# Patient Record
Sex: FEMALE | Race: WHITE | NOT HISPANIC OR LATINO | Employment: OTHER | URBAN - METROPOLITAN AREA
[De-identification: names, ages, dates, MRNs, and addresses within clinical notes are randomized per-mention and may not be internally consistent; named-entity substitution may affect disease eponyms.]

---

## 2020-04-04 ENCOUNTER — APPOINTMENT (OUTPATIENT)
Dept: RADIOLOGY | Facility: MEDICAL CENTER | Age: 75
DRG: 562 | End: 2020-04-04
Attending: EMERGENCY MEDICINE
Payer: MEDICARE

## 2020-04-04 ENCOUNTER — APPOINTMENT (OUTPATIENT)
Dept: CARDIOLOGY | Facility: MEDICAL CENTER | Age: 75
DRG: 562 | End: 2020-04-04
Attending: HOSPITALIST
Payer: MEDICARE

## 2020-04-04 ENCOUNTER — HOSPITAL ENCOUNTER (INPATIENT)
Facility: MEDICAL CENTER | Age: 75
LOS: 3 days | DRG: 562 | End: 2020-04-07
Attending: EMERGENCY MEDICINE | Admitting: HOSPITALIST
Payer: MEDICARE

## 2020-04-04 DIAGNOSIS — J96.01 ACUTE RESPIRATORY FAILURE WITH HYPOXIA (HCC): ICD-10-CM

## 2020-04-04 DIAGNOSIS — E78.5 DYSLIPIDEMIA: ICD-10-CM

## 2020-04-04 DIAGNOSIS — I10 ESSENTIAL HYPERTENSION: ICD-10-CM

## 2020-04-04 DIAGNOSIS — S42.209A PROXIMAL HUMERAL FRACTURE: ICD-10-CM

## 2020-04-04 DIAGNOSIS — R09.02 HYPOXIA: ICD-10-CM

## 2020-04-04 DIAGNOSIS — S42.326A: ICD-10-CM

## 2020-04-04 PROBLEM — Z71.89 ACP (ADVANCE CARE PLANNING): Status: ACTIVE | Noted: 2020-04-04

## 2020-04-04 PROBLEM — S42.309A HUMERUS FRACTURE: Status: ACTIVE | Noted: 2020-04-04

## 2020-04-04 PROBLEM — D53.9 MACROCYTIC ANEMIA: Status: ACTIVE | Noted: 2020-04-04

## 2020-04-04 LAB
ANION GAP SERPL CALC-SCNC: 15 MMOL/L (ref 7–16)
BASOPHILS # BLD AUTO: 0.3 % (ref 0–1.8)
BASOPHILS # BLD: 0.05 K/UL (ref 0–0.12)
BUN SERPL-MCNC: 21 MG/DL (ref 8–22)
CALCIUM SERPL-MCNC: 9.6 MG/DL (ref 8.4–10.2)
CHLORIDE SERPL-SCNC: 98 MMOL/L (ref 96–112)
CO2 SERPL-SCNC: 23 MMOL/L (ref 20–33)
CREAT SERPL-MCNC: 0.52 MG/DL (ref 0.5–1.4)
EOSINOPHIL # BLD AUTO: 0.1 K/UL (ref 0–0.51)
EOSINOPHIL NFR BLD: 0.6 % (ref 0–6.9)
ERYTHROCYTE [DISTWIDTH] IN BLOOD BY AUTOMATED COUNT: 48.4 FL (ref 35.9–50)
GLUCOSE SERPL-MCNC: 331 MG/DL (ref 65–99)
HCT VFR BLD AUTO: 41 % (ref 37–47)
HGB BLD-MCNC: 13 G/DL (ref 12–16)
IMM GRANULOCYTES # BLD AUTO: 0.11 K/UL (ref 0–0.11)
IMM GRANULOCYTES NFR BLD AUTO: 0.7 % (ref 0–0.9)
LYMPHOCYTES # BLD AUTO: 1.78 K/UL (ref 1–4.8)
LYMPHOCYTES NFR BLD: 10.6 % (ref 22–41)
MCH RBC QN AUTO: 32 PG (ref 27–33)
MCHC RBC AUTO-ENTMCNC: 31.7 G/DL (ref 33.6–35)
MCV RBC AUTO: 101 FL (ref 81.4–97.8)
MONOCYTES # BLD AUTO: 0.75 K/UL (ref 0–0.85)
MONOCYTES NFR BLD AUTO: 4.5 % (ref 0–13.4)
NEUTROPHILS # BLD AUTO: 13.97 K/UL (ref 2–7.15)
NEUTROPHILS NFR BLD: 83.3 % (ref 44–72)
NRBC # BLD AUTO: 0 K/UL
NRBC BLD-RTO: 0 /100 WBC
PLATELET # BLD AUTO: 288 K/UL (ref 164–446)
PMV BLD AUTO: 10 FL (ref 9–12.9)
POTASSIUM SERPL-SCNC: 4.1 MMOL/L (ref 3.6–5.5)
RBC # BLD AUTO: 4.06 M/UL (ref 4.2–5.4)
SODIUM SERPL-SCNC: 136 MMOL/L (ref 135–145)
WBC # BLD AUTO: 16.8 K/UL (ref 4.8–10.8)

## 2020-04-04 PROCEDURE — 700102 HCHG RX REV CODE 250 W/ 637 OVERRIDE(OP): Performed by: HOSPITALIST

## 2020-04-04 PROCEDURE — 82607 VITAMIN B-12: CPT

## 2020-04-04 PROCEDURE — 71045 X-RAY EXAM CHEST 1 VIEW: CPT

## 2020-04-04 PROCEDURE — 80048 BASIC METABOLIC PNL TOTAL CA: CPT

## 2020-04-04 PROCEDURE — 99223 1ST HOSP IP/OBS HIGH 75: CPT | Mod: 25 | Performed by: HOSPITALIST

## 2020-04-04 PROCEDURE — 99285 EMERGENCY DEPT VISIT HI MDM: CPT

## 2020-04-04 PROCEDURE — 770006 HCHG ROOM/CARE - MED/SURG/GYN SEMI*

## 2020-04-04 PROCEDURE — A9270 NON-COVERED ITEM OR SERVICE: HCPCS | Performed by: EMERGENCY MEDICINE

## 2020-04-04 PROCEDURE — 93306 TTE W/DOPPLER COMPLETE: CPT

## 2020-04-04 PROCEDURE — 96374 THER/PROPH/DIAG INJ IV PUSH: CPT

## 2020-04-04 PROCEDURE — 700102 HCHG RX REV CODE 250 W/ 637 OVERRIDE(OP): Performed by: EMERGENCY MEDICINE

## 2020-04-04 PROCEDURE — A9270 NON-COVERED ITEM OR SERVICE: HCPCS | Performed by: HOSPITALIST

## 2020-04-04 PROCEDURE — 94760 N-INVAS EAR/PLS OXIMETRY 1: CPT

## 2020-04-04 PROCEDURE — 85025 COMPLETE CBC W/AUTO DIFF WBC: CPT

## 2020-04-04 PROCEDURE — 700111 HCHG RX REV CODE 636 W/ 250 OVERRIDE (IP): Performed by: EMERGENCY MEDICINE

## 2020-04-04 PROCEDURE — 36415 COLL VENOUS BLD VENIPUNCTURE: CPT

## 2020-04-04 PROCEDURE — 96375 TX/PRO/DX INJ NEW DRUG ADDON: CPT

## 2020-04-04 PROCEDURE — 73030 X-RAY EXAM OF SHOULDER: CPT | Mod: RT

## 2020-04-04 PROCEDURE — 700111 HCHG RX REV CODE 636 W/ 250 OVERRIDE (IP): Performed by: HOSPITALIST

## 2020-04-04 PROCEDURE — 99497 ADVNCD CARE PLAN 30 MIN: CPT | Performed by: HOSPITALIST

## 2020-04-04 RX ORDER — AMOXICILLIN 250 MG
2 CAPSULE ORAL 2 TIMES DAILY
Status: DISCONTINUED | OUTPATIENT
Start: 2020-04-04 | End: 2020-04-07 | Stop reason: HOSPADM

## 2020-04-04 RX ORDER — MORPHINE SULFATE 4 MG/ML
2 INJECTION, SOLUTION INTRAMUSCULAR; INTRAVENOUS ONCE
Status: COMPLETED | OUTPATIENT
Start: 2020-04-04 | End: 2020-04-04

## 2020-04-04 RX ORDER — ONDANSETRON 2 MG/ML
4 INJECTION INTRAMUSCULAR; INTRAVENOUS EVERY 4 HOURS PRN
Status: DISCONTINUED | OUTPATIENT
Start: 2020-04-04 | End: 2020-04-07 | Stop reason: HOSPADM

## 2020-04-04 RX ORDER — GLIMEPIRIDE 4 MG/1
4 TABLET ORAL DAILY
COMMUNITY
Start: 2020-02-24

## 2020-04-04 RX ORDER — OXYBUTYNIN CHLORIDE 5 MG/1
5 TABLET ORAL 2 TIMES DAILY
COMMUNITY
Start: 2020-03-13 | End: 2021-06-23

## 2020-04-04 RX ORDER — ACETAMINOPHEN 325 MG/1
650 TABLET ORAL EVERY 6 HOURS PRN
Status: DISCONTINUED | OUTPATIENT
Start: 2020-04-04 | End: 2020-04-07 | Stop reason: HOSPADM

## 2020-04-04 RX ORDER — ACETAMINOPHEN 325 MG/1
650 TABLET ORAL ONCE
Status: COMPLETED | OUTPATIENT
Start: 2020-04-04 | End: 2020-04-04

## 2020-04-04 RX ORDER — OXYCODONE HYDROCHLORIDE 5 MG/1
5 TABLET ORAL
Status: DISCONTINUED | OUTPATIENT
Start: 2020-04-04 | End: 2020-04-07 | Stop reason: HOSPADM

## 2020-04-04 RX ORDER — BISACODYL 10 MG
10 SUPPOSITORY, RECTAL RECTAL
Status: DISCONTINUED | OUTPATIENT
Start: 2020-04-04 | End: 2020-04-07 | Stop reason: HOSPADM

## 2020-04-04 RX ORDER — OXYCODONE HYDROCHLORIDE 5 MG/1
2.5 TABLET ORAL
Status: DISCONTINUED | OUTPATIENT
Start: 2020-04-04 | End: 2020-04-07 | Stop reason: HOSPADM

## 2020-04-04 RX ORDER — ENALAPRILAT 1.25 MG/ML
1.25 INJECTION INTRAVENOUS EVERY 6 HOURS PRN
Status: DISCONTINUED | OUTPATIENT
Start: 2020-04-04 | End: 2020-04-07 | Stop reason: HOSPADM

## 2020-04-04 RX ORDER — LEVOTHYROXINE SODIUM 0.03 MG/1
25 TABLET ORAL DAILY
COMMUNITY
Start: 2020-03-13

## 2020-04-04 RX ORDER — ONDANSETRON 4 MG/1
4 TABLET, ORALLY DISINTEGRATING ORAL EVERY 4 HOURS PRN
Status: DISCONTINUED | OUTPATIENT
Start: 2020-04-04 | End: 2020-04-07 | Stop reason: HOSPADM

## 2020-04-04 RX ORDER — METFORMIN HYDROCHLORIDE 500 MG/1
500 TABLET, EXTENDED RELEASE ORAL 2 TIMES DAILY
COMMUNITY
Start: 2020-01-18

## 2020-04-04 RX ORDER — HYDROMORPHONE HYDROCHLORIDE 1 MG/ML
0.25 INJECTION, SOLUTION INTRAMUSCULAR; INTRAVENOUS; SUBCUTANEOUS
Status: DISCONTINUED | OUTPATIENT
Start: 2020-04-04 | End: 2020-04-07 | Stop reason: HOSPADM

## 2020-04-04 RX ORDER — POLYETHYLENE GLYCOL 3350 17 G/17G
1 POWDER, FOR SOLUTION ORAL
Status: DISCONTINUED | OUTPATIENT
Start: 2020-04-04 | End: 2020-04-07 | Stop reason: HOSPADM

## 2020-04-04 RX ADMIN — ONDANSETRON 4 MG: 2 INJECTION INTRAMUSCULAR; INTRAVENOUS at 23:34

## 2020-04-04 RX ADMIN — MORPHINE SULFATE 2 MG: 4 INJECTION INTRAVENOUS at 11:38

## 2020-04-04 RX ADMIN — OXYCODONE HYDROCHLORIDE 5 MG: 5 TABLET ORAL at 14:34

## 2020-04-04 RX ADMIN — FENTANYL CITRATE 50 MCG: 50 INJECTION, SOLUTION INTRAMUSCULAR; INTRAVENOUS at 12:34

## 2020-04-04 RX ADMIN — ACETAMINOPHEN 650 MG: 325 TABLET, FILM COATED ORAL at 11:47

## 2020-04-04 RX ADMIN — ONDANSETRON 4 MG: 2 INJECTION INTRAMUSCULAR; INTRAVENOUS at 19:39

## 2020-04-04 RX ADMIN — ENOXAPARIN SODIUM 40 MG: 40 INJECTION SUBCUTANEOUS at 14:34

## 2020-04-04 RX ADMIN — OXYCODONE HYDROCHLORIDE 5 MG: 5 TABLET ORAL at 23:31

## 2020-04-04 ASSESSMENT — COGNITIVE AND FUNCTIONAL STATUS - GENERAL
TOILETING: TOTAL
WALKING IN HOSPITAL ROOM: A LOT
MOBILITY SCORE: 12
MOVING FROM LYING ON BACK TO SITTING ON SIDE OF FLAT BED: A LOT
DRESSING REGULAR LOWER BODY CLOTHING: TOTAL
SUGGESTED CMS G CODE MODIFIER MOBILITY: CL
EATING MEALS: A LITTLE
HELP NEEDED FOR BATHING: TOTAL
STANDING UP FROM CHAIR USING ARMS: A LOT
MOVING TO AND FROM BED TO CHAIR: A LOT
PERSONAL GROOMING: A LITTLE
TURNING FROM BACK TO SIDE WHILE IN FLAT BAD: A LOT
DAILY ACTIVITIY SCORE: 10
SUGGESTED CMS G CODE MODIFIER DAILY ACTIVITY: CL
CLIMB 3 TO 5 STEPS WITH RAILING: A LOT
DRESSING REGULAR UPPER BODY CLOTHING: TOTAL

## 2020-04-04 ASSESSMENT — LIFESTYLE VARIABLES
HAVE PEOPLE ANNOYED YOU BY CRITICIZING YOUR DRINKING: NO
TOTAL SCORE: 0
AVERAGE NUMBER OF DAYS PER WEEK YOU HAVE A DRINK CONTAINING ALCOHOL: 0
EVER_SMOKED: YES
EVER FELT BAD OR GUILTY ABOUT YOUR DRINKING: NO
EVER HAD A DRINK FIRST THING IN THE MORNING TO STEADY YOUR NERVES TO GET RID OF A HANGOVER: NO
CONSUMPTION TOTAL: NEGATIVE
HAVE YOU EVER FELT YOU SHOULD CUT DOWN ON YOUR DRINKING: NO
ON A TYPICAL DAY WHEN YOU DRINK ALCOHOL HOW MANY DRINKS DO YOU HAVE: 0
ALCOHOL_USE: NO
HOW MANY TIMES IN THE PAST YEAR HAVE YOU HAD 5 OR MORE DRINKS IN A DAY: 0
TOTAL SCORE: 0
TOTAL SCORE: 0

## 2020-04-04 ASSESSMENT — PATIENT HEALTH QUESTIONNAIRE - PHQ9
SUM OF ALL RESPONSES TO PHQ QUESTIONS 1-9: 15
7. TROUBLE CONCENTRATING ON THINGS, SUCH AS READING THE NEWSPAPER OR WATCHING TELEVISION: NOT AT ALL
8. MOVING OR SPEAKING SO SLOWLY THAT OTHER PEOPLE COULD HAVE NOTICED. OR THE OPPOSITE, BEING SO FIGETY OR RESTLESS THAT YOU HAVE BEEN MOVING AROUND A LOT MORE THAN USUAL: NOT AT ALL
2. FEELING DOWN, DEPRESSED, IRRITABLE, OR HOPELESS: NEARLY EVERY DAY
SUM OF ALL RESPONSES TO PHQ9 QUESTIONS 1 AND 2: 5
5. POOR APPETITE OR OVEREATING: NEARLY EVERY DAY
4. FEELING TIRED OR HAVING LITTLE ENERGY: NEARLY EVERY DAY
3. TROUBLE FALLING OR STAYING ASLEEP OR SLEEPING TOO MUCH: NEARLY EVERY DAY
9. THOUGHTS THAT YOU WOULD BE BETTER OFF DEAD, OR OF HURTING YOURSELF: NOT AT ALL
6. FEELING BAD ABOUT YOURSELF - OR THAT YOU ARE A FAILURE OR HAVE LET YOURSELF OR YOUR FAMILY DOWN: SEVERAL DAYS
1. LITTLE INTEREST OR PLEASURE IN DOING THINGS: MORE THAN HALF THE DAYS

## 2020-04-04 ASSESSMENT — COPD QUESTIONNAIRES
IN THE PAST 12 MONTHS DO YOU DO LESS THAN YOU USED TO BECAUSE OF YOUR BREATHING PROBLEMS: STRONGLY AGREE
HAVE YOU SMOKED AT LEAST 100 CIGARETTES IN YOUR ENTIRE LIFE: YES
COPD SCREENING SCORE: 8
DURING THE PAST 4 WEEKS HOW MUCH DID YOU FEEL SHORT OF BREATH: MOST  OR ALL OF THE TIME
DO YOU EVER COUGH UP ANY MUCUS OR PHLEGM?: NO/ONLY WITH OCCASIONAL COLDS OR INFECTIONS

## 2020-04-04 ASSESSMENT — ENCOUNTER SYMPTOMS
DOUBLE VISION: 0
NAUSEA: 0
DIZZINESS: 0
PALPITATIONS: 0
TREMORS: 0
ORTHOPNEA: 0
SENSORY CHANGE: 0
TINGLING: 0
HEADACHES: 0
FEVER: 0
SHORTNESS OF BREATH: 0
VOMITING: 0
COUGH: 0
HEARTBURN: 0
CLAUDICATION: 0
CHILLS: 0
BLURRED VISION: 0

## 2020-04-04 NOTE — ED NOTES
Care assumed at this time. Patient resting in bed. Patient occasionally self adjusts in bed. Bed is at lowest position and locked. Call light and preferred belongings in reach. Will continue to monitor.

## 2020-04-04 NOTE — ASSESSMENT & PLAN NOTE
Of unclear etiology  Low suspicion for PE  We do not have a previous echo on file  Chest x-ray personally reviewed shows possible vascular congestion  Repeat echo shows grade 2 diastolic dysfunction  Start Lasix 40 mg p.o. daily  RT protocol  Continuous pulse ox  Wean O2 as able

## 2020-04-04 NOTE — ED TRIAGE NOTES
Pt BIB [facility] from home.  Pt had a GLF this morning in her kitchen.  Pt lives alone.  Per EMS, pt was c/o pain to her R side.  Pt given Fentanyl 100mcg and Zofran 4mg by EMS.  Pt was found to be hypoxic for EMS @ 87%.  Pt placed on O2 by EMS.  FSBS .  On arrival to the ER, pt awake and c/o pain 3/10 to her R shoulder, R elbow and R arm.  Pt found to be 78% on R/A.  Pt states she has COPD and does not use home oxygen.  Pt placed on 4L O2 and sats up to 99%.  Warm blankets given.  Call light within reach.  Chart up for ERP.

## 2020-04-04 NOTE — ED NOTES
Unable to complete med rec at this time  Tried to call pt and she did not answer  No pharmacy on file to update med rec with

## 2020-04-04 NOTE — ASSESSMENT & PLAN NOTE
I discussed advance care planning with the patient for at least 18 minutes, including diagnosis, prognosis, plan of care, risks and benefits of any therapies that could be offered, as well as alternatives including palliation and hospice, as appropriate.  We discussed CODE STATUS, patient is wanting full code.

## 2020-04-04 NOTE — ED PROVIDER NOTES
ED Provider Note    CHIEF COMPLAINT  Chief Complaint   Patient presents with   • T-5000 GLF   • Shoulder Pain     right   • Arm Pain     right       HPI  Gabrielle Olmstead is a 74 y.o. female who presents complaining of right shoulder pain.  The patient was in her usual state of health, was leaning over to kill some ants on the floor, lost her balance landing onto her right shoulder.  She denies any loss of consciousness.  There is no headache or head injury.  No chest pain or shortness of breath.  She has a cough but points out that this is the same cough that she always has no changes here.  She denies any sick contacts or recent travel.  No fever.  It is not sound like she is aware she has a diagnosis of COPD.  There is no leg pain or swelling.  She denies any difficulty sleeping such as orthopnea or PND.  She denies injure anything else.  There is no other complaint.    PAST MEDICAL HISTORY  Past Medical History:   Diagnosis Date   • Arthritis    • ASTHMA    • Breath shortness    • Bronchitis    • Diabetes     ORAL   • EMPHYSEMA    • Hypertension    • Other specified disorder of intestines        FAMILY HISTORY  History reviewed. No pertinent family history.    SOCIAL HISTORY  Social History     Tobacco Use   • Smoking status: Former Smoker     Packs/day: 2.00     Years: 40.00     Pack years: 80.00     Types: Cigarettes   • Smokeless tobacco: Never Used   • Tobacco comment: 45 YEARS  11/2 PPD   Substance Use Topics   • Alcohol use: No     Comment: OCCASSIONAL   • Drug use: No         SURGICAL HISTORY  Past Surgical History:   Procedure Laterality Date   • HYSTERECTOMY ROBOTIC  11/28/2011    Performed by REKHA BYERS at SURGERY [facility] ORS   • LESION EXCISION GENERAL  11/28/2011    Performed by REKHA BYERS at SURGERY [facility]   • CYST EXCISION  8/6/2010    Performed by ANA PAVON at SURGERY SAME DAY [facility]       CURRENT MEDICATIONS  Home Medications     Reviewed by Lowell Álvarez (Pharmacy  "Tech) on 04/04/20 at 1223  Med List Status: Complete   Medication Last Dose Status   amlodipine-benazepril (LOTREL) 5-20 MG per capsule UNK Active   atorvastatin (LIPITOR) 40 MG TABS UNK Active   dicyclomine (BENTYL) 20 MG TABS UNK Active   fluticasone-salmeterol (ADVAIR) 250-50 MCG/DOSE AEPB UNK Active   glimepiride (AMARYL) 4 MG Tab UNK Active   hydrochlorothiazide (HYDRODIURIL) 25 MG TABS UNK Active   levothyroxine (SYNTHROID) 25 MCG Tab UNK Active   metFORMIN ER (GLUCOPHAGE XR) 500 MG TABLET SR 24 HR UNK Active   oxybutynin (DITROPAN) 5 MG Tab UNK Active   sertraline (ZOLOFT) 50 MG Tab UNK Active                I have reviewed the nurses notes and/or the list brought with the patient.    ALLERGIES  Allergies   Allergen Reactions   • Nkda [No Known Drug Allergy]        REVIEW OF SYSTEMS  See HPI for further details. Review of systems as above, otherwise all other systems are negative.     PHYSICAL EXAM  VITAL SIGNS: BP (!) 175/71   Pulse 75   Temp 36.7 °C (98 °F) (Temporal)   Resp 18   Ht 1.626 m (5' 4\")   Wt 108 kg (238 lb 1.6 oz) Comment: per [facility] on scale upon entry  SpO2 93%   BMI 40.87 kg/m²     Constitutional: Well appearing patient in no acute distress.  Not toxic, nor ill in appearance.  HENT: Mucus membranes moist.  Oropharynx is clear.  Eyes: Pupils equally round.  No scleral icterus.   Neck: Full nontender range of motion.  Lymphatic: No cervical lymphadenopathy noted.   Cardiovascular: Regular heart rate and rhythm.  No murmurs, rubs, nor gallop appreciated.   Thorax & Lungs: Chest is nontender.  Lungs are clear to auscultation with good air movement bilaterally.  No wheeze, rhonchi, nor rales.   Abdomen: Soft, with no tenderness, rebound nor guarding.  No mass, pulsatile mass, nor hepatosplenomegaly appreciated.  Skin: No purpura nor petechia noted.  Extremities/Musculoskeletal: Quite tender over the proximal humerus.  Some edema here.  No evidence of dislocation.  She is uninjured more " distally.  Neurologic: Alert & oriented.  Strength and sensation is intact all around.  Gait is not assessed.  Psychiatric: Normal affect appropriate for the clinical situation.    LABS  Labs Reviewed   CBC WITH DIFFERENTIAL - Abnormal; Notable for the following components:       Result Value    WBC 16.8 (*)     RBC 4.06 (*)     .0 (*)     MCHC 31.7 (*)     Neutrophils-Polys 83.30 (*)     Lymphocytes 10.60 (*)     Neutrophils (Absolute) 13.97 (*)     All other components within normal limits   BASIC METABOLIC PANEL - Abnormal; Notable for the following components:    Glucose 331 (*)     All other components within normal limits   ESTIMATED GFR         RADIOLOGY/PROCEDURES  I have reviewed the patient's film interpretations myself, and they are read out by the radiologist as:   DX-SHOULDER 2+ RIGHT   Final Result      Impacted and angulated RIGHT proximal humerus fracture with associated hemarthrosis.      DX-CHEST-PORTABLE (1 VIEW)   Final Result      1.  Hypoinflation and pulmonary vascular congestion.   2.  Moderate cardiac enlargement, increased from prior exam.   3.  Impacted RIGHT proximal humerus fracture.        .    MEDICAL RECORD  I have reviewed patient's medical record and pertinent results are listed above.    COURSE & MEDICAL DECISION MAKING  I have reviewed any medical record information, laboratory studies and radiographic results as noted above.  This patient presents after mechanical ground-level fall with pain in her right shoulder.  She is in quite a bit of discomfort despite IV opiate analgesia as well as oral acetaminophen.  At this point, I agree with the nursing staff that she is unable to care for herself safely at this time.  I do note that she has hypoxia which corrects with supplemental nasal cannula.  She does have leukocytosis as well.  As noted above the patient is quite clear that she is not short of breath, does not have a new cough.  She was a heavy smoker but quit about 8  years ago.  For this reason, and the fact that her x-ray shows no evidence of infiltrate, I have held off on antibiotics.  Emphysema is listed on her medication problems but she does not know about a diagnosis of this or COPD.  At this point put her in the hospital for ongoing pain control, physical and potentially Occupational Therapy.  Discussed the patient's case with Dr. Lloyd who will be seeing her for admission.  Also discussed the patient's case with Dr. Loredo, orthopedics, who will be consulting.  She be placed into a sling.    FINAL IMPRESSION  1. Proximal humeral fracture    2. Hypoxia           This dictation was created using voice recognition software.    Electronically signed by: Vj Adams M.D., 4/4/2020 1:05 PM

## 2020-04-04 NOTE — H&P
Hospital Medicine History & Physical Note    Date of Service  4/4/2020    Primary Care Physician  Anthony Martinez M.D.    Consultants  Orthopedics    Code Status  Full    Chief Complaint  Fall with arm pain    History of Presenting Illness  74 y.o. female who presented 4/4/2020 with fall at home, patient was bending over at the waist to place in and says she is on the ground and fell forward onto her right shoulder and arm.  Imaging confirms impacted and angulated RIGHT proximal humerus fracture with associated hemarthrosis.  This is not happened to her before.  She denies any loss of consciousness no chest pain headaches or vision changes prior to the event.  She simply lost her balance and fell forward, she is unable to bend well at the knees due to arthritis and so leaning too far forward over her hips.    Denies recent fevers, no recent sick contacts.  No cough.  In the emergency department she was found to be hypoxic requiring 2 L via nasal cannula.  She denies oxygen use at home.  I confirmed patient is hypoxic on room air with O2 sats dipping into the mid 80s.  No tachycardia.  Patient denies any lower extremity pain or swelling, no recent long trips.    I discussed this case with Dr Adams of the emergency department.  He kindly consulted orthopedics and discussed her care, patient will be placed into a sling.    Review of Systems  Review of Systems   Constitutional: Negative for chills and fever.   Eyes: Negative for blurred vision and double vision.   Respiratory: Negative for cough and shortness of breath.    Cardiovascular: Negative for chest pain, palpitations, orthopnea, claudication and leg swelling.   Gastrointestinal: Negative for heartburn, nausea and vomiting.   Musculoskeletal: Positive for joint pain.   Skin: Negative for itching and rash.   Neurological: Negative for dizziness, tingling, tremors, sensory change and headaches.   All other systems reviewed and are negative.      Past Medical  History   has a past medical history of Arthritis, ASTHMA, Breath shortness, Bronchitis, Diabetes, EMPHYSEMA, Hypertension, and Other specified disorder of intestines.    Surgical History   has a past surgical history that includes cyst excision (8/6/2010); hysterectomy robotic (11/28/2011); and lesion excision general (11/28/2011).     Family History  family history is not on file.     Social History   reports that she has quit smoking. Her smoking use included cigarettes. She has a 80.00 pack-year smoking history. She has never used smokeless tobacco. She reports that she does not drink alcohol or use drugs.    Allergies  Allergies   Allergen Reactions   • Nkda [No Known Drug Allergy]        Medications  Prior to Admission Medications   Prescriptions Last Dose Informant Patient Reported? Taking?   amlodipine-benazepril (LOTREL) 5-20 MG per capsule UNK at [facility] Patient's Home Pharmacy Yes No   Sig: Take 1 Cap by mouth every day.   atorvastatin (LIPITOR) 40 MG TABS UNK at [facility] Patient's Home Pharmacy Yes No   Sig: Take 40 mg by mouth every evening.   dicyclomine (BENTYL) 20 MG TABS UNK at [facility] Patient's Home Pharmacy Yes No   Sig: Take 20 mg by mouth every 6 hours.   fluticasone-salmeterol (ADVAIR) 250-50 MCG/DOSE AEPB UNK at [facility] Patient's Home Pharmacy Yes No   Sig: Inhale 1 Puff by mouth every 12 hours.   glimepiride (AMARYL) 4 MG Tab UNK at [facility] Patient's Home Pharmacy Yes No   Sig: Take 4 mg by mouth every day.   hydrochlorothiazide (HYDRODIURIL) 25 MG TABS UNK at [facility] Patient's Home Pharmacy Yes No   Sig: Take 25 mg by mouth every day.   levothyroxine (SYNTHROID) 25 MCG Tab UNK at [facility] Patient's Home Pharmacy Yes No   Sig: Take 25 mcg by mouth every day.   metFORMIN ER (GLUCOPHAGE XR) 500 MG TABLET SR 24 HR UNK at [facility] Patient's Home Pharmacy Yes No   Sig: Take 500 mg by mouth 2 Times a Day.   oxybutynin (DITROPAN) 5 MG Tab UNK at [facility] Patient's Home Pharmacy Yes No   Sig: Take 5 mg by mouth 2 Times a Day.   sertraline  (ZOLOFT) 50 MG Tab UNK at [facility] Patient's Home Pharmacy Yes No   Sig: Take 50 mg by mouth every day.      Facility-Administered Medications: None       Physical Exam  Temp:  [36.6 °C (97.8 °F)-36.7 °C (98 °F)] 36.6 °C (97.8 °F)  Pulse:  [75-86] 77  Resp:  [18] 18  BP: (156-201)/(48-71) 156/57  SpO2:  [78 %-97 %] 96 %    Physical Exam  Vitals signs and nursing note reviewed.   Constitutional:       General: She is not in acute distress.     Appearance: She is obese. She is ill-appearing.   HENT:      Head: Normocephalic and atraumatic.      Nose: No congestion.      Mouth/Throat:      Mouth: Mucous membranes are moist.      Pharynx: Oropharynx is clear.   Eyes:      General: No scleral icterus.        Right eye: No discharge.         Left eye: No discharge.      Extraocular Movements: Extraocular movements intact.      Conjunctiva/sclera: Conjunctivae normal.   Neck:      Musculoskeletal: Normal range of motion and neck supple. No neck rigidity.   Cardiovascular:      Rate and Rhythm: Normal rate and regular rhythm.      Pulses: Normal pulses.      Heart sounds: Normal heart sounds. No murmur.   Pulmonary:      Effort: Pulmonary effort is normal. No respiratory distress.      Breath sounds: Normal breath sounds. No stridor. No wheezing.   Abdominal:      General: Bowel sounds are normal. There is no distension.      Palpations: Abdomen is soft.      Tenderness: There is no abdominal tenderness. There is no guarding.   Musculoskeletal:         General: Swelling, tenderness and signs of injury present.   Skin:     General: Skin is warm and dry.      Coloration: Skin is not jaundiced.      Findings: No bruising.   Neurological:      General: No focal deficit present.      Mental Status: She is alert and oriented to person, place, and time.      Cranial Nerves: No cranial nerve deficit.   Psychiatric:         Mood and Affect: Mood normal.         Thought Content: Thought content normal.         Laboratory:  Recent Labs      04/04/20  1145   WBC 16.8*   RBC 4.06*   HEMOGLOBIN 13.0   HEMATOCRIT 41.0   .0*   MCH 32.0   MCHC 31.7*   RDW 48.4   PLATELETCT 288   MPV 10.0     Recent Labs     04/04/20  1145   SODIUM 136   POTASSIUM 4.1   CHLORIDE 98   CO2 23   GLUCOSE 331*   BUN 21   CREATININE 0.52   CALCIUM 9.6     Recent Labs     04/04/20  1145   GLUCOSE 331*         No results for input(s): NTPROBNP in the last 72 hours.      No results for input(s): TROPONINT in the last 72 hours.    Urinalysis:    No results found     Imaging:  DX-SHOULDER 2+ RIGHT   Final Result      Impacted and angulated RIGHT proximal humerus fracture with associated hemarthrosis.      DX-CHEST-PORTABLE (1 VIEW)   Final Result      1.  Hypoinflation and pulmonary vascular congestion.   2.  Moderate cardiac enlargement, increased from prior exam.   3.  Impacted RIGHT proximal humerus fracture.      EC-ECHOCARDIOGRAM COMPLETE W/O CONT    (Results Pending)         Assessment/Plan:  I anticipate this patient will require at least two midnights for appropriate medical management, necessitating inpatient admission.    * Acute respiratory failure with hypoxia (HCC)  Assessment & Plan  Of unclear etiology  Low suspicion for PE  We do not have a previous echo on file  Chest x-ray personally reviewed shows possible vascular congestion  Check echo  RT protocol  Continuous pulse ox  Wean O2 as able    ACP (advance care planning)  Assessment & Plan  I discussed advance care planning with the patient for at least 18 minutes, including diagnosis, prognosis, plan of care, risks and benefits of any therapies that could be offered, as well as alternatives including palliation and hospice, as appropriate.  We discussed CODE STATUS, patient is wanting full code.      Macrocytic anemia  Assessment & Plan  Check vitamin B12 levels  Follow a.m. CBC    Humerus fracture  Assessment & Plan  After falling forward onto right side  Orthopedics consulted  Patient placed in sling  Continue  judicious pain control  PT OT ordered  Anticipate she will need skilled nursing      VTE prophylaxis: Lovenox

## 2020-04-04 NOTE — PROGRESS NOTES
Assumed care. Pt reports 4/10 humerus pain. IS given to pt and she got up to 1500 the first attempt.

## 2020-04-04 NOTE — CONSULTS
Orthopaedic Consult / H&P Note  Date: 4/4/2020    CHIEF COMPLAINT: Right shoulder pain    HISTORY OF PRESENT ILLNESS: Gabrielle Olmstead is a 74 y.o. who presents with a right proximal humerus fracture after a ground level fall today. She has knee arthritis and usually bends from her hips. She was bending to pick something up today and fell directly onto the right shoulder. She was hypoxic in the ED causing her to be admitted. She has no history of pain or surgery on this shoulder and is right hand dominant. Denies numbness, tingling, weakness, or pain elsewhere.    Past Medical History:   Diagnosis Date   • Arthritis    • ASTHMA    • Breath shortness    • Bronchitis    • Diabetes     ORAL   • EMPHYSEMA    • Hypertension    • Other specified disorder of intestines        Past Surgical History:   Procedure Laterality Date   • HYSTERECTOMY ROBOTIC  11/28/2011    Performed by REKHA BYERS at SURGERY [facility] ORS   • LESION EXCISION GENERAL  11/28/2011    Performed by REKHA BYERS at SURGERY [facility] ORS   • CYST EXCISION  8/6/2010    Performed by ANA PAVON at SURGERY SAME DAY [facility] ORS       No current facility-administered medications on file prior to encounter.      Current Outpatient Medications on File Prior to Encounter   Medication Sig Dispense Refill   • glimepiride (AMARYL) 4 MG Tab Take 4 mg by mouth every day.     • levothyroxine (SYNTHROID) 25 MCG Tab Take 25 mcg by mouth every day.     • metFORMIN ER (GLUCOPHAGE XR) 500 MG TABLET SR 24 HR Take 500 mg by mouth 2 Times a Day.     • oxybutynin (DITROPAN) 5 MG Tab Take 5 mg by mouth 2 Times a Day.     • sertraline (ZOLOFT) 50 MG Tab Take 50 mg by mouth every day.     • dicyclomine (BENTYL) 20 MG TABS Take 20 mg by mouth every 6 hours.     • fluticasone-salmeterol (ADVAIR) 250-50 MCG/DOSE AEPB Inhale 1 Puff by mouth every 12 hours.     • hydrochlorothiazide (HYDRODIURIL) 25 MG TABS Take 25 mg by mouth every day.     • amlodipine-benazepril (LOTREL) 5-20  MG per capsule Take 1 Cap by mouth every day.     • atorvastatin (LIPITOR) 40 MG TABS Take 40 mg by mouth every evening.         Allergies: Nkda [no known drug allergy]    Social History     Socioeconomic History   • Marital status:      Spouse name: Not on file   • Number of children: Not on file   • Years of education: Not on file   • Highest education level: Not on file   Occupational History   • Not on file   Social Needs   • Financial resource strain: Not on file   • Food insecurity     Worry: Not on file     Inability: Not on file   • Transportation needs     Medical: Not on file     Non-medical: Not on file   Tobacco Use   • Smoking status: Former Smoker     Packs/day: 2.00     Years: 40.00     Pack years: 80.00     Types: Cigarettes   • Smokeless tobacco: Never Used   • Tobacco comment: 45 YEARS  11/2 PPD   Substance and Sexual Activity   • Alcohol use: No     Comment: OCCASSIONAL   • Drug use: No   • Sexual activity: Not on file   Lifestyle   • Physical activity     Days per week: Not on file     Minutes per session: Not on file   • Stress: Not on file   Relationships   • Social connections     Talks on phone: Not on file     Gets together: Not on file     Attends Rastafari service: Not on file     Active member of club or organization: Not on file     Attends meetings of clubs or organizations: Not on file     Relationship status: Not on file   • Intimate partner violence     Fear of current or ex partner: Not on file     Emotionally abused: Not on file     Physically abused: Not on file     Forced sexual activity: Not on file   Other Topics Concern   • Not on file   Social History Narrative   • Not on file       History reviewed. No pertinent family history.    REVIEW OF SYSTEMS: Full 13-point review of systems obtained with positives   noted in the HPI above, all others negative.    PHYSICAL EXAMINATION:  Vitals:    04/04/20 1245 04/04/20 1335 04/04/20 1434 04/04/20 1525   BP: (!) 175/71 156/57    "  Pulse: 75 77  85   Resp:  18 18 18   Temp:  36.6 °C (97.8 °F)     TempSrc:  Oral     SpO2: 93% 96%  93%   Weight:  107.5 kg (236 lb 15.9 oz)     Height:  1.6 m (5' 3\")         GENERAL: No acute distress, alert and oriented x3.  HEENT: Normocephalic, atraumatic  CARDIOVASCULAR: Regular rate  RESPIRATORY: Unlabored  ABDOMEN: Soft, nontender, nondistended.  SKIN: Some swelling/bruising, no open wound noted.   EXTREMITIES: No clubbing, cyanosis or edema, Pain in right shoulder, other extremities have no pain with palpation.   MUSCULOSKELETAL: Right shoulder with swelling, faint bruising. Able to make OK sign, cross fingers, and extend thumb IP joint. SILT in radial, median, ulnar, axillary nerve distrubutions. Palpable radial and ulnar pulses    LABORATORY DATA:     Lab Results   Component Value Date/Time    WBC 16.8 (H) 04/04/2020 11:45 AM    RBC 4.06 (L) 04/04/2020 11:45 AM    HEMOGLOBIN 13.0 04/04/2020 11:45 AM    HEMATOCRIT 41.0 04/04/2020 11:45 AM        Lab Results   Component Value Date/Time    SODIUM 136 04/04/2020 11:45 AM    POTASSIUM 4.1 04/04/2020 11:45 AM    CHLORIDE 98 04/04/2020 11:45 AM    CO2 23 04/04/2020 11:45 AM    GLUCOSE 331 (H) 04/04/2020 11:45 AM    BUN 21 04/04/2020 11:45 AM    CREATININE 0.52 04/04/2020 11:45 AM        Lab Results   Component Value Date/Time    PROTHROMBTM 11.0 11/21/2011 12:10 PM    INR 0.93 11/21/2011 12:10 PM          IMAGING: Right proximal humerus fracture with mild varus displaced neck component, comminution into tuberosities but little displacement    ASSESSMENT AND PLAN: Right proximal humerus fracture    Diet:  Weight Bearing Status-NWB RUE, AROM/PROM elbow and wrist encouraged  Please use a cuff and collar brace  Follow up with Dr. Melchor in 1 week for repeat films  No plan for surgery at this time    Christiano Loredo MD  Arthroplasty Fellow-Orthopedic Surgery  [facility]      Please page or call with any questions or concerns regarding this patient's care  "

## 2020-04-04 NOTE — ASSESSMENT & PLAN NOTE
After falling forward onto right side  Orthopedics consulted  Patient placed in sling  Continue judicious pain control  PT OT ordered  Anticipate she will need skilled nursing

## 2020-04-04 NOTE — FLOWSHEET NOTE
04/04/20 1525   Events/Summary/Plan   Events/Summary/Plan RT Consult done   Skin Inspection Respiratory Device Intact   Interdisciplinary Plan of Care-Goals (Indications)   Bronchodilator Indications History / Diagnosis   Breath Sounds   RUL Breath Sounds Clear;Diminished   RML Breath Sounds Diminished   RLL Breath Sounds Diminished   MOSHE Breath Sounds Clear;Diminished   LLL Breath Sounds Diminished   Oximetry   $ Pulse Oximetry (Spot Check) Yes   Oxygen   O2 (LPM) 2   O2 Delivery Device Nasal Cannula

## 2020-04-05 LAB
ALBUMIN SERPL BCP-MCNC: 4 G/DL (ref 3.2–4.9)
ALBUMIN/GLOB SERPL: 1.4 G/DL
ALP SERPL-CCNC: 102 U/L (ref 30–99)
ALT SERPL-CCNC: 18 U/L (ref 2–50)
ANION GAP SERPL CALC-SCNC: 14 MMOL/L (ref 7–16)
AST SERPL-CCNC: 15 U/L (ref 12–45)
BASOPHILS # BLD AUTO: 0.2 % (ref 0–1.8)
BASOPHILS # BLD: 0.02 K/UL (ref 0–0.12)
BILIRUB SERPL-MCNC: 0.3 MG/DL (ref 0.1–1.5)
BUN SERPL-MCNC: 25 MG/DL (ref 8–22)
CALCIUM SERPL-MCNC: 10.1 MG/DL (ref 8.4–10.2)
CHLORIDE SERPL-SCNC: 95 MMOL/L (ref 96–112)
CO2 SERPL-SCNC: 29 MMOL/L (ref 20–33)
CREAT SERPL-MCNC: 0.6 MG/DL (ref 0.5–1.4)
EOSINOPHIL # BLD AUTO: 0.08 K/UL (ref 0–0.51)
EOSINOPHIL NFR BLD: 0.9 % (ref 0–6.9)
ERYTHROCYTE [DISTWIDTH] IN BLOOD BY AUTOMATED COUNT: 47.1 FL (ref 35.9–50)
GLOBULIN SER CALC-MCNC: 2.8 G/DL (ref 1.9–3.5)
GLUCOSE BLD-MCNC: 241 MG/DL (ref 65–99)
GLUCOSE BLD-MCNC: 274 MG/DL (ref 65–99)
GLUCOSE SERPL-MCNC: 231 MG/DL (ref 65–99)
HCT VFR BLD AUTO: 36.7 % (ref 37–47)
HGB BLD-MCNC: 11.8 G/DL (ref 12–16)
IMM GRANULOCYTES # BLD AUTO: 0.03 K/UL (ref 0–0.11)
IMM GRANULOCYTES NFR BLD AUTO: 0.3 % (ref 0–0.9)
LV EJECT FRACT  99904: 75
LV EJECT FRACT MOD 2C 99903: 85.3
LV EJECT FRACT MOD 4C 99902: 83.42
LV EJECT FRACT MOD BP 99901: 83.54
LYMPHOCYTES # BLD AUTO: 2.48 K/UL (ref 1–4.8)
LYMPHOCYTES NFR BLD: 28.1 % (ref 22–41)
MCH RBC QN AUTO: 32 PG (ref 27–33)
MCHC RBC AUTO-ENTMCNC: 32.2 G/DL (ref 33.6–35)
MCV RBC AUTO: 99.5 FL (ref 81.4–97.8)
MONOCYTES # BLD AUTO: 0.65 K/UL (ref 0–0.85)
MONOCYTES NFR BLD AUTO: 7.4 % (ref 0–13.4)
NEUTROPHILS # BLD AUTO: 5.56 K/UL (ref 2–7.15)
NEUTROPHILS NFR BLD: 63.1 % (ref 44–72)
NRBC # BLD AUTO: 0 K/UL
NRBC BLD-RTO: 0 /100 WBC
PLATELET # BLD AUTO: 294 K/UL (ref 164–446)
PMV BLD AUTO: 9.7 FL (ref 9–12.9)
POTASSIUM SERPL-SCNC: 4 MMOL/L (ref 3.6–5.5)
PROT SERPL-MCNC: 6.8 G/DL (ref 6–8.2)
RBC # BLD AUTO: 3.69 M/UL (ref 4.2–5.4)
SODIUM SERPL-SCNC: 138 MMOL/L (ref 135–145)
VIT B12 SERPL-MCNC: 213 PG/ML (ref 211–911)
WBC # BLD AUTO: 8.8 K/UL (ref 4.8–10.8)

## 2020-04-05 PROCEDURE — 93306 TTE W/DOPPLER COMPLETE: CPT | Mod: 26 | Performed by: INTERNAL MEDICINE

## 2020-04-05 PROCEDURE — 700102 HCHG RX REV CODE 250 W/ 637 OVERRIDE(OP): Performed by: HOSPITALIST

## 2020-04-05 PROCEDURE — 36415 COLL VENOUS BLD VENIPUNCTURE: CPT

## 2020-04-05 PROCEDURE — 85025 COMPLETE CBC W/AUTO DIFF WBC: CPT

## 2020-04-05 PROCEDURE — 700111 HCHG RX REV CODE 636 W/ 250 OVERRIDE (IP): Performed by: INTERNAL MEDICINE

## 2020-04-05 PROCEDURE — 700111 HCHG RX REV CODE 636 W/ 250 OVERRIDE (IP): Performed by: HOSPITALIST

## 2020-04-05 PROCEDURE — 83036 HEMOGLOBIN GLYCOSYLATED A1C: CPT

## 2020-04-05 PROCEDURE — 99233 SBSQ HOSP IP/OBS HIGH 50: CPT | Performed by: HOSPITALIST

## 2020-04-05 PROCEDURE — 770006 HCHG ROOM/CARE - MED/SURG/GYN SEMI*

## 2020-04-05 PROCEDURE — 82962 GLUCOSE BLOOD TEST: CPT

## 2020-04-05 PROCEDURE — A9270 NON-COVERED ITEM OR SERVICE: HCPCS | Performed by: HOSPITALIST

## 2020-04-05 PROCEDURE — 80053 COMPREHEN METABOLIC PANEL: CPT

## 2020-04-05 RX ORDER — FAMOTIDINE 20 MG/1
20 TABLET, FILM COATED ORAL ONCE
Status: COMPLETED | OUTPATIENT
Start: 2020-04-05 | End: 2020-04-05

## 2020-04-05 RX ORDER — BENAZEPRIL HYDROCHLORIDE 10 MG/1
20 TABLET ORAL
Status: DISCONTINUED | OUTPATIENT
Start: 2020-04-05 | End: 2020-04-07 | Stop reason: HOSPADM

## 2020-04-05 RX ORDER — METFORMIN HYDROCHLORIDE 500 MG/1
500 TABLET, EXTENDED RELEASE ORAL 2 TIMES DAILY
Status: DISCONTINUED | OUTPATIENT
Start: 2020-04-05 | End: 2020-04-07 | Stop reason: HOSPADM

## 2020-04-05 RX ORDER — DEXTROSE MONOHYDRATE 25 G/50ML
50 INJECTION, SOLUTION INTRAVENOUS
Status: DISCONTINUED | OUTPATIENT
Start: 2020-04-05 | End: 2020-04-06

## 2020-04-05 RX ORDER — BUDESONIDE AND FORMOTEROL FUMARATE DIHYDRATE 160; 4.5 UG/1; UG/1
2 AEROSOL RESPIRATORY (INHALATION)
Status: DISCONTINUED | OUTPATIENT
Start: 2020-04-05 | End: 2020-04-07 | Stop reason: HOSPADM

## 2020-04-05 RX ORDER — AMLODIPINE BESYLATE AND BENAZEPRIL HYDROCHLORIDE 5; 20 MG/1; MG/1
1 CAPSULE ORAL DAILY
Status: DISCONTINUED | OUTPATIENT
Start: 2020-04-05 | End: 2020-04-05

## 2020-04-05 RX ORDER — ATORVASTATIN CALCIUM 40 MG/1
40 TABLET, FILM COATED ORAL NIGHTLY
Status: DISCONTINUED | OUTPATIENT
Start: 2020-04-05 | End: 2020-04-07 | Stop reason: HOSPADM

## 2020-04-05 RX ORDER — PROCHLORPERAZINE EDISYLATE 5 MG/ML
10 INJECTION INTRAMUSCULAR; INTRAVENOUS EVERY 6 HOURS PRN
Status: DISCONTINUED | OUTPATIENT
Start: 2020-04-05 | End: 2020-04-07 | Stop reason: HOSPADM

## 2020-04-05 RX ORDER — AMLODIPINE BESYLATE 5 MG/1
5 TABLET ORAL
Status: DISCONTINUED | OUTPATIENT
Start: 2020-04-05 | End: 2020-04-07 | Stop reason: HOSPADM

## 2020-04-05 RX ORDER — FUROSEMIDE 40 MG/1
40 TABLET ORAL
Status: DISCONTINUED | OUTPATIENT
Start: 2020-04-05 | End: 2020-04-07 | Stop reason: HOSPADM

## 2020-04-05 RX ORDER — FAMOTIDINE 20 MG/1
20 TABLET, FILM COATED ORAL 2 TIMES DAILY
Status: DISCONTINUED | OUTPATIENT
Start: 2020-04-05 | End: 2020-04-07 | Stop reason: HOSPADM

## 2020-04-05 RX ADMIN — INSULIN HUMAN 5 UNITS: 100 INJECTION, SOLUTION PARENTERAL at 16:33

## 2020-04-05 RX ADMIN — PROCHLORPERAZINE EDISYLATE 10 MG: 5 INJECTION INTRAMUSCULAR; INTRAVENOUS at 01:28

## 2020-04-05 RX ADMIN — FAMOTIDINE 20 MG: 20 TABLET ORAL at 12:26

## 2020-04-05 RX ADMIN — BENAZEPRIL HYDROCHLORIDE 20 MG: 10 TABLET ORAL at 09:37

## 2020-04-05 RX ADMIN — AMLODIPINE BESYLATE 5 MG: 5 TABLET ORAL at 09:37

## 2020-04-05 RX ADMIN — ATORVASTATIN CALCIUM 40 MG: 40 TABLET, FILM COATED ORAL at 20:12

## 2020-04-05 RX ADMIN — FAMOTIDINE 20 MG: 20 TABLET ORAL at 18:17

## 2020-04-05 RX ADMIN — INSULIN HUMAN 5 UNITS: 100 INJECTION, SOLUTION PARENTERAL at 20:09

## 2020-04-05 RX ADMIN — METFORMIN HYDROCHLORIDE 500 MG: 500 TABLET, EXTENDED RELEASE ORAL at 10:53

## 2020-04-05 RX ADMIN — FUROSEMIDE 40 MG: 40 TABLET ORAL at 14:18

## 2020-04-05 RX ADMIN — PROCHLORPERAZINE EDISYLATE 10 MG: 5 INJECTION INTRAMUSCULAR; INTRAVENOUS at 07:28

## 2020-04-05 RX ADMIN — ENOXAPARIN SODIUM 40 MG: 40 INJECTION SUBCUTANEOUS at 05:45

## 2020-04-05 RX ADMIN — BUDESONIDE AND FORMOTEROL FUMARATE DIHYDRATE 2 PUFF: 160; 4.5 AEROSOL RESPIRATORY (INHALATION) at 20:06

## 2020-04-05 RX ADMIN — METFORMIN HYDROCHLORIDE 500 MG: 500 TABLET, EXTENDED RELEASE ORAL at 18:17

## 2020-04-05 RX ADMIN — HYDROMORPHONE HYDROCHLORIDE 0.25 MG: 1 INJECTION, SOLUTION INTRAMUSCULAR; INTRAVENOUS; SUBCUTANEOUS at 07:28

## 2020-04-05 RX ADMIN — INSULIN HUMAN 3 UNITS: 100 INJECTION, SOLUTION PARENTERAL at 10:53

## 2020-04-05 RX ADMIN — SERTRALINE HYDROCHLORIDE 50 MG: 50 TABLET ORAL at 09:37

## 2020-04-05 ASSESSMENT — ENCOUNTER SYMPTOMS
CHILLS: 0
FEVER: 0
SENSORY CHANGE: 0
DIARRHEA: 0
HEARTBURN: 0
NAUSEA: 0
BLURRED VISION: 0
TREMORS: 0
DOUBLE VISION: 0
MYALGIAS: 1
ABDOMINAL PAIN: 0
TINGLING: 0
VOMITING: 0

## 2020-04-05 NOTE — PROGRESS NOTES
Pt sitting up in bed. Sling in place. C/o 2/10 pain in R shoulder. Medicated per MAR. Pt is requesting to rest at this time. Fall precautions in place.

## 2020-04-05 NOTE — PROGRESS NOTES
0112: Pt has continued c/o n/v despite PRN zofran. pt vomited x1. Paged MD for new orders.     0121: received orders for compazine from MD.

## 2020-04-05 NOTE — PROGRESS NOTES
[facility] Medicine Daily Progress Note    Date of Service  4/5/2020    Chief Complaint  74 y.o. female admitted 4/4/2020 with ground-level fall and proximal right humerus fracture    Hospital Course    74-year-old female admitted with fall at home, found to have right proximal humerus fracture associated with hemarthrosis.  Patient placed in a sling in the emergency department.      Interval Problem Update  Seen and examined  Orthopedics consulted on 4/4/2020, no surgical plan at this time  Patient appears oversedated today  No reported fevers overnight, right arm       Consultants/Specialty  Orthopedics    Code Status  Full    Disposition  Inpatient, may need skilled nursing in 1 to 2 days    Review of Systems  Review of Systems   Constitutional: Negative for chills and fever.   Eyes: Negative for blurred vision and double vision.   Cardiovascular: Negative for chest pain.   Gastrointestinal: Negative for abdominal pain, diarrhea, heartburn, nausea and vomiting.   Musculoskeletal: Positive for joint pain and myalgias.   Skin: Negative for itching and rash.   Neurological: Negative for tingling, tremors and sensory change.   All other systems reviewed and are negative.       Physical Exam  Temp:  [37 °C (98.6 °F)-37.1 °C (98.7 °F)] 37.1 °C (98.7 °F)  Pulse:  [85-91] 86  Resp:  [16-18] 17  BP: (125-160)/(43-78) 125/43  SpO2:  [91 %-98 %] 93 %    Physical Exam  Vitals signs and nursing note reviewed.   Constitutional:       General: She is not in acute distress.     Appearance: She is not ill-appearing.   HENT:      Head: Normocephalic and atraumatic.      Nose: No congestion.      Mouth/Throat:      Mouth: Mucous membranes are moist.      Pharynx: Oropharynx is clear.   Eyes:      General:         Right eye: No discharge.         Left eye: No discharge.      Extraocular Movements: Extraocular movements intact.      Conjunctiva/sclera: Conjunctivae normal.   Neck:      Musculoskeletal: Normal range of  motion and neck supple.   Cardiovascular:      Rate and Rhythm: Normal rate.      Pulses: Normal pulses.      Heart sounds: Normal heart sounds.   Pulmonary:      Effort: Pulmonary effort is normal. No respiratory distress.      Breath sounds: Normal breath sounds. No wheezing.   Abdominal:      General: Bowel sounds are normal. There is no distension.      Palpations: Abdomen is soft.      Tenderness: There is no abdominal tenderness. There is no guarding.   Musculoskeletal:         General: Tenderness present.      Comments: Right arm in sling   Skin:     General: Skin is warm and dry.   Neurological:      General: No focal deficit present.      Mental Status: She is alert and oriented to person, place, and time.      Cranial Nerves: No cranial nerve deficit.      Comments: Appears overly sedated   Psychiatric:         Mood and Affect: Mood normal.         Thought Content: Thought content normal.         Fluids    Intake/Output Summary (Last 24 hours) at 4/5/2020 1404  Last data filed at 4/5/2020 0800  Gross per 24 hour   Intake 720 ml   Output 775 ml   Net -55 ml       Laboratory  Recent Labs     04/04/20  1145 04/05/20  0020   WBC 16.8* 8.8   RBC 4.06* 3.69*   HEMOGLOBIN 13.0 11.8*   HEMATOCRIT 41.0 36.7*   .0* 99.5*   MCH 32.0 32.0   MCHC 31.7* 32.2*   RDW 48.4 47.1   PLATELETCT 288 294   MPV 10.0 9.7     Recent Labs     04/04/20  1145 04/05/20  0020   SODIUM 136 138   POTASSIUM 4.1 4.0   CHLORIDE 98 95*   CO2 23 29   GLUCOSE 331* 231*   BUN 21 25*   CREATININE 0.52 0.60   CALCIUM 9.6 10.1                   Imaging  EC-ECHOCARDIOGRAM COMPLETE W/O CONT   Final Result      DX-SHOULDER 2+ RIGHT   Final Result      Impacted and angulated RIGHT proximal humerus fracture with associated hemarthrosis.      DX-CHEST-PORTABLE (1 VIEW)   Final Result      1.  Hypoinflation and pulmonary vascular congestion.   2.  Moderate cardiac enlargement, increased from prior exam.   3.  Impacted RIGHT proximal humerus  fracture.           Assessment/Plan  * Acute respiratory failure with hypoxia (HCC)  Assessment & Plan  Of unclear etiology  Low suspicion for PE  We do not have a previous echo on file  Chest x-ray personally reviewed shows possible vascular congestion  Repeat echo shows grade 2 diastolic dysfunction  Start Lasix 40 mg p.o. daily  RT protocol  Continuous pulse ox  Wean O2 as able    ACP (advance care planning)  Assessment & Plan  I discussed advance care planning with the patient for at least 18 minutes, including diagnosis, prognosis, plan of care, risks and benefits of any therapies that could be offered, as well as alternatives including palliation and hospice, as appropriate.  We discussed CODE STATUS, patient is wanting full code.      Macrocytic anemia  Assessment & Plan  Check vitamin B12 levels  Follow a.m. CBC    Humerus fracture  Assessment & Plan  After falling forward onto right side  Orthopedics consulted  Patient placed in sling  Continue judicious pain control  PT OT ordered  Anticipate she will need skilled nursing       VTE prophylaxis: Lovenox    Total time: 40 minutes. I spent greater than 50% of the time for patient care, counseling, and coordination on this date, including unit/floor time, and face-to-face time with the patient as per interval events and assessment and plan above. Where indicated, the assessment and plan reflect discussion of patient with consultants, other healthcare providers, family members, and additional research needed to obtain further information in formulating the plan of care for Gabrielle Olmstead.

## 2020-04-05 NOTE — THERAPY
PT eval attempted, however, pt is increasingly lethargic today and presenting with delayed responses. RN attempted EOB, however, pt not following well. Will re-attempt once patient is able to participate with therapy session.

## 2020-04-05 NOTE — CARE PLAN
Problem: Safety  Goal: Will remain free from injury  Outcome: PROGRESSING AS EXPECTED  Goal: Will remain free from falls  Outcome: PROGRESSING AS EXPECTED  Pt has not fallen this admission. Call light and personal belongings within reach.     Problem: Venous Thromboembolism (VTW)/Deep Vein Thrombosis (DVT) Prevention:  Goal: Patient will participate in Venous Thrombosis (VTE)/Deep Vein Thrombosis (DVT)Prevention Measures  Outcome: PROGRESSING AS EXPECTED  Pt is free from s/s of VTE/DVT     Problem: Pain Management  Goal: Pain level will decrease to patient's comfort goal  Outcome: PROGRESSING AS EXPECTED  Pts pain level has decreased since arrival to unit.

## 2020-04-06 PROBLEM — I10 ESSENTIAL HYPERTENSION: Status: ACTIVE | Noted: 2020-04-06

## 2020-04-06 PROBLEM — E78.5 DYSLIPIDEMIA: Status: ACTIVE | Noted: 2020-04-06

## 2020-04-06 LAB
BASOPHILS # BLD AUTO: 0.3 % (ref 0–1.8)
BASOPHILS # BLD: 0.03 K/UL (ref 0–0.12)
EOSINOPHIL # BLD AUTO: 0.09 K/UL (ref 0–0.51)
EOSINOPHIL NFR BLD: 0.8 % (ref 0–6.9)
ERYTHROCYTE [DISTWIDTH] IN BLOOD BY AUTOMATED COUNT: 49.9 FL (ref 35.9–50)
EST. AVERAGE GLUCOSE BLD GHB EST-MCNC: 200 MG/DL
GLUCOSE BLD-MCNC: 194 MG/DL (ref 65–99)
GLUCOSE BLD-MCNC: 212 MG/DL (ref 65–99)
GLUCOSE BLD-MCNC: 222 MG/DL (ref 65–99)
GLUCOSE BLD-MCNC: 258 MG/DL (ref 65–99)
GLUCOSE BLD-MCNC: 286 MG/DL (ref 65–99)
HBA1C MFR BLD: 8.6 % (ref 0–5.6)
HCT VFR BLD AUTO: 34 % (ref 37–47)
HGB BLD-MCNC: 10.6 G/DL (ref 12–16)
IMM GRANULOCYTES # BLD AUTO: 0.03 K/UL (ref 0–0.11)
IMM GRANULOCYTES NFR BLD AUTO: 0.3 % (ref 0–0.9)
LYMPHOCYTES # BLD AUTO: 2.79 K/UL (ref 1–4.8)
LYMPHOCYTES NFR BLD: 24.5 % (ref 22–41)
MCH RBC QN AUTO: 31.9 PG (ref 27–33)
MCHC RBC AUTO-ENTMCNC: 31.2 G/DL (ref 33.6–35)
MCV RBC AUTO: 102.4 FL (ref 81.4–97.8)
MONOCYTES # BLD AUTO: 0.81 K/UL (ref 0–0.85)
MONOCYTES NFR BLD AUTO: 7.1 % (ref 0–13.4)
NEUTROPHILS # BLD AUTO: 7.64 K/UL (ref 2–7.15)
NEUTROPHILS NFR BLD: 67 % (ref 44–72)
NRBC # BLD AUTO: 0 K/UL
NRBC BLD-RTO: 0 /100 WBC
PLATELET # BLD AUTO: 286 K/UL (ref 164–446)
PMV BLD AUTO: 10.1 FL (ref 9–12.9)
RBC # BLD AUTO: 3.32 M/UL (ref 4.2–5.4)
WBC # BLD AUTO: 11.4 K/UL (ref 4.8–10.8)

## 2020-04-06 PROCEDURE — 36415 COLL VENOUS BLD VENIPUNCTURE: CPT

## 2020-04-06 PROCEDURE — 82962 GLUCOSE BLOOD TEST: CPT | Mod: 91

## 2020-04-06 PROCEDURE — A9270 NON-COVERED ITEM OR SERVICE: HCPCS | Performed by: HOSPITALIST

## 2020-04-06 PROCEDURE — 700102 HCHG RX REV CODE 250 W/ 637 OVERRIDE(OP): Performed by: HOSPITALIST

## 2020-04-06 PROCEDURE — 85025 COMPLETE CBC W/AUTO DIFF WBC: CPT

## 2020-04-06 PROCEDURE — 770006 HCHG ROOM/CARE - MED/SURG/GYN SEMI*

## 2020-04-06 PROCEDURE — 700111 HCHG RX REV CODE 636 W/ 250 OVERRIDE (IP): Performed by: HOSPITALIST

## 2020-04-06 PROCEDURE — 97163 PT EVAL HIGH COMPLEX 45 MIN: CPT

## 2020-04-06 PROCEDURE — 700111 HCHG RX REV CODE 636 W/ 250 OVERRIDE (IP): Performed by: INTERNAL MEDICINE

## 2020-04-06 PROCEDURE — 97166 OT EVAL MOD COMPLEX 45 MIN: CPT

## 2020-04-06 PROCEDURE — 99233 SBSQ HOSP IP/OBS HIGH 50: CPT | Performed by: HOSPITALIST

## 2020-04-06 RX ORDER — INSULIN GLARGINE 100 [IU]/ML
0.2 INJECTION, SOLUTION SUBCUTANEOUS EVERY EVENING
Status: DISCONTINUED | OUTPATIENT
Start: 2020-04-06 | End: 2020-04-07 | Stop reason: HOSPADM

## 2020-04-06 RX ORDER — DEXTROSE MONOHYDRATE 25 G/50ML
50 INJECTION, SOLUTION INTRAVENOUS
Status: DISCONTINUED | OUTPATIENT
Start: 2020-04-06 | End: 2020-04-07 | Stop reason: HOSPADM

## 2020-04-06 RX ADMIN — AMLODIPINE BESYLATE 5 MG: 5 TABLET ORAL at 05:44

## 2020-04-06 RX ADMIN — FAMOTIDINE 20 MG: 20 TABLET ORAL at 05:45

## 2020-04-06 RX ADMIN — INSULIN HUMAN 3 UNITS: 100 INJECTION, SOLUTION PARENTERAL at 16:33

## 2020-04-06 RX ADMIN — ENOXAPARIN SODIUM 40 MG: 40 INJECTION SUBCUTANEOUS at 05:43

## 2020-04-06 RX ADMIN — INSULIN HUMAN 2 UNITS: 100 INJECTION, SOLUTION PARENTERAL at 05:41

## 2020-04-06 RX ADMIN — BUDESONIDE AND FORMOTEROL FUMARATE DIHYDRATE 2 PUFF: 160; 4.5 AEROSOL RESPIRATORY (INHALATION) at 10:09

## 2020-04-06 RX ADMIN — METFORMIN HYDROCHLORIDE 500 MG: 500 TABLET, EXTENDED RELEASE ORAL at 07:40

## 2020-04-06 RX ADMIN — BENAZEPRIL HYDROCHLORIDE 20 MG: 10 TABLET ORAL at 05:45

## 2020-04-06 RX ADMIN — PROCHLORPERAZINE EDISYLATE 10 MG: 5 INJECTION INTRAMUSCULAR; INTRAVENOUS at 07:40

## 2020-04-06 RX ADMIN — FAMOTIDINE 20 MG: 20 TABLET ORAL at 16:33

## 2020-04-06 RX ADMIN — METFORMIN HYDROCHLORIDE 500 MG: 500 TABLET, EXTENDED RELEASE ORAL at 16:33

## 2020-04-06 RX ADMIN — SENNOSIDES AND DOCUSATE SODIUM 2 TABLET: 8.6; 5 TABLET ORAL at 05:44

## 2020-04-06 RX ADMIN — INSULIN GLARGINE 22 UNITS: 100 INJECTION, SOLUTION SUBCUTANEOUS at 18:35

## 2020-04-06 RX ADMIN — INSULIN LISPRO 2 UNITS: 100 INJECTION, SOLUTION INTRAVENOUS; SUBCUTANEOUS at 21:31

## 2020-04-06 RX ADMIN — SERTRALINE HYDROCHLORIDE 50 MG: 50 TABLET ORAL at 05:44

## 2020-04-06 RX ADMIN — OXYCODONE HYDROCHLORIDE 5 MG: 5 TABLET ORAL at 06:44

## 2020-04-06 RX ADMIN — BUDESONIDE AND FORMOTEROL FUMARATE DIHYDRATE 2 PUFF: 160; 4.5 AEROSOL RESPIRATORY (INHALATION) at 21:27

## 2020-04-06 RX ADMIN — FUROSEMIDE 40 MG: 40 TABLET ORAL at 05:46

## 2020-04-06 RX ADMIN — ATORVASTATIN CALCIUM 40 MG: 40 TABLET, FILM COATED ORAL at 21:27

## 2020-04-06 RX ADMIN — INSULIN HUMAN 5 UNITS: 100 INJECTION, SOLUTION PARENTERAL at 10:22

## 2020-04-06 ASSESSMENT — COGNITIVE AND FUNCTIONAL STATUS - GENERAL
MOVING TO AND FROM BED TO CHAIR: UNABLE
TOILETING: TOTAL
MOVING FROM LYING ON BACK TO SITTING ON SIDE OF FLAT BED: A LOT
DAILY ACTIVITIY SCORE: 10
CLIMB 3 TO 5 STEPS WITH RAILING: TOTAL
EATING MEALS: A LITTLE
MOBILITY SCORE: 10
STANDING UP FROM CHAIR USING ARMS: A LOT
HELP NEEDED FOR BATHING: TOTAL
TURNING FROM BACK TO SIDE WHILE IN FLAT BAD: A LOT
SUGGESTED CMS G CODE MODIFIER DAILY ACTIVITY: CL
DRESSING REGULAR LOWER BODY CLOTHING: TOTAL
DRESSING REGULAR UPPER BODY CLOTHING: TOTAL
PERSONAL GROOMING: A LITTLE
WALKING IN HOSPITAL ROOM: A LOT
SUGGESTED CMS G CODE MODIFIER MOBILITY: CL

## 2020-04-06 ASSESSMENT — ENCOUNTER SYMPTOMS
CHILLS: 0
NAUSEA: 0
DOUBLE VISION: 0
SENSORY CHANGE: 0
BLURRED VISION: 0
CONSTIPATION: 0
FEVER: 0
DIARRHEA: 0
HEARTBURN: 0
TREMORS: 0
ABDOMINAL PAIN: 0
VOMITING: 0
TINGLING: 0
MYALGIAS: 1

## 2020-04-06 ASSESSMENT — GAIT ASSESSMENTS
DISTANCE (FEET): 3
DEVIATION: ATAXIC;STEP TO;DECREASED BASE OF SUPPORT;BRADYKINETIC;SHUFFLED GAIT
GAIT LEVEL OF ASSIST: MODERATE ASSIST
ASSISTIVE DEVICE: SINGLE POINT CANE

## 2020-04-06 ASSESSMENT — ACTIVITIES OF DAILY LIVING (ADL): TOILETING: INDEPENDENT

## 2020-04-06 NOTE — DISCHARGE PLANNING
Attempted to call pt to discuss dc planning. RN STEVEN was unable to reach pt by phone. Called BS RN and asked for assistance contacting pt. BS RN to discuss SNF options with pt.

## 2020-04-06 NOTE — THERAPY
"Occupational Therapy Evaluation completed.   Functional Status:  73 yo admit after GLF at home, R proximal humerus fx.  Pt currently NWB RUE in immobilizer.  Pt states she lives alone in a condo with no steps to enter, and that she has friends to assist with groceries as she no longer drives.  Pt states her mobility is limited \"because both of my knees are bad\" and that \"I broke pretty much everything falling down some stairs.\"  Pt states she normally walks with a cane in her R hand.  Pt required maxA for supine to sit EOB (with HOB elevated.  ModA to scoot to EOB.  ModA x2 sit to stand, Nicholas x2 for safety with cane and hand held assist to pivot to bedside chair.  Pt required max encouragement to participate- groggy throughout session.  Pt is maxA with all self care tasks at this time, and noted to have limited ROM in her L shoulder as well.  Pt is NOT safe to care for self at home and will need further inpt post acute therapy prior to home.  OT will follow while in house.    Plan of Care: Will benefit from Occupational Therapy 5 times per week  Discharge Recommendations:  Equipment: Will Continue to Assess for Equipment Needs.  Recommend post-acute placement for additional occupational therapy services prior to discharge home.    See \"Rehab Therapy-Acute\" Patient Summary Report for complete documentation.    "

## 2020-04-06 NOTE — CARE PLAN
Problem: Venous Thromboembolism (VTW)/Deep Vein Thrombosis (DVT) Prevention:  Goal: Patient will participate in Venous Thrombosis (VTE)/Deep Vein Thrombosis (DVT)Prevention Measures  Outcome: PROGRESSING AS EXPECTED  Flowsheets (Taken 4/6/2020 0913)  Pharmacologic Prophylaxis Used: LMWH: Enoxaparin(Lovenox)  Note: Pt on DVT prophylaxis. Will encourage patient to get up to chair.      Problem: Pain Management  Goal: Pain level will decrease to patient's comfort goal  Outcome: PROGRESSING AS EXPECTED  Note: Difficult to control pain, able to control with combination of pain medication and nausea medication.

## 2020-04-06 NOTE — PROGRESS NOTES
Received report from night shift RNHaim. Assumed care. This pt is AOx4,   5/10 pain, Patient is still reports nausea, unable to eat breakfast, administered meds per MAR. Chart reviewed. Call light in place, fall precautions in place, patient educated on importance of calling for assistance. No additional needs at this time.

## 2020-04-06 NOTE — PROGRESS NOTES
Pt projectile vomited around 200mL. Pt more alert and able to sit and stand at the edge of bed, but still complaining of nausea and vomiting.

## 2020-04-06 NOTE — DIETARY
NUTRITION SERVICES: BMI - Pt with BMI >40 (=Body mass index is 41.98 kg/m².), morbid obesity. Weight loss counseling not appropriate in acute care setting. RECOMMEND - Referral to outpatient nutrition services for weight management after D/C.

## 2020-04-06 NOTE — DISCHARGE PLANNING
Received phone call from from PALMA RN. Patient is hesitant about SNF placement. Pt requesting we get ahold of her , Keily.     RN STEVEN reached out to pts emergency contact, Javier. Javier states that the patient lives alone and needs SNF placement for safe DC. ADDIE HARRIS asked Javier if he was aware on any social workers the patient has. Javier states that pt is in contact with Brittany Sohrt ([phone])  Who works for the [employer]. Javier stated she may also have another  named Letty but does not have a phone number.  Javier stated he is able to help pt with getting things from her house if needed.     ADDIE HARRIS reached out to Allie Short. No answer - left VM.

## 2020-04-06 NOTE — DISCHARGE PLANNING
Received Choice form at 1105  Agency/Facility Name: [facility]  Referral sent per Choice form @ [id number]

## 2020-04-06 NOTE — THERAPY
"Physical Therapy Evaluation completed.   Bed Mobility:  Supine to Sit: Maximal Assist  Transfers: Sit to Stand: Moderate Assist(x2 people)  Gait: Level Of Assist: Moderate Assist with Single Point Cane       Plan of Care: Will benefit from Physical Therapy 5 times per week  Discharge Recommendations: Equipment: Will Continue to Assess for Equipment Needs.Recommend post-acute placement for continued physical therapy services prior to discharge home.       See \"Rehab Therapy-Acute\" Patient Summary Report for complete documentation.     Pt was recently admitted for GLF and presented with R humer fracture. Pt has orders to be NWB in RUE with immobilizer in place. Pt presented to PT with impaired balance, impaired coordination, weakness, dec activity tolerance, and dec ROM and use of RUE due to pain and functional limitations. Pt was able to demo Mod to Max A x2 for all functional mobility at this time with SPC. Pt was primarily limtied due to fear and lethargic behavior during functional assessment. Pt requires frequent encouragment and redirection thoughout session. Pt was able to take a few steps forward during transfer to chair. Pt will continue to benefit from skilled PT while in house, with recommendation for post acute therapy prior to d/c home given current objective findings, age, IPLOF, and limited social support.   "

## 2020-04-06 NOTE — DISCHARGE PLANNING
BS RN spoke to pt at [facility] about DC planning. Pt requesting friend, Javier, to help with DC planning.     RN STEVEN called Javier to discuss DC planing. Javier stated that he is not very famillar with the local SNFs. RN CM explained location and ratings of SNFs in [facility]. Javier would like referrals sent out to [facility].      SNF choice form completed and faxed to [facility].

## 2020-04-07 ENCOUNTER — PATIENT OUTREACH (OUTPATIENT)
Dept: HEALTH INFORMATION MANAGEMENT | Facility: OTHER | Age: 75
End: 2020-04-07

## 2020-04-07 VITALS
BODY MASS INDEX: 41.99 KG/M2 | DIASTOLIC BLOOD PRESSURE: 40 MMHG | RESPIRATION RATE: 18 BRPM | WEIGHT: 236.99 LBS | OXYGEN SATURATION: 91 % | SYSTOLIC BLOOD PRESSURE: 139 MMHG | HEART RATE: 75 BPM | TEMPERATURE: 98.8 F | HEIGHT: 63 IN

## 2020-04-07 LAB
BASOPHILS # BLD AUTO: 0.2 % (ref 0–1.8)
BASOPHILS # BLD: 0.02 K/UL (ref 0–0.12)
EOSINOPHIL # BLD AUTO: 0.11 K/UL (ref 0–0.51)
EOSINOPHIL NFR BLD: 0.9 % (ref 0–6.9)
ERYTHROCYTE [DISTWIDTH] IN BLOOD BY AUTOMATED COUNT: 49.2 FL (ref 35.9–50)
GLUCOSE BLD-MCNC: 171 MG/DL (ref 65–99)
GLUCOSE BLD-MCNC: 205 MG/DL (ref 65–99)
HCT VFR BLD AUTO: 33.8 % (ref 37–47)
HGB BLD-MCNC: 10.5 G/DL (ref 12–16)
IMM GRANULOCYTES # BLD AUTO: 0.05 K/UL (ref 0–0.11)
IMM GRANULOCYTES NFR BLD AUTO: 0.4 % (ref 0–0.9)
LYMPHOCYTES # BLD AUTO: 1.87 K/UL (ref 1–4.8)
LYMPHOCYTES NFR BLD: 16.1 % (ref 22–41)
MCH RBC QN AUTO: 31.4 PG (ref 27–33)
MCHC RBC AUTO-ENTMCNC: 31.1 G/DL (ref 33.6–35)
MCV RBC AUTO: 101.2 FL (ref 81.4–97.8)
MONOCYTES # BLD AUTO: 0.86 K/UL (ref 0–0.85)
MONOCYTES NFR BLD AUTO: 7.4 % (ref 0–13.4)
NEUTROPHILS # BLD AUTO: 8.68 K/UL (ref 2–7.15)
NEUTROPHILS NFR BLD: 75 % (ref 44–72)
NRBC # BLD AUTO: 0 K/UL
NRBC BLD-RTO: 0 /100 WBC
PLATELET # BLD AUTO: 269 K/UL (ref 164–446)
PMV BLD AUTO: 10.4 FL (ref 9–12.9)
RBC # BLD AUTO: 3.34 M/UL (ref 4.2–5.4)
WBC # BLD AUTO: 11.6 K/UL (ref 4.8–10.8)

## 2020-04-07 PROCEDURE — 36415 COLL VENOUS BLD VENIPUNCTURE: CPT

## 2020-04-07 PROCEDURE — 99239 HOSP IP/OBS DSCHRG MGMT >30: CPT | Performed by: INTERNAL MEDICINE

## 2020-04-07 PROCEDURE — 82962 GLUCOSE BLOOD TEST: CPT | Mod: 91

## 2020-04-07 PROCEDURE — 85025 COMPLETE CBC W/AUTO DIFF WBC: CPT

## 2020-04-07 PROCEDURE — A9270 NON-COVERED ITEM OR SERVICE: HCPCS | Performed by: HOSPITALIST

## 2020-04-07 PROCEDURE — 700102 HCHG RX REV CODE 250 W/ 637 OVERRIDE(OP): Performed by: HOSPITALIST

## 2020-04-07 PROCEDURE — 700111 HCHG RX REV CODE 636 W/ 250 OVERRIDE (IP): Performed by: HOSPITALIST

## 2020-04-07 RX ORDER — OXYCODONE HYDROCHLORIDE 5 MG/1
2.5-5 TABLET ORAL
Qty: 10 TAB | Refills: 0 | Status: SHIPPED | OUTPATIENT
Start: 2020-04-07 | End: 2020-04-12

## 2020-04-07 RX ADMIN — AMLODIPINE BESYLATE 5 MG: 5 TABLET ORAL at 06:21

## 2020-04-07 RX ADMIN — METFORMIN HYDROCHLORIDE 500 MG: 500 TABLET, EXTENDED RELEASE ORAL at 07:37

## 2020-04-07 RX ADMIN — FAMOTIDINE 20 MG: 20 TABLET ORAL at 06:21

## 2020-04-07 RX ADMIN — INSULIN LISPRO 2 UNITS: 100 INJECTION, SOLUTION INTRAVENOUS; SUBCUTANEOUS at 11:00

## 2020-04-07 RX ADMIN — BENAZEPRIL HYDROCHLORIDE 20 MG: 10 TABLET ORAL at 06:21

## 2020-04-07 RX ADMIN — ONDANSETRON 4 MG: 2 INJECTION INTRAMUSCULAR; INTRAVENOUS at 01:37

## 2020-04-07 RX ADMIN — ENOXAPARIN SODIUM 40 MG: 40 INJECTION SUBCUTANEOUS at 06:21

## 2020-04-07 RX ADMIN — BUDESONIDE AND FORMOTEROL FUMARATE DIHYDRATE 2 PUFF: 160; 4.5 AEROSOL RESPIRATORY (INHALATION) at 09:45

## 2020-04-07 RX ADMIN — OXYCODONE HYDROCHLORIDE 2.5 MG: 5 TABLET ORAL at 01:39

## 2020-04-07 RX ADMIN — FUROSEMIDE 40 MG: 40 TABLET ORAL at 06:21

## 2020-04-07 RX ADMIN — SERTRALINE HYDROCHLORIDE 50 MG: 50 TABLET ORAL at 06:22

## 2020-04-07 RX ADMIN — INSULIN LISPRO 1 UNITS: 100 INJECTION, SOLUTION INTRAVENOUS; SUBCUTANEOUS at 07:39

## 2020-04-07 NOTE — RESPIRATORY CARE
COPD EDUCATION by COPD CLINICAL EDUCATOR  4/7/2020 at 11:45 AM by Carolina Salvador, RRT     Patient interviewed by COPD education team. Patient refused COPD program at this time. Patient has quit smoking. Patient going to SNF.

## 2020-04-07 NOTE — DISCHARGE SUMMARY
Discharge Summary    CHIEF COMPLAINT ON ADMISSION  Chief Complaint   Patient presents with   • T-5000 GLF   • Shoulder Pain     right   • Arm Pain     right       Reason for Admission  EMS     Admission Date  4/4/2020    CODE STATUS  Full Code    HPI & HOSPITAL COURSE  This is a 74 y.o. female here with fall at home, found to have right proximal humerus fracture associated with hemarthrosis.  Orthopedic surgery consulted and recommended no surgical intervention and to use a cuff and collar brace.  She is supposed to have 1 week repeat films with Dr Melchor at the office for follow up on healing.         Therefore, she is discharged in good and stable condition to skilled nursing facility.    The patient met 2-midnight criteria for an inpatient stay at the time of discharge.    Discharge Date  4/7/2020    FOLLOW UP ITEMS POST DISCHARGE  PCP after discharge from SNF  Dr Melchor in 1 week for follow up films.    DISCHARGE DIAGNOSES  Principal Problem:    Acute respiratory failure with hypoxia (HCC) POA: Unknown  Active Problems:    Humerus fracture POA: Unknown    Macrocytic anemia POA: Unknown    ACP (advance care planning) POA: Unknown    Essential hypertension POA: Unknown    Dyslipidemia POA: Unknown  Resolved Problems:    * No resolved hospital problems. *      FOLLOW UP  No future appointments.  Anthony Martinez M.D.  [address] [facility] [address]  [phone]    In 2 weeks        MEDICATIONS ON DISCHARGE     Medication List      START taking these medications      Instructions   oxyCODONE immediate-release 5 MG Tabs  Commonly known as:  ROXICODONE   Take 0.5-1 Tabs by mouth every 3 hours as needed for up to 5 days.  Dose:  2.5-5 mg        CONTINUE taking these medications      Instructions   amlodipine-benazepril 5-20 MG per capsule  Commonly known as:  LOTREL   Take 1 Cap by mouth every day.  Dose:  1 Cap     dicyclomine 20 MG Tabs  Commonly known as:  BENTYL   Take 20 mg by mouth every 6 hours.  Dose:   20 mg     fluticasone-salmeterol 250-50 MCG/DOSE Aepb  Commonly known as:  ADVAIR   Inhale 1 Puff by mouth every 12 hours.  Dose:  1 Puff     glimepiride 4 MG Tabs  Commonly known as:  AMARYL   Take 4 mg by mouth every day.  Dose:  4 mg     hydroCHLOROthiazide 25 MG Tabs  Commonly known as:  HYDRODIURIL   Take 25 mg by mouth every day.  Dose:  25 mg     levothyroxine 25 MCG Tabs  Commonly known as:  SYNTHROID   Take 25 mcg by mouth every day.  Dose:  25 mcg     Lipitor 40 MG Tabs  Generic drug:  atorvastatin   Take 40 mg by mouth every evening.  Dose:  40 mg     metFORMIN  MG Tb24  Commonly known as:  GLUCOPHAGE XR   Take 500 mg by mouth 2 Times a Day.  Dose:  500 mg     oxybutynin 5 MG Tabs  Commonly known as:  DITROPAN   Take 5 mg by mouth 2 Times a Day.  Dose:  5 mg     sertraline 50 MG Tabs  Commonly known as:  ZOLOFT   Take 50 mg by mouth every day.  Dose:  50 mg            Allergies  Allergies   Allergen Reactions   • Nkda [No Known Drug Allergy]        DIET  Orders Placed This Encounter   Procedures   • Diet Order Regular     Standing Status:   Standing     Number of Occurrences:   1     Order Specific Question:   Diet:     Answer:   Regular [1]       ACTIVITY  As tolerated.  Weight bearing as tolerated  Non-weight bearing RUE - continue to wear brace    CONSULTATIONS  Christiano Loredo - orthopedic surgery    PROCEDURES  none    LABORATORY  Lab Results   Component Value Date    SODIUM 138 04/05/2020    POTASSIUM 4.0 04/05/2020    CHLORIDE 95 (L) 04/05/2020    CO2 29 04/05/2020    GLUCOSE 231 (H) 04/05/2020    BUN 25 (H) 04/05/2020    CREATININE 0.60 04/05/2020        Lab Results   Component Value Date    WBC 11.6 (H) 04/07/2020    HEMOGLOBIN 10.5 (L) 04/07/2020    HEMATOCRIT 33.8 (L) 04/07/2020    PLATELETCT 269 04/07/2020        Total time of the discharge process exceeds 35 minutes.

## 2020-04-07 NOTE — PROGRESS NOTES
Patient alert and oriented, vss, complains of pain to R shoulder relieved with ice pack and repositioning with sling. Patient refuses ambulation or some turns at this time. Noted to have redness in abd folds and interdry soiled. Removed interdry to wash and will replace when dry. Patient incontinent on urine as purewick did not work. Will monitor.

## 2020-04-07 NOTE — DISCHARGE PLANNING
CCA spoke to Chaparrita @ [facility]. Pt accepted. Transport is scheduled for 4/7 @ 1200. [facility] will be providing transport via their van.     Damaris REAL CM notified of transport time.

## 2020-04-07 NOTE — DISCHARGE INSTRUCTIONS
Discharge Instructions    Discharged to [facility] by medical transportation with escort. Discharged via wheelchair, hospital escort: Yes.  Special equipment needed: Not Applicable    Be sure to schedule a follow-up appointment with your primary care doctor or any specialists as instructed.     Discharge Plan:   Diet Plan: Discussed  Activity Level: Discussed  Confirmed Follow up Appointment: Patient to Call and Schedule Appointment  Confirmed Symptoms Management: Discussed  Medication Reconciliation Updated: Yes  Influenza Vaccine Indication: Not indicated: Previously immunized this influenza season and > 8 years of age    I understand that a diet low in cholesterol, fat, and sodium is recommended for good health. Unless I have been given specific instructions below for another diet, I accept this instruction as my diet prescription.   Other diet: Diebetic Diet    Special Instructions: None    · Is patient discharged on Warfarin / Coumadin?   No     Depression / Suicide Risk    As you are discharged from this [facility] facility, it is important to learn how to keep safe from harming yourself.    Recognize the warning signs:  · Abrupt changes in personality, positive or negative- including increase in energy   · Giving away possessions  · Change in eating patterns- significant weight changes-  positive or negative  · Change in sleeping patterns- unable to sleep or sleeping all the time   · Unwillingness or inability to communicate  · Depression  · Unusual sadness, discouragement and loneliness  · Talk of wanting to die  · Neglect of personal appearance   · Rebelliousness- reckless behavior  · Withdrawal from people/activities they love  · Confusion- inability to concentrate     If you or a loved one observes any of these behaviors or has concerns about self-harm, here's what you can do:  · Talk about it- your feelings and reasons for harming yourself  · Remove any means that you might use to hurt yourself  (examples: pills, rope, extension cords, firearm)  · Get professional help from the community (Mental Health, Substance Abuse, psychological counseling)  · Do not be alone:Call your Safe Contact- someone whom you trust who will be there for you.  · Call your local CRISIS HOTLINE 612-9816 or 384-376-1386  · Call your local Children's Mobile Crisis Response Team [place] (213) 407-5117 or www.[employer]  · Call the toll free National Suicide Prevention Hotlines   · National Suicide Prevention Lifeline 193-105-CZSI (5123)  · National Hope Line Network 800-SUICIDE (869-5944)

## 2020-04-07 NOTE — PROGRESS NOTES
Received report from night shift RNBinta. Assumed care. This pt is AOx4, mild pain, Patient sitting up eating breafast: questions answered. Refilled and placed ice pack to right shoulder. Chart reviewed. Call light in place, fall precautions in place, patient educated on importance of calling for assistance. No additional needs at this time.

## 2020-04-07 NOTE — PROGRESS NOTES
Transport, Seun, from [facility] arrived, pt DC'd with transport. All pt belongings gathered, paperwork/cobra given to transport. VSS, pt A/Ox4, ice pack to shoulder for comfort.

## 2020-04-07 NOTE — CARE PLAN
Problem: Communication  Goal: The ability to communicate needs accurately and effectively will improve  Outcome: PROGRESSING AS EXPECTED  Note: With prompting patient able to effectively communicate with staff.      Problem: Safety  Goal: Will remain free from falls  Outcome: PROGRESSING AS EXPECTED  Note: Patient needs asist of two for edge of the bed.      Problem: Pain Management  Goal: Pain level will decrease to patient's comfort goal  Outcome: PROGRESSING AS EXPECTED  Note: Pain is controlled with ice and medication per MAR.      Problem: Respiratory:  Goal: Respiratory status will improve  Outcome: PROGRESSING AS EXPECTED  Note: Able to ween pt down to 1.5 liters of oxygen, nasal canula. Encouraged patient to use IS. TCDB

## 2020-09-15 NOTE — PROGRESS NOTES
2 RN skin check complete with Colleen  Devices in place: nasal cannula  Skin assessed under devices : yes.  Areas for concern are bilateral abd folds, under bilateral breasts for moisture. Bottom red but blanchable.  The following interventions in place: education provided on risk for skin breakdown. interdry will be applied.     Mastoid Interpolation Flap Text: A decision was made to reconstruct the defect utilizing an interpolation axial flap and a staged reconstruction.  A telfa template was made of the defect.  This telfa template was then used to outline the mastoid interpolation flap.  The donor area for the pedicle flap was then injected with anesthesia.  The flap was excised through the skin and subcutaneous tissue down to the layer of the underlying musculature.  The pedicle flap was carefully excised within this deep plane to maintain its blood supply.  The edges of the donor site were undermined.   The donor site was closed in a primary fashion.  The pedicle was then rotated into position and sutured.  Once the tube was sutured into place, adequate blood supply was confirmed with blanching and refill.  The pedicle was then wrapped with xeroform gauze and dressed appropriately with a telfa and gauze bandage to ensure continued blood supply and protect the attached pedicle.

## 2020-11-23 ENCOUNTER — HOSPITAL ENCOUNTER (OUTPATIENT)
Facility: MEDICAL CENTER | Age: 75
End: 2020-11-23
Attending: PHYSICIAN ASSISTANT
Payer: MEDICARE

## 2020-11-23 PROCEDURE — U0003 INFECTIOUS AGENT DETECTION BY NUCLEIC ACID (DNA OR RNA); SEVERE ACUTE RESPIRATORY SYNDROME CORONAVIRUS 2 (SARS-COV-2) (CORONAVIRUS DISEASE [COVID-19]), AMPLIFIED PROBE TECHNIQUE, MAKING USE OF HIGH THROUGHPUT TECHNOLOGIES AS DESCRIBED BY CMS-2020-01-R: HCPCS

## 2020-11-24 ENCOUNTER — HOSPITAL ENCOUNTER (INPATIENT)
Facility: MEDICAL CENTER | Age: 75
LOS: 2 days | DRG: 291 | End: 2020-11-27
Attending: EMERGENCY MEDICINE | Admitting: HOSPITALIST
Payer: MEDICARE

## 2020-11-24 ENCOUNTER — APPOINTMENT (OUTPATIENT)
Dept: RADIOLOGY | Facility: MEDICAL CENTER | Age: 75
DRG: 291 | End: 2020-11-24
Attending: EMERGENCY MEDICINE
Payer: MEDICARE

## 2020-11-24 DIAGNOSIS — J96.01 ACUTE RESPIRATORY FAILURE WITH HYPOXIA (HCC): ICD-10-CM

## 2020-11-24 DIAGNOSIS — J81.0 ACUTE PULMONARY EDEMA (HCC): ICD-10-CM

## 2020-11-24 DIAGNOSIS — J44.1 ACUTE EXACERBATION OF CHRONIC OBSTRUCTIVE PULMONARY DISEASE (COPD) (HCC): ICD-10-CM

## 2020-11-24 DIAGNOSIS — I27.20 PULMONARY HYPERTENSION (HCC): ICD-10-CM

## 2020-11-24 PROBLEM — E11.9 DM (DIABETES MELLITUS) (HCC): Status: ACTIVE | Noted: 2020-11-24

## 2020-11-24 LAB
ALBUMIN SERPL BCP-MCNC: 3.7 G/DL (ref 3.2–4.9)
ALBUMIN/GLOB SERPL: 1.1 G/DL
ALP SERPL-CCNC: 111 U/L (ref 30–99)
ALT SERPL-CCNC: 35 U/L (ref 2–50)
ANION GAP SERPL CALC-SCNC: 16 MMOL/L (ref 7–16)
AST SERPL-CCNC: 42 U/L (ref 12–45)
BASOPHILS # BLD AUTO: 0.2 % (ref 0–1.8)
BASOPHILS # BLD: 0.03 K/UL (ref 0–0.12)
BILIRUB SERPL-MCNC: 0.4 MG/DL (ref 0.1–1.5)
BUN SERPL-MCNC: 18 MG/DL (ref 8–22)
CALCIUM SERPL-MCNC: 9.8 MG/DL (ref 8.4–10.2)
CHLORIDE SERPL-SCNC: 103 MMOL/L (ref 96–112)
CO2 SERPL-SCNC: 22 MMOL/L (ref 20–33)
COVID ORDER STATUS COVID19: NORMAL
COVID ORDER STATUS COVID19: NORMAL
CREAT SERPL-MCNC: 0.65 MG/DL (ref 0.5–1.4)
EKG IMPRESSION: NORMAL
EOSINOPHIL # BLD AUTO: 0.02 K/UL (ref 0–0.51)
EOSINOPHIL NFR BLD: 0.1 % (ref 0–6.9)
ERYTHROCYTE [DISTWIDTH] IN BLOOD BY AUTOMATED COUNT: 51 FL (ref 35.9–50)
FLUAV RNA SPEC QL NAA+PROBE: NEGATIVE
FLUBV RNA SPEC QL NAA+PROBE: NEGATIVE
GLOBULIN SER CALC-MCNC: 3.3 G/DL (ref 1.9–3.5)
GLUCOSE SERPL-MCNC: 364 MG/DL (ref 65–99)
HCT VFR BLD AUTO: 37.2 % (ref 37–47)
HGB BLD-MCNC: 11.8 G/DL (ref 12–16)
IMM GRANULOCYTES # BLD AUTO: 0.11 K/UL (ref 0–0.11)
IMM GRANULOCYTES NFR BLD AUTO: 0.7 % (ref 0–0.9)
LYMPHOCYTES # BLD AUTO: 1 K/UL (ref 1–4.8)
LYMPHOCYTES NFR BLD: 6.3 % (ref 22–41)
MCH RBC QN AUTO: 31.7 PG (ref 27–33)
MCHC RBC AUTO-ENTMCNC: 31.7 G/DL (ref 33.6–35)
MCV RBC AUTO: 100 FL (ref 81.4–97.8)
MONOCYTES # BLD AUTO: 0.29 K/UL (ref 0–0.85)
MONOCYTES NFR BLD AUTO: 1.8 % (ref 0–13.4)
NEUTROPHILS # BLD AUTO: 14.41 K/UL (ref 2–7.15)
NEUTROPHILS NFR BLD: 90.9 % (ref 44–72)
NRBC # BLD AUTO: 0 K/UL
NRBC BLD-RTO: 0 /100 WBC
NT-PROBNP SERPL IA-MCNC: 320 PG/ML (ref 0–125)
PLATELET # BLD AUTO: 294 K/UL (ref 164–446)
PMV BLD AUTO: 9.5 FL (ref 9–12.9)
POTASSIUM SERPL-SCNC: 4.1 MMOL/L (ref 3.6–5.5)
PROT SERPL-MCNC: 7 G/DL (ref 6–8.2)
RBC # BLD AUTO: 3.72 M/UL (ref 4.2–5.4)
RSV RNA SPEC QL NAA+PROBE: NEGATIVE
SARS-COV-2 RNA RESP QL NAA+PROBE: NOTDETECTED
SARS-COV-2 RNA RESP QL NAA+PROBE: NOTDETECTED
SODIUM SERPL-SCNC: 141 MMOL/L (ref 135–145)
SPECIMEN SOURCE: NORMAL
SPECIMEN SOURCE: NORMAL
TROPONIN T SERPL-MCNC: 53 NG/L (ref 6–19)
WBC # BLD AUTO: 15.9 K/UL (ref 4.8–10.8)

## 2020-11-24 PROCEDURE — 84484 ASSAY OF TROPONIN QUANT: CPT

## 2020-11-24 PROCEDURE — 700111 HCHG RX REV CODE 636 W/ 250 OVERRIDE (IP): Performed by: HOSPITALIST

## 2020-11-24 PROCEDURE — 93005 ELECTROCARDIOGRAM TRACING: CPT

## 2020-11-24 PROCEDURE — 96367 TX/PROPH/DG ADDL SEQ IV INF: CPT

## 2020-11-24 PROCEDURE — 83880 ASSAY OF NATRIURETIC PEPTIDE: CPT

## 2020-11-24 PROCEDURE — 96372 THER/PROPH/DIAG INJ SC/IM: CPT

## 2020-11-24 PROCEDURE — 93005 ELECTROCARDIOGRAM TRACING: CPT | Performed by: EMERGENCY MEDICINE

## 2020-11-24 PROCEDURE — 99220 PR INITIAL OBSERVATION CARE,LEVL III: CPT | Performed by: HOSPITALIST

## 2020-11-24 PROCEDURE — 700105 HCHG RX REV CODE 258: Performed by: EMERGENCY MEDICINE

## 2020-11-24 PROCEDURE — G0378 HOSPITAL OBSERVATION PER HR: HCPCS

## 2020-11-24 PROCEDURE — A9270 NON-COVERED ITEM OR SERVICE: HCPCS | Performed by: HOSPITALIST

## 2020-11-24 PROCEDURE — 85025 COMPLETE CBC W/AUTO DIFF WBC: CPT

## 2020-11-24 PROCEDURE — 700111 HCHG RX REV CODE 636 W/ 250 OVERRIDE (IP): Performed by: EMERGENCY MEDICINE

## 2020-11-24 PROCEDURE — 96365 THER/PROPH/DIAG IV INF INIT: CPT

## 2020-11-24 PROCEDURE — 99285 EMERGENCY DEPT VISIT HI MDM: CPT

## 2020-11-24 PROCEDURE — 700105 HCHG RX REV CODE 258: Performed by: HOSPITALIST

## 2020-11-24 PROCEDURE — 700102 HCHG RX REV CODE 250 W/ 637 OVERRIDE(OP): Performed by: HOSPITALIST

## 2020-11-24 PROCEDURE — 80053 COMPREHEN METABOLIC PANEL: CPT

## 2020-11-24 PROCEDURE — 96375 TX/PRO/DX INJ NEW DRUG ADDON: CPT

## 2020-11-24 PROCEDURE — 71045 X-RAY EXAM CHEST 1 VIEW: CPT

## 2020-11-24 PROCEDURE — 82962 GLUCOSE BLOOD TEST: CPT

## 2020-11-24 PROCEDURE — 0241U HCHG SARS-COV-2 COVID-19 NFCT DS RESP RNA 4 TRGT MIC: CPT

## 2020-11-24 RX ORDER — METHYLPREDNISOLONE SODIUM SUCCINATE 125 MG/2ML
125 INJECTION, POWDER, LYOPHILIZED, FOR SOLUTION INTRAMUSCULAR; INTRAVENOUS ONCE
Status: COMPLETED | OUTPATIENT
Start: 2020-11-24 | End: 2020-11-24

## 2020-11-24 RX ORDER — ONDANSETRON 4 MG/1
4 TABLET, ORALLY DISINTEGRATING ORAL EVERY 4 HOURS PRN
Status: DISCONTINUED | OUTPATIENT
Start: 2020-11-24 | End: 2020-11-27 | Stop reason: HOSPADM

## 2020-11-24 RX ORDER — LEVOTHYROXINE SODIUM 0.05 MG/1
25 TABLET ORAL DAILY
Status: DISCONTINUED | OUTPATIENT
Start: 2020-11-25 | End: 2020-11-27 | Stop reason: HOSPADM

## 2020-11-24 RX ORDER — OXYBUTYNIN CHLORIDE 5 MG/1
5 TABLET ORAL 2 TIMES DAILY
Status: DISCONTINUED | OUTPATIENT
Start: 2020-11-24 | End: 2020-11-27 | Stop reason: HOSPADM

## 2020-11-24 RX ORDER — GUAIFENESIN/DEXTROMETHORPHAN 100-10MG/5
10 SYRUP ORAL EVERY 6 HOURS PRN
Status: DISCONTINUED | OUTPATIENT
Start: 2020-11-24 | End: 2020-11-27 | Stop reason: HOSPADM

## 2020-11-24 RX ORDER — AMOXICILLIN AND CLAVULANATE POTASSIUM 875; 125 MG/1; MG/1
1 TABLET, FILM COATED ORAL 2 TIMES DAILY
Status: ON HOLD | COMMUNITY
Start: 2020-11-24 | End: 2020-11-27

## 2020-11-24 RX ORDER — BUDESONIDE AND FORMOTEROL FUMARATE DIHYDRATE 160; 4.5 UG/1; UG/1
2 AEROSOL RESPIRATORY (INHALATION) 2 TIMES DAILY
Status: DISCONTINUED | OUTPATIENT
Start: 2020-11-24 | End: 2020-11-27 | Stop reason: HOSPADM

## 2020-11-24 RX ORDER — AMOXICILLIN 250 MG
2 CAPSULE ORAL 2 TIMES DAILY
Status: DISCONTINUED | OUTPATIENT
Start: 2020-11-24 | End: 2020-11-27 | Stop reason: HOSPADM

## 2020-11-24 RX ORDER — PREDNISONE 20 MG/1
40 TABLET ORAL DAILY
COMMUNITY
Start: 2020-11-24 | End: 2021-06-23

## 2020-11-24 RX ORDER — ACETAMINOPHEN 325 MG/1
650 TABLET ORAL EVERY 6 HOURS PRN
Status: DISCONTINUED | OUTPATIENT
Start: 2020-11-24 | End: 2020-11-27 | Stop reason: HOSPADM

## 2020-11-24 RX ORDER — IBUPROFEN 200 MG
600 TABLET ORAL EVERY 6 HOURS PRN
Status: ON HOLD | COMMUNITY
End: 2020-11-27

## 2020-11-24 RX ORDER — BENAZEPRIL HYDROCHLORIDE 10 MG/1
20 TABLET ORAL
Status: DISCONTINUED | OUTPATIENT
Start: 2020-11-25 | End: 2020-11-27 | Stop reason: HOSPADM

## 2020-11-24 RX ORDER — ALBUTEROL SULFATE 90 UG/1
2 AEROSOL, METERED RESPIRATORY (INHALATION) EVERY 6 HOURS PRN
COMMUNITY
End: 2021-06-23

## 2020-11-24 RX ORDER — ONDANSETRON 2 MG/ML
4 INJECTION INTRAMUSCULAR; INTRAVENOUS EVERY 4 HOURS PRN
Status: DISCONTINUED | OUTPATIENT
Start: 2020-11-24 | End: 2020-11-27 | Stop reason: HOSPADM

## 2020-11-24 RX ORDER — AMLODIPINE BESYLATE AND BENAZEPRIL HYDROCHLORIDE 5; 20 MG/1; MG/1
1 CAPSULE ORAL DAILY
Status: DISCONTINUED | OUTPATIENT
Start: 2020-11-25 | End: 2020-11-24

## 2020-11-24 RX ORDER — BISACODYL 10 MG
10 SUPPOSITORY, RECTAL RECTAL
Status: DISCONTINUED | OUTPATIENT
Start: 2020-11-24 | End: 2020-11-27 | Stop reason: HOSPADM

## 2020-11-24 RX ORDER — HYDROCHLOROTHIAZIDE 25 MG/1
25 TABLET ORAL
Status: DISCONTINUED | OUTPATIENT
Start: 2020-11-24 | End: 2020-11-24

## 2020-11-24 RX ORDER — FUROSEMIDE 10 MG/ML
20 INJECTION INTRAMUSCULAR; INTRAVENOUS
Status: DISCONTINUED | OUTPATIENT
Start: 2020-11-25 | End: 2020-11-27

## 2020-11-24 RX ORDER — DEXTROSE MONOHYDRATE 25 G/50ML
50 INJECTION, SOLUTION INTRAVENOUS
Status: DISCONTINUED | OUTPATIENT
Start: 2020-11-24 | End: 2020-11-27 | Stop reason: HOSPADM

## 2020-11-24 RX ORDER — FUROSEMIDE 10 MG/ML
40 INJECTION INTRAMUSCULAR; INTRAVENOUS ONCE
Status: DISPENSED | OUTPATIENT
Start: 2020-11-24 | End: 2020-11-25

## 2020-11-24 RX ORDER — ATORVASTATIN CALCIUM 40 MG/1
40 TABLET, FILM COATED ORAL DAILY
Status: DISCONTINUED | OUTPATIENT
Start: 2020-11-25 | End: 2020-11-27 | Stop reason: HOSPADM

## 2020-11-24 RX ORDER — AZITHROMYCIN 500 MG/1
500 INJECTION, POWDER, LYOPHILIZED, FOR SOLUTION INTRAVENOUS ONCE
Status: COMPLETED | OUTPATIENT
Start: 2020-11-24 | End: 2020-11-24

## 2020-11-24 RX ORDER — AMLODIPINE BESYLATE 5 MG/1
5 TABLET ORAL
Status: DISCONTINUED | OUTPATIENT
Start: 2020-11-25 | End: 2020-11-27 | Stop reason: HOSPADM

## 2020-11-24 RX ORDER — ALBUTEROL SULFATE 90 UG/1
2 AEROSOL, METERED RESPIRATORY (INHALATION) EVERY 6 HOURS PRN
Status: DISCONTINUED | OUTPATIENT
Start: 2020-11-24 | End: 2020-11-27 | Stop reason: HOSPADM

## 2020-11-24 RX ORDER — POLYETHYLENE GLYCOL 3350 17 G/17G
1 POWDER, FOR SOLUTION ORAL
Status: DISCONTINUED | OUTPATIENT
Start: 2020-11-24 | End: 2020-11-27 | Stop reason: HOSPADM

## 2020-11-24 RX ADMIN — INSULIN HUMAN 6 UNITS: 100 INJECTION, SOLUTION PARENTERAL at 21:54

## 2020-11-24 RX ADMIN — AZITHROMYCIN DIHYDRATE 500 MG: 500 INJECTION, POWDER, LYOPHILIZED, FOR SOLUTION INTRAVENOUS at 19:00

## 2020-11-24 RX ADMIN — SODIUM CHLORIDE 3 G: 900 INJECTION INTRAVENOUS at 17:30

## 2020-11-24 RX ADMIN — METHYLPREDNISOLONE SODIUM SUCCINATE 125 MG: 125 INJECTION, POWDER, FOR SOLUTION INTRAMUSCULAR; INTRAVENOUS at 17:28

## 2020-11-24 RX ADMIN — SODIUM CHLORIDE 3 G: 900 INJECTION INTRAVENOUS at 23:55

## 2020-11-24 RX ADMIN — ACETAMINOPHEN 650 MG: 325 TABLET, FILM COATED ORAL at 23:55

## 2020-11-24 ASSESSMENT — COGNITIVE AND FUNCTIONAL STATUS - GENERAL
HELP NEEDED FOR BATHING: A LITTLE
TURNING FROM BACK TO SIDE WHILE IN FLAT BAD: A LITTLE
MOVING TO AND FROM BED TO CHAIR: A LITTLE
SUGGESTED CMS G CODE MODIFIER DAILY ACTIVITY: CK
DRESSING REGULAR LOWER BODY CLOTHING: A LITTLE
MOBILITY SCORE: 14
CLIMB 3 TO 5 STEPS WITH RAILING: A LOT
SUGGESTED CMS G CODE MODIFIER MOBILITY: CL
DRESSING REGULAR UPPER BODY CLOTHING: A LITTLE
EATING MEALS: A LITTLE
PERSONAL GROOMING: A LITTLE
DAILY ACTIVITIY SCORE: 18
STANDING UP FROM CHAIR USING ARMS: A LOT
TOILETING: A LITTLE
MOVING FROM LYING ON BACK TO SITTING ON SIDE OF FLAT BED: A LOT
WALKING IN HOSPITAL ROOM: A LOT

## 2020-11-24 ASSESSMENT — ENCOUNTER SYMPTOMS
VOMITING: 0
PALPITATIONS: 0
MYALGIAS: 0
SPEECH CHANGE: 0
COUGH: 0
ORTHOPNEA: 1
FLANK PAIN: 0
EYE DISCHARGE: 0
CHILLS: 0
SORE THROAT: 0
EYE REDNESS: 0
SHORTNESS OF BREATH: 1
PND: 1
HALLUCINATIONS: 0
BLOOD IN STOOL: 0
LOSS OF CONSCIOUSNESS: 0
HEMOPTYSIS: 0
EYE PAIN: 0
STRIDOR: 0
FEVER: 0
NERVOUS/ANXIOUS: 0
ABDOMINAL PAIN: 0
BRUISES/BLEEDS EASILY: 0
SPUTUM PRODUCTION: 0
SEIZURES: 0
FOCAL WEAKNESS: 0
DIARRHEA: 0

## 2020-11-24 ASSESSMENT — FIBROSIS 4 INDEX: FIB4 SCORE: 1.81

## 2020-11-24 NOTE — ED NOTES
"assisting with care, per arleth in lab, pt covid test will be resulted \"later this afternoon\", junior eta. Update to dr villeda  "

## 2020-11-24 NOTE — ED TRIAGE NOTES
BIB [facility] for SOB that began today. Patient advised that she has had flu like symptoms for 2 weeks. Patient was 72% RA, [facility] placed patient on 02 and gave patient 3 albuterol tx, 5 duo neb tx, 125 of solumedrol, and 2mg of mag. Pt tested for COVID yesterday and results not back at this time.

## 2020-11-25 ENCOUNTER — APPOINTMENT (OUTPATIENT)
Dept: CARDIOLOGY | Facility: MEDICAL CENTER | Age: 75
DRG: 291 | End: 2020-11-25
Attending: INTERNAL MEDICINE
Payer: MEDICARE

## 2020-11-25 PROBLEM — I50.9 ACUTE EXACERBATION OF CHF (CONGESTIVE HEART FAILURE) (HCC): Status: ACTIVE | Noted: 2020-11-25

## 2020-11-25 PROBLEM — I50.33 ACUTE ON CHRONIC DIASTOLIC CHF (CONGESTIVE HEART FAILURE), NYHA CLASS 2 (HCC): Status: ACTIVE | Noted: 2020-11-25

## 2020-11-25 PROBLEM — J04.0 LARYNGITIS, ACUTE: Status: ACTIVE | Noted: 2020-11-25

## 2020-11-25 PROBLEM — Z91.199 CURRENT NON-ADHERENCE TO MEDICAL TREATMENT: Status: ACTIVE | Noted: 2020-11-25

## 2020-11-25 LAB
ALBUMIN SERPL BCP-MCNC: 3.7 G/DL (ref 3.2–4.9)
ALBUMIN/GLOB SERPL: 1.2 G/DL
ALP SERPL-CCNC: 99 U/L (ref 30–99)
ALT SERPL-CCNC: 27 U/L (ref 2–50)
ANION GAP SERPL CALC-SCNC: 15 MMOL/L (ref 7–16)
AST SERPL-CCNC: 19 U/L (ref 12–45)
BASOPHILS # BLD AUTO: 0.1 % (ref 0–1.8)
BASOPHILS # BLD: 0.01 K/UL (ref 0–0.12)
BILIRUB SERPL-MCNC: 0.3 MG/DL (ref 0.1–1.5)
BUN SERPL-MCNC: 20 MG/DL (ref 8–22)
CALCIUM SERPL-MCNC: 9.8 MG/DL (ref 8.4–10.2)
CHLORIDE SERPL-SCNC: 103 MMOL/L (ref 96–112)
CO2 SERPL-SCNC: 22 MMOL/L (ref 20–33)
CREAT SERPL-MCNC: 0.6 MG/DL (ref 0.5–1.4)
EOSINOPHIL # BLD AUTO: 0 K/UL (ref 0–0.51)
EOSINOPHIL NFR BLD: 0 % (ref 0–6.9)
ERYTHROCYTE [DISTWIDTH] IN BLOOD BY AUTOMATED COUNT: 51.2 FL (ref 35.9–50)
EST. AVERAGE GLUCOSE BLD GHB EST-MCNC: 200 MG/DL
GLOBULIN SER CALC-MCNC: 3.1 G/DL (ref 1.9–3.5)
GLUCOSE BLD-MCNC: 204 MG/DL (ref 65–99)
GLUCOSE BLD-MCNC: 260 MG/DL (ref 65–99)
GLUCOSE BLD-MCNC: 263 MG/DL (ref 65–99)
GLUCOSE BLD-MCNC: 310 MG/DL (ref 65–99)
GLUCOSE BLD-MCNC: 409 MG/DL (ref 65–99)
GLUCOSE SERPL-MCNC: 301 MG/DL (ref 65–99)
HBA1C MFR BLD: 8.6 % (ref 0–5.6)
HCT VFR BLD AUTO: 35.8 % (ref 37–47)
HGB BLD-MCNC: 11.2 G/DL (ref 12–16)
IMM GRANULOCYTES # BLD AUTO: 0.1 K/UL (ref 0–0.11)
IMM GRANULOCYTES NFR BLD AUTO: 1.2 % (ref 0–0.9)
LV EJECT FRACT  99904: 70
LV EJECT FRACT MOD 2C 99903: 70.06
LV EJECT FRACT MOD 4C 99902: 67.26
LV EJECT FRACT MOD BP 99901: 67.6
LYMPHOCYTES # BLD AUTO: 1.16 K/UL (ref 1–4.8)
LYMPHOCYTES NFR BLD: 13.7 % (ref 22–41)
MAGNESIUM SERPL-MCNC: 1.9 MG/DL (ref 1.5–2.5)
MCH RBC QN AUTO: 31 PG (ref 27–33)
MCHC RBC AUTO-ENTMCNC: 31.3 G/DL (ref 33.6–35)
MCV RBC AUTO: 99.2 FL (ref 81.4–97.8)
MONOCYTES # BLD AUTO: 0.15 K/UL (ref 0–0.85)
MONOCYTES NFR BLD AUTO: 1.8 % (ref 0–13.4)
NEUTROPHILS # BLD AUTO: 7.07 K/UL (ref 2–7.15)
NEUTROPHILS NFR BLD: 83.2 % (ref 44–72)
NRBC # BLD AUTO: 0 K/UL
NRBC BLD-RTO: 0 /100 WBC
PLATELET # BLD AUTO: 322 K/UL (ref 164–446)
PMV BLD AUTO: 10.2 FL (ref 9–12.9)
POTASSIUM SERPL-SCNC: 4.6 MMOL/L (ref 3.6–5.5)
PROCALCITONIN SERPL-MCNC: 0.17 NG/ML
PROT SERPL-MCNC: 6.8 G/DL (ref 6–8.2)
RBC # BLD AUTO: 3.61 M/UL (ref 4.2–5.4)
S PYO AG THROAT QL: NORMAL
SIGNIFICANT IND 70042: NORMAL
SITE SITE: NORMAL
SODIUM SERPL-SCNC: 140 MMOL/L (ref 135–145)
SOURCE SOURCE: NORMAL
TROPONIN T SERPL-MCNC: 78 NG/L (ref 6–19)
WBC # BLD AUTO: 8.5 K/UL (ref 4.8–10.8)

## 2020-11-25 PROCEDURE — 82962 GLUCOSE BLOOD TEST: CPT | Mod: 91

## 2020-11-25 PROCEDURE — 93306 TTE W/DOPPLER COMPLETE: CPT | Mod: 26 | Performed by: INTERNAL MEDICINE

## 2020-11-25 PROCEDURE — 83036 HEMOGLOBIN GLYCOSYLATED A1C: CPT

## 2020-11-25 PROCEDURE — 84484 ASSAY OF TROPONIN QUANT: CPT

## 2020-11-25 PROCEDURE — 93306 TTE W/DOPPLER COMPLETE: CPT

## 2020-11-25 PROCEDURE — 700105 HCHG RX REV CODE 258: Performed by: HOSPITALIST

## 2020-11-25 PROCEDURE — 700102 HCHG RX REV CODE 250 W/ 637 OVERRIDE(OP): Performed by: HOSPITALIST

## 2020-11-25 PROCEDURE — 770020 HCHG ROOM/CARE - TELE (206)

## 2020-11-25 PROCEDURE — 85025 COMPLETE CBC W/AUTO DIFF WBC: CPT

## 2020-11-25 PROCEDURE — 700111 HCHG RX REV CODE 636 W/ 250 OVERRIDE (IP): Performed by: HOSPITALIST

## 2020-11-25 PROCEDURE — 80053 COMPREHEN METABOLIC PANEL: CPT

## 2020-11-25 PROCEDURE — 700111 HCHG RX REV CODE 636 W/ 250 OVERRIDE (IP): Performed by: INTERNAL MEDICINE

## 2020-11-25 PROCEDURE — 87880 STREP A ASSAY W/OPTIC: CPT

## 2020-11-25 PROCEDURE — 94760 N-INVAS EAR/PLS OXIMETRY 1: CPT

## 2020-11-25 PROCEDURE — 84145 PROCALCITONIN (PCT): CPT

## 2020-11-25 PROCEDURE — 96375 TX/PRO/DX INJ NEW DRUG ADDON: CPT

## 2020-11-25 PROCEDURE — 700101 HCHG RX REV CODE 250: Performed by: HOSPITALIST

## 2020-11-25 PROCEDURE — 700105 HCHG RX REV CODE 258: Performed by: INTERNAL MEDICINE

## 2020-11-25 PROCEDURE — 83735 ASSAY OF MAGNESIUM: CPT

## 2020-11-25 PROCEDURE — 99232 SBSQ HOSP IP/OBS MODERATE 35: CPT | Performed by: INTERNAL MEDICINE

## 2020-11-25 PROCEDURE — A9270 NON-COVERED ITEM OR SERVICE: HCPCS | Performed by: HOSPITALIST

## 2020-11-25 PROCEDURE — 94640 AIRWAY INHALATION TREATMENT: CPT

## 2020-11-25 PROCEDURE — 96372 THER/PROPH/DIAG INJ SC/IM: CPT

## 2020-11-25 RX ADMIN — BUDESONIDE AND FORMOTEROL FUMARATE DIHYDRATE 2 PUFF: 160; 4.5 AEROSOL RESPIRATORY (INHALATION) at 07:09

## 2020-11-25 RX ADMIN — BUDESONIDE AND FORMOTEROL FUMARATE DIHYDRATE 2 PUFF: 160; 4.5 AEROSOL RESPIRATORY (INHALATION) at 17:27

## 2020-11-25 RX ADMIN — OXYBUTYNIN CHLORIDE 5 MG: 5 TABLET ORAL at 17:26

## 2020-11-25 RX ADMIN — INSULIN HUMAN 4 UNITS: 100 INJECTION, SOLUTION PARENTERAL at 21:13

## 2020-11-25 RX ADMIN — ALBUTEROL SULFATE 2.5 MG: 2.5 SOLUTION RESPIRATORY (INHALATION) at 18:53

## 2020-11-25 RX ADMIN — ATORVASTATIN CALCIUM 40 MG: 40 TABLET, FILM COATED ORAL at 06:10

## 2020-11-25 RX ADMIN — SODIUM CHLORIDE 3 G: 900 INJECTION INTRAVENOUS at 11:34

## 2020-11-25 RX ADMIN — CEFTRIAXONE SODIUM 1 G: 1 INJECTION, POWDER, FOR SOLUTION INTRAMUSCULAR; INTRAVENOUS at 15:22

## 2020-11-25 RX ADMIN — FUROSEMIDE 20 MG: 10 INJECTION, SOLUTION INTRAMUSCULAR; INTRAVENOUS at 15:22

## 2020-11-25 RX ADMIN — FUROSEMIDE 20 MG: 10 INJECTION, SOLUTION INTRAMUSCULAR; INTRAVENOUS at 05:59

## 2020-11-25 RX ADMIN — ALBUTEROL SULFATE 2.5 MG: 2.5 SOLUTION RESPIRATORY (INHALATION) at 10:32

## 2020-11-25 RX ADMIN — ENOXAPARIN SODIUM 40 MG: 40 INJECTION SUBCUTANEOUS at 06:09

## 2020-11-25 RX ADMIN — INSULIN HUMAN 3 UNITS: 100 INJECTION, SOLUTION PARENTERAL at 11:37

## 2020-11-25 RX ADMIN — SODIUM CHLORIDE 3 G: 900 INJECTION INTRAVENOUS at 06:10

## 2020-11-25 RX ADMIN — INSULIN HUMAN 3 UNITS: 100 INJECTION, SOLUTION PARENTERAL at 06:20

## 2020-11-25 RX ADMIN — ALBUTEROL SULFATE 2 PUFF: 90 AEROSOL, METERED RESPIRATORY (INHALATION) at 07:09

## 2020-11-25 RX ADMIN — INSULIN HUMAN 2 UNITS: 100 INJECTION, SOLUTION PARENTERAL at 17:30

## 2020-11-25 RX ADMIN — ALBUTEROL SULFATE 2.5 MG: 2.5 SOLUTION RESPIRATORY (INHALATION) at 14:32

## 2020-11-25 RX ADMIN — ACETAMINOPHEN 650 MG: 325 TABLET, FILM COATED ORAL at 21:15

## 2020-11-25 RX ADMIN — OXYBUTYNIN CHLORIDE 5 MG: 5 TABLET ORAL at 06:10

## 2020-11-25 ASSESSMENT — ENCOUNTER SYMPTOMS
VOMITING: 0
SHORTNESS OF BREATH: 1
DIARRHEA: 0
BACK PAIN: 0
DIZZINESS: 0
SORE THROAT: 1
INSOMNIA: 0
NAUSEA: 0
ABDOMINAL PAIN: 0
PALPITATIONS: 0
DIAPHORESIS: 0
CONSTIPATION: 0
HEADACHES: 0
FEVER: 0
COUGH: 1
NERVOUS/ANXIOUS: 0

## 2020-11-25 ASSESSMENT — PAIN DESCRIPTION - PAIN TYPE
TYPE: CHRONIC PAIN
TYPE: ACUTE PAIN
TYPE: ACUTE PAIN

## 2020-11-25 ASSESSMENT — FIBROSIS 4 INDEX: FIB4 SCORE: 0.85

## 2020-11-25 ASSESSMENT — LIFESTYLE VARIABLES: EVER_SMOKED: YES

## 2020-11-25 NOTE — ASSESSMENT & PLAN NOTE
Unclear etiology at this time, rapid strep negative. Likely viral etiology, COVID test negative.  - continue supportive care

## 2020-11-25 NOTE — ASSESSMENT & PLAN NOTE
- continue home amlodipine/Benzapril with hold parameters.  - patient is refusing medications, monitor blood pressures  - continue IV lasix, transition to PO lasix

## 2020-11-25 NOTE — PROGRESS NOTES
Telemetry Shift Summary    Rhythm: SR w/ BBB  HR: 60-80s  Ectopy: none    Measurements for strip printed 11/25/2020 at 0201  HR 85  0.18 / 0.14 / 0.34    Normal Values  Rhythm: SR  HR:   Measurements: 0.12-0.20 / 0.06-0.10 / 0.30-0.52

## 2020-11-25 NOTE — PROGRESS NOTES
Hospital Medicine Daily Progress Note    Date of Service  11/25/2020    Chief Complaint  75 y.o. female admitted 11/24/2020 with SOB    Hospital Course  75F PMH COPD no home oxygen, Hypothyroid, DM A1c 8.6 (04/20), HTN, DLD admitted 11/24/2020 for SOB, orthopnea, BLE edema, weight gain for 2 weeks likely decompensated CHF. Patient had hypoxia at home 70's on EMS pickup.  Patient has not been adherent to her prescribed lasix medication from her PCP.  She stated she does not want to live a life where she has to use the restroom too frequently.  CXR on admission showed possible consolidation in RLL region, procalcitonin 0.17 high normal range.    Interval Problem Update  I have seen and examined this patient this morning. Patient stated she does not want to use lasix.  Informed her of her CHF and fluid intake at home, restrictions to keep from having volume overload and adherence to lasix therapy.  Patient understood but stated she does not want to continue with lasix at home.    As per nursing, patient has been stable, requiring supplemental oxygen.  Patient did refuse some medications overnight.    Care plan discussed with patient in detail.  Care team notified of plan as well.    Consultants/Specialty  Palliative care for goals of care and POLST    Code Status  Full Code    Disposition  TBD, likely will need management of her CHF exacerbation for next 2-3 days, requiring oxygen supplementation.    Review of Systems  Review of Systems   Constitutional: Negative for diaphoresis and fever.   HENT: Positive for sore throat. Negative for ear pain and nosebleeds.    Respiratory: Positive for cough and shortness of breath.    Cardiovascular: Negative for chest pain and palpitations.   Gastrointestinal: Negative for abdominal pain, constipation, diarrhea, nausea and vomiting.   Musculoskeletal: Negative for back pain and joint pain.   Skin: Negative for itching and rash.   Neurological: Negative for dizziness and headaches.    Psychiatric/Behavioral: The patient is not nervous/anxious and does not have insomnia.       Physical Exam  Temp:  [36.4 °C (97.6 °F)-37.1 °C (98.7 °F)] 36.7 °C (98 °F)  Pulse:  [77-97] 77  Resp:  [18-38] 20  BP: (117-181)/(50-93) 138/79  SpO2:  [92 %-97 %] 96 %    Physical Exam  Vitals signs and nursing note reviewed.   Constitutional:       General: She is not in acute distress.     Appearance: Normal appearance. She is not ill-appearing.   Cardiovascular:      Rate and Rhythm: Tachycardia present.      Pulses: Normal pulses.      Heart sounds: Normal heart sounds. No murmur.   Pulmonary:      Effort: Respiratory distress present.      Comments: Tachypnea  Using oxygen supplementation  B/L coarse breath sounds  Abdominal:      General: Bowel sounds are normal.      Palpations: Abdomen is soft.   Musculoskeletal: Normal range of motion.      Right lower leg: Edema present.      Left lower leg: Edema present.   Skin:     General: Skin is warm.      Coloration: Skin is not jaundiced.      Findings: Lesion (right leg covered in guaze) present.   Neurological:      General: No focal deficit present.      Mental Status: She is alert and oriented to person, place, and time. Mental status is at baseline.      Motor: No weakness.   Psychiatric:         Mood and Affect: Mood normal.         Behavior: Behavior normal.         Thought Content: Thought content normal.       Fluids    Intake/Output Summary (Last 24 hours) at 11/25/2020 1105  Last data filed at 11/25/2020 0600  Gross per 24 hour   Intake --   Output 500 ml   Net -500 ml     Laboratory  Recent Labs     11/24/20  1529 11/25/20  0259   WBC 15.9* 8.5   RBC 3.72* 3.61*   HEMOGLOBIN 11.8* 11.2*   HEMATOCRIT 37.2 35.8*   .0* 99.2*   MCH 31.7 31.0   MCHC 31.7* 31.3*   RDW 51.0* 51.2*   PLATELETCT 294 322   MPV 9.5 10.2     Recent Labs     11/24/20  1529 11/25/20  0259   SODIUM 141 140   POTASSIUM 4.1 4.6   CHLORIDE 103 103   CO2 22 22   GLUCOSE 364* 301*   BUN  "18 20   CREATININE 0.65 0.60   CALCIUM 9.8 9.8                   Imaging  DX-CHEST-PORTABLE (1 VIEW)   Final Result      Stable marked cardiac silhouette enlargement with some new reticular and indistinct opacity that is worrisome for interstitial edema favored over infectious process      EC-ECHOCARDIOGRAM COMPLETE W/O CONT    (Results Pending)        Assessment/Plan  * Acute on chronic diastolic CHF (congestive heart failure), NYHA class 2 (HCC)- (present on admission)  Assessment & Plan  Patient arrived with orthopnea, SOB, BLE edema, CXR showing pulmonary edema  Last Echo showed probably diastolic CHF stage 2  Patient is not taking her prescribed lasix at home, states \"it's no life\" going to the bathroom all day  - continue IV lasix  - update echo ordered  - monitor respiratory function, strict Ins & Outs, fluid restriction 1200ml per day    Acute respiratory failure with hypoxia (HCC)- (present on admission)  Assessment & Plan  COVID test negative, Influenza screen negative  CXR shows evidence for new reticular and indistinct opacity that is worrisome for interstitial edema favored over infectious process  - monitor respiratory function, continue plan for CHF exacerbation  - continue Unasyn, checking procalcitonin    Current non-adherence to medical treatment  Assessment & Plan  Patient has refused to take her home lasix ordered by her PCP, due to inconvience of mulitple urination throughout the day.  - explained to patient the necessity of medical adherence, especially with her lasix.  Explained to patient that she will have continued exacerbations if she does not take her diuretics at home.  Patient has understanding of the need for lasix, but she adamantly states she will not take the medication.  - obtain palliative care for CHF and COPD goals of care, code status, POLST  - patient is high risk for readmission to the hospital    Laryngitis, acute- (present on admission)  Assessment & Plan  Unclear etiology " at this time  - check rapid strep, patient had past strep laryngitis    Essential hypertension- (present on admission)  Assessment & Plan  - continue home amlodipine/Benzapril with hold parameters.  - patient is refusing medications, monitor blood pressures  - continue IV lasix    DM (diabetes mellitus) (HCC)- (present on admission)  Assessment & Plan  With hyperglycemia  Glycated hemoglobin 8.6%, 8 months ago  - checking updated A1c  - Hold home oral antidiabetic medications  - ISS  - Accu-Checks  - hypoglycemia protocol      Dyslipidemia- (present on admission)  Assessment & Plan  - continue home atorvastatin       VTE prophylaxis: lovenox

## 2020-11-25 NOTE — ASSESSMENT & PLAN NOTE
Patient has refused to take her home lasix ordered by her PCP, due to inconvience of mulitple urination throughout the day.  - explained to patient the necessity of medical adherence, especially with her lasix.  Explained to patient that she will have continued exacerbations if she does not take her diuretics at home.  Patient has understanding of the need for lasix, but she adamantly states she will not take the medication.  - Palliative care has spoken with patient, will revisit, patient became agitated on multiple questioning.  - patient is high risk for readmission to the hospital

## 2020-11-25 NOTE — CARE PLAN
Problem: Bronchoconstriction:  Goal: Improve in air movement and diminished wheezing  Outcome: PROGRESSING AS EXPECTED   Albuterol QID

## 2020-11-25 NOTE — ED PROVIDER NOTES
ED provider note    CHIEF COMPLAINT  Chief Complaint   Patient presents with   • Shortness of Breath     BIB EMS for Shortness of Breath       HPI  Gabrielle Olmstead is a 75 y.o. female who presents for evaluation of shortness of breath.  She is a history of COPD, she reports over the past few days she is had increasing shortness of breath with a mild nonproductive cough.  Yesterday she contacted dispatch [facility], a traveling health provider who evaluated the patient, treated her with steroids, breathing treatment and antibiotics, no x-ray of her chest was obtained.  She states that she felt better until sometime in the early morning hours when she began to be short of breath again.  She called EMS, upon their arrival they noted her oxygen saturation to be around 70% on room air, she received breathing treatments on the ambulance and again is feeling improved.  Patient does not have chest pain.  She was tested for Covid yesterday, results pending was states she has had no fever, no contact the Covid patients that she is aware of.  Patient offers no other specific complaints at this time, no abdominal pain or vomiting, headache.    REVIEW OF SYSTEMS  Negative for fever, rash, chest pain, abdominal pain, nausea, vomiting, diarrhea, headache, focal weakness, focal numbness, focal tingling, back pain. All other systems are negative.     PAST MEDICAL HISTORY  Past Medical History:   Diagnosis Date   • Arthritis    • ASTHMA    • Breath shortness    • Bronchitis    • Diabetes     ORAL   • EMPHYSEMA    • Hypertension    • Other specified disorder of intestines        FAMILY HISTORY  Family History   Problem Relation Age of Onset   • Hypertension Mother        SOCIAL HISTORY  Social History     Tobacco Use   • Smoking status: Former Smoker     Packs/day: 2.00     Years: 40.00     Pack years: 80.00     Types: Cigarettes   • Smokeless tobacco: Never Used   • Tobacco comment: 45 YEARS  11/2 PPD   Substance Use Topics   • Alcohol  "use: No     Comment: OCCASSIONAL   • Drug use: No       SURGICAL HISTORY  Past Surgical History:   Procedure Laterality Date   • HYSTERECTOMY ROBOTIC  11/28/2011    Performed by REKHA BYERS at SURGERY [facility] ORS   • LESION EXCISION GENERAL  11/28/2011    Performed by REKHA BYERS at SURGERY [facility] ORS   • CYST EXCISION  8/6/2010    Performed by ANA PAVON at SURGERY SAME DAY [facility] ORS       CURRENT MEDICATIONS  I personally reviewed the medication list in the charting documentation.     ALLERGIES  Allergies   Allergen Reactions   • Nkda [No Known Drug Allergy]        MEDICAL RECORD  I have reviewed patient's medical record and pertinent results are listed above.      PHYSICAL EXAM  VITAL SIGNS: /59   Pulse 86   Temp 36.7 °C (98.1 °F) (Temporal)   Resp (!) 24   Ht 1.6 m (5' 3\")   Wt 104.3 kg (230 lb)   SpO2 85% on room air  BMI 40.74 kg/m²    Constitutional: Ill-appearing, mild respiratory distress   HENT: Normocephalic, no evidence of acute trauma.  Eyes: No scleral icterus. Normal conjunctiva   Neck: Supple, comfortable, nonpainful range of motion.   Cardiovascular: Regular heart rate and rhythm.   Thorax & Lungs: Poor air movement throughout.  No focal rales, no wheezing appreciated.  Abdomen: Soft, with no tenderness, rebound nor guarding.  No mass or pulsatile mass appreciated.  Skin: Warm, dry. No rash appreciated  Extremities/Musculoskeletal: Symmetric 2+ lower extremity pitting edema.  Dressing applied to the right lower extremity.  Neurovascularly intact bilaterally  Neurologic: Alert & oriented. No focal deficits observed.   Psychiatric: Normal affect appropriate for the clinical situation.    DIAGNOSTIC STUDIES / PROCEDURES    LABS/EKGs  Results for orders placed or performed during the hospital encounter of 11/24/20   CBC WITH DIFFERENTIAL   Result Value Ref Range    WBC 15.9 (H) 4.8 - 10.8 K/uL    RBC 3.72 (L) 4.20 - 5.40 M/uL    Hemoglobin 11.8 (L) 12.0 - 16.0 g/dL    " Hematocrit 37.2 37.0 - 47.0 %    .0 (H) 81.4 - 97.8 fL    MCH 31.7 27.0 - 33.0 pg    MCHC 31.7 (L) 33.6 - 35.0 g/dL    RDW 51.0 (H) 35.9 - 50.0 fL    Platelet Count 294 164 - 446 K/uL    MPV 9.5 9.0 - 12.9 fL    Neutrophils-Polys 90.90 (H) 44.00 - 72.00 %    Lymphocytes 6.30 (L) 22.00 - 41.00 %    Monocytes 1.80 0.00 - 13.40 %    Eosinophils 0.10 0.00 - 6.90 %    Basophils 0.20 0.00 - 1.80 %    Immature Granulocytes 0.70 0.00 - 0.90 %    Nucleated RBC 0.00 /100 WBC    Neutrophils (Absolute) 14.41 (H) 2.00 - 7.15 K/uL    Lymphs (Absolute) 1.00 1.00 - 4.80 K/uL    Monos (Absolute) 0.29 0.00 - 0.85 K/uL    Eos (Absolute) 0.02 0.00 - 0.51 K/uL    Baso (Absolute) 0.03 0.00 - 0.12 K/uL    Immature Granulocytes (abs) 0.11 0.00 - 0.11 K/uL    NRBC (Absolute) 0.00 K/uL   COMP METABOLIC PANEL   Result Value Ref Range    Sodium 141 135 - 145 mmol/L    Potassium 4.1 3.6 - 5.5 mmol/L    Chloride 103 96 - 112 mmol/L    Co2 22 20 - 33 mmol/L    Anion Gap 16.0 7.0 - 16.0    Glucose 364 (H) 65 - 99 mg/dL    Bun 18 8 - 22 mg/dL    Creatinine 0.65 0.50 - 1.40 mg/dL    Calcium 9.8 8.4 - 10.2 mg/dL    AST(SGOT) 42 12 - 45 U/L    ALT(SGPT) 35 2 - 50 U/L    Alkaline Phosphatase 111 (H) 30 - 99 U/L    Total Bilirubin 0.4 0.1 - 1.5 mg/dL    Albumin 3.7 3.2 - 4.9 g/dL    Total Protein 7.0 6.0 - 8.2 g/dL    Globulin 3.3 1.9 - 3.5 g/dL    A-G Ratio 1.1 g/dL   TROPONIN   Result Value Ref Range    Troponin T 53 (H) 6 - 19 ng/L   proBrain Natriuretic Peptide, NT   Result Value Ref Range    NT-proBNP 320 (H) 0 - 125 pg/mL   ESTIMATED GFR   Result Value Ref Range    GFR If African American >60 >60 mL/min/1.73 m 2    GFR If Non African American >60 >60 mL/min/1.73 m 2   EKG   Result Value Ref Range    Report       [facility] Emergency Dept.    Test Date:  2020-11-24  Pt Name:    MICHEAL VALENCIA                  Department[facility]  MRN:        [id number]                      [facility]  Gender:     Female                        Technician: RUSTY  :        1945                   Requested By:ER TRIAGE PROTOCOL  Order #:    [id number]                    Reading MD: VLADISLAV MCMULLEN MD    Measurements  Intervals                                Axis  Rate:       92                           P:          -4  WV:         175                          QRS:        -81  QRSD:       152                          T:          16  QT:         411  QTc:        509    Interpretive Statements  12 Lead EKG interpreted by me to show: -- Rate 92-- Rhythm: Normal sinus  rhythm  -- Axis: Left-- WV and QRS Intervals: Right bundle branch block-- T waves: No    acute changes -- ST segments: No acute changes -- Ectopy: None. My impression  of  this EKG: Does not indicate acute ischemia at thi s time.  Electronically Signed On 2020 16:40:38 PST by VLADISLAV MCMULLEN MD          RADIOLOGY  DX-CHEST-PORTABLE (1 VIEW)   Final Result      Stable marked cardiac silhouette enlargement with some new reticular and indistinct opacity that is worrisome for interstitial edema favored over infectious process            COURSE & MEDICAL DECISION MAKING  I have reviewed any medical record information, laboratory studies and radiographic results as noted above.  Differential diagnoses includes: COPD with exacerbation, pneumonia, COVID-19, pulmonary edema, CHF, anemia, dehydration, electrolyte abnormalities, pneumothorax    Encounter Summary: This is a 75 y.o. female with progressive shortness of breath for a few days, history of COPD, treated yesterday by dispatch health with antibiotics and steroids and breathing treatments, improved only temporarily and worsened again.  Was found today by EMS to have oxygen saturations in the 70s.  Treated with breathing treatments on the ambulance and arrives hypoxic with a room air saturation of 85%.  In respiratory distress and ill-appearing.  The patient was tested for Covid yesterday, results pending and the patient is  refusing another swab at this time.  Chest x-ray reveals indistinct opacities differential including interstitial edema versus infectious process.  WBC is elevated.  At this point the patient has been treated for pneumonia with broad-spectrum antibiotics, she is also been treated for possible pulmonary edema with Lasix as well as for exacerbation of COPD with Solu-Medrol.  At this point, regardless of the etiology, the patient has acute hypoxic respiratory failure and is being admitted to the hospital in guarded condition    CRITICAL CARE  The very real possibilty of a deterioration of this patient's condition required the highest level of my preparedness for sudden, emergent intervention.  I provided critical care services, which included medication orders, frequent reevaluations of the patient's condition and response to treatment, ordering and reviewing test results, and discussing the case with various consultants.  The critical care time associated with the care of the patient was 39 minutes. Review chart for interventions. This time is exclusive of any other billable procedures.         DISPOSITION: Admit in guarded condition      FINAL IMPRESSION  1. Acute respiratory failure with hypoxia (HCC)    2. Acute exacerbation of chronic obstructive pulmonary disease (COPD) (HCC)    3. Acute pulmonary edema (HCC)           This dictation was created using voice recognition software. The accuracy of the dictation is limited to the abilities of the software. I expect there may be some errors of grammar and possibly content. The nursing notes were reviewed and certain aspects of this information were incorporated into this note.    Electronically signed by: Chace Hyde M.D., 11/24/2020 8:49 PM

## 2020-11-25 NOTE — H&P
Hospital Medicine History & Physical Note    Date of Service  11/24/2020    Primary Care Physician  Anthony Martinez M.D.    Consultants  None    Code Status  Full Code    Chief Complaint  Chief Complaint   Patient presents with   • Shortness of Breath     BIB EMS for Shortness of Breath     History of Presenting Illness  75 y.o. female with a past medical history of chronic obstructive pulmonary disease not on oxygen at baseline, hypertension, diabetes, dyslipidemia and hypothyroidism who presented 11/24/2020 with shortness of breath.  Patient reports that she has been having generalized weakness easy fatigability over the past 2 weeks.  She also reports having flulike symptoms nearly 10 days ago.  Apparently she was found to be saturating in the low 70s on room air by rounds.  She denies having fevers and chills.  She does report having bilateral lower extremity swelling over the past 7 days.  She does have orthopnea and paroxysmal nocturnal dyspnea.  She thinks that she might have gained some weight recently, however she is now following her weight regularly.    Review of Systems  Review of Systems   Constitutional: Positive for malaise/fatigue. Negative for chills and fever.   HENT: Negative for congestion and sore throat.    Eyes: Negative for pain, discharge and redness.   Respiratory: Positive for shortness of breath. Negative for cough, hemoptysis, sputum production and stridor.    Cardiovascular: Positive for orthopnea, leg swelling and PND. Negative for chest pain and palpitations.   Gastrointestinal: Negative for abdominal pain, blood in stool, diarrhea and vomiting.   Genitourinary: Negative for flank pain, hematuria and urgency.   Musculoskeletal: Negative for myalgias.   Skin: Negative for rash.   Neurological: Negative for speech change, focal weakness, seizures and loss of consciousness.   Endo/Heme/Allergies: Does not bruise/bleed easily.   Psychiatric/Behavioral: Negative for hallucinations and  suicidal ideas. The patient is not nervous/anxious.      Past Medical History   has a past medical history of Arthritis, ASTHMA, Breath shortness, Bronchitis, Diabetes, EMPHYSEMA, Hypertension, and Other specified disorder of intestines.    Surgical History   has a past surgical history that includes cyst excision (8/6/2010); hysterectomy robotic (11/28/2011); and lesion excision general (11/28/2011).     Family History  family history includes Hypertension in her mother.     Social History   reports that she has quit smoking. Her smoking use included cigarettes. She has a 80.00 pack-year smoking history. She has never used smokeless tobacco. She reports that she does not drink alcohol or use drugs.    Allergies  Allergies   Allergen Reactions   • Nkda [No Known Drug Allergy]      Medications  Prior to Admission Medications   Prescriptions Last Dose Informant Patient Reported? Taking?   albuterol 108 (90 Base) MCG/ACT Aero Soln inhalation aerosol 11/24/2020 at 0800 Patient Yes Yes   Sig: Inhale 2 Puffs every 6 hours as needed for Shortness of Breath.   amlodipine-benazepril (LOTREL) 5-20 MG per capsule 11/24/2020 at 0700 Patient Yes No   Sig: Take 1 Cap by mouth every day.   amoxicillin-clavulanate (AUGMENTIN) 875-125 MG Tab 11/24/2020 at 1000 Patient's Home Pharmacy Yes Yes   Sig: Take 1 Tab by mouth 2 times a day. Pt started on 11/24/2020 for 7 day course   atorvastatin (LIPITOR) 40 MG TABS 11/24/2020 at 0700 Patient Yes No   Sig: Take 40 mg by mouth every day.   fluticasone-salmeterol (ADVAIR) 250-50 MCG/DOSE AEPB 11/24/2020 at 0700 Patient Yes No   Sig: Inhale 1 Puff by mouth every 12 hours.   glimepiride (AMARYL) 4 MG Tab 11/24/2020 at 0700 Patient Yes No   Sig: Take 4 mg by mouth every day.   guaiFENesin (MUCINEX PO) 11/23/2020 at 2300 Patient Yes Yes   Sig: Take 1 Tab by mouth as needed (For cough).   hydrochlorothiazide (HYDRODIURIL) 25 MG TABS > 2 days at Unknown Patient Yes No   Sig: Take 25 mg by mouth  every 72 hours.   ibuprofen (MOTRIN) 200 MG Tab 11/23/2020 at 2300 Patient Yes Yes   Sig: Take 600 mg by mouth every 6 hours as needed for Mild Pain.   levothyroxine (SYNTHROID) 25 MCG Tab 11/24/2020 at 0630 Patient Yes No   Sig: Take 25 mcg by mouth every day.   metFORMIN ER (GLUCOPHAGE XR) 500 MG TABLET SR 24 HR 11/24/2020 at 0700 Patient Yes No   Sig: Take 500 mg by mouth 2 Times a Day.   oxybutynin (DITROPAN) 5 MG Tab 11/24/2020 at 0700 Patient Yes No   Sig: Take 5 mg by mouth 2 Times a Day.   predniSONE (DELTASONE) 20 MG Tab 11/24/2020 at 1000 Patient's Home Pharmacy Yes Yes   Sig: Take 40 mg by mouth every day. Pt started on 11/24/2020 for 4 day course   sertraline (ZOLOFT) 50 MG Tab 11/24/2020 at 0700 Patient Yes No   Sig: Take 50 mg by mouth every day.      Facility-Administered Medications: None     Physical Exam  Temp:  [36.7 °C (98.1 °F)] 36.7 °C (98.1 °F)  Pulse:  [86-97] 97  Resp:  [18-38] 31  BP: (117-181)/(50-93) 146/65  SpO2:  [92 %-97 %] 96 %    Physical Exam  Vitals signs and nursing note reviewed.   Constitutional:       General: She is not in acute distress.     Appearance: Normal appearance.   HENT:      Head: Normocephalic and atraumatic.      Right Ear: External ear normal.      Left Ear: External ear normal.      Nose: Nose normal.      Mouth/Throat:      Mouth: Mucous membranes are moist.   Eyes:      General:         Right eye: No discharge.         Left eye: No discharge.      Extraocular Movements: Extraocular movements intact.      Pupils: Pupils are equal, round, and reactive to light.   Neck:      Musculoskeletal: Normal range of motion and neck supple.   Cardiovascular:      Rate and Rhythm: Tachycardia present.      Heart sounds: No friction rub. No gallop.    Pulmonary:      Effort: Pulmonary effort is normal.      Breath sounds: No stridor. Decreased breath sounds present. No wheezing.      Comments: Bilateral basal crepitations   Abdominal:      General: Abdomen is flat. There is  no distension.      Tenderness: There is no abdominal tenderness.   Musculoskeletal: Normal range of motion.         General: Swelling present. No deformity.      Right lower leg: Edema present.      Left lower leg: Edema present.   Skin:     General: Skin is warm and dry.      Capillary Refill: Capillary refill takes 2 to 3 seconds.   Neurological:      General: No focal deficit present.      Mental Status: She is alert and oriented to person, place, and time.   Psychiatric:         Mood and Affect: Mood normal.         Behavior: Behavior normal.       Laboratory:  Recent Labs     11/24/20  1529   WBC 15.9*   RBC 3.72*   HEMOGLOBIN 11.8*   HEMATOCRIT 37.2   .0*   MCH 31.7   MCHC 31.7*   RDW 51.0*   PLATELETCT 294   MPV 9.5     Recent Labs     11/24/20  1529   SODIUM 141   POTASSIUM 4.1   CHLORIDE 103   CO2 22   GLUCOSE 364*   BUN 18   CREATININE 0.65   CALCIUM 9.8     Recent Labs     11/24/20  1529   ALTSGPT 35   ASTSGOT 42   ALKPHOSPHAT 111*   TBILIRUBIN 0.4   GLUCOSE 364*         Recent Labs     11/24/20  1529   NTPROBNP 320*         Recent Labs     11/24/20  1529   TROPONINT 53*     Imaging:  DX-CHEST-PORTABLE (1 VIEW)   Final Result      Stable marked cardiac silhouette enlargement with some new reticular and indistinct opacity that is worrisome for interstitial edema favored over infectious process        Assessment/Plan:  I anticipate this patient is appropriate for observation status at this time.    * Acute respiratory failure with hypoxia (HCC)- (present on admission)  Assessment & Plan  Differentials include COVID-19 infection, heart failure, worsening pulmonary hypertension, COPD exacerbation  CXR shows evidence for new reticular and indistinct opacity that is worrisome for interstitial edema favored over infectious process  Recent echo reviewed, Echo April 2020, EF, ~75%.. Mild concentric left ventricular hypertrophy.  Probably grade II diastolic dysfunction.~ right ventricular systolic pressure   is 40 mmHg.  COVID-19 is negative, patient has bilateral lower extremity edema, bilateral basal crepitations and appears volume overloaded.   Started on Lasix in the emergency department with some benefit, will continue intravenous diuretics for now.  Daily strict input and output. Daily standing weights.  Daily BMP to watch renal function, electrolytes.  Replace electrolytes as needed.  Started on Unasyn in the emergency department, will continue for now, follow on cultures and sensitivities.    DM (diabetes mellitus) (HCC)- (present on admission)  Assessment & Plan  With hyperglycemia  Glycated hemoglobin 8.6%, 8 months ago  I will hold home oral antidiabetic medications  I will start short acting insulin for now  Accu-Checks, hypoglycemia protocol      Essential hypertension- (present on admission)  Assessment & Plan  Resume home amlodipine/Benzapril with hold parameters.  Switching to intravenous Lasix with hold parameters    Dyslipidemia- (present on admission)  Assessment & Plan  Resume home atorvastatin.

## 2020-11-25 NOTE — ED NOTES
Med rec updated and complete  Allergies reviewed  Pt reports no vitamins   Pt reports she picked up an antibiotic and a steroid today 11/24/2020, not sure the name or strength.  Called Cecille @ [phone] to verify antibiotic and steroid  name

## 2020-11-25 NOTE — WOUND TEAM
[facility]  Inpatient Services  Initial Wound and Skin Care Evaluation    Admission Date: 11/24/2020     Last order of IP CONSULT TO WOUND CARE was found on 11/24/2020 from Hospital Encounter on 11/24/2020       HPI, PMH, SH: Reviewed    Unit where seen by Wound Team: 3315/01     WOUND CONSULT/FOLLOW UP RELATED TO:  RLE wounds    Self Report / Pain Level:   No c/o pain    OBJECTIVE:  On pressure redistribution mattress, silicone O2 NC    WOUND TYPE, LOCATION, CHARACTERISTICS (Pressure Injuries: location, stage, POA or date identified)  Wound 11/24/20 Venous Ulcer Pretibial Right (Active)      11/24/20 2158   Site Assessment JACKY    Periwound Assessment JACKY    Margins Defined edges    Closure Secondary intention    Drainage Amount Scant    Drainage Description Serous    Treatments Cleansed    Wound Cleansing Normal Saline Irrigation    Dressing Cleansing/Solutions Not Applicable    Dressing Options Nonadhesive Foam;Spandage    Dressing Changed New    Dressing Status Clean;Dry;Intact    Dressing Change/Treatment Frequency Every 72 hrs, and As Needed    NEXT Dressing Change/Treatment Date 11/28/20    NEXT Weekly Photo (Inpatient Only) 12/01/20    Non-staged Wound Description Partial thickness    Wound Length (cm) 2 cm medial 1 11/25/20 0730   Wound Width (cm) 1 cm medial 0.8 11/25/20 0730   Wound Depth (cm) 0.1 cm both 11/25/20 0730   Wound Surface Area (cm^2) 2 cm^2 11/25/20 0730   Wound Volume (cm^3) 0.2 cm^3 11/25/20 0730   Shape irregular    Wound Odor None    Pulses DP;1+;Right    Exposed Structures None    Number of days: 1          Vascular:    NEL:   No results found.      Lab Values:    Lab Results   Component Value Date/Time    WBC 8.5 11/25/2020 02:59 AM    RBC 3.61 (L) 11/25/2020 02:59 AM    HEMOGLOBIN 11.2 (L) 11/25/2020 02:59 AM    HEMATOCRIT 35.8 (L) 11/25/2020 02:59 AM    HBA1C 8.6 (H) 04/05/2020 12:20 AM            Culture Results show:  No results found for this or any previous visit  "(from the past 720 hour(s)).      INTERVENTIONS BY WOUND TEAM:  Removed drsg, cleaned wounds with NS, dried. Applied small piece of silver hydrofiber to wounds, covered with ad foam. Applied moisturizer to BLE and reapplied tubigrip F to RLE.     Interdisciplinary consultation: Patient, Bedside RN  EVALUATION: pt has hemosiderin staining with 2 small wounds to R shin. Pt reports \"It's getting better.\"    Goals: Slow steady decrease in wound area and depth weekly.    NURSING PLAN OF CARE ORDERS (X):    Dressing changes: See Dressing Care orders: x  Skin care: See Skin Care orders: x  Rectal tube care: See Rectal Tube Care orders:   Other orders:    RSKIN:   CURRENTLY IN PLACE (X), APPLIED THIS VISIT (A), ORDERED (O):   Q shift Isrrael:  x  Q shift pressure point assessments:    Pressure redistribution mattress  x          Low Airloss          Bariatric PAYTON         Bariatric foam           Heel float boots     Heel Silicone dressing        Float Heels off Bed with Pillows               Barrier wipes         Barrier Cream         Barrier paste          Sacral silicone dressing         Silicone O2 tubing   x      Anchorfast         Cannula fixation Device (Tender )          Gray Foam Ear protectors           Trach with Optifoam split foam                 Waffle cushion        Waffle Overlay         Rectal tube or BMS    Purwick/Condom Cath          Antifungal tx      Interdry          Reposition q 2 hours   x     Up to chair        Ambulate      PT/OT        Dietician        Diabetes Education      PO  x   TF     TPN     NPO   # days   Other        WOUND TEAM PLAN OF CARE   Dressing changes by wound team:          Follow up 1-2 times weekly:               Follow up 3 times weekly:                NPWT change 3 times weekly:     Follow up as needed:  PRN     Other (explain):     Anticipated discharge plans:  LTACH:        SNF/Rehab:                  Home Care:    Has been seeing       Outpatient Wound Center:          "   Self Care:            Other:

## 2020-11-25 NOTE — ASSESSMENT & PLAN NOTE
COVID test negative, Influenza screen negative  CXR shows evidence for new reticular and indistinct opacity that is worrisome for interstitial edema favored over infectious process  - monitor respiratory function, continue plan for CHF exacerbation  - discontinued ABX  - Positive home oxygen requirements on evaluation, Face to face made and DME order placed for home oxygen  - PT/OT eval

## 2020-11-25 NOTE — ASSESSMENT & PLAN NOTE
With hyperglycemia  Glycated hemoglobin 8.6% (11/20)  - Hold home oral antidiabetic medications  - ISS  - Accu-Checks  - hypoglycemia protocol

## 2020-11-25 NOTE — PROGRESS NOTES
1900 Report received from Merline REAL    1945 Pt up to unit    2050 Admit profile and assessment complete. Pt AOx4 on 3L NC, denies pain. POC discussed regarding diuresis.    2200 COVID swab collected and sent to lab

## 2020-11-25 NOTE — ASSESSMENT & PLAN NOTE
"Patient arrived with orthopnea, SOB, BLE edema, CXR showing pulmonary edema  Last Echo showed probably diastolic CHF stage 2  Patient is not taking her prescribed lasix at home, states \"it's no life\" going to the bathroom all day  - updated echo shows LVEF 70%, normal diastolic function, elevated RVSP  - continue IV lasix, transition to PO lasix  - monitor respiratory function, strict Ins & Outs, fluid restriction 1200ml per day  "

## 2020-11-26 ENCOUNTER — APPOINTMENT (OUTPATIENT)
Dept: RADIOLOGY | Facility: MEDICAL CENTER | Age: 75
DRG: 291 | End: 2020-11-26
Attending: INTERNAL MEDICINE
Payer: MEDICARE

## 2020-11-26 PROBLEM — I27.20 PULMONARY HYPERTENSION (HCC): Status: ACTIVE | Noted: 2020-11-26

## 2020-11-26 LAB
ANION GAP SERPL CALC-SCNC: 15 MMOL/L (ref 7–16)
BUN SERPL-MCNC: 33 MG/DL (ref 8–22)
CALCIUM SERPL-MCNC: 9.9 MG/DL (ref 8.4–10.2)
CHLORIDE SERPL-SCNC: 101 MMOL/L (ref 96–112)
CO2 SERPL-SCNC: 24 MMOL/L (ref 20–33)
CREAT SERPL-MCNC: 0.69 MG/DL (ref 0.5–1.4)
ERYTHROCYTE [DISTWIDTH] IN BLOOD BY AUTOMATED COUNT: 53.7 FL (ref 35.9–50)
GLUCOSE BLD-MCNC: 128 MG/DL (ref 65–99)
GLUCOSE SERPL-MCNC: 171 MG/DL (ref 65–99)
HCT VFR BLD AUTO: 37 % (ref 37–47)
HGB BLD-MCNC: 11.3 G/DL (ref 12–16)
MAGNESIUM SERPL-MCNC: 1.8 MG/DL (ref 1.5–2.5)
MCH RBC QN AUTO: 31.3 PG (ref 27–33)
MCHC RBC AUTO-ENTMCNC: 30.5 G/DL (ref 33.6–35)
MCV RBC AUTO: 102.5 FL (ref 81.4–97.8)
PHOSPHATE SERPL-MCNC: 2.8 MG/DL (ref 2.5–4.5)
PLATELET # BLD AUTO: 318 K/UL (ref 164–446)
PMV BLD AUTO: 10.2 FL (ref 9–12.9)
POTASSIUM SERPL-SCNC: 4.1 MMOL/L (ref 3.6–5.5)
PROCALCITONIN SERPL-MCNC: 0.17 NG/ML
RBC # BLD AUTO: 3.61 M/UL (ref 4.2–5.4)
SODIUM SERPL-SCNC: 140 MMOL/L (ref 135–145)
WBC # BLD AUTO: 12.9 K/UL (ref 4.8–10.8)

## 2020-11-26 PROCEDURE — 700102 HCHG RX REV CODE 250 W/ 637 OVERRIDE(OP): Performed by: HOSPITALIST

## 2020-11-26 PROCEDURE — 82962 GLUCOSE BLOOD TEST: CPT | Mod: 91

## 2020-11-26 PROCEDURE — 99232 SBSQ HOSP IP/OBS MODERATE 35: CPT | Performed by: INTERNAL MEDICINE

## 2020-11-26 PROCEDURE — 770006 HCHG ROOM/CARE - MED/SURG/GYN SEMI*

## 2020-11-26 PROCEDURE — 700105 HCHG RX REV CODE 258: Performed by: INTERNAL MEDICINE

## 2020-11-26 PROCEDURE — 84145 PROCALCITONIN (PCT): CPT

## 2020-11-26 PROCEDURE — 700111 HCHG RX REV CODE 636 W/ 250 OVERRIDE (IP): Performed by: HOSPITALIST

## 2020-11-26 PROCEDURE — 700101 HCHG RX REV CODE 250: Performed by: HOSPITALIST

## 2020-11-26 PROCEDURE — 94760 N-INVAS EAR/PLS OXIMETRY 1: CPT

## 2020-11-26 PROCEDURE — 80048 BASIC METABOLIC PNL TOTAL CA: CPT

## 2020-11-26 PROCEDURE — 94640 AIRWAY INHALATION TREATMENT: CPT

## 2020-11-26 PROCEDURE — 83735 ASSAY OF MAGNESIUM: CPT

## 2020-11-26 PROCEDURE — 700111 HCHG RX REV CODE 636 W/ 250 OVERRIDE (IP): Performed by: INTERNAL MEDICINE

## 2020-11-26 PROCEDURE — 84100 ASSAY OF PHOSPHORUS: CPT

## 2020-11-26 PROCEDURE — A9270 NON-COVERED ITEM OR SERVICE: HCPCS | Performed by: HOSPITALIST

## 2020-11-26 PROCEDURE — 71045 X-RAY EXAM CHEST 1 VIEW: CPT

## 2020-11-26 PROCEDURE — 85027 COMPLETE CBC AUTOMATED: CPT

## 2020-11-26 RX ADMIN — ENOXAPARIN SODIUM 40 MG: 40 INJECTION SUBCUTANEOUS at 08:27

## 2020-11-26 RX ADMIN — AMLODIPINE BESYLATE 5 MG: 5 TABLET ORAL at 08:28

## 2020-11-26 RX ADMIN — OXYBUTYNIN CHLORIDE 5 MG: 5 TABLET ORAL at 08:28

## 2020-11-26 RX ADMIN — ALBUTEROL SULFATE 2.5 MG: 2.5 SOLUTION RESPIRATORY (INHALATION) at 18:42

## 2020-11-26 RX ADMIN — ALBUTEROL SULFATE 2.5 MG: 2.5 SOLUTION RESPIRATORY (INHALATION) at 07:23

## 2020-11-26 RX ADMIN — FUROSEMIDE 20 MG: 10 INJECTION, SOLUTION INTRAMUSCULAR; INTRAVENOUS at 08:29

## 2020-11-26 RX ADMIN — INSULIN HUMAN 2 UNITS: 100 INJECTION, SOLUTION PARENTERAL at 20:48

## 2020-11-26 RX ADMIN — ALBUTEROL SULFATE 2.5 MG: 2.5 SOLUTION RESPIRATORY (INHALATION) at 10:24

## 2020-11-26 RX ADMIN — BENAZEPRIL HYDROCHLORIDE 20 MG: 10 TABLET, COATED ORAL at 08:27

## 2020-11-26 RX ADMIN — ACETAMINOPHEN 650 MG: 325 TABLET, FILM COATED ORAL at 11:19

## 2020-11-26 RX ADMIN — OXYBUTYNIN CHLORIDE 5 MG: 5 TABLET ORAL at 18:06

## 2020-11-26 RX ADMIN — INSULIN HUMAN 1 UNITS: 100 INJECTION, SOLUTION PARENTERAL at 11:22

## 2020-11-26 RX ADMIN — CEFTRIAXONE SODIUM 1 G: 1 INJECTION, POWDER, FOR SOLUTION INTRAMUSCULAR; INTRAVENOUS at 08:27

## 2020-11-26 RX ADMIN — ATORVASTATIN CALCIUM 40 MG: 40 TABLET, FILM COATED ORAL at 08:29

## 2020-11-26 RX ADMIN — BUDESONIDE AND FORMOTEROL FUMARATE DIHYDRATE 2 PUFF: 160; 4.5 AEROSOL RESPIRATORY (INHALATION) at 18:07

## 2020-11-26 RX ADMIN — SERTRALINE HYDROCHLORIDE 50 MG: 50 TABLET ORAL at 08:29

## 2020-11-26 RX ADMIN — ALBUTEROL SULFATE 2.5 MG: 2.5 SOLUTION RESPIRATORY (INHALATION) at 14:41

## 2020-11-26 RX ADMIN — INSULIN HUMAN 1 UNITS: 100 INJECTION, SOLUTION PARENTERAL at 18:04

## 2020-11-26 RX ADMIN — BUDESONIDE AND FORMOTEROL FUMARATE DIHYDRATE 2 PUFF: 160; 4.5 AEROSOL RESPIRATORY (INHALATION) at 07:26

## 2020-11-26 RX ADMIN — FUROSEMIDE 20 MG: 10 INJECTION, SOLUTION INTRAMUSCULAR; INTRAVENOUS at 15:57

## 2020-11-26 RX ADMIN — LEVOTHYROXINE SODIUM 25 MCG: 0.05 TABLET ORAL at 08:29

## 2020-11-26 RX ADMIN — ACETAMINOPHEN 650 MG: 325 TABLET, FILM COATED ORAL at 21:26

## 2020-11-26 ASSESSMENT — ENCOUNTER SYMPTOMS
SHORTNESS OF BREATH: 1
ABDOMINAL PAIN: 0
NERVOUS/ANXIOUS: 0
DIARRHEA: 0
INSOMNIA: 0
FEVER: 0
CONSTIPATION: 0
NAUSEA: 0
DIAPHORESIS: 0
PALPITATIONS: 0
VOMITING: 0
BACK PAIN: 0
HEADACHES: 0
SORE THROAT: 1
COUGH: 1
DIZZINESS: 0

## 2020-11-26 ASSESSMENT — PAIN DESCRIPTION - PAIN TYPE: TYPE: CHRONIC PAIN

## 2020-11-26 NOTE — PROGRESS NOTES
Tele strip at 0331 shows SR at 68.      Measurements from am strip were as follows:  AZ=0.18  QRS=0.14 BBB  QT=0.40    Tele Shift Summary:    Rhythm : SR  Rate : 64-80  Ectopy : Per CCT [place], pt had rare PACs.     Telemetry monitoring strips placed in pt's chart.

## 2020-11-26 NOTE — PROGRESS NOTES
Hospital Medicine Daily Progress Note    Date of Service  11/26/2020    Chief Complaint  75 y.o. female admitted 11/24/2020 with SOB    Hospital Course  75F PMH COPD no home oxygen, Hypothyroid, DM A1c 8.6 (04/20), HTN, DLD admitted 11/24/2020 for SOB, orthopnea, BLE edema, weight gain for 2 weeks likely decompensated CHF. Patient had hypoxia at home 70's on EMS pickup.  Patient has not been adherent to her prescribed lasix medication from her PCP.  She stated she does not want to live a life where she has to use the restroom too frequently.  CXR on admission showed possible consolidation in RLL region, procalcitonin 0.17 high normal range.    Interval Problem Update  I have seen and examined this patient this morning. Patient evaluated by Palliative care. Patient appeared more agitated today, requesting to go home, but requiring oxygen supplementation, which she does not have at home.     As per nursing, patient has been stable, requiring supplemental oxygen.   Care plan discussed with patient in detail.  Care team notified of plan as well. Pending home oxygen evaluation, PT and OT evaluation.    Consultants/Specialty  Palliative care    Code Status  Full Code    Disposition  TBD, likely will need management of her CHF exacerbation for next 2-3 days, requiring oxygen supplementation.    Review of Systems  Review of Systems   Constitutional: Negative for diaphoresis and fever.   HENT: Positive for sore throat. Negative for ear pain and nosebleeds.    Respiratory: Positive for cough and shortness of breath.    Cardiovascular: Negative for chest pain and palpitations.   Gastrointestinal: Negative for abdominal pain, constipation, diarrhea, nausea and vomiting.   Musculoskeletal: Negative for back pain and joint pain.   Skin: Negative for itching and rash.   Neurological: Negative for dizziness and headaches.   Psychiatric/Behavioral: The patient is not nervous/anxious and does not have insomnia.       Physical  Exam  Temp:  [36.5 °C (97.7 °F)-36.8 °C (98.3 °F)] 36.8 °C (98.3 °F)  Pulse:  [66-83] 80  Resp:  [16-20] 16  BP: (133-154)/(41-60) 133/41  SpO2:  [94 %-98 %] 95 %    Physical Exam  Vitals signs and nursing note reviewed.   Constitutional:       General: She is not in acute distress.     Appearance: Normal appearance. She is not ill-appearing.   Cardiovascular:      Rate and Rhythm: Tachycardia present.      Pulses: Normal pulses.      Heart sounds: Normal heart sounds. No murmur.   Pulmonary:      Effort: Respiratory distress present.      Breath sounds: Wheezing present.      Comments: Tachypnea  Using oxygen supplementation  B/L coarse breath sounds  Abdominal:      General: Bowel sounds are normal.      Palpations: Abdomen is soft.   Musculoskeletal: Normal range of motion.      Right lower leg: Edema present.      Left lower leg: Edema present.   Skin:     General: Skin is warm.      Coloration: Skin is not jaundiced.      Findings: Lesion (right leg covered in guaze) present.   Neurological:      General: No focal deficit present.      Mental Status: She is alert and oriented to person, place, and time. Mental status is at baseline.      Motor: No weakness.   Psychiatric:      Comments: Agitated, aggressive       Fluids    Intake/Output Summary (Last 24 hours) at 11/26/2020 1527  Last data filed at 11/26/2020 0351  Gross per 24 hour   Intake 740 ml   Output 1000 ml   Net -260 ml     Laboratory  Recent Labs     11/24/20  1529 11/25/20  0259 11/26/20  0220   WBC 15.9* 8.5 12.9*   RBC 3.72* 3.61* 3.61*   HEMOGLOBIN 11.8* 11.2* 11.3*   HEMATOCRIT 37.2 35.8* 37.0   .0* 99.2* 102.5*   MCH 31.7 31.0 31.3   MCHC 31.7* 31.3* 30.5*   RDW 51.0* 51.2* 53.7*   PLATELETCT 294 322 318   MPV 9.5 10.2 10.2     Recent Labs     11/24/20  1529 11/25/20  0259 11/26/20  0220   SODIUM 141 140 140   POTASSIUM 4.1 4.6 4.1   CHLORIDE 103 103 101   CO2 22 22 24   GLUCOSE 364* 301* 171*   BUN 18 20 33*   CREATININE 0.65 0.60 0.69  "  CALCIUM 9.8 9.8 9.9                   Imaging  DX-CHEST-PORTABLE (1 VIEW)   Final Result      1.  Enlarged cardiac silhouette with vascular congestion/edema without significant interval change.      EC-ECHOCARDIOGRAM COMPLETE W/O CONT   Final Result      DX-CHEST-PORTABLE (1 VIEW)   Final Result      Stable marked cardiac silhouette enlargement with some new reticular and indistinct opacity that is worrisome for interstitial edema favored over infectious process           Assessment/Plan  * Acute on chronic diastolic CHF (congestive heart failure), NYHA class 2 (HCC)- (present on admission)  Assessment & Plan  Patient arrived with orthopnea, SOB, BLE edema, CXR showing pulmonary edema  Last Echo showed probably diastolic CHF stage 2  Patient is not taking her prescribed lasix at home, states \"it's no life\" going to the bathroom all day  - updated echo shows LVEF 70%, normal diastolic function, elevated RVSP  - continue IV lasix  - monitor respiratory function, strict Ins & Outs, fluid restriction 1200ml per day    Acute respiratory failure with hypoxia (HCC)- (present on admission)  Assessment & Plan  COVID test negative, Influenza screen negative  CXR shows evidence for new reticular and indistinct opacity that is worrisome for interstitial edema favored over infectious process  - monitor respiratory function, continue plan for CHF exacerbation  - ABX now rocephin, checking procalcitonin in AM  - pending home oxygen evaluation, PT/OT eval    Current non-adherence to medical treatment  Assessment & Plan  Patient has refused to take her home lasix ordered by her PCP, due to inconvience of mulitple urination throughout the day.  - explained to patient the necessity of medical adherence, especially with her lasix.  Explained to patient that she will have continued exacerbations if she does not take her diuretics at home.  Patient has understanding of the need for lasix, but she adamantly states she will not take the " medication.  - Palliative care has spoken with patient, will revisit, patient became agitated on multiple questioning.  - patient is high risk for readmission to the hospital    Laryngitis, acute- (present on admission)  Assessment & Plan  Unclear etiology at this time, rapid strep negative. Likely viral etiology, COVID test negative.    Essential hypertension- (present on admission)  Assessment & Plan  - continue home amlodipine/Benzapril with hold parameters.  - patient is refusing medications, monitor blood pressures  - continue IV lasix    Pulmonary hypertension (HCC)- (present on admission)  Assessment & Plan  RVSP 45mmHg, possible due LVH  - continue lasix    DM (diabetes mellitus) (HCC)- (present on admission)  Assessment & Plan  With hyperglycemia  Glycated hemoglobin 8.6% (11/20)  - Hold home oral antidiabetic medications  - ISS  - Accu-Checks  - hypoglycemia protocol      Dyslipidemia- (present on admission)  Assessment & Plan  - continue home atorvastatin     VTE prophylaxis: lovenox

## 2020-11-26 NOTE — FLOWSHEET NOTE
11/26/20 1442   Events/Summary/Plan   Events/Summary/Plan tx given   Skin Inspection Respiratory Device Intact   Vital Signs   Pulse 80   Respiration 16   Pulse Oximetry 95 %   $ Pulse Oximetry (Spot Check) Yes   Breath Sounds   RUL Breath Sounds Clear   RML Breath Sounds Clear;Diminished   RLL Breath Sounds Diminished   MOSHE Breath Sounds Clear   LLL Breath Sounds Diminished   Oxygen   O2 (LPM) 3   O2 Delivery Device Silicone Nasal Cannula

## 2020-11-26 NOTE — FLOWSHEET NOTE
11/26/20 0715   Vital Signs   Pulse 70   Respiration 16   Pulse Oximetry 98 %   $ Pulse Oximetry (Spot Check) Yes   Respiratory Assessment   Level of Consciousness Alert   Breath Sounds   RUL Breath Sounds Clear;Diminished   RML Breath Sounds Diminished   RLL Breath Sounds Diminished   MOSHE Breath Sounds Clear;Diminished   LLL Breath Sounds Diminished   Oxygen   O2 (LPM) 3   O2 Delivery Device Silicone Nasal Cannula

## 2020-11-26 NOTE — PROGRESS NOTES
Assumed care of patient, bedside report received from ADDIE Bernabe. Updated on POC, call light within reach and fall precautions in place. Bed locked and in lowest position. Patient instructed to call for assistance before getting out of bed. All questions answered, no other needs at this time.

## 2020-11-26 NOTE — CONSULTS
"Reason for PC Consult: Advance Care Planning    Consulted by:   Dr. Bell    Assessment:  General:   75 year old female admitted for acute hypoxemic respiratory failure on 11/24/20. Pt has a history of COPD, HTN, diabetes, DLD, hypothyroidism, arthritis, asthma, dyspnea, bronchitis, and right shoulder fracture. Pt presented to [facility] ED with shortness of breath, generalized weakness and flu like symptoms. EMS found pt at home with O2 sats in the low 70's.     Social:   Pt lives alone in her home, she reports having \"many, many friends\". She does have a sister who lives out of state. Pt does have home health nursing and a caregiver come weekly.     Dyspnea: Yes, 98% on 3L NC  Last BM: 11/25/20   Pain: No   Depression: No    Dementia: No      Spiritual:  Is Holiness or spirituality important for coping with this illness? No, Pt declined visit from   Has a  or spiritual provider visit been requested? No    Palliative Performance Scale: 50%    Advance Directive: None on File    DPOA: None on File  POLST: None on File    To locate the AD/POLST, please hover the cursor over the patient's code status to find all linked ACP documents.    Code Status: Full     Outcome:  PC RN visited pt at bedside, introduced self and role of PC. Discussed pt's understanding of clinical picture, pt verbalized limited insight into COPD. Educated pt on the typical treatment regimen, the progressive and terminal nature of COPD and the importance of discussing advanced care planning.    Discussed quality vs quantity of life, benefits vs burdens of treatment and explored pt's thoughts and feelings about end of life. Explored pt's thoughts and feelings about lasix as she made comments about diuretics earlier in the conversation. Pt reports that she was on lasix before and it caused her to have \"constant\" incontinence. She would go to the bathroom and void and \"within seconds I'd go again\". Pt states \"that's no way to live. I'd " "rather die than take that stuff again\". Pt expressed her goal is to remain independent at home and avoid taking medications that impact her quality of life. For her, having constant incontinence is a quality of life that she does not find acceptable.     Discussed code status, AD and POLST. Pt verbalized agreement with completed an AD, pt choose Winsome Hassan as DPOA, Jammie Ulloa as 1st Alt, and Isael Barrientos as 2nd Alt, pt chose statements of desires #2 & 5 as well as declaration to physican. Declaration completed, AD awaiting notary. Began discussing code status in more detail, pt appeared emotionally fatigued from discussion and would state \"I don't know about that\" with every question. Pt then expressed wanting to be done with discussion.    Discussed with Dr. Bell, he will discuss further during rounds today.    Active listening, education, validation, normalization, therapeutic touch, and emotional support provided throughout encounter.    Updated:   Dr. Bell    Plan:   AD awaiting notary, Declaration completed. Continue GOC discussion as clinical picture unfolds.     Thank you for allowing Palliative Care to participate in this patient's care. Please feel free to call [phone] with any questions or concerns.  "

## 2020-11-26 NOTE — FLOWSHEET NOTE
11/26/20 1024   Events/Summary/Plan   Events/Summary/Plan tx given   Skin Inspection Respiratory Device Intact   Vital Signs   Pulse 76   Respiration 16   Pulse Oximetry 97 %   $ Pulse Oximetry (Spot Check) Yes   Respiratory Assessment   Level of Consciousness Alert   Oxygen   O2 (LPM) 3   O2 Delivery Device Silicone Nasal Cannula

## 2020-11-26 NOTE — PROGRESS NOTES
Tele summary :  Rhythm:  SR BBB  Rate:  79  MT:  .18  QRS:  .16  QT:  .38    Ectopy:  none    Telemetry strips signed and placed in chart.

## 2020-11-26 NOTE — PROGRESS NOTES
Received shift handoff report from ADDIE Foster. Pt is in bed and stable. Bed locked and in lowest position, upper side rails up, call light in reach, whiteboard updated. POC discussed and pt verbalizes understanding. Will continue to monitor.

## 2020-11-27 VITALS
WEIGHT: 169.31 LBS | RESPIRATION RATE: 20 BRPM | OXYGEN SATURATION: 95 % | BODY MASS INDEX: 30 KG/M2 | DIASTOLIC BLOOD PRESSURE: 72 MMHG | TEMPERATURE: 97.4 F | SYSTOLIC BLOOD PRESSURE: 119 MMHG | HEIGHT: 63 IN | HEART RATE: 83 BPM

## 2020-11-27 PROBLEM — J04.0 LARYNGITIS, ACUTE: Status: RESOLVED | Noted: 2020-11-25 | Resolved: 2020-11-27

## 2020-11-27 LAB
ALBUMIN SERPL BCP-MCNC: 3.6 G/DL (ref 3.2–4.9)
BUN SERPL-MCNC: 29 MG/DL (ref 8–22)
CALCIUM SERPL-MCNC: 9.6 MG/DL (ref 8.4–10.2)
CHLORIDE SERPL-SCNC: 99 MMOL/L (ref 96–112)
CO2 SERPL-SCNC: 27 MMOL/L (ref 20–33)
CREAT SERPL-MCNC: 0.6 MG/DL (ref 0.5–1.4)
ERYTHROCYTE [DISTWIDTH] IN BLOOD BY AUTOMATED COUNT: 51.8 FL (ref 35.9–50)
GLUCOSE BLD-MCNC: 156 MG/DL (ref 65–99)
GLUCOSE BLD-MCNC: 164 MG/DL (ref 65–99)
GLUCOSE BLD-MCNC: 173 MG/DL (ref 65–99)
GLUCOSE BLD-MCNC: 212 MG/DL (ref 65–99)
GLUCOSE SERPL-MCNC: 191 MG/DL (ref 65–99)
HCT VFR BLD AUTO: 36.2 % (ref 37–47)
HGB BLD-MCNC: 11.2 G/DL (ref 12–16)
MAGNESIUM SERPL-MCNC: 1.7 MG/DL (ref 1.5–2.5)
MCH RBC QN AUTO: 30.9 PG (ref 27–33)
MCHC RBC AUTO-ENTMCNC: 30.9 G/DL (ref 33.6–35)
MCV RBC AUTO: 100 FL (ref 81.4–97.8)
PHOSPHATE SERPL-MCNC: 3 MG/DL (ref 2.5–4.5)
PLATELET # BLD AUTO: 279 K/UL (ref 164–446)
PMV BLD AUTO: 10.5 FL (ref 9–12.9)
POTASSIUM SERPL-SCNC: 3.8 MMOL/L (ref 3.6–5.5)
PROCALCITONIN SERPL-MCNC: 0.13 NG/ML
RBC # BLD AUTO: 3.62 M/UL (ref 4.2–5.4)
S PYO SPEC QL CULT: NORMAL
SIGNIFICANT IND 70042: NORMAL
SITE SITE: NORMAL
SODIUM SERPL-SCNC: 140 MMOL/L (ref 135–145)
SOURCE SOURCE: NORMAL
TSH SERPL DL<=0.005 MIU/L-ACNC: 3.75 UIU/ML (ref 0.38–5.33)
VIT B12 SERPL-MCNC: 308 PG/ML (ref 211–911)
WBC # BLD AUTO: 10.5 K/UL (ref 4.8–10.8)

## 2020-11-27 PROCEDURE — 83735 ASSAY OF MAGNESIUM: CPT

## 2020-11-27 PROCEDURE — 90662 IIV NO PRSV INCREASED AG IM: CPT | Performed by: INTERNAL MEDICINE

## 2020-11-27 PROCEDURE — 85027 COMPLETE CBC AUTOMATED: CPT

## 2020-11-27 PROCEDURE — 700111 HCHG RX REV CODE 636 W/ 250 OVERRIDE (IP): Performed by: HOSPITALIST

## 2020-11-27 PROCEDURE — 700105 HCHG RX REV CODE 258: Performed by: INTERNAL MEDICINE

## 2020-11-27 PROCEDURE — 84443 ASSAY THYROID STIM HORMONE: CPT

## 2020-11-27 PROCEDURE — 3E02340 INTRODUCTION OF INFLUENZA VACCINE INTO MUSCLE, PERCUTANEOUS APPROACH: ICD-10-PCS | Performed by: INTERNAL MEDICINE

## 2020-11-27 PROCEDURE — A9270 NON-COVERED ITEM OR SERVICE: HCPCS | Performed by: INTERNAL MEDICINE

## 2020-11-27 PROCEDURE — 94640 AIRWAY INHALATION TREATMENT: CPT

## 2020-11-27 PROCEDURE — A9270 NON-COVERED ITEM OR SERVICE: HCPCS | Performed by: HOSPITALIST

## 2020-11-27 PROCEDURE — 84145 PROCALCITONIN (PCT): CPT

## 2020-11-27 PROCEDURE — 700111 HCHG RX REV CODE 636 W/ 250 OVERRIDE (IP): Performed by: INTERNAL MEDICINE

## 2020-11-27 PROCEDURE — 99239 HOSP IP/OBS DSCHRG MGMT >30: CPT | Performed by: INTERNAL MEDICINE

## 2020-11-27 PROCEDURE — 90471 IMMUNIZATION ADMIN: CPT

## 2020-11-27 PROCEDURE — 80069 RENAL FUNCTION PANEL: CPT

## 2020-11-27 PROCEDURE — 82962 GLUCOSE BLOOD TEST: CPT

## 2020-11-27 PROCEDURE — 82747 ASSAY OF FOLIC ACID RBC: CPT

## 2020-11-27 PROCEDURE — 700102 HCHG RX REV CODE 250 W/ 637 OVERRIDE(OP): Performed by: HOSPITALIST

## 2020-11-27 PROCEDURE — 82607 VITAMIN B-12: CPT

## 2020-11-27 PROCEDURE — 700102 HCHG RX REV CODE 250 W/ 637 OVERRIDE(OP): Performed by: INTERNAL MEDICINE

## 2020-11-27 PROCEDURE — 97162 PT EVAL MOD COMPLEX 30 MIN: CPT

## 2020-11-27 RX ORDER — FUROSEMIDE 40 MG/1
40 TABLET ORAL
Status: DISCONTINUED | OUTPATIENT
Start: 2020-11-27 | End: 2020-11-27 | Stop reason: HOSPADM

## 2020-11-27 RX ORDER — MAGNESIUM SULFATE 1 G/100ML
1 INJECTION INTRAVENOUS ONCE
Status: COMPLETED | OUTPATIENT
Start: 2020-11-27 | End: 2020-11-27

## 2020-11-27 RX ORDER — FUROSEMIDE 40 MG/1
40 TABLET ORAL SEE ADMIN INSTRUCTIONS
Qty: 60 TAB | Refills: 0 | Status: SHIPPED | OUTPATIENT
Start: 2020-11-27 | End: 2020-12-27

## 2020-11-27 RX ORDER — FUROSEMIDE 40 MG/1
40 TABLET ORAL SEE ADMIN INSTRUCTIONS
Qty: 60 TAB | Refills: 0 | Status: SHIPPED | OUTPATIENT
Start: 2020-11-27 | End: 2020-11-27

## 2020-11-27 RX ADMIN — ACETAMINOPHEN 650 MG: 325 TABLET, FILM COATED ORAL at 03:26

## 2020-11-27 RX ADMIN — INSULIN HUMAN 2 UNITS: 100 INJECTION, SOLUTION PARENTERAL at 11:25

## 2020-11-27 RX ADMIN — BENAZEPRIL HYDROCHLORIDE 20 MG: 10 TABLET, COATED ORAL at 05:44

## 2020-11-27 RX ADMIN — FUROSEMIDE 40 MG: 40 TABLET ORAL at 16:33

## 2020-11-27 RX ADMIN — SENNOSIDES-DOCUSATE SODIUM TAB 8.6-50 MG 2 TABLET: 8.6-5 TAB at 06:00

## 2020-11-27 RX ADMIN — AMLODIPINE BESYLATE 5 MG: 5 TABLET ORAL at 05:44

## 2020-11-27 RX ADMIN — CEFTRIAXONE SODIUM 1 G: 1 INJECTION, POWDER, FOR SOLUTION INTRAMUSCULAR; INTRAVENOUS at 05:54

## 2020-11-27 RX ADMIN — BUDESONIDE AND FORMOTEROL FUMARATE DIHYDRATE 2 PUFF: 160; 4.5 AEROSOL RESPIRATORY (INHALATION) at 06:00

## 2020-11-27 RX ADMIN — INSULIN HUMAN 1 UNITS: 100 INJECTION, SOLUTION PARENTERAL at 06:07

## 2020-11-27 RX ADMIN — ALBUTEROL SULFATE 2 PUFF: 90 AEROSOL, METERED RESPIRATORY (INHALATION) at 06:35

## 2020-11-27 RX ADMIN — ALBUTEROL SULFATE 2 PUFF: 90 AEROSOL, METERED RESPIRATORY (INHALATION) at 13:40

## 2020-11-27 RX ADMIN — OXYBUTYNIN CHLORIDE 5 MG: 5 TABLET ORAL at 05:44

## 2020-11-27 RX ADMIN — FUROSEMIDE 20 MG: 10 INJECTION, SOLUTION INTRAMUSCULAR; INTRAVENOUS at 05:44

## 2020-11-27 RX ADMIN — LEVOTHYROXINE SODIUM 25 MCG: 0.05 TABLET ORAL at 06:00

## 2020-11-27 RX ADMIN — BUDESONIDE AND FORMOTEROL FUMARATE DIHYDRATE 2 PUFF: 160; 4.5 AEROSOL RESPIRATORY (INHALATION) at 16:52

## 2020-11-27 RX ADMIN — ENOXAPARIN SODIUM 40 MG: 40 INJECTION SUBCUTANEOUS at 05:45

## 2020-11-27 RX ADMIN — INFLUENZA A VIRUS A/MICHIGAN/45/2015 X-275 (H1N1) ANTIGEN (FORMALDEHYDE INACTIVATED), INFLUENZA A VIRUS A/SINGAPORE/INFIMH-16-0019/2016 IVR-186 (H3N2) ANTIGEN (FORMALDEHYDE INACTIVATED), INFLUENZA B VIRUS B/PHUKET/3073/2013 ANTIGEN (FORMALDEHYDE INACTIVATED), AND INFLUENZA B VIRUS B/MARYLAND/15/2016 BX-69A ANTIGEN (FORMALDEHYDE INACTIVATED) 0.7 ML: 60; 60; 60; 60 INJECTION, SUSPENSION INTRAMUSCULAR at 16:51

## 2020-11-27 RX ADMIN — ATORVASTATIN CALCIUM 40 MG: 40 TABLET, FILM COATED ORAL at 05:44

## 2020-11-27 RX ADMIN — INSULIN HUMAN 2 UNITS: 100 INJECTION, SOLUTION PARENTERAL at 16:53

## 2020-11-27 RX ADMIN — MAGNESIUM SULFATE 1 G: 1 INJECTION INTRAVENOUS at 08:16

## 2020-11-27 RX ADMIN — SERTRALINE HYDROCHLORIDE 50 MG: 50 TABLET ORAL at 05:44

## 2020-11-27 RX ADMIN — OXYBUTYNIN CHLORIDE 5 MG: 5 TABLET ORAL at 16:50

## 2020-11-27 ASSESSMENT — COGNITIVE AND FUNCTIONAL STATUS - GENERAL
MOBILITY SCORE: 23
CLIMB 3 TO 5 STEPS WITH RAILING: A LITTLE
SUGGESTED CMS G CODE MODIFIER MOBILITY: CI

## 2020-11-27 ASSESSMENT — GAIT ASSESSMENTS
DEVIATION: STEP TO
GAIT LEVEL OF ASSIST: SUPERVISED
ASSISTIVE DEVICE: 4 WHEEL WALKER
DISTANCE (FEET): 100

## 2020-11-27 ASSESSMENT — PAIN DESCRIPTION - PAIN TYPE: TYPE: CHRONIC PAIN

## 2020-11-27 NOTE — DISCHARGE PLANNING
Received Choice form at 0841  Agency/Facility Name: [facility]   Referral sent per Choice form @ 1417    Agency/Facility Name: [facility]  Spoke To: Per Fax  Outcome: Pt accepted   notified Zohra via Teams    @0830  [facility] is at bedside.

## 2020-11-27 NOTE — CARE PLAN
"  Problem: Communication  Goal: The ability to communicate needs accurately and effectively will improve  Outcome: PROGRESSING AS EXPECTED     Problem: Safety  Goal: Will remain free from injury  Outcome: PROGRESSING AS EXPECTED     Problem: Fluid Volume:  Goal: Will maintain balanced intake and output  Outcome: PROGRESSING AS EXPECTED   BLE swelling with noted improvement.   Problem: Respiratory:  Goal: Respiratory status will improve  Outcome: PROGRESSING SLOWER THAN EXPECTED   Pt continues to require 3LNC. Education provided on home oxygen patient states that \"this was just a one time thing I don't need that\"   "

## 2020-11-27 NOTE — DISCHARGE PLANNING
Anticipated Discharge Disposition: home 02    Action: LSW spoke with pt regarding home 02. Pt had no preference on 02 company. LSW sent referral for [facility] to [facility]    Barriers to Discharge: 02 acceptance, delivery     Plan: LSW to f/u

## 2020-11-27 NOTE — FLOWSHEET NOTE
11/26/20 1842   Events/Summary/Plan   Events/Summary/Plan SVN w/MP upright in bed, 3 LPM nasal cannula   Skin Inspection Respiratory Device Intact   Vital Signs   Pulse 80   Respiration 20   Pulse Oximetry 94 %   $ Pulse Oximetry (Spot Check) Yes   Respiratory Assessment   Level of Consciousness Alert   Chest Exam   Work Of Breathing / Effort Moderate   Breath Sounds   RUL Breath Sounds Clear   RML Breath Sounds Clear;Diminished   RLL Breath Sounds Diminished   MOSHE Breath Sounds Clear   LLL Breath Sounds Diminished   Secretions   Cough Congested;Moist;Non Productive   How Sputum Obtained Spontaneous   Oxygen   O2 (LPM) 3   O2 Delivery Device Nasal Cannula

## 2020-11-27 NOTE — FACE TO FACE
"Face to Face Note  -  Durable Medical Equipment    Walter Bell M.D. - NPI: [id number]  I certify that this patient is under my care and that they had a durable medical equipment(DME)face to face encounter by myself that meets the physician DME face-to-face encounter requirements with this patient on:    Date of encounter:   Patient:                    MRN:                       YOB: 2020  Gabrielle Olmstead  [id number]  1945     The encounter with the patient was in whole, or in part, for the following medical condition, which is the primary reason for durable medical equipment:  CHF and COPD    I certify that, based on my findings, the following durable medical equipment is medically necessary:  Oxygen.    HOME O2 Saturation Measurements:(Values must be present for Home Oxygen orders)  Room air sat at rest: 89  Room air sat with amb: 79  With liters of O2: 3, O2 sat at rest with O2: 92  With Liters of O2: 3, O2 sat with amb with O2 : 91  Is the patient mobile?: Yes    My Clinical findings support the need for the above equipment due to:  Hypoxia    Supporting Symptoms: The patient requires supplemental oxygen, as the following interventions have been tried with limited or no improvement: \"Bronchodilators and/or steroid inhalers, \"Oral and/or IV steroids, \"Ambulation with oximetry and \"Incentive spirometry    If patient feels more short of breath, they can go up to 6 liters per minute and contact healthcare provider.  "

## 2020-11-27 NOTE — PROGRESS NOTES
Assumed pt care. AOx4. Pt c/o sciatica pain. Pt repositioned with good effect. Denies any other distress.  Discussed POC.  Pt verbalized understanding.  Hourly rounding in place. Fall precautions in place and call lights w/in reach.

## 2020-11-27 NOTE — PROGRESS NOTES
Telemetry Shift Summary     Rhythm: SR BBB   HR Range: 71-81  Ectopy: Per Monitor Tech Francine: rare PACs       Measurements for strip printed 11/26/20 at 14:39:   HR 79    0.16/0.14/0.36           Normal Values  Rhythm SR  HR Range    Measurements 0.12-0.20 / 0.06-0.10  / 0.30-0.52

## 2020-11-27 NOTE — PALLIATIVE CARE
Palliative Care follow-up  AD notarized. Scanned copy into EMR, original to be placed back on hard chart and should be sent with pt upon discharge.          Thank you for allowing Palliative Care to participate in this patient's care. Please feel free to call [phone] with any questions or concerns.

## 2020-11-27 NOTE — THERAPY
Physical Therapy   Initial Evaluation     Patient Name: Gabrielle Olmstead  Age:  75 y.o., Sex:  female  Medical Record #: [id number]  Today's Date: 11/27/2020     Precautions: (P) Fall Risk    Assessment  Patient is 75 y.o. female with a diagnosis of COPD Pt lives at home alone and is mod active.Pt appears to be safe with transfers and ambulation,she has no equipment needs She appears to be safe for home with 02 and help from friends      Plan    Recommend Physical Therapy for Evaluation only      11/27/20 1400   Prior Living Situation   Prior Services None   Housing / Facility 1 Story House   Steps Into Home 0   Steps In Home 0   Equipment Owned 4-Wheel Walker   Lives with - Patient's Self Care Capacity Alone and Able to Care For Self   Prior Level of Functional Mobility   Bed Mobility Independent   Transfer Status Independent   Ambulation Independent   Distance Ambulation (Feet)   (household)   Assistive Devices Used 4-Wheel Walker   Balance Assessment   Sitting Balance (Static) Good   Sitting Balance (Dynamic) Fair +   Standing Balance (Static) Fair   Standing Balance (Dynamic) Fair   Weight Shift Sitting Good   Weight Shift Standing Good   Gait Analysis   Gait Level Of Assist Supervised   Assistive Device 4 Wheel Walker   Distance (Feet) 100   # of Times Distance was Traveled 1   Deviation Step To   # of Stairs Climbed 0   Weight Bearing Status full   Functional Mobility   Sit to Stand Supervised   Bed, Chair, Wheelchair Transfer Supervised   Transfer Method Stand Step   Activity Tolerance   Sitting in Chair > 1hr   Standing 10   Patient / Family Goals    Patient / Family Goal #1 Home   Anticipated Discharge Equipment and Recommendations   DC Equipment Recommendations None   Discharge Recommendations Anticipate that the patient will have no further physical therapy needs after discharge from the hospital       DC Equipment Recommendations: (P) None  Discharge Recommendations: (P) Anticipate that the patient will have  no further physical therapy needs after discharge from the hospital

## 2020-11-27 NOTE — PROGRESS NOTES
Report received from Rose REAL. Plan of care discussed. Pt sleeping comfortably in bed. Safety precautions in place. Call light and personal belongings within reach.

## 2020-11-27 NOTE — DISCHARGE SUMMARY
Discharge Summary    CHIEF COMPLAINT ON ADMISSION  Chief Complaint   Patient presents with   • Shortness of Breath     BIB EMS for Shortness of Breath       Reason for Admission  EMS     Admission Date  11/24/2020    CODE STATUS  Full Code    HPI & HOSPITAL COURSE  This is a 75 y.o. female here with 75F PMH COPD no home oxygen, Hypothyroid, DM A1c 8.6 (04/20), HTN, DLD admitted 11/24/2020 for SOB, orthopnea, BLE edema, weight gain for 2 weeks likely decompensated CHF. Patient had hypoxia at home 70's on EMS pickup.  Patient has not been adherent to her prescribed lasix medication from her PCP.  She stated she does not want to live a life where she has to use the restroom too frequently.  CXR on admission showed possible consolidation in RLL region, procalcitonin 0.17 high normal range.     Patient evaluated by Palliative care. Advanced Directive was developed and on file.    Patient had significant hypoxia on home oxygen evaluation. Able to obtain home oxygen DME on day of discharge.    Patient was discharged with Lasix and home oxygen.  Patient was not willing to take daily Lasix.  Advised her to water her weight and with any increase to start lasix, if her BLE show worsening edema to start lasix and if she has SOB to start lasix immediately.  Prescription was written for every other day lasix, which the patient stated she will take in this fashion.    Therefore, she is discharged in guarded and stable condition to home with close outpatient follow-up.    The patient met 2-midnight criteria for an inpatient stay at the time of discharge.    Discharge Date  11/27/2020    FOLLOW UP ITEMS POST DISCHARGE  Listed below with PCP.    DISCHARGE DIAGNOSES  Principal Problem:    Acute on chronic diastolic CHF (congestive heart failure), NYHA class 2 (HCC) POA: Yes  Active Problems:    Acute respiratory failure with hypoxia (HCC) POA: Yes    Essential hypertension POA: Yes    Current non-adherence to medical treatment POA:  Yes    Pulmonary hypertension (HCC) POA: Yes    Dyslipidemia POA: Yes    DM (diabetes mellitus) (HCC) POA: Yes  Resolved Problems:    Laryngitis, acute POA: Yes      FOLLOW UP  No future appointments.  Anthony Martinez M.D.  [address]  [place] [id number]  [phone]    Schedule an appointment as soon as possible for a visit in 1 week  F/U for pulmonary edema, possibly from CHF or pulmonary hypertension. Updated echo performed. LVEF 70%, RVSP 45mmHg, Mild MR and Aortic stenosis. Please refer to cardiologist and pulmonologist.      MEDICATIONS ON DISCHARGE     Medication List      START taking these medications      Instructions   furosemide 40 MG Tabs  Commonly known as: LASIX   Take 1 Tab by mouth See Admin Instructions for 30 days. Take 1 tab at 7AM and 3PM, every other day  Dose: 40 mg        CONTINUE taking these medications      Instructions   albuterol 108 (90 Base) MCG/ACT Aers inhalation aerosol   Inhale 2 Puffs every 6 hours as needed for Shortness of Breath.  Dose: 2 Puff     amlodipine-benazepril 5-20 MG per capsule  Commonly known as: LOTREL   Take 1 Cap by mouth every day.  Dose: 1 Cap     fluticasone-salmeterol 250-50 MCG/DOSE Aepb  Commonly known as: ADVAIR   Inhale 1 Puff by mouth every 12 hours.  Dose: 1 Puff     glimepiride 4 MG Tabs  Commonly known as: AMARYL   Take 4 mg by mouth every day.  Dose: 4 mg     hydroCHLOROthiazide 25 MG Tabs  Commonly known as: HYDRODIURIL   Take 25 mg by mouth every 72 hours.  Dose: 25 mg     levothyroxine 25 MCG Tabs  Commonly known as: SYNTHROID   Take 25 mcg by mouth every day.  Dose: 25 mcg     Lipitor 40 MG Tabs  Generic drug: atorvastatin   Take 40 mg by mouth every day.  Dose: 40 mg     metFORMIN  MG Tb24  Commonly known as: GLUCOPHAGE XR   Take 500 mg by mouth 2 Times a Day.  Dose: 500 mg     MUCINEX PO   Take 1 Tab by mouth as needed (For cough).  Dose: 1 Tab     oxybutynin 5 MG Tabs  Commonly known as: DITROPAN   Take 5 mg by mouth 2 Times a  Day.  Dose: 5 mg     predniSONE 20 MG Tabs  Commonly known as: DELTASONE   Take 40 mg by mouth every day. Pt started on 11/24/2020 for 4 day course  Dose: 40 mg     sertraline 50 MG Tabs  Commonly known as: Zoloft   Take 50 mg by mouth every day.  Dose: 50 mg        STOP taking these medications    amoxicillin-clavulanate 875-125 MG Tabs  Commonly known as: AUGMENTIN     ibuprofen 200 MG Tabs  Commonly known as: MOTRIN            Allergies  Allergies   Allergen Reactions   • Nkda [No Known Drug Allergy]        DIET  Orders Placed This Encounter   Procedures   • Diet Order Diet: Cardiac; Second Modifier: (optional): Consistent CHO (Diabetic)     Standing Status:   Standing     Number of Occurrences:   1     Order Specific Question:   Diet:     Answer:   Cardiac [6]     Order Specific Question:   Second Modifier: (optional)     Answer:   Consistent CHO (Diabetic) [4]       ACTIVITY  As tolerated.  Weight bearing as tolerated    CONSULTATIONS  Palliative Care    PROCEDURES  none    LABORATORY  Lab Results   Component Value Date    SODIUM 140 11/27/2020    POTASSIUM 3.8 11/27/2020    CHLORIDE 99 11/27/2020    CO2 27 11/27/2020    GLUCOSE 191 (H) 11/27/2020    BUN 29 (H) 11/27/2020    CREATININE 0.60 11/27/2020        Lab Results   Component Value Date    WBC 10.5 11/27/2020    HEMOGLOBIN 11.2 (L) 11/27/2020    HEMATOCRIT 36.2 (L) 11/27/2020    PLATELETCT 279 11/27/2020        Total time of the discharge process exceeds 40 minutes.  More than 50% of time was spent face to face with patient, which included but note limited to review of hospital course with patient, treatment goals upon discharge, recommendations to PCP, continued and new medications and their adverse reactions, and nursing instructions for patient.

## 2020-11-27 NOTE — PROGRESS NOTES
Bedside report give to ADDIE Conway. POC discussed. Patient resting comfortably in bed. Care released

## 2020-11-28 LAB
FOLATE RBC-MCNC: 798 NG/ML
GLUCOSE BLD-MCNC: 215 MG/DL (ref 65–99)
GLUCOSE BLD-MCNC: 221 MG/DL (ref 65–99)

## 2020-11-28 NOTE — DISCHARGE INSTRUCTIONS
Discharge Instructions    Discharged to home by car with friend. Discharged via wheelchair, hospital escort: Yes.  Special equipment needed: Oxygen and Walker    Be sure to schedule a follow-up appointment with your primary care doctor or any specialists as instructed.     Discharge Plan:   Influenza Vaccine Indication: Indicated: 65 years and older  Influenza Vaccine Given - only chart on this line when given: Influenza Vaccine Given (See MAR)    I understand that a diet low in cholesterol, fat, and sodium is recommended for good health. Unless I have been given specific instructions below for another diet, I accept this instruction as my diet prescription.   Other diet: Low salt    Special Instructions:     HF Patient Discharge Instructions  · Monitor your weight daily, and maintain a weight chart, to track your weight changes.   · Activity as tolerated, unless your Doctor has ordered otherwise. Other activity order: Resume regular activty.  · Follow a low fat, low cholesterol, low salt diet unless instructed otherwise by your Doctor. Read the labels on the back of food products and track your intake of fat, cholesterol and salt.   · Fluid Restriction No. If a Fluid Restriction has been ordered by your Doctor, measure fluids with a measuring cup to ensure that you are not exceeding the restriction.   · No smoking.  · Oxygen Yes. If your Doctor has ordered that you wear Oxygen at home, it is important to wear it as ordered.  · Did you receive an explanation from staff on the importance of taking each of your medications and why it is necessary to stay on the medications the physician/care provider has ordered? Yes  · Do you have any questions concerning how to manage your heart failure and what to do should you have any increased signs and symptoms after you go home? Yes  · Do you feel like your heart failure care team involved you in the care treatment plan and allowed you to make decisions regarding your care  while in the hospital and addressed any discharge needs you might have? Yes    See the educational handout provided at discharge for more information on monitoring your daily weight, activity and diet. This also explains more about Heart Failure, symptoms of a flare-up and some of the tests that you have undergone.     Warning Signs of a Flare-Up include:  · Swelling in the ankles or lower legs.  · Shortness of breath, while at rest, or while doing normal activities.   · Shortness of breath at night when in bed, or coughing in bed.   · Requiring more pillows to sleep at night, or needing to sit up at night to sleep.  · Feeling weak, dizzy or fatigued.     When to call your Doctor:  · Call [facility] about questions regarding the discharge instructions you were given [phone].  (Discharge Unit Telemetry)  · Call your Primary Care Physician or Cardiologist if:   1. You experience any pain radiating to your jaw or neck.  2. You have any difficulty breathing.  3. You experience weight gain of 2 lbs in a day or 5 lbs in a week.   4. You feel any palpitations or irregular heartbeats.  5. You become dizzy or lose consciousness.   If you have had an angiogram or had a pacemaker or AICD placed, and experience:  1. Bleeding, drainage or swelling at the surgical / puncture site.  2. Fever greater than 100.0 F  3. Shock from internal defibrillator.  4. Cool and / or numb extremities.   and None    · Is patient discharged on Warfarin / Coumadin?   No Furosemide tablets  What is this medicine?  FUROSEMIDE (fyoor [place] se mide) is a diuretic. It helps you make more urine and to lose salt and excess water from your body. This medicine is used to treat high blood pressure, and edema or swelling from heart, kidney, or liver disease.  This medicine may be used for other purposes; ask your health care provider or pharmacist if you have questions.  COMMON BRAND NAME(S): Active-Medicated Specimen Kit, GeoMetWatch,  Diuscreen, Lasix, RX Specimen Collection Kit, Specimen Collection Kit, URINX Medicated Specimen Collection  What should I tell my health care provider before I take this medicine?  They need to know if you have any of these conditions:  abnormal blood electrolytes  diarrhea or vomiting  gout  heart disease  kidney disease, small amounts of urine, or difficulty passing urine  liver disease  thyroid disease  an unusual or allergic reaction to furosemide, sulfa drugs, other medicines, foods, dyes, or preservatives  pregnant or trying to get pregnant  breast-feeding  How should I use this medicine?  Take this medicine by mouth with a glass of water. Follow the directions on the prescription label. You may take this medicine with or without food. If it upsets your stomach, take it with food or milk. Do not take your medicine more often than directed. Remember that you will need to pass more urine after taking this medicine. Do not take your medicine at a time of day that will cause you problems. Do not take at bedtime.  Talk to your pediatrician regarding the use of this medicine in children. While this drug may be prescribed for selected conditions, precautions do apply.  Overdosage: If you think you have taken too much of this medicine contact a poison control center or emergency room at once.  NOTE: This medicine is only for you. Do not share this medicine with others.  What if I miss a dose?  If you miss a dose, take it as soon as you can. If it is almost time for your next dose, take only that dose. Do not take double or extra doses.  What may interact with this medicine?  aspirin and aspirin-like medicines  certain antibiotics  chloral hydrate  cisplatin  cyclosporine  digoxin  diuretics  laxatives  lithium  medicines for blood pressure  medicines that relax muscles for surgery  methotrexate  NSAIDs, medicines for pain and inflammation like ibuprofen, naproxen, or indomethacin  phenytoin  steroid medicines like  prednisone or cortisone  sucralfate  thyroid hormones  This list may not describe all possible interactions. Give your health care provider a list of all the medicines, herbs, non-prescription drugs, or dietary supplements you use. Also tell them if you smoke, drink alcohol, or use illegal drugs. Some items may interact with your medicine.  What should I watch for while using this medicine?  Visit your doctor or health care provider for regular checks on your progress. Check your blood pressure regularly. Ask your doctor or health care provider what your blood pressure should be, and when you should contact him or her. If you are a diabetic, check your blood sugar as directed.  This medicine may cause serious skin reactions. They can happen weeks to months after starting the medicine. Contact your health care provider right away if you notice fevers or flu-like symptoms with a rash. The rash may be red or purple and then turn into blisters or peeling of the skin. Or, you might notice a red rash with swelling of the face, lips or lymph nodes in your neck or under your arms.  You may need to be on a special diet while taking this medicine. Check with your doctor. Also, ask how many glasses of fluid you need to drink a day. You must not get dehydrated.  You may get drowsy or dizzy. Do not drive, use machinery, or do anything that needs mental alertness until you know how this drug affects you. Do not stand or sit up quickly, especially if you are an older patient. This reduces the risk of dizzy or fainting spells. Alcohol can make you more drowsy and dizzy. Avoid alcoholic drinks.  This medicine can make you more sensitive to the sun. Keep out of the sun. If you cannot avoid being in the sun, wear protective clothing and use sunscreen. Do not use sun lamps or tanning beds/booths.  What side effects may I notice from receiving this medicine?  Side effects that you should report to your doctor or health care professional  as soon as possible:  blood in urine or stools  dry mouth  fever or chills  hearing loss or ringing in the ears  irregular heartbeat  muscle pain or weakness, cramps  rash, fever, and swollen lymph nodes  redness, blistering, peeling or loosening of the skin, including inside the mouth  skin rash  stomach upset, pain, or nausea  tingling or numbness in the hands or feet  unusually weak or tired  vomiting or diarrhea  yellowing of the eyes or skin  Side effects that usually do not require medical attention (report to your doctor or health care professional if they continue or are bothersome):  headache  loss of appetite  unusual bleeding or bruising  This list may not describe all possible side effects. Call your doctor for medical advice about side effects. You may report side effects to FDA at 7-985-PYZ-8474.  Where should I keep my medicine?  Keep out of the reach of children.  Store at room temperature between 15 and 30 degrees C (59 and 86 degrees F). Protect from light. Throw away any unused medicine after the expiration date.  NOTE: This sheet is a summary. It may not cover all possible information. If you have questions about this medicine, talk to your doctor, pharmacist, or health care provider.  © 2020 Elsevier/Gold Standard (2020-03-20 14:04:13)      Depression / Suicide Risk    As you are discharged from this [facility] facility, it is important to learn how to keep safe from harming yourself.    Recognize the warning signs:  · Abrupt changes in personality, positive or negative- including increase in energy   · Giving away possessions  · Change in eating patterns- significant weight changes-  positive or negative  · Change in sleeping patterns- unable to sleep or sleeping all the time   · Unwillingness or inability to communicate  · Depression  · Unusual sadness, discouragement and loneliness  · Talk of wanting to die  · Neglect of personal appearance   · Rebelliousness- reckless  behavior  · Withdrawal from people/activities they love  · Confusion- inability to concentrate     If you or a loved one observes any of these behaviors or has concerns about self-harm, here's what you can do:  · Talk about it- your feelings and reasons for harming yourself  · Remove any means that you might use to hurt yourself (examples: pills, rope, extension cords, firearm)  · Get professional help from the community (Mental Health, Substance Abuse, psychological counseling)  · Do not be alone:Call your Safe Contact- someone whom you trust who will be there for you.  · Call your local CRISIS HOTLINE 351-9110 or 828-519-7418  · Call your local Children's Mobile Crisis Response Team [place] (826) 707-7417 or www.Athigo  · Call the toll free National Suicide Prevention Hotlines   · National Suicide Prevention Lifeline 254-039-UBWR (8495)  · National Hope Line Network 800-SUICIDE (789-8516)

## 2020-11-28 NOTE — PROGRESS NOTES
Pt discharged home on oxygen. Pt discharged with walker and all belongings. Pt education provided on importance of picking up prescriptions and continue to take lasix. Pt has no further questions about d/c.

## 2020-12-09 ENCOUNTER — HOSPITAL ENCOUNTER (OUTPATIENT)
Facility: MEDICAL CENTER | Age: 75
End: 2020-12-09
Attending: NURSE PRACTITIONER
Payer: MEDICARE

## 2020-12-09 PROCEDURE — U0003 INFECTIOUS AGENT DETECTION BY NUCLEIC ACID (DNA OR RNA); SEVERE ACUTE RESPIRATORY SYNDROME CORONAVIRUS 2 (SARS-COV-2) (CORONAVIRUS DISEASE [COVID-19]), AMPLIFIED PROBE TECHNIQUE, MAKING USE OF HIGH THROUGHPUT TECHNOLOGIES AS DESCRIBED BY CMS-2020-01-R: HCPCS

## 2020-12-10 LAB — COVID ORDER STATUS COVID19: NORMAL

## 2020-12-11 LAB
SARS-COV-2 RNA RESP QL NAA+PROBE: NOTDETECTED
SPECIMEN SOURCE: NORMAL

## 2021-01-14 DIAGNOSIS — Z23 NEED FOR VACCINATION: ICD-10-CM

## 2021-06-23 ENCOUNTER — HOSPITAL ENCOUNTER (INPATIENT)
Facility: MEDICAL CENTER | Age: 76
LOS: 8 days | DRG: 871 | End: 2021-07-01
Attending: EMERGENCY MEDICINE | Admitting: INTERNAL MEDICINE
Payer: MEDICARE

## 2021-06-23 ENCOUNTER — APPOINTMENT (OUTPATIENT)
Dept: RADIOLOGY | Facility: MEDICAL CENTER | Age: 76
DRG: 871 | End: 2021-06-23
Attending: EMERGENCY MEDICINE
Payer: MEDICARE

## 2021-06-23 DIAGNOSIS — R62.7 FAILURE TO THRIVE IN ADULT: ICD-10-CM

## 2021-06-23 DIAGNOSIS — I10 ESSENTIAL HYPERTENSION: ICD-10-CM

## 2021-06-23 DIAGNOSIS — Z71.89 ACP (ADVANCE CARE PLANNING): ICD-10-CM

## 2021-06-23 DIAGNOSIS — E11.9 TYPE 2 DIABETES MELLITUS WITHOUT COMPLICATION, WITHOUT LONG-TERM CURRENT USE OF INSULIN (HCC): ICD-10-CM

## 2021-06-23 DIAGNOSIS — E78.5 DYSLIPIDEMIA: ICD-10-CM

## 2021-06-23 DIAGNOSIS — G89.29 CHRONIC BACK PAIN, UNSPECIFIED BACK LOCATION, UNSPECIFIED BACK PAIN LATERALITY: ICD-10-CM

## 2021-06-23 DIAGNOSIS — I50.33 ACUTE ON CHRONIC DIASTOLIC CHF (CONGESTIVE HEART FAILURE), NYHA CLASS 2 (HCC): ICD-10-CM

## 2021-06-23 DIAGNOSIS — E03.9 HYPOTHYROIDISM, UNSPECIFIED TYPE: ICD-10-CM

## 2021-06-23 DIAGNOSIS — R06.02 SHORTNESS OF BREATH: ICD-10-CM

## 2021-06-23 DIAGNOSIS — M54.9 CHRONIC BACK PAIN, UNSPECIFIED BACK LOCATION, UNSPECIFIED BACK PAIN LATERALITY: ICD-10-CM

## 2021-06-23 DIAGNOSIS — Z91.199 CURRENT NON-ADHERENCE TO MEDICAL TREATMENT: ICD-10-CM

## 2021-06-23 DIAGNOSIS — R06.02 SOB (SHORTNESS OF BREATH): ICD-10-CM

## 2021-06-23 DIAGNOSIS — I27.20 PULMONARY HYPERTENSION (HCC): ICD-10-CM

## 2021-06-23 DIAGNOSIS — J96.21 ACUTE ON CHRONIC RESPIRATORY FAILURE WITH HYPOXIA (HCC): ICD-10-CM

## 2021-06-23 PROBLEM — A41.9 SEPSIS (HCC): Status: ACTIVE | Noted: 2021-06-23

## 2021-06-23 LAB
ALBUMIN SERPL BCP-MCNC: 3.5 G/DL (ref 3.2–4.9)
ALBUMIN/GLOB SERPL: 1.2 G/DL
ALP SERPL-CCNC: 108 U/L (ref 30–99)
ALT SERPL-CCNC: 10 U/L (ref 2–50)
ANION GAP SERPL CALC-SCNC: 11 MMOL/L (ref 7–16)
AST SERPL-CCNC: 8 U/L (ref 12–45)
BASOPHILS # BLD AUTO: 0.2 % (ref 0–1.8)
BASOPHILS # BLD: 0.03 K/UL (ref 0–0.12)
BILIRUB SERPL-MCNC: 0.5 MG/DL (ref 0.1–1.5)
BUN SERPL-MCNC: 23 MG/DL (ref 8–22)
CALCIUM SERPL-MCNC: 9 MG/DL (ref 8.4–10.2)
CHLORIDE SERPL-SCNC: 105 MMOL/L (ref 96–112)
CO2 SERPL-SCNC: 21 MMOL/L (ref 20–33)
CREAT SERPL-MCNC: 0.81 MG/DL (ref 0.5–1.4)
EKG IMPRESSION: NORMAL
EOSINOPHIL # BLD AUTO: 0.05 K/UL (ref 0–0.51)
EOSINOPHIL NFR BLD: 0.3 % (ref 0–6.9)
ERYTHROCYTE [DISTWIDTH] IN BLOOD BY AUTOMATED COUNT: 52.8 FL (ref 35.9–50)
FLUAV RNA SPEC QL NAA+PROBE: NEGATIVE
FLUBV RNA SPEC QL NAA+PROBE: NEGATIVE
GLOBULIN SER CALC-MCNC: 3 G/DL (ref 1.9–3.5)
GLUCOSE BLD-MCNC: 209 MG/DL (ref 65–99)
GLUCOSE SERPL-MCNC: 293 MG/DL (ref 65–99)
HCT VFR BLD AUTO: 32.1 % (ref 37–47)
HGB BLD-MCNC: 10 G/DL (ref 12–16)
IMM GRANULOCYTES # BLD AUTO: 0.1 K/UL (ref 0–0.11)
IMM GRANULOCYTES NFR BLD AUTO: 0.6 % (ref 0–0.9)
INR PPP: 1.17 (ref 0.87–1.13)
LACTATE BLD-SCNC: 2 MMOL/L (ref 0.5–2)
LACTATE BLD-SCNC: 2 MMOL/L (ref 0.5–2)
LACTATE BLD-SCNC: 2.4 MMOL/L (ref 0.5–2)
LYMPHOCYTES # BLD AUTO: 1.08 K/UL (ref 1–4.8)
LYMPHOCYTES NFR BLD: 7 % (ref 22–41)
MCH RBC QN AUTO: 31.3 PG (ref 27–33)
MCHC RBC AUTO-ENTMCNC: 31.2 G/DL (ref 33.6–35)
MCV RBC AUTO: 100.3 FL (ref 81.4–97.8)
MONOCYTES # BLD AUTO: 1 K/UL (ref 0–0.85)
MONOCYTES NFR BLD AUTO: 6.5 % (ref 0–13.4)
NEUTROPHILS # BLD AUTO: 13.24 K/UL (ref 2–7.15)
NEUTROPHILS NFR BLD: 85.4 % (ref 44–72)
NRBC # BLD AUTO: 0 K/UL
NRBC BLD-RTO: 0 /100 WBC
NT-PROBNP SERPL IA-MCNC: 366 PG/ML (ref 0–125)
PLATELET # BLD AUTO: 266 K/UL (ref 164–446)
PMV BLD AUTO: 9.9 FL (ref 9–12.9)
POTASSIUM SERPL-SCNC: 4.4 MMOL/L (ref 3.6–5.5)
PROCALCITONIN SERPL-MCNC: 0.76 NG/ML
PROT SERPL-MCNC: 6.5 G/DL (ref 6–8.2)
PROTHROMBIN TIME: 14.6 SEC (ref 12–14.6)
RBC # BLD AUTO: 3.2 M/UL (ref 4.2–5.4)
RSV RNA SPEC QL NAA+PROBE: NEGATIVE
SARS-COV-2 RNA RESP QL NAA+PROBE: NOTDETECTED
SODIUM SERPL-SCNC: 137 MMOL/L (ref 135–145)
SPECIMEN SOURCE: NORMAL
TROPONIN T SERPL-MCNC: 26 NG/L (ref 6–19)
WBC # BLD AUTO: 15.5 K/UL (ref 4.8–10.8)

## 2021-06-23 PROCEDURE — 85610 PROTHROMBIN TIME: CPT

## 2021-06-23 PROCEDURE — 700102 HCHG RX REV CODE 250 W/ 637 OVERRIDE(OP): Performed by: EMERGENCY MEDICINE

## 2021-06-23 PROCEDURE — 99223 1ST HOSP IP/OBS HIGH 75: CPT | Performed by: INTERNAL MEDICINE

## 2021-06-23 PROCEDURE — A9270 NON-COVERED ITEM OR SERVICE: HCPCS | Performed by: INTERNAL MEDICINE

## 2021-06-23 PROCEDURE — 0241U HCHG SARS-COV-2 COVID-19 NFCT DS RESP RNA 4 TRGT MIC: CPT

## 2021-06-23 PROCEDURE — 93005 ELECTROCARDIOGRAM TRACING: CPT

## 2021-06-23 PROCEDURE — 700105 HCHG RX REV CODE 258: Performed by: EMERGENCY MEDICINE

## 2021-06-23 PROCEDURE — 83880 ASSAY OF NATRIURETIC PEPTIDE: CPT

## 2021-06-23 PROCEDURE — A9270 NON-COVERED ITEM OR SERVICE: HCPCS | Performed by: EMERGENCY MEDICINE

## 2021-06-23 PROCEDURE — 80053 COMPREHEN METABOLIC PANEL: CPT

## 2021-06-23 PROCEDURE — 94760 N-INVAS EAR/PLS OXIMETRY 1: CPT

## 2021-06-23 PROCEDURE — 96365 THER/PROPH/DIAG IV INF INIT: CPT

## 2021-06-23 PROCEDURE — 99285 EMERGENCY DEPT VISIT HI MDM: CPT

## 2021-06-23 PROCEDURE — 93005 ELECTROCARDIOGRAM TRACING: CPT | Performed by: EMERGENCY MEDICINE

## 2021-06-23 PROCEDURE — 770020 HCHG ROOM/CARE - TELE (206)

## 2021-06-23 PROCEDURE — 84484 ASSAY OF TROPONIN QUANT: CPT

## 2021-06-23 PROCEDURE — 94640 AIRWAY INHALATION TREATMENT: CPT

## 2021-06-23 PROCEDURE — 85025 COMPLETE CBC W/AUTO DIFF WBC: CPT

## 2021-06-23 PROCEDURE — 83605 ASSAY OF LACTIC ACID: CPT

## 2021-06-23 PROCEDURE — C9803 HOPD COVID-19 SPEC COLLECT: HCPCS | Performed by: EMERGENCY MEDICINE

## 2021-06-23 PROCEDURE — 71045 X-RAY EXAM CHEST 1 VIEW: CPT

## 2021-06-23 PROCEDURE — 84145 PROCALCITONIN (PCT): CPT

## 2021-06-23 PROCEDURE — 700102 HCHG RX REV CODE 250 W/ 637 OVERRIDE(OP): Performed by: INTERNAL MEDICINE

## 2021-06-23 PROCEDURE — 82962 GLUCOSE BLOOD TEST: CPT

## 2021-06-23 PROCEDURE — 700111 HCHG RX REV CODE 636 W/ 250 OVERRIDE (IP): Performed by: EMERGENCY MEDICINE

## 2021-06-23 PROCEDURE — 700111 HCHG RX REV CODE 636 W/ 250 OVERRIDE (IP): Performed by: INTERNAL MEDICINE

## 2021-06-23 PROCEDURE — 307059 PAD,EAR PROTECTOR: Performed by: INTERNAL MEDICINE

## 2021-06-23 PROCEDURE — 87040 BLOOD CULTURE FOR BACTERIA: CPT

## 2021-06-23 PROCEDURE — 36415 COLL VENOUS BLD VENIPUNCTURE: CPT

## 2021-06-23 RX ORDER — AZITHROMYCIN 250 MG/1
500 TABLET, FILM COATED ORAL ONCE
Status: COMPLETED | OUTPATIENT
Start: 2021-06-23 | End: 2021-06-23

## 2021-06-23 RX ORDER — ONDANSETRON 4 MG/1
4 TABLET, ORALLY DISINTEGRATING ORAL EVERY 4 HOURS PRN
Status: DISCONTINUED | OUTPATIENT
Start: 2021-06-23 | End: 2021-07-01 | Stop reason: HOSPADM

## 2021-06-23 RX ORDER — BUMETANIDE 1 MG/1
1 TABLET ORAL DAILY
Status: ON HOLD | COMMUNITY
End: 2022-03-02

## 2021-06-23 RX ORDER — POLYETHYLENE GLYCOL 3350 17 G/17G
1 POWDER, FOR SOLUTION ORAL
Status: DISCONTINUED | OUTPATIENT
Start: 2021-06-23 | End: 2021-07-01 | Stop reason: HOSPADM

## 2021-06-23 RX ORDER — AZITHROMYCIN 250 MG/1
500 TABLET, FILM COATED ORAL DAILY
Status: COMPLETED | OUTPATIENT
Start: 2021-06-24 | End: 2021-06-25

## 2021-06-23 RX ORDER — AMOXICILLIN 250 MG
2 CAPSULE ORAL 2 TIMES DAILY
Status: DISCONTINUED | OUTPATIENT
Start: 2021-06-23 | End: 2021-07-01 | Stop reason: HOSPADM

## 2021-06-23 RX ORDER — ACETAMINOPHEN 325 MG/1
650 TABLET ORAL EVERY 6 HOURS PRN
Status: DISCONTINUED | OUTPATIENT
Start: 2021-06-23 | End: 2021-06-25

## 2021-06-23 RX ORDER — ATORVASTATIN CALCIUM 40 MG/1
40 TABLET, FILM COATED ORAL DAILY
Status: DISCONTINUED | OUTPATIENT
Start: 2021-06-24 | End: 2021-07-01 | Stop reason: HOSPADM

## 2021-06-23 RX ORDER — FUROSEMIDE 20 MG/1
20 TABLET ORAL DAILY
Status: ON HOLD | COMMUNITY
End: 2021-06-30

## 2021-06-23 RX ORDER — BISACODYL 10 MG
10 SUPPOSITORY, RECTAL RECTAL
Status: DISCONTINUED | OUTPATIENT
Start: 2021-06-23 | End: 2021-07-01 | Stop reason: HOSPADM

## 2021-06-23 RX ORDER — POTASSIUM CHLORIDE 750 MG/1
10 TABLET, EXTENDED RELEASE ORAL DAILY
Status: ON HOLD | COMMUNITY
End: 2022-03-02

## 2021-06-23 RX ORDER — LABETALOL HYDROCHLORIDE 5 MG/ML
10 INJECTION, SOLUTION INTRAVENOUS EVERY 4 HOURS PRN
Status: DISCONTINUED | OUTPATIENT
Start: 2021-06-23 | End: 2021-07-01 | Stop reason: HOSPADM

## 2021-06-23 RX ORDER — OXYBUTYNIN CHLORIDE 15 MG/1
15 TABLET, EXTENDED RELEASE ORAL EVERY EVENING
COMMUNITY

## 2021-06-23 RX ORDER — ENALAPRILAT 1.25 MG/ML
1.25 INJECTION INTRAVENOUS EVERY 6 HOURS PRN
Status: DISCONTINUED | OUTPATIENT
Start: 2021-06-23 | End: 2021-07-01 | Stop reason: HOSPADM

## 2021-06-23 RX ORDER — IBUPROFEN 200 MG
600 TABLET ORAL EVERY 6 HOURS PRN
Status: ON HOLD | COMMUNITY
End: 2021-06-30

## 2021-06-23 RX ORDER — ALBUTEROL SULFATE 90 UG/1
2 AEROSOL, METERED RESPIRATORY (INHALATION)
Status: DISCONTINUED | OUTPATIENT
Start: 2021-06-23 | End: 2021-07-01 | Stop reason: HOSPADM

## 2021-06-23 RX ORDER — LEVOTHYROXINE SODIUM 0.03 MG/1
25 TABLET ORAL DAILY
Status: DISCONTINUED | OUTPATIENT
Start: 2021-06-24 | End: 2021-07-01 | Stop reason: HOSPADM

## 2021-06-23 RX ORDER — FUROSEMIDE 10 MG/ML
40 INJECTION INTRAMUSCULAR; INTRAVENOUS
Status: DISCONTINUED | OUTPATIENT
Start: 2021-06-23 | End: 2021-06-29

## 2021-06-23 RX ORDER — HYDROCODONE BITARTRATE AND ACETAMINOPHEN 5; 325 MG/1; MG/1
0.5 TABLET ORAL DAILY
COMMUNITY
End: 2022-02-16

## 2021-06-23 RX ORDER — DEXTROSE MONOHYDRATE 25 G/50ML
50 INJECTION, SOLUTION INTRAVENOUS
Status: DISCONTINUED | OUTPATIENT
Start: 2021-06-23 | End: 2021-06-26

## 2021-06-23 RX ORDER — BUDESONIDE AND FORMOTEROL FUMARATE DIHYDRATE 160; 4.5 UG/1; UG/1
2 AEROSOL RESPIRATORY (INHALATION)
Status: DISCONTINUED | OUTPATIENT
Start: 2021-06-23 | End: 2021-07-01 | Stop reason: HOSPADM

## 2021-06-23 RX ORDER — ONDANSETRON 2 MG/ML
4 INJECTION INTRAMUSCULAR; INTRAVENOUS EVERY 4 HOURS PRN
Status: DISCONTINUED | OUTPATIENT
Start: 2021-06-23 | End: 2021-07-01 | Stop reason: HOSPADM

## 2021-06-23 RX ADMIN — AZITHROMYCIN MONOHYDRATE 500 MG: 250 TABLET ORAL at 14:58

## 2021-06-23 RX ADMIN — CEFTRIAXONE SODIUM 2 G: 2 INJECTION, POWDER, FOR SOLUTION INTRAMUSCULAR; INTRAVENOUS at 14:34

## 2021-06-23 RX ADMIN — BUDESONIDE AND FORMOTEROL FUMARATE DIHYDRATE 2 PUFF: 160; 4.5 AEROSOL RESPIRATORY (INHALATION) at 19:13

## 2021-06-23 RX ADMIN — FUROSEMIDE 40 MG: 10 INJECTION, SOLUTION INTRAMUSCULAR; INTRAVENOUS at 17:55

## 2021-06-23 RX ADMIN — ALBUTEROL SULFATE 2 PUFF: 90 AEROSOL, METERED RESPIRATORY (INHALATION) at 18:01

## 2021-06-23 RX ADMIN — ENOXAPARIN SODIUM 40 MG: 40 INJECTION SUBCUTANEOUS at 17:56

## 2021-06-23 RX ADMIN — INSULIN HUMAN 2 UNITS: 100 INJECTION, SOLUTION PARENTERAL at 21:08

## 2021-06-23 ASSESSMENT — COGNITIVE AND FUNCTIONAL STATUS - GENERAL
DRESSING REGULAR UPPER BODY CLOTHING: A LOT
DAILY ACTIVITIY SCORE: 15
MOVING FROM LYING ON BACK TO SITTING ON SIDE OF FLAT BED: A LOT
MOVING TO AND FROM BED TO CHAIR: A LOT
CLIMB 3 TO 5 STEPS WITH RAILING: A LOT
WALKING IN HOSPITAL ROOM: A LOT
SUGGESTED CMS G CODE MODIFIER MOBILITY: CL
TURNING FROM BACK TO SIDE WHILE IN FLAT BAD: A LOT
PERSONAL GROOMING: A LITTLE
MOBILITY SCORE: 12
TOILETING: A LITTLE
DRESSING REGULAR LOWER BODY CLOTHING: A LOT
HELP NEEDED FOR BATHING: A LOT
EATING MEALS: A LITTLE
SUGGESTED CMS G CODE MODIFIER DAILY ACTIVITY: CK
STANDING UP FROM CHAIR USING ARMS: A LOT

## 2021-06-23 ASSESSMENT — LIFESTYLE VARIABLES
EVER FELT BAD OR GUILTY ABOUT YOUR DRINKING: NO
TOTAL SCORE: 0
HAVE PEOPLE ANNOYED YOU BY CRITICIZING YOUR DRINKING: NO
ON A TYPICAL DAY WHEN YOU DRINK ALCOHOL HOW MANY DRINKS DO YOU HAVE: 0
ALCOHOL_USE: NO
AVERAGE NUMBER OF DAYS PER WEEK YOU HAVE A DRINK CONTAINING ALCOHOL: 0
HAVE YOU EVER FELT YOU SHOULD CUT DOWN ON YOUR DRINKING: NO
TOTAL SCORE: 0
EVER HAD A DRINK FIRST THING IN THE MORNING TO STEADY YOUR NERVES TO GET RID OF A HANGOVER: NO
TOTAL SCORE: 0
CONSUMPTION TOTAL: NEGATIVE
HOW MANY TIMES IN THE PAST YEAR HAVE YOU HAD 5 OR MORE DRINKS IN A DAY: 0

## 2021-06-23 ASSESSMENT — ENCOUNTER SYMPTOMS
SHORTNESS OF BREATH: 1
HEADACHES: 0
ABDOMINAL PAIN: 0
DIARRHEA: 0
PALPITATIONS: 0
DIZZINESS: 0
FEVER: 0
ORTHOPNEA: 1
VOMITING: 0
CHILLS: 0
COUGH: 0
NAUSEA: 0
BACK PAIN: 0
CONSTIPATION: 0

## 2021-06-23 ASSESSMENT — PATIENT HEALTH QUESTIONNAIRE - PHQ9
SUM OF ALL RESPONSES TO PHQ9 QUESTIONS 1 AND 2: 0
2. FEELING DOWN, DEPRESSED, IRRITABLE, OR HOPELESS: NOT AT ALL
1. LITTLE INTEREST OR PLEASURE IN DOING THINGS: NOT AT ALL

## 2021-06-23 ASSESSMENT — COPD QUESTIONNAIRES
DURING THE PAST 4 WEEKS HOW MUCH DID YOU FEEL SHORT OF BREATH: SOME OF THE TIME
HAVE YOU SMOKED AT LEAST 100 CIGARETTES IN YOUR ENTIRE LIFE: YES
COPD SCREENING SCORE: 6
DO YOU EVER COUGH UP ANY MUCUS OR PHLEGM?: NO/ONLY WITH OCCASIONAL COLDS OR INFECTIONS

## 2021-06-23 ASSESSMENT — FIBROSIS 4 INDEX
FIB4 SCORE: 0.71
FIB4 SCORE: 0.98

## 2021-06-23 NOTE — ED PROVIDER NOTES
ED Provider Note    CHIEF COMPLAINT  Chief Complaint   Patient presents with   • Shortness of Breath       HPI  Gabrielle Olmstead is a 75 y.o. female who presents for several days of worsening lower extremity edema orthopnea shortness of breath.  Patient has a history of heart failure.  She denies high fever or productive cough.  She has been vaccinated against COVID-19.  She denies hemoptysis productive cough or high fevers.  She reports some subjective fluid overload with leg swelling and orthopnea.  No associated strokelike symptoms such as unilateral numbness weakness or tingling to the arms legs or face.  No speech abnormalities.  No headache    REVIEW OF SYSTEMS  See HPI for further details.  No high fevers chills or productive cough all other systems are negative.     PAST MEDICAL HISTORY  Past Medical History:   Diagnosis Date   • Arthritis    • ASTHMA    • Breath shortness    • Bronchitis    • Diabetes     ORAL   • EMPHYSEMA    • Hypertension    • Other specified disorder of intestines        FAMILY HISTORY  Noncontributory    SOCIAL HISTORY  Social History     Socioeconomic History   • Marital status:      Spouse name: Not on file   • Number of children: Not on file   • Years of education: Not on file   • Highest education level: Not on file   Occupational History   • Not on file   Tobacco Use   • Smoking status: Former Smoker     Packs/day: 2.00     Years: 40.00     Pack years: 80.00     Types: Cigarettes   • Smokeless tobacco: Never Used   • Tobacco comment: 45 YEARS  11/2 PPD   Substance and Sexual Activity   • Alcohol use: No     Comment: OCCASSIONAL   • Drug use: No   • Sexual activity: Not on file   Other Topics Concern   • Not on file   Social History Narrative   • Not on file     Social Determinants of Health     Financial Resource Strain:    • Difficulty of Paying Living Expenses:    Food Insecurity:    • Worried About Running Out of Food in the Last Year:    • Ran Out of Food in the Last  Year:    Transportation Needs:    • Lack of Transportation (Medical):    • Lack of Transportation (Non-Medical):    Physical Activity:    • Days of Exercise per Week:    • Minutes of Exercise per Session:    Stress:    • Feeling of Stress :    Social Connections:    • Frequency of Communication with Friends and Family:    • Frequency of Social Gatherings with Friends and Family:    • Attends Anabaptism Services:    • Active Member of Clubs or Organizations:    • Attends Club or Organization Meetings:    • Marital Status:    Intimate Partner Violence:    • Fear of Current or Ex-Partner:    • Emotionally Abused:    • Physically Abused:    • Sexually Abused:        SURGICAL HISTORY  Past Surgical History:   Procedure Laterality Date   • HYSTERECTOMY ROBOTIC  11/28/2011    Performed by REKHA BYERS at SURGERY [facility] ORS   • LESION EXCISION GENERAL  11/28/2011    Performed by REKHA BYERS at SURGERY [facility] ORS   • CYST EXCISION  8/6/2010    Performed by ANA PAVON at SURGERY SAME DAY [facility] ORS       CURRENT MEDICATIONS  Home Medications     Reviewed by Lowell Romano (Pharmacy Tech) on 06/23/21 at 1254  Med List Status: Complete   Medication Last Dose Status   amlodipine-benazepril (LOTREL) 5-20 MG per capsule 6/23/2021 Active   atorvastatin (LIPITOR) 40 MG TABS 6/23/2021 Active   bumetanide (BUMEX) 1 MG Tab 6/23/2021 Active   fluticasone-salmeterol (ADVAIR) 250-50 MCG/DOSE AEPB 6/23/2021 Active   furosemide (LASIX) 20 MG Tab 6/22/2021 Active   glimepiride (AMARYL) 4 MG Tab 6/23/2021 Active   guaiFENesin (MUCINEX PO) 6/23/2021 Active   HYDROcodone-acetaminophen (NORCO) 5-325 MG Tab per tablet > 2 days Active   ibuprofen (MOTRIN) 200 MG Tab 6/23/2021 Active   levothyroxine (SYNTHROID) 25 MCG Tab 6/23/2021 Active   metFORMIN ER (GLUCOPHAGE XR) 500 MG TABLET SR 24 HR 6/23/2021 Active   oxybutynin (DITROPAN XL) 15 MG CR tablet 6/23/2021 Active   potassium chloride SA (K-DUR) 10 MEQ Tab CR 6/23/2021  Active   Probiotic Product (PROBIOTIC PO) 6/22/2021 Active   sertraline (ZOLOFT) 50 MG Tab 6/23/2021 Active              Current Facility-Administered Medications:   •  cefTRIAXone (ROCEPHIN) 2 g in  mL IVPB, 2 g, Intravenous, Once, Baltazar Humphrey M.D.    Current Outpatient Medications:   •  furosemide (LASIX) 20 MG Tab, Take 20 mg by mouth every day., Disp: , Rfl:   •  oxybutynin (DITROPAN XL) 15 MG CR tablet, Take 15 mg by mouth every evening., Disp: , Rfl:   •  potassium chloride SA (K-DUR) 10 MEQ Tab CR, Take 10 mEq by mouth every day., Disp: , Rfl:   •  HYDROcodone-acetaminophen (NORCO) 5-325 MG Tab per tablet, Take 0.5 Tablets by mouth every day., Disp: , Rfl:   •  ibuprofen (MOTRIN) 200 MG Tab, Take 600 mg by mouth every 6 hours as needed for Mild Pain., Disp: , Rfl:   •  Probiotic Product (PROBIOTIC PO), Take 1 capsule by mouth every day., Disp: , Rfl:   •  bumetanide (BUMEX) 1 MG Tab, Take 1 mg by mouth every day., Disp: , Rfl:   •  guaiFENesin (MUCINEX PO), Take 1 tablet by mouth 2 times a day as needed (For cough)., Disp: , Rfl:   •  glimepiride (AMARYL) 4 MG Tab, Take 4 mg by mouth every day., Disp: , Rfl:   •  levothyroxine (SYNTHROID) 25 MCG Tab, Take 25 mcg by mouth every day., Disp: , Rfl:   •  metFORMIN ER (GLUCOPHAGE XR) 500 MG TABLET SR 24 HR, Take 500 mg by mouth 2 Times a Day., Disp: , Rfl:   •  sertraline (ZOLOFT) 50 MG Tab, Take 50 mg by mouth every day., Disp: , Rfl:   •  fluticasone-salmeterol (ADVAIR) 250-50 MCG/DOSE AEPB, Inhale 1 Puff by mouth every 12 hours., Disp: , Rfl:   •  amlodipine-benazepril (LOTREL) 5-20 MG per capsule, Take 1 Cap by mouth every day., Disp: , Rfl:   •  atorvastatin (LIPITOR) 40 MG TABS, Take 40 mg by mouth every day., Disp: , Rfl:       ALLERGIES  Allergies   Allergen Reactions   • Nkda [No Known Drug Allergy]        PHYSICAL EXAM  VITAL SIGNS: /42   Pulse 99   Temp 36.6 °C (97.8 °F) (Temporal)   Resp (!) 22   Wt 76.7 kg (169 lb)   SpO2 95%    BMI 29.94 kg/m²       Constitutional: Elderly decompensated ill-appearing.   HENT: Normocephalic, Atraumatic, Bilateral external ears normal, Oropharynx moist, No oral exudates, Nose normal.   Eyes: PERRLA, EOMI, Conjunctiva normal, No discharge.   Neck: Normal range of motion, No tenderness, Supple, No stridor.   Cardiovascular: Normal heart rate, Normal rhythm, No murmurs, No rubs, No gallops.   Thorax & Lungs: Bibasilar rales with rhonchi  abdomen: Bowel sounds normal, Soft, No tenderness, No masses, No pulsatile masses.   Skin: Warm, Dry, No erythema, No rash.   Back: No tenderness, No CVA tenderness.   Extremities: Bilateral lower extremity pitting edema with some weeping venous stasis   Patient presents here with worsening cough shortness of breath and orthopnea.  She had extensive work-up here.  Her white blood cell count is elevated neurologic: Alert & oriented x 3, Normal motor function, Normal sensory function, No focal deficits noted.   Psychiatric: Anxious    Results for orders placed or performed during the hospital encounter of 06/23/21   CBC with Differential   Result Value Ref Range    WBC 15.5 (H) 4.8 - 10.8 K/uL    RBC 3.20 (L) 4.20 - 5.40 M/uL    Hemoglobin 10.0 (L) 12.0 - 16.0 g/dL    Hematocrit 32.1 (L) 37.0 - 47.0 %    .3 (H) 81.4 - 97.8 fL    MCH 31.3 27.0 - 33.0 pg    MCHC 31.2 (L) 33.6 - 35.0 g/dL    RDW 52.8 (H) 35.9 - 50.0 fL    Platelet Count 266 164 - 446 K/uL    MPV 9.9 9.0 - 12.9 fL    Neutrophils-Polys 85.40 (H) 44.00 - 72.00 %    Lymphocytes 7.00 (L) 22.00 - 41.00 %    Monocytes 6.50 0.00 - 13.40 %    Eosinophils 0.30 0.00 - 6.90 %    Basophils 0.20 0.00 - 1.80 %    Immature Granulocytes 0.60 0.00 - 0.90 %    Nucleated RBC 0.00 /100 WBC    Neutrophils (Absolute) 13.24 (H) 2.00 - 7.15 K/uL    Lymphs (Absolute) 1.08 1.00 - 4.80 K/uL    Monos (Absolute) 1.00 (H) 0.00 - 0.85 K/uL    Eos (Absolute) 0.05 0.00 - 0.51 K/uL    Baso (Absolute) 0.03 0.00 - 0.12 K/uL    Immature  Granulocytes (abs) 0.10 0.00 - 0.11 K/uL    NRBC (Absolute) 0.00 K/uL   Comp Metabolic Panel   Result Value Ref Range    Sodium 137 135 - 145 mmol/L    Potassium 4.4 3.6 - 5.5 mmol/L    Chloride 105 96 - 112 mmol/L    Co2 21 20 - 33 mmol/L    Anion Gap 11.0 7.0 - 16.0    Glucose 293 (H) 65 - 99 mg/dL    Bun 23 (H) 8 - 22 mg/dL    Creatinine 0.81 0.50 - 1.40 mg/dL    Calcium 9.0 8.4 - 10.2 mg/dL    AST(SGOT) 8 (L) 12 - 45 U/L    ALT(SGPT) 10 2 - 50 U/L    Alkaline Phosphatase 108 (H) 30 - 99 U/L    Total Bilirubin 0.5 0.1 - 1.5 mg/dL    Albumin 3.5 3.2 - 4.9 g/dL    Total Protein 6.5 6.0 - 8.2 g/dL    Globulin 3.0 1.9 - 3.5 g/dL    A-G Ratio 1.2 g/dL   proBrain Natriuretic Peptide, NT   Result Value Ref Range    NT-proBNP 366 (H) 0 - 125 pg/mL   PROCALCITONIN   Result Value Ref Range    Procalcitonin 0.76 (H) <0.25 ng/mL   COV-2, FLU A/B, AND RSV BY PCR (2-4 HOURS CEPHEID): Collect NP swab in VTM    Specimen: Nasopharyngeal; Respirate   Result Value Ref Range    Influenza virus A RNA Negative Negative    Influenza virus B, PCR Negative Negative    RSV, PCR Negative Negative    SARS-CoV-2 by PCR NotDetected     SARS-CoV-2 Source NP Swab    ESTIMATED GFR   Result Value Ref Range    GFR If African American >60 >60 mL/min/1.73 m 2    GFR If Non African American >60 >60 mL/min/1.73 m 2   TROPONIN   Result Value Ref Range    Troponin T 26 (H) 6 - 19 ng/L   LACTIC ACID   Result Value Ref Range    Lactic Acid 2.0 0.5 - 2.0 mmol/L   EKG   Result Value Ref Range    Report       [facility] Emergency Dept.    Test Date:  2021  Pt Name:    MICHEAL VALENCIA                  Department: [facility]  MRN:        [id number]                      Room:       -ROOM 11  Gender:     Female                       Technician: FARHAT  :        [id number]                   Requested By:ER TRIAGE PROTOCOL  Order #:    [id number]                    Reading MD:    Measurements  Intervals                                Axis  Rate:        95                           P:          -39  NE:         130                          QRS:        -78  QRSD:       157                          T:          13  QT:         387  QTc:        487    Interpretive Statements  Sinus rhythm  Atrial premature complex  RBBB and LAFB  Compared to ECG 11/24/2020 15:20:06  Atrial premature complex(es) now present  Left anterior fascicular block now present  Right bundle-branch block now present  Possible ischemia no longer present           COURSE & MEDICAL DECISION MAKING  Pertinent Labs & Imaging studies reviewed. (See chart for details)  As well as procalcitonin concerning for possible bacterial pneumonia.  Chest x-ray is nonspecific but definitely demonstrates either fluid overload with some overlying infection.  Covid and influenza tests are negative.  BNP is slightly elevated as well as troponin.  Blood cultures have been performed.  There is no lactic acidemia or other signs of fulminant sepsis on vital signs or laboratory studies.  Patient was given IV Rocephin and oral azithromycin and will be admitted to internal medicine    FINAL IMPRESSION  1.  Acute volume overload  2.  Community-acquired pneumonia with hypoxia         Electronically signed by: Baltazar Humphrey M.D., 6/23/2021 12:17 PM

## 2021-06-23 NOTE — ASSESSMENT & PLAN NOTE
Hold patient's home p.o. medications  Current A1c 8.6  ISS  Accuchecks  Hypoglycemia protocol    6/26: We will increase to medium sliding scale.  Monitor and make changes accordingly.  6/27: We will add lantus 5  U daily.    6/28: We will increase lantus to 10 U

## 2021-06-23 NOTE — ASSESSMENT & PLAN NOTE
Patient not taking her diuretics at home, causes her too much incontinence  - continue counseling for adherence

## 2021-06-23 NOTE — H&P
Hospital Medicine History & Physical Note    Date of Service  6/23/2021    Primary Care Physician  Anthony Martinez M.D.    Consultants  none    Code Status  Full Code    Chief Complaint  Chief Complaint   Patient presents with   • Shortness of Breath       History of Presenting Illness  75 y.o. female who presented 6/23/2021 with worsening shortness of breath.  Patient has had progressive SLB with orthopnea for the past couple days.  Patient has bilateral lower extremity edema which she states gets wrapped with compression stockings.  She noticed that she began to have more fluid leakage from her legs in the past couple weeks.  She stated she knows she has heart failure and problems with fluids but was not taking a diuretic.  Patient states she is extremely incontinent with Lasix.  She has chronic home oxygen at 2 L nasal cannula.  Patient denied any subjective fevers, coughing, abdominal pain, headache.    Review of Systems  Review of Systems   Constitutional: Negative for chills, fever and malaise/fatigue.   Respiratory: Positive for shortness of breath. Negative for cough.    Cardiovascular: Positive for orthopnea and leg swelling. Negative for chest pain and palpitations.   Gastrointestinal: Negative for abdominal pain, constipation, diarrhea, nausea and vomiting.   Musculoskeletal: Negative for back pain and joint pain.   Neurological: Negative for dizziness and headaches.   All other systems reviewed and are negative.      Past Medical History   has a past medical history of Arthritis, ASTHMA, Breath shortness, Bronchitis, Diabetes, EMPHYSEMA, Hypertension, and Other specified disorder of intestines.    Surgical History   has a past surgical history that includes cyst excision (8/6/2010); hysterectomy robotic (11/28/2011); and lesion excision general (11/28/2011).     Family History  family history includes Hypertension in her mother.     Social History   reports that she has quit smoking. Her smoking use  included cigarettes. She has a 80.00 pack-year smoking history. She has never used smokeless tobacco. She reports that she does not drink alcohol and does not use drugs.    Allergies  Allergies   Allergen Reactions   • Nkda [No Known Drug Allergy]        Medications  Prior to Admission Medications   Prescriptions Last Dose Informant Patient Reported? Taking?   HYDROcodone-acetaminophen (NORCO) 5-325 MG Tab per tablet > 2 days at [facility] out Patient's Home Pharmacy Yes Yes   Sig: Take 0.5 Tablets by mouth every day.   Probiotic Product (PROBIOTIC PO) 6/22/2021 at 1100 Patient Yes Yes   Sig: Take 1 capsule by mouth every day.   amlodipine-benazepril (LOTREL) 5-20 MG per capsule 6/23/2021 at 0700 Patient's Home Pharmacy Yes No   Sig: Take 1 Cap by mouth every day.   atorvastatin (LIPITOR) 40 MG TABS 6/23/2021 at 0700 Patient's Home Pharmacy Yes No   Sig: Take 40 mg by mouth every day.   bumetanide (BUMEX) 1 MG Tab 6/23/2021 at 0700 Patient's Home Pharmacy Yes Yes   Sig: Take 1 mg by mouth every day.   fluticasone-salmeterol (ADVAIR) 250-50 MCG/DOSE AEPB 6/23/2021 at 0700 Patient's Home Pharmacy Yes No   Sig: Inhale 1 Puff by mouth every 12 hours.   furosemide (LASIX) 20 MG Tab 6/22/2021 at 0700 Patient's Home Pharmacy Yes Yes   Sig: Take 20 mg by mouth every day.   glimepiride (AMARYL) 4 MG Tab 6/23/2021 at 0700 Patient's Home Pharmacy Yes No   Sig: Take 4 mg by mouth every day.   guaiFENesin (MUCINEX PO) 6/23/2021 at 0000 Patient Yes No   Sig: Take 1 tablet by mouth 2 times a day as needed (For cough).   ibuprofen (MOTRIN) 200 MG Tab 6/23/2021 at 0000 Patient Yes Yes   Sig: Take 600 mg by mouth every 6 hours as needed for Mild Pain.   levothyroxine (SYNTHROID) 25 MCG Tab 6/23/2021 at 0500 Patient's Home Pharmacy Yes No   Sig: Take 25 mcg by mouth every day.   metFORMIN ER (GLUCOPHAGE XR) 500 MG TABLET SR 24 HR 6/23/2021 at 0700 Patient's Home Pharmacy Yes No   Sig: Take 500 mg by mouth 2 Times a Day.   oxybutynin  (DITROPAN XL) 15 MG CR tablet 6/23/2021 at 0000 Patient's Home Pharmacy Yes Yes   Sig: Take 15 mg by mouth every evening.   potassium chloride SA (K-DUR) 10 MEQ Tab CR 6/23/2021 at 0700 Patient's Home Pharmacy Yes Yes   Sig: Take 10 mEq by mouth every day.   sertraline (ZOLOFT) 50 MG Tab 6/23/2021 at 0700 Patient's Home Pharmacy Yes No   Sig: Take 50 mg by mouth every day.      Facility-Administered Medications: None       Physical Exam  Temp:  [36.6 °C (97.8 °F)-36.8 °C (98.2 °F)] 36.8 °C (98.2 °F)  Pulse:  [78-99] 79  Resp:  [22-28] 28  BP: (116-134)/(42) 116/42  SpO2:  [95 %-97 %] 97 %    Physical Exam  Vitals and nursing note reviewed.   Constitutional:       General: She is not in acute distress.     Appearance: She is obese. She is ill-appearing.   HENT:      Head: Normocephalic and atraumatic.      Right Ear: External ear normal.      Left Ear: External ear normal.      Nose: No congestion or rhinorrhea.      Mouth/Throat:      Pharynx: No oropharyngeal exudate or posterior oropharyngeal erythema.   Eyes:      Extraocular Movements: Extraocular movements intact.      Conjunctiva/sclera: Conjunctivae normal.      Pupils: Pupils are equal, round, and reactive to light.   Cardiovascular:      Rate and Rhythm: Normal rate.      Pulses: Normal pulses.      Heart sounds: Normal heart sounds. No murmur heard.     Pulmonary:      Effort: Respiratory distress present.      Breath sounds: Wheezing (Minimal on left side) present.   Abdominal:      General: Abdomen is flat. Bowel sounds are normal. There is no distension.      Palpations: Abdomen is soft.      Tenderness: There is no abdominal tenderness.   Musculoskeletal:      Cervical back: Normal range of motion and neck supple. No rigidity.      Right lower leg: Edema present.      Left lower leg: Edema present.      Comments: Legs were covered with compression stockings, ulcers noted on bilateral anterior tibia.  Venous insufficiency signs noted.   Skin:      General: Skin is warm.      Capillary Refill: Capillary refill takes less than 2 seconds.      Coloration: Skin is not jaundiced.      Findings: No erythema.   Neurological:      General: No focal deficit present.      Mental Status: She is alert and oriented to person, place, and time. Mental status is at baseline.      Motor: No weakness.   Psychiatric:         Mood and Affect: Mood normal.         Behavior: Behavior normal.         Thought Content: Thought content normal.         Judgment: Judgment normal.         Laboratory:  Recent Labs     06/23/21  1200   WBC 15.5*   RBC 3.20*   HEMOGLOBIN 10.0*   HEMATOCRIT 32.1*   .3*   MCH 31.3   MCHC 31.2*   RDW 52.8*   PLATELETCT 266   MPV 9.9     Recent Labs     06/23/21  1200   SODIUM 137   POTASSIUM 4.4   CHLORIDE 105   CO2 21   GLUCOSE 293*   BUN 23*   CREATININE 0.81   CALCIUM 9.0     Recent Labs     06/23/21  1200   ALTSGPT 10   ASTSGOT 8*   ALKPHOSPHAT 108*   TBILIRUBIN 0.5   GLUCOSE 293*     Recent Labs     06/23/21  1532   INR 1.17*     Recent Labs     06/23/21  1258   NTPROBNP 366*         Recent Labs     06/23/21  1258   TROPONINT 26*       Imaging:  DX-CHEST-PORTABLE (1 VIEW)   Final Result      1.  Possible mild pulmonary interstitial edema      2.  Enlarged cardiac silhouette        Assessment/Plan:  I anticipate this patient will require at least two midnights for appropriate medical management, necessitating inpatient admission.    * Sepsis (HCC)- (present on admission)  Assessment & Plan  This is Sepsis Present on admission  SIRS criteria identified on my evaluation include: Tachycardia, with heart rate greater than 90 BPM and Leukocytosis, with WBC greater than 12,000  Source is PNA/CAP  Sepsis protocol initiated  Fluid resuscitation ordered per protocol -holding IV fluids patient appears to be volume overloaded  IV antibiotics as appropriate for source of sepsis -ceftriaxone and azithromycin  While organ dysfunction may be noted elsewhere in  this problem list or in the chart, degree of organ dysfunction does not meet CMS criteria for severe sepsis  Associated acute on chronic hypoxic respiratory failure due to sepsis    Acute on chronic diastolic CHF (congestive heart failure), NYHA class 2 (HCC)- (present on admission)  Assessment & Plan  11/25/2020 - echo noted normal diastolic and systolic function.  Mild mitral regurg.  Mild tricuspid regurg.  Mild aortic stenosis.  Past diagnosis. Patient has evidence with pulmonary crackles, bilateral lower extremity edema with leaking venous insufficiency ulcers.  Patient has not been taking her Lasix at home.  -trial 40mg IV Lasix, patient does have sepsis appearance but is unable to tolerate more volume at this time.  Monitor for hypotension    Acute on chronic respiratory failure with hypoxia (HCC)- (present on admission)  Assessment & Plan  Patient's acute on chronic respiratory failure requiring increased oxygen needs due to her acute CHF exacerbation and pneumonia.  Patient does use chronic home oxygen 2 L nasal cannula.  -Oxygen supplementation, continue IV Lasix    Pulmonary hypertension (HCC)- (present on admission)  Assessment & Plan  RVSP 45 mmHg.  11/25/2020.  - Continue Lasix  - continue oxygen supplementation    Current non-adherence to medical treatment- (present on admission)  Assessment & Plan  Patient not taking her diuretics at home, causes her too much incontinence  - continue counseling for adherence    DM (diabetes mellitus) ([facility])- (present on admission)  Assessment & Plan  Hold patient's home p.o. medications  Current A1c 8.6  ISS  Accuchecks  Hypoglycemia protocol    Dyslipidemia- (present on admission)  Assessment & Plan  Continue atorvastatin    Essential hypertension- (present on admission)  Assessment & Plan  Holding patient's home blood pressure medications as she will need diuretics, at risk for hypotension due to her sepsis from pneumonia.    DVT prophylaxis: Lovenox

## 2021-06-23 NOTE — ASSESSMENT & PLAN NOTE
This is Sepsis Present on admission  SIRS criteria identified on my evaluation include: Tachycardia, with heart rate greater than 90 BPM and Leukocytosis, with WBC greater than 12,000  Source is PNA/CAP  Sepsis protocol initiated  Fluid resuscitation ordered per protocol -holding IV fluids patient appears to be volume overloaded  IV antibiotics as appropriate for source of sepsis -ceftriaxone and azithromycin  While organ dysfunction may be noted elsewhere in this problem list or in the chart, degree of organ dysfunction does not meet CMS criteria for severe sepsis  Associated acute on chronic hypoxic respiratory failure due to sepsis    6/28: Patient had sepsis on admission, currently not meeting criteria.  We will continue antibiotics with Unasyn and doxycycline for a couple more days, monitor and make changes accordingly.

## 2021-06-23 NOTE — ASSESSMENT & PLAN NOTE
11/25/2020 - echo noted normal diastolic and systolic function.  Mild mitral regurg.  Mild tricuspid regurg.  Mild aortic stenosis.  Past diagnosis. Patient has evidence with pulmonary crackles, bilateral lower extremity edema with leaking venous insufficiency ulcers.  Patient has not been taking her Lasix at home.  -trial 40mg IV Lasix, patient does have sepsis appearance but is unable to tolerate more volume at this time.  Monitor for hypotension    6/28: We will continue with lasix at 40 mg IV daily.  Patient mentions she feels much better, lower extremity edema has improved.  It appears I/O's are not reliable. We will try to obtain more strict I/O's and consider switching to PO lasix when appropriate.

## 2021-06-23 NOTE — ASSESSMENT & PLAN NOTE
Holding patient's home blood pressure medications as she will need diuretics, at risk for hypotension due to her sepsis from pneumonia.    6/26: We will now reorder the patient's home medication for blood pressure.  6/28: We have increased amlodipine to 10 mg.

## 2021-06-23 NOTE — ASSESSMENT & PLAN NOTE
Patient's acute on chronic respiratory failure requiring increased oxygen needs due to probably her pneumonia.  Patient does use chronic home oxygen 2 L nasal cannula.  -Oxygen supplementation, continue IV Lasix    6/24: We will continue with IV Lasix for now and antibiotic.    6/25: Patient somewhat improving.  Currently using 3 L, yesterday she was using 4 L.    6/26: D-dimer was elevated, the patient declined to do a CT angio of the chest, however was agreeable to a VQ scan which we have ordered.    6/28: VQ scan was read as low probability for pulmonary embolism.  Patient still does not want to do a CT angio of the chest.  Yesterday was the fifth day of antibiotics.  We will start with Unasyn and doxycycline for a couple more days to see if her respiratory status improves.  She did have increased procalcitonin on admission.

## 2021-06-23 NOTE — ED TRIAGE NOTES
Pt bib EMS with c/o increasing SOB for the past 2-3 days. Per the pt she was being evaluated by her home health nurse at which point [facility] was called for concerns of declining condition

## 2021-06-23 NOTE — DISCHARGE PLANNING
ER CM met with pt at bedside. She lives in a 1 story home alone and reports she can take care of self. Today HH RN felt she was worse. She has had [facility] for about a yr per her report since DC home from [facility] sp fx arm. She has FWW and Wheelchair at home She has o2 but cannot remember provider. She is not on o2 at home all the time. BLE edema noted Dressing on BLE. Room air sat while talking to ER CM 89 percent. O2 at 3lpm . Care Transition Team Assessment    Information Source  Orientation Level: Oriented X4  Information Given By: Patient  Informant's Name: Gabrielle  Who is responsible for making decisions for patient? : Patient         Elopement Risk  Legal Hold: No  Ambulatory or Self Mobile in Wheelchair: No-Not an Elopement Risk    Interdisciplinary Discharge Planning  Primary Care Physician: Dr Martinez  Lives with - Patient's Self Care Capacity: Alone and Able to Care For Self  Support Systems: Friends / Neighbors  Housing / Facility: 1 Story House  Do You Take your Prescribed Medications Regularly: Yes  Able to Return to Previous ADL's: Future Time w/Therapy  Mobility Issues: Yes  Prior Services: [facility] Health Services ([facility])  Patient Prefers to be Discharged to::  (home)  Assistance Needed: Yes  Durable Medical Equipment: Home Oxygen, Walker, Other - Specify (WC and o2 can remember provider)  DME Provider / Phone: cant remember    Discharge Preparedness  What is your plan after discharge?: Home with help  What are your discharge supports?: Other (comment)  Prior Functional Level: Needs Assist with Activities of Daily Living, Uses Walker, Uses Wheelchair  Difficulity with ADLs: Walking    Functional Assesment  Prior Functional Level: Needs Assist with Activities of Daily Living, Uses Walker, Uses Wheelchair    Finances  Prescription Coverage: Yes ([employer])                   Domestic Abuse  Have you ever been the victim of abuse or violence?: No         Discharge Risks or Barriers  Discharge  risks or barriers?: Lives alone, no community support

## 2021-06-23 NOTE — ED NOTES
Med rec updated and complete  Allergies reviewed  Pt thinks she took her medications today  Pt is not sure the names and strengths of all her medications.  Called Danielle @ [phone] to verify all prescriptions   Pt and pts pharmacy reports no antibiotics in the last 2 weeks    No current facility-administered medications on file prior to encounter.     Current Outpatient Medications on File Prior to Encounter   Medication Sig Dispense Refill   • furosemide (LASIX) 20 MG Tab Take 20 mg by mouth every day.     • oxybutynin (DITROPAN XL) 15 MG CR tablet Take 15 mg by mouth every evening.     • potassium chloride SA (K-DUR) 10 MEQ Tab CR Take 10 mEq by mouth every day.     • HYDROcodone-acetaminophen (NORCO) 5-325 MG Tab per tablet Take 0.5 Tablets by mouth every day.     • ibuprofen (MOTRIN) 200 MG Tab Take 600 mg by mouth every 6 hours as needed for Mild Pain.     • Probiotic Product (PROBIOTIC PO) Take 1 capsule by mouth every day.     • bumetanide (BUMEX) 1 MG Tab Take 1 mg by mouth every day.     • guaiFENesin (MUCINEX PO) Take 1 tablet by mouth 2 times a day as needed (For cough).     • glimepiride (AMARYL) 4 MG Tab Take 4 mg by mouth every day.     • levothyroxine (SYNTHROID) 25 MCG Tab Take 25 mcg by mouth every day.     • metFORMIN ER (GLUCOPHAGE XR) 500 MG TABLET SR 24 HR Take 500 mg by mouth 2 Times a Day.     • sertraline (ZOLOFT) 50 MG Tab Take 50 mg by mouth every day.     • fluticasone-salmeterol (ADVAIR) 250-50 MCG/DOSE AEPB Inhale 1 Puff by mouth every 12 hours.     • amlodipine-benazepril (LOTREL) 5-20 MG per capsule Take 1 Cap by mouth every day.     • atorvastatin (LIPITOR) 40 MG TABS Take 40 mg by mouth every day.

## 2021-06-24 PROBLEM — R50.9 FEVER: Status: ACTIVE | Noted: 2021-06-24

## 2021-06-24 LAB
ANION GAP SERPL CALC-SCNC: 10 MMOL/L (ref 7–16)
APPEARANCE UR: ABNORMAL
BACTERIA #/AREA URNS HPF: ABNORMAL /HPF
BASOPHILS # BLD AUTO: 0.3 % (ref 0–1.8)
BASOPHILS # BLD: 0.03 K/UL (ref 0–0.12)
BILIRUB UR QL STRIP.AUTO: NEGATIVE
BUN SERPL-MCNC: 23 MG/DL (ref 8–22)
CALCIUM SERPL-MCNC: 8.9 MG/DL (ref 8.4–10.2)
CHLORIDE SERPL-SCNC: 104 MMOL/L (ref 96–112)
CO2 SERPL-SCNC: 25 MMOL/L (ref 20–33)
COLOR UR: YELLOW
CREAT SERPL-MCNC: 0.94 MG/DL (ref 0.5–1.4)
EOSINOPHIL # BLD AUTO: 0.06 K/UL (ref 0–0.51)
EOSINOPHIL NFR BLD: 0.5 % (ref 0–6.9)
EPI CELLS #/AREA URNS HPF: ABNORMAL /HPF
ERYTHROCYTE [DISTWIDTH] IN BLOOD BY AUTOMATED COUNT: 55.5 FL (ref 35.9–50)
EST. AVERAGE GLUCOSE BLD GHB EST-MCNC: 212 MG/DL
GLUCOSE BLD-MCNC: 155 MG/DL (ref 65–99)
GLUCOSE BLD-MCNC: 190 MG/DL (ref 65–99)
GLUCOSE BLD-MCNC: 202 MG/DL (ref 65–99)
GLUCOSE SERPL-MCNC: 181 MG/DL (ref 65–99)
GLUCOSE UR STRIP.AUTO-MCNC: NEGATIVE MG/DL
HBA1C MFR BLD: 9 % (ref 4–5.6)
HCT VFR BLD AUTO: 31.3 % (ref 37–47)
HGB BLD-MCNC: 9.7 G/DL (ref 12–16)
IMM GRANULOCYTES # BLD AUTO: 0.11 K/UL (ref 0–0.11)
IMM GRANULOCYTES NFR BLD AUTO: 1 % (ref 0–0.9)
KETONES UR STRIP.AUTO-MCNC: NEGATIVE MG/DL
LACTATE BLD-SCNC: 1.7 MMOL/L (ref 0.5–2)
LEUKOCYTE ESTERASE UR QL STRIP.AUTO: ABNORMAL
LYMPHOCYTES # BLD AUTO: 1.71 K/UL (ref 1–4.8)
LYMPHOCYTES NFR BLD: 15.1 % (ref 22–41)
MAGNESIUM SERPL-MCNC: 1.5 MG/DL (ref 1.5–2.5)
MCH RBC QN AUTO: 31.6 PG (ref 27–33)
MCHC RBC AUTO-ENTMCNC: 31 G/DL (ref 33.6–35)
MCV RBC AUTO: 102 FL (ref 81.4–97.8)
MICRO URNS: ABNORMAL
MONOCYTES # BLD AUTO: 0.69 K/UL (ref 0–0.85)
MONOCYTES NFR BLD AUTO: 6.1 % (ref 0–13.4)
NEUTROPHILS # BLD AUTO: 8.69 K/UL (ref 2–7.15)
NEUTROPHILS NFR BLD: 77 % (ref 44–72)
NITRITE UR QL STRIP.AUTO: NEGATIVE
NRBC # BLD AUTO: 0 K/UL
NRBC BLD-RTO: 0 /100 WBC
PH UR STRIP.AUTO: 5.5 [PH] (ref 5–8)
PHOSPHATE SERPL-MCNC: 2.4 MG/DL (ref 2.5–4.5)
PLATELET # BLD AUTO: 223 K/UL (ref 164–446)
PMV BLD AUTO: 9.9 FL (ref 9–12.9)
POTASSIUM SERPL-SCNC: 4.2 MMOL/L (ref 3.6–5.5)
PROCALCITONIN SERPL-MCNC: 1.5 NG/ML
PROT UR QL STRIP: 30 MG/DL
RBC # BLD AUTO: 3.07 M/UL (ref 4.2–5.4)
RBC # URNS HPF: ABNORMAL /HPF
RBC UR QL AUTO: ABNORMAL
SODIUM SERPL-SCNC: 139 MMOL/L (ref 135–145)
SP GR UR STRIP.AUTO: >=1.03
WBC # BLD AUTO: 11.3 K/UL (ref 4.8–10.8)
WBC #/AREA URNS HPF: ABNORMAL /HPF

## 2021-06-24 PROCEDURE — 770020 HCHG ROOM/CARE - TELE (206)

## 2021-06-24 PROCEDURE — 94640 AIRWAY INHALATION TREATMENT: CPT

## 2021-06-24 PROCEDURE — 99233 SBSQ HOSP IP/OBS HIGH 50: CPT | Performed by: INTERNAL MEDICINE

## 2021-06-24 PROCEDURE — 80048 BASIC METABOLIC PNL TOTAL CA: CPT

## 2021-06-24 PROCEDURE — 84145 PROCALCITONIN (PCT): CPT

## 2021-06-24 PROCEDURE — A9270 NON-COVERED ITEM OR SERVICE: HCPCS | Performed by: INTERNAL MEDICINE

## 2021-06-24 PROCEDURE — 700102 HCHG RX REV CODE 250 W/ 637 OVERRIDE(OP): Performed by: INTERNAL MEDICINE

## 2021-06-24 PROCEDURE — 94760 N-INVAS EAR/PLS OXIMETRY 1: CPT

## 2021-06-24 PROCEDURE — 83605 ASSAY OF LACTIC ACID: CPT

## 2021-06-24 PROCEDURE — 83036 HEMOGLOBIN GLYCOSYLATED A1C: CPT

## 2021-06-24 PROCEDURE — 85025 COMPLETE CBC W/AUTO DIFF WBC: CPT

## 2021-06-24 PROCEDURE — 82962 GLUCOSE BLOOD TEST: CPT | Mod: 91

## 2021-06-24 PROCEDURE — 87040 BLOOD CULTURE FOR BACTERIA: CPT

## 2021-06-24 PROCEDURE — 97162 PT EVAL MOD COMPLEX 30 MIN: CPT

## 2021-06-24 PROCEDURE — 87086 URINE CULTURE/COLONY COUNT: CPT

## 2021-06-24 PROCEDURE — 700105 HCHG RX REV CODE 258: Performed by: INTERNAL MEDICINE

## 2021-06-24 PROCEDURE — 81001 URINALYSIS AUTO W/SCOPE: CPT

## 2021-06-24 PROCEDURE — 700111 HCHG RX REV CODE 636 W/ 250 OVERRIDE (IP): Performed by: INTERNAL MEDICINE

## 2021-06-24 PROCEDURE — 84100 ASSAY OF PHOSPHORUS: CPT

## 2021-06-24 PROCEDURE — 83735 ASSAY OF MAGNESIUM: CPT

## 2021-06-24 RX ORDER — MAGNESIUM SULFATE HEPTAHYDRATE 40 MG/ML
2 INJECTION, SOLUTION INTRAVENOUS ONCE
Status: COMPLETED | OUTPATIENT
Start: 2021-06-24 | End: 2021-06-24

## 2021-06-24 RX ORDER — GUAIFENESIN 600 MG/1
600 TABLET, EXTENDED RELEASE ORAL EVERY 12 HOURS
Status: DISCONTINUED | OUTPATIENT
Start: 2021-06-24 | End: 2021-07-01 | Stop reason: HOSPADM

## 2021-06-24 RX ADMIN — SERTRALINE HYDROCHLORIDE 50 MG: 50 TABLET ORAL at 05:53

## 2021-06-24 RX ADMIN — INSULIN HUMAN 3 UNITS: 100 INJECTION, SOLUTION PARENTERAL at 21:26

## 2021-06-24 RX ADMIN — LEVOTHYROXINE SODIUM 25 MCG: 0.03 TABLET ORAL at 05:53

## 2021-06-24 RX ADMIN — BUDESONIDE AND FORMOTEROL FUMARATE DIHYDRATE 2 PUFF: 160; 4.5 AEROSOL RESPIRATORY (INHALATION) at 07:39

## 2021-06-24 RX ADMIN — INSULIN HUMAN 1 UNITS: 100 INJECTION, SOLUTION PARENTERAL at 17:50

## 2021-06-24 RX ADMIN — FUROSEMIDE 40 MG: 10 INJECTION, SOLUTION INTRAMUSCULAR; INTRAVENOUS at 22:58

## 2021-06-24 RX ADMIN — ACETAMINOPHEN 650 MG: 325 TABLET, FILM COATED ORAL at 03:57

## 2021-06-24 RX ADMIN — ACETAMINOPHEN 650 MG: 325 TABLET, FILM COATED ORAL at 12:29

## 2021-06-24 RX ADMIN — AMPICILLIN AND SULBACTAM 3 G: 2; 1 INJECTION, POWDER, FOR SOLUTION INTRAVENOUS at 11:56

## 2021-06-24 RX ADMIN — GUAIFENESIN 600 MG: 600 TABLET, EXTENDED RELEASE ORAL at 17:40

## 2021-06-24 RX ADMIN — ENOXAPARIN SODIUM 40 MG: 40 INJECTION SUBCUTANEOUS at 17:40

## 2021-06-24 RX ADMIN — AZITHROMYCIN 500 MG: 250 TABLET, FILM COATED ORAL at 11:55

## 2021-06-24 RX ADMIN — DOCUSATE SODIUM 50 MG AND SENNOSIDES 8.6 MG 2 TABLET: 8.6; 5 TABLET, FILM COATED ORAL at 17:40

## 2021-06-24 RX ADMIN — MAGNESIUM SULFATE 2 G: 2 INJECTION INTRAVENOUS at 12:27

## 2021-06-24 RX ADMIN — BUDESONIDE AND FORMOTEROL FUMARATE DIHYDRATE 2 PUFF: 160; 4.5 AEROSOL RESPIRATORY (INHALATION) at 20:55

## 2021-06-24 RX ADMIN — ATORVASTATIN CALCIUM 40 MG: 40 TABLET, FILM COATED ORAL at 05:53

## 2021-06-24 RX ADMIN — INSULIN HUMAN 2 UNITS: 100 INJECTION, SOLUTION PARENTERAL at 12:19

## 2021-06-24 RX ADMIN — ACETAMINOPHEN 650 MG: 325 TABLET, FILM COATED ORAL at 21:18

## 2021-06-24 RX ADMIN — INSULIN HUMAN 1 UNITS: 100 INJECTION, SOLUTION PARENTERAL at 06:07

## 2021-06-24 ASSESSMENT — ENCOUNTER SYMPTOMS
NERVOUS/ANXIOUS: 0
CHILLS: 0
SHORTNESS OF BREATH: 1
PALPITATIONS: 0
DOUBLE VISION: 0
FALLS: 0
COUGH: 1
HEADACHES: 0
ABDOMINAL PAIN: 0
BACK PAIN: 0
HEARTBURN: 0
VOMITING: 0
FEVER: 1
BLURRED VISION: 0
DIZZINESS: 0

## 2021-06-24 ASSESSMENT — FIBROSIS 4 INDEX: FIB4 SCORE: 0.85

## 2021-06-24 ASSESSMENT — LIFESTYLE VARIABLES: SUBSTANCE_ABUSE: 0

## 2021-06-24 NOTE — PROGRESS NOTES
This RN alerted by SHLOMO Gant that pt had oral temperature of 102.5 F. Extra blankets were removed from bed, x2 ice packs placed in each axillary region, and PO Tylenol administered.    0505: Temperature recheck was 99.8 F

## 2021-06-24 NOTE — CARE PLAN
The patient is Watcher - Medium risk of patient condition declining or worsening    Shift Goals  Clinical Goals: Monitor I's/O's  Patient Goals: Breathe better    Progress made toward(s) clinical / shift goals:  Pt was able to adhere to fluid restriction and remain compliant with care of fluid overload. Pt is perfusing appropriately at 5L O2 via NC.      Patient is not progressing towards the following goals:      Problem: Hemodynamics  Goal: Patient's hemodynamics, fluid balance and neurologic status will be stable or improve  Outcome: Not Progressing   Pt became febrile at 0355. Pt was treated accordingly based off of temperature and was otherwise hemodynamically stable.      Problem: Respiratory  Goal: Patient will achieve/maintain optimum respiratory ventilation and gas exchange  Outcome: Progressing   Pt remained stable overnight but is still above baseline at 5L of O2 via NC. Baseline is 2L of O2 strictly at night.

## 2021-06-24 NOTE — PROGRESS NOTES
Telemetry Shift Summary      Rhythm: SR w/ BBB  HR Range: 84-90  Ectopy: R-O PVC  Measurements: .18/.16/.36    Normal Values:  Rhythm: SR  HR Range:   Measurements: 0.12-0.20/ 0.06-0.10/ 0.30-0.52

## 2021-06-24 NOTE — PROGRESS NOTES
Report received from ADDIE Winkler. Plan of care discussed at patient's bedside. Whiteboard has been updated. Pt is resting comfortably in bed and declines any further needs at this time. Safety precautions in place, bed alarm on, and call light within reach.

## 2021-06-24 NOTE — WOUND TEAM
[facility]  Inpatient Services  Initial Wound and Skin Care Evaluation    Admission Date: 6/23/2021     Last order of IP CONSULT TO WOUND CARE was found on 6/24/2021 from Hospital Encounter on 6/23/2021       HPI, PMH, SH: Reviewed    Unit where seen by Wound Team: 3311/02     WOUND CONSULT/FOLLOW UP RELATED TO: B legs, buttocks, R hand    Self Report / Pain Level:  No report of pain with wound care    OBJECTIVE:  Pt with chronic wounds B legs and buttock.  Pt reports home care takes care of her wounds.  Pt states she spends most of her time in a recliner.   .    WOUND TYPE, LOCATION, CHARACTERISTICS (Pressure Injuries: location, stage, POA or date identified)  Wound 11/24/20 Venous Ulcer Pretibial Right (Active)   Wound Image   06/23/21 2211   Site Assessment Red;Yellow 06/23/21 2211   Periwound Assessment Dry;Edema;Satellite lesions 06/23/21 2211   Margins Attached edges 06/23/21 2211   Closure Open to air 06/23/21 2100   Drainage Amount Scant 06/23/21 2211   Drainage Description Serosanguineous 06/23/21 2211   Treatments Site care;Cleansed 06/23/21 2211   Wound Cleansing Normal Saline Irrigation 06/23/21 2211   Periwound Protectant Skin Protectant Wipes to Periwound 06/23/21 2211   Dressing Options Honey Colloid;Mepilex;Tubigrip 06/23/21 2211   Dressing Changed Changed 06/23/21 2211   Dressing Status Open to Air 06/23/21 2100   Dressing Change/Treatment Frequency Every 48 hrs, and As Needed 06/23/21 2211   NEXT Dressing Change/Treatment Date 06/26/21 06/23/21 2211   NEXT Weekly Photo (Inpatient Only) 06/30/21 06/23/21 2211   Non-staged Wound Description Full thickness 06/23/21 2211   Wound Length (cm) 3.5 cm 06/23/21 2211   Wound Width (cm) 2 cm 06/23/21 2211   Wound Surface Area (cm^2) 7 cm^2 06/23/21 2211   Wound Bed Granulation (%) 60 % 06/23/21 2211   Wound Bed Slough (%) 40 % 06/23/21 2211   Wound Odor None 06/23/21 2211   Pulses Right;1+;DP;PT 06/23/21 2211   Exposed Structures None 06/23/21  2211   WOUND NURSE ONLY - Time Spent with Patient (mins) 60 06/23/21 2211   Number of days: 212       Wound 06/23/21 Pretibial Left (Active)   Wound Image   06/23/21 2212   Site Assessment Dry;Brown;Red 06/23/21 2100   Periwound Assessment Hot 06/23/21 2100   Margins Defined edges 06/23/21 2100   Closure Open to air 06/23/21 2100   Dressing Options Open to Air 06/23/21 2100   Dressing Status Open to Air 06/23/21 2100   Number of days: 1       Wound 06/23/21 Hand Right (Active)   Wound Image   06/23/21 2212   Site Assessment Dry;Fragile;[place] 06/23/21 2100   Periwound Assessment Intact 06/23/21 2100   Margins Defined edges 06/23/21 2100   Closure Open to air 06/23/21 2100   Drainage Amount None 06/23/21 2100   Dressing Options Open to Air 06/23/21 2100   Dressing Status Open to Air 06/23/21 2100   Number of days: 1       Wound 06/23/21 Pressure Injury Buttocks Left stage 2 POA (Active)   Wound Image   06/23/21 2232   Site Assessment Red 06/23/21 2232   Periwound Assessment Pink 06/23/21 2232   Margins Defined edges 06/23/21 2232   Closure Secondary intention 06/23/21 2232   Drainage Amount Scant 06/23/21 2232   Drainage Description Serosanguineous 06/23/21 2232   Treatments Site care;Cleansed 06/23/21 2232   Wound Cleansing Normal Saline Irrigation 06/23/21 2232   Periwound Protectant Barrier Paste 06/23/21 2232   Dressing Options Open to Air 06/23/21 2100   Dressing Changed Changed 06/23/21 2232   Dressing Status Open to Air 06/23/21 2100   Dressing Change/Treatment Frequency Every Shift, and As Needed 06/23/21 2232   NEXT Dressing Change/Treatment Date 06/24/21 06/23/21 2232   NEXT Weekly Photo (Inpatient Only) 06/30/21 06/23/21 2232   Pressure Injury Stage 2 06/23/21 2232   Non-staged Wound Description Full thickness 06/23/21 2232   Wound Length (cm) 0.5 cm 06/23/21 2232   Wound Width (cm) 1.5 cm 06/23/21 2232   Wound Depth (cm) 0.2 cm 06/23/21 2232   Wound Surface Area (cm^2) 0.75 cm^2 06/23/21 2232   Wound Volume  (cm^3) 0.15 cm^3 06/23/21 2232   Wound Bed Granulation (%) 100 % 06/23/21 2232   Shape fissure 06/23/21 2232   Wound Odor None 06/23/21 2232   Exposed Structures None 06/23/21 2232   Number of days: 1       Wound 06/23/21 Pretibial Distal (Active)   Wound Image   06/23/21 2232   Site Assessment Pink;Red 06/23/21 2100   Periwound Assessment Hot;Pink;Red 06/23/21 2100   Margins Defined edges 06/23/21 2100   Closure Open to air 06/23/21 2100   Drainage Amount Scant 06/23/21 2100   Drainage Description Serous 06/23/21 2100   Dressing Options Open to Air 06/23/21 2100   Dressing Status Open to Air 06/23/21 2100   Number of days: 1           Vascular: 6/24/21 by hand held doppler B dp and pt faint.    NEL:   No results found.      Lab Values:    Lab Results   Component Value Date/Time    WBC 11.3 (H) 06/24/2021 12:08 AM    RBC 3.07 (L) 06/24/2021 12:08 AM    HEMOGLOBIN 9.7 (L) 06/24/2021 12:08 AM    HEMATOCRIT 31.3 (L) 06/24/2021 12:08 AM    HBA1C 9.0 (H) 06/24/2021 12:08 AM          Culture:   Obtained na   Culture Results show:  No results found for this or any previous visit (from the past 720 hour(s)).      INTERVENTIONS BY WOUND TEAM:  Wounds cleansed with saline and gauze. Legs cleansed with damp cloths.  Honey colloid placed on R lateral leg wounds and covered with sacral mepilex.  Then applied moisturizer on B legs and covered with tubi F.    Buttocks - applied triad over wound and reddened areas.    R hand left JERRY    Interdisciplinary consultation: Patient, Bedside RN     EVALUATION: B leg wounds likely related to fluid overload and venous insufficency.  Using mild compression to manage edema and allow open area to reepithelialize.  Sacral sore related to immobility - triad will support granular bed and protect jackelin wound.  .  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  Ordering waffle overlay to off load    Goals: Steady decrease in wound area and depth weekly.    NURSING PLAN OF CARE ORDERS (X):    Dressing  changes: See Dressing Care orders:X  Skin care: See Skin Care orders:   Rectal tube care: See Rectal Tube Care orders:   Other orders:    RSKIN:   CURRENTLY IN PLACE (X), APPLIED THIS VISIT (A), ORDERED (O):   Q shift [place]:    Q shift pressure point assessments:    Pressure redistribution mattress            Low Airloss          Bariatric PAYTON         Bariatric foam           Heel float boots   O  Heel Silicone dressing        Float Heels off Bed with Pillows               Barrier wipes         Barrier Cream         Barrier paste          Sacral silicone dressing         Silicone O2 tubing         Anchorfast         Cannula fixation Device (Tender )          Gray Foam Ear protectors           Trach with Optifoam split foam                 Waffle cushion      O  Waffle Overlay       O  Rectal tube or BMS    Purwick/Condom Cath          Antifungal tx      Interdry          Reposition q 2 hours   X     Up to chair      X  Ambulate      PT/OT    X    Dietician        Diabetes Education      PO     TF     TPN     NPO   # days   Other        WOUND TEAM PLAN OF CARE   ressing changes by wound team:          Follow up 1-2 times weekly:               Follow up 3 times weekly:                NPWT change 3 times weekly:     Follow up as needed:     X  Other (explain):     Anticipated discharge plans:  LTACH:        SNF/Rehab:    X pt will need ongoing wound care for R leg and buttocks              Home Care:           Outpatient Wound Center:            Self Care:

## 2021-06-24 NOTE — THERAPY
Missed Therapy     Patient Name: Gabrielle Olmstead  Age:  75 y.o., Sex:  female  Medical Record #: [id number]  Today's Date: 6/24/2021    Discussed missed therapy with RN       06/24/21 [id number]   Interdisciplinary Plan of Care Collaboration   IDT Collaboration with  Nursing   Patient Position at End of Therapy In Bed;Call Light within Reach;Tray Table within Reach;Phone within Reach   Collaboration Comments PT order received. Pt refused therapy evaluation due to feeling very warm and having a fever. Will re-attempt once pt is able to participate.

## 2021-06-24 NOTE — PROGRESS NOTES
"Hospital Medicine Daily Progress Note    Date of Service  6/24/2021    Chief Complaint  75 y.o. female admitted 6/23/2021 with shortness of breath    Hospital Course  \"75 y.o. female who presented 6/23/2021 with worsening shortness of breath.  Patient has had progressive SLB with orthopnea for the past couple days.  Patient has bilateral lower extremity edema which she states gets wrapped with compression stockings.  She noticed that she began to have more fluid leakage from her legs in the past couple weeks.  She stated she knows she has heart failure and problems with fluids but was not taking a diuretic.  Patient states she is extremely incontinent with Lasix.  She has chronic home oxygen at 2 L nasal cannula.  Patient denied any subjective fevers, coughing, abdominal pain, headache.\"    Interval Problem Update  6/24: Patient seen at bedside this morning.  She states that her shortness of breath has improved since yesterday, however she still having constant fevers.  We will continue with IV antibiotics, pending cultures.  As per nursing no other overnight events reported.    Consultants/Specialty  None for now    Code Status  Full Code    Disposition  Continue medical care. Pending PT/OT.    Review of Systems  Review of Systems   Constitutional: Positive for fever and malaise/fatigue. Negative for chills.   HENT: Negative for hearing loss and nosebleeds.    Eyes: Negative for blurred vision and double vision.   Respiratory: Positive for cough and shortness of breath.    Cardiovascular: Negative for chest pain and palpitations.   Gastrointestinal: Negative for abdominal pain, heartburn and vomiting.   Genitourinary: Negative for dysuria and urgency.   Musculoskeletal: Negative for back pain and falls.   Skin: Negative for itching and rash.   Neurological: Negative for dizziness and headaches.   Psychiatric/Behavioral: Negative for substance abuse. The patient is not nervous/anxious.    All other systems reviewed " and are negative.       Physical Exam  Temp:  [36.6 °C (97.8 °F)-39.3 °C (102.7 °F)] 37.2 °C (99 °F)  Pulse:  [77-90] 85  Resp:  [18-28] 18  BP: (107-151)/(26-83) 144/42  SpO2:  [87 %-100 %] 93 %    Physical Exam  Vitals and nursing note reviewed.   Constitutional:       Appearance: She is obese. She is ill-appearing.   HENT:      Head: Normocephalic and atraumatic.      Right Ear: External ear normal.      Left Ear: External ear normal.      Mouth/Throat:      Pharynx: No oropharyngeal exudate or posterior oropharyngeal erythema.   Eyes:      General:         Right eye: No discharge.         Left eye: No discharge.   Cardiovascular:      Rate and Rhythm: Normal rate and regular rhythm.      Pulses: Normal pulses.      Heart sounds: No murmur heard.   No gallop.    Pulmonary:      Effort: Respiratory distress present.      Breath sounds: Rhonchi present.   Abdominal:      General: Bowel sounds are normal.      Palpations: Abdomen is soft.      Tenderness: There is no abdominal tenderness. There is no guarding.   Musculoskeletal:         General: Normal range of motion.      Cervical back: Normal range of motion and neck supple. No tenderness.      Right lower leg: Edema present.      Left lower leg: Edema present.   Skin:     General: Skin is warm and dry.   Neurological:      General: No focal deficit present.      Mental Status: She is alert and oriented to person, place, and time. Mental status is at baseline.      Motor: No weakness.   Psychiatric:         Mood and Affect: Mood normal.         Behavior: Behavior normal.         Thought Content: Thought content normal.         Judgment: Judgment normal.         Fluids    Intake/Output Summary (Last 24 hours) at 6/24/2021 1426  Last data filed at 6/24/2021 1107  Gross per 24 hour   Intake 400 ml   Output 600 ml   Net -200 ml       Laboratory  Recent Labs     06/23/21  1200 06/24/21  0008   WBC 15.5* 11.3*   RBC 3.20* 3.07*   HEMOGLOBIN 10.0* 9.7*   HEMATOCRIT  32.1* 31.3*   .3* 102.0*   MCH 31.3 31.6   MCHC 31.2* 31.0*   RDW 52.8* 55.5*   PLATELETCT 266 223   MPV 9.9 9.9     Recent Labs     06/23/21  1200 06/24/21  0008   SODIUM 137 139   POTASSIUM 4.4 4.2   CHLORIDE 105 104   CO2 21 25   GLUCOSE 293* 181*   BUN 23* 23*   CREATININE 0.81 0.94   CALCIUM 9.0 8.9     Recent Labs     06/23/21  1532   INR 1.17*               Imaging  DX-CHEST-PORTABLE (1 VIEW)   Final Result      1.  Possible mild pulmonary interstitial edema      2.  Enlarged cardiac silhouette           Assessment/Plan  * Sepsis (HCC)- (present on admission)  Assessment & Plan  This is Sepsis Present on admission  SIRS criteria identified on my evaluation include: Tachycardia, with heart rate greater than 90 BPM and Leukocytosis, with WBC greater than 12,000  Source is PNA/CAP  Sepsis protocol initiated  Fluid resuscitation ordered per protocol -holding IV fluids patient appears to be volume overloaded  IV antibiotics as appropriate for source of sepsis -ceftriaxone and azithromycin  While organ dysfunction may be noted elsewhere in this problem list or in the chart, degree of organ dysfunction does not meet CMS criteria for severe sepsis  Associated acute on chronic hypoxic respiratory failure due to sepsis    Acute on chronic respiratory failure with hypoxia (HCC)- (present on admission)  Assessment & Plan  Patient's acute on chronic respiratory failure requiring increased oxygen needs due to probably her pneumonia.  Patient does use chronic home oxygen 2 L nasal cannula.  -Oxygen supplementation, continue IV Lasix    6/24: We will continue with IV Lasix for now and antibiotic.    Fever  Assessment & Plan  Patient with continued fevers.  We will continue IV antibiotics for now.  We have ordered blood cultures, pending results.  If needed we will consult ID.    Pulmonary hypertension (HCC)- (present on admission)  Assessment & Plan  RVSP 45 mmHg.  11/25/2020.  - Continue Lasix  - continue oxygen  supplementation    Current non-adherence to medical treatment- (present on admission)  Assessment & Plan  Patient not taking her diuretics at home, causes her too much incontinence  - continue counseling for adherence    Acute on chronic diastolic CHF (congestive heart failure), NYHA class 2 ([facility])- (present on admission)  Assessment & Plan  11/25/2020 - echo noted normal diastolic and systolic function.  Mild mitral regurg.  Mild tricuspid regurg.  Mild aortic stenosis.  Past diagnosis. Patient has evidence with pulmonary crackles, bilateral lower extremity edema with leaking venous insufficiency ulcers.  Patient has not been taking her Lasix at home.  -trial 40mg IV Lasix, patient does have sepsis appearance but is unable to tolerate more volume at this time.  Monitor for hypotension    DM (diabetes mellitus) ([facility])- (present on admission)  Assessment & Plan  Hold patient's home p.o. medications  Current A1c 8.6  ISS  Accuchecks  Hypoglycemia protocol    Dyslipidemia- (present on admission)  Assessment & Plan  Continue atorvastatin    Essential hypertension- (present on admission)  Assessment & Plan  Holding patient's home blood pressure medications as she will need diuretics, at risk for hypotension due to her sepsis from pneumonia.         VTE prophylaxis: Lovenox

## 2021-06-24 NOTE — THERAPY
Missed Therapy     Patient Name: Gabrielle Olmstead  Age:  75 y.o., Sex:  female  Medical Record #: [id number]  Today's Date: 6/24/2021    Discussed missed therapy with RN       06/24/21 3286   Interdisciplinary Plan of Care Collaboration   Collaboration Comments OT order rec'd- Rn asked therapy to wait and try in pm.  Attempted in pm and pt refused due to feeling feverish and warm and generally poorly today.  Will hold and attempt again as able.

## 2021-06-24 NOTE — ASSESSMENT & PLAN NOTE
Patient with continued fevers.  We will continue IV antibiotics for now.  We have ordered blood cultures, pending results.  If needed we will consult ID.    6/28: Patient has not had a recorded fever since 6/26.  Blood cultures negative to date.

## 2021-06-24 NOTE — PROGRESS NOTES
Telemetry Shift Summary     Rhythm: SR/BBB  Rate: 71-92  Measurements: 0.18/0.12/0.38  Ectopy (reported by Monitor Tech): rPVC/rTRIG     Normal Values  Rhythm: Sinus  HR:   Measurements: 0.12-0.20/0.06-0.10/0.30-0.52

## 2021-06-24 NOTE — PROGRESS NOTES
4 Eyes Skin Assessment Completed by ADDIE Schwartz and ADDIE Medley.    Head WDL  Ears WDL  Nose WDL  Mouth WDL  Neck WDL  Breast/Chest Redness and moisture  Shoulder Blades WDL  Spine WDL  (R) Arm/Elbow/Hand Discoloration, flaky, wound present on R hand  (L) Arm/Elbow/Hand Discoloration, flaky  Abdomen Redness and moisture  Groin Redness and excoriation  Scrotum/Coccyx/Buttocks Redness and Wound present on L buttocks  (R) Leg Redness, Swelling and Weeping (x2 wounds)  (L) Leg Redness, Swelling and Weeping (x1 wounds)  (R) Heel/Foot/Toe Edema, dry skin  (L) Heel/Foot/Toe Edema, dry skin          Devices In Places: Tele box, PIV, Nasal cannula, and purewick       Interventions In Place: NC w/ ear foams, Purewick, barrier paste, interdry, pillows in place for support and positioning, q2hr turns    Possible Skin Injury Yes    Pictures Uploaded Into Epic Yes  Wound Consult Placed Yes  RN Wound Prevention Protocol Ordered Yes

## 2021-06-24 NOTE — DIETARY
"Nutrition services: Day 1 of admit.  Gabrielle Olmstead is a 75 y.o. female with admitting DX of Sepsis.  Consult received for MST score of 3 per nutrition screen for unsure of unplanned weight loss with stated usual weight of 200 lb, and poor PO intake.    Assessment:  Height: 160 cm (5' 3\")  Weight: 107 kg (236 lb 1.8 oz)  Body mass index is 41.83 kg/m²., BMI classification: Class 3 Obesity  Diet/Intake: Cardiac, 1500 ml Fluid restriction    Evaluation:   1. Current weight is above stated usual weight.   2. Pt states she did not eat for 3 days. She stated her appetite is coming back and she was eating lunch at time of visit. Per flow sheets, pt ate % of breakfast and 50-75% of lunch. PO intake currently appears good.     Malnutrition Risk: Criteria not met.    Recommendations/Plan:  1. Encourage intake of >50% of meals.  2. Document intake of all meals as % taken in ADL's to provide interdisciplinary communication across all shifts.   3. Monitor weight.  4. Nutrition rep will continue to see patient for ongoing meal and snack preferences.             "

## 2021-06-25 ENCOUNTER — APPOINTMENT (OUTPATIENT)
Dept: RADIOLOGY | Facility: MEDICAL CENTER | Age: 76
DRG: 871 | End: 2021-06-25
Attending: HOSPITALIST
Payer: MEDICARE

## 2021-06-25 PROBLEM — R62.7 FAILURE TO THRIVE IN ADULT: Status: ACTIVE | Noted: 2021-06-25

## 2021-06-25 PROBLEM — M54.9 CHRONIC BACK PAIN: Status: ACTIVE | Noted: 2021-06-25

## 2021-06-25 PROBLEM — G89.29 CHRONIC BACK PAIN: Status: ACTIVE | Noted: 2021-06-25

## 2021-06-25 PROBLEM — D64.9 ANEMIA: Status: ACTIVE | Noted: 2020-04-04

## 2021-06-25 PROBLEM — N39.0 UTI (URINARY TRACT INFECTION): Status: ACTIVE | Noted: 2021-06-25

## 2021-06-25 LAB
ALBUMIN SERPL BCP-MCNC: 3 G/DL (ref 3.2–4.9)
ALBUMIN/GLOB SERPL: 0.9 G/DL
ALP SERPL-CCNC: 109 U/L (ref 30–99)
ALT SERPL-CCNC: 19 U/L (ref 2–50)
ANION GAP SERPL CALC-SCNC: 11 MMOL/L (ref 7–16)
AST SERPL-CCNC: 18 U/L (ref 12–45)
BASOPHILS # BLD AUTO: 0.2 % (ref 0–1.8)
BASOPHILS # BLD: 0.02 K/UL (ref 0–0.12)
BILIRUB SERPL-MCNC: 0.2 MG/DL (ref 0.1–1.5)
BUN SERPL-MCNC: 22 MG/DL (ref 8–22)
CALCIUM SERPL-MCNC: 9 MG/DL (ref 8.4–10.2)
CHLORIDE SERPL-SCNC: 102 MMOL/L (ref 96–112)
CO2 SERPL-SCNC: 24 MMOL/L (ref 20–33)
CREAT SERPL-MCNC: 0.65 MG/DL (ref 0.5–1.4)
D DIMER PPP IA.FEU-MCNC: 1.57 UG/ML (FEU) (ref 0–0.5)
EOSINOPHIL # BLD AUTO: 0.24 K/UL (ref 0–0.51)
EOSINOPHIL NFR BLD: 2.6 % (ref 0–6.9)
ERYTHROCYTE [DISTWIDTH] IN BLOOD BY AUTOMATED COUNT: 54.5 FL (ref 35.9–50)
GLOBULIN SER CALC-MCNC: 3.3 G/DL (ref 1.9–3.5)
GLUCOSE BLD-MCNC: 182 MG/DL (ref 65–99)
GLUCOSE BLD-MCNC: 234 MG/DL (ref 65–99)
GLUCOSE BLD-MCNC: 253 MG/DL (ref 65–99)
GLUCOSE BLD-MCNC: 296 MG/DL (ref 65–99)
GLUCOSE BLD-MCNC: 314 MG/DL (ref 65–99)
GLUCOSE SERPL-MCNC: 199 MG/DL (ref 65–99)
HCT VFR BLD AUTO: 30.5 % (ref 37–47)
HGB BLD-MCNC: 9.4 G/DL (ref 12–16)
IMM GRANULOCYTES # BLD AUTO: 0.09 K/UL (ref 0–0.11)
IMM GRANULOCYTES NFR BLD AUTO: 1 % (ref 0–0.9)
LACTATE BLD-SCNC: 1.3 MMOL/L (ref 0.5–2)
LYMPHOCYTES # BLD AUTO: 1.37 K/UL (ref 1–4.8)
LYMPHOCYTES NFR BLD: 14.6 % (ref 22–41)
MAGNESIUM SERPL-MCNC: 1.7 MG/DL (ref 1.5–2.5)
MCH RBC QN AUTO: 30.9 PG (ref 27–33)
MCHC RBC AUTO-ENTMCNC: 30.8 G/DL (ref 33.6–35)
MCV RBC AUTO: 100.3 FL (ref 81.4–97.8)
MONOCYTES # BLD AUTO: 0.62 K/UL (ref 0–0.85)
MONOCYTES NFR BLD AUTO: 6.6 % (ref 0–13.4)
NEUTROPHILS # BLD AUTO: 7.07 K/UL (ref 2–7.15)
NEUTROPHILS NFR BLD: 75 % (ref 44–72)
NRBC # BLD AUTO: 0 K/UL
NRBC BLD-RTO: 0 /100 WBC
PLATELET # BLD AUTO: 234 K/UL (ref 164–446)
PMV BLD AUTO: 10.3 FL (ref 9–12.9)
POTASSIUM SERPL-SCNC: 4.1 MMOL/L (ref 3.6–5.5)
PROT SERPL-MCNC: 6.3 G/DL (ref 6–8.2)
RBC # BLD AUTO: 3.04 M/UL (ref 4.2–5.4)
SODIUM SERPL-SCNC: 137 MMOL/L (ref 135–145)
WBC # BLD AUTO: 9.4 K/UL (ref 4.8–10.8)

## 2021-06-25 PROCEDURE — 94760 N-INVAS EAR/PLS OXIMETRY 1: CPT

## 2021-06-25 PROCEDURE — 97165 OT EVAL LOW COMPLEX 30 MIN: CPT

## 2021-06-25 PROCEDURE — 700111 HCHG RX REV CODE 636 W/ 250 OVERRIDE (IP): Performed by: INTERNAL MEDICINE

## 2021-06-25 PROCEDURE — A9270 NON-COVERED ITEM OR SERVICE: HCPCS | Performed by: INTERNAL MEDICINE

## 2021-06-25 PROCEDURE — 97163 PT EVAL HIGH COMPLEX 45 MIN: CPT

## 2021-06-25 PROCEDURE — 83605 ASSAY OF LACTIC ACID: CPT

## 2021-06-25 PROCEDURE — 80053 COMPREHEN METABOLIC PANEL: CPT

## 2021-06-25 PROCEDURE — 83735 ASSAY OF MAGNESIUM: CPT

## 2021-06-25 PROCEDURE — 71045 X-RAY EXAM CHEST 1 VIEW: CPT

## 2021-06-25 PROCEDURE — 700102 HCHG RX REV CODE 250 W/ 637 OVERRIDE(OP): Performed by: INTERNAL MEDICINE

## 2021-06-25 PROCEDURE — 85379 FIBRIN DEGRADATION QUANT: CPT

## 2021-06-25 PROCEDURE — 87040 BLOOD CULTURE FOR BACTERIA: CPT | Mod: 91

## 2021-06-25 PROCEDURE — 82962 GLUCOSE BLOOD TEST: CPT | Mod: 91

## 2021-06-25 PROCEDURE — 770020 HCHG ROOM/CARE - TELE (206)

## 2021-06-25 PROCEDURE — 85025 COMPLETE CBC W/AUTO DIFF WBC: CPT

## 2021-06-25 PROCEDURE — 99233 SBSQ HOSP IP/OBS HIGH 50: CPT | Performed by: INTERNAL MEDICINE

## 2021-06-25 PROCEDURE — 700101 HCHG RX REV CODE 250: Performed by: INTERNAL MEDICINE

## 2021-06-25 PROCEDURE — 94640 AIRWAY INHALATION TREATMENT: CPT

## 2021-06-25 PROCEDURE — 93005 ELECTROCARDIOGRAM TRACING: CPT | Performed by: HOSPITALIST

## 2021-06-25 PROCEDURE — 700105 HCHG RX REV CODE 258: Performed by: INTERNAL MEDICINE

## 2021-06-25 RX ORDER — LIDOCAINE 50 MG/G
1 PATCH TOPICAL EVERY 24 HOURS
Status: DISCONTINUED | OUTPATIENT
Start: 2021-06-25 | End: 2021-07-01 | Stop reason: HOSPADM

## 2021-06-25 RX ORDER — ACETAMINOPHEN 500 MG
1000 TABLET ORAL EVERY 8 HOURS
Status: DISCONTINUED | OUTPATIENT
Start: 2021-06-25 | End: 2021-07-01 | Stop reason: HOSPADM

## 2021-06-25 RX ORDER — MAGNESIUM SULFATE 1 G/100ML
1 INJECTION INTRAVENOUS ONCE
Status: COMPLETED | OUTPATIENT
Start: 2021-06-25 | End: 2021-06-25

## 2021-06-25 RX ADMIN — INSULIN HUMAN 3 UNITS: 100 INJECTION, SOLUTION PARENTERAL at 18:15

## 2021-06-25 RX ADMIN — AZITHROMYCIN 500 MG: 250 TABLET, FILM COATED ORAL at 05:10

## 2021-06-25 RX ADMIN — ENOXAPARIN SODIUM 40 MG: 40 INJECTION SUBCUTANEOUS at 18:15

## 2021-06-25 RX ADMIN — LIDOCAINE 1 PATCH: 50 PATCH TOPICAL at 11:29

## 2021-06-25 RX ADMIN — DOCUSATE SODIUM 50 MG AND SENNOSIDES 8.6 MG 2 TABLET: 8.6; 5 TABLET, FILM COATED ORAL at 18:14

## 2021-06-25 RX ADMIN — ALBUTEROL SULFATE 2 PUFF: 90 AEROSOL, METERED RESPIRATORY (INHALATION) at 18:35

## 2021-06-25 RX ADMIN — INSULIN HUMAN 2 UNITS: 100 INJECTION, SOLUTION PARENTERAL at 12:17

## 2021-06-25 RX ADMIN — LEVOTHYROXINE SODIUM 25 MCG: 0.03 TABLET ORAL at 05:11

## 2021-06-25 RX ADMIN — CEFTRIAXONE SODIUM 2 G: 2 INJECTION, POWDER, FOR SOLUTION INTRAMUSCULAR; INTRAVENOUS at 05:11

## 2021-06-25 RX ADMIN — GUAIFENESIN 600 MG: 600 TABLET, EXTENDED RELEASE ORAL at 06:00

## 2021-06-25 RX ADMIN — INSULIN HUMAN 1 UNITS: 100 INJECTION, SOLUTION PARENTERAL at 06:08

## 2021-06-25 RX ADMIN — FUROSEMIDE 40 MG: 10 INJECTION, SOLUTION INTRAMUSCULAR; INTRAVENOUS at 18:15

## 2021-06-25 RX ADMIN — SERTRALINE HYDROCHLORIDE 50 MG: 50 TABLET ORAL at 05:11

## 2021-06-25 RX ADMIN — GUAIFENESIN 600 MG: 600 TABLET, EXTENDED RELEASE ORAL at 18:14

## 2021-06-25 RX ADMIN — BUDESONIDE AND FORMOTEROL FUMARATE DIHYDRATE 2 PUFF: 160; 4.5 AEROSOL RESPIRATORY (INHALATION) at 07:28

## 2021-06-25 RX ADMIN — ALBUTEROL SULFATE 2 PUFF: 90 AEROSOL, METERED RESPIRATORY (INHALATION) at 23:40

## 2021-06-25 RX ADMIN — ACETAMINOPHEN 1000 MG: 500 TABLET, FILM COATED ORAL at 11:29

## 2021-06-25 RX ADMIN — INSULIN HUMAN 4 UNITS: 100 INJECTION, SOLUTION PARENTERAL at 20:21

## 2021-06-25 RX ADMIN — MAGNESIUM SULFATE IN DEXTROSE 1 G: 10 INJECTION, SOLUTION INTRAVENOUS at 11:28

## 2021-06-25 RX ADMIN — ATORVASTATIN CALCIUM 40 MG: 40 TABLET, FILM COATED ORAL at 05:11

## 2021-06-25 RX ADMIN — ACETAMINOPHEN 650 MG: 325 TABLET, FILM COATED ORAL at 03:44

## 2021-06-25 RX ADMIN — BUDESONIDE AND FORMOTEROL FUMARATE DIHYDRATE 2 PUFF: 160; 4.5 AEROSOL RESPIRATORY (INHALATION) at 18:35

## 2021-06-25 ASSESSMENT — ENCOUNTER SYMPTOMS
HEARTBURN: 0
NERVOUS/ANXIOUS: 0
VOMITING: 0
ABDOMINAL PAIN: 0
CHILLS: 0
BLURRED VISION: 0
HEADACHES: 0
COUGH: 1
FEVER: 1
PALPITATIONS: 0
BACK PAIN: 0
DIZZINESS: 0
SHORTNESS OF BREATH: 1
FALLS: 0
DOUBLE VISION: 0

## 2021-06-25 ASSESSMENT — GAIT ASSESSMENTS
GAIT LEVEL OF ASSIST: MINIMAL ASSIST
ASSISTIVE DEVICE: FRONT WHEEL WALKER
DISTANCE (FEET): 3

## 2021-06-25 ASSESSMENT — COGNITIVE AND FUNCTIONAL STATUS - GENERAL
SUGGESTED CMS G CODE MODIFIER MOBILITY: CL
HELP NEEDED FOR BATHING: A LOT
TURNING FROM BACK TO SIDE WHILE IN FLAT BAD: A LOT
MOVING FROM LYING ON BACK TO SITTING ON SIDE OF FLAT BED: A LOT
SUGGESTED CMS G CODE MODIFIER DAILY ACTIVITY: CK
WALKING IN HOSPITAL ROOM: A LOT
CLIMB 3 TO 5 STEPS WITH RAILING: TOTAL
STANDING UP FROM CHAIR USING ARMS: A LOT
PERSONAL GROOMING: A LITTLE
DAILY ACTIVITIY SCORE: 16
MOVING TO AND FROM BED TO CHAIR: A LOT
DRESSING REGULAR LOWER BODY CLOTHING: A LOT
TOILETING: A LOT
MOBILITY SCORE: 11
DRESSING REGULAR UPPER BODY CLOTHING: A LITTLE

## 2021-06-25 ASSESSMENT — LIFESTYLE VARIABLES: SUBSTANCE_ABUSE: 0

## 2021-06-25 ASSESSMENT — PAIN DESCRIPTION - PAIN TYPE
TYPE: ACUTE PAIN
TYPE: ACUTE PAIN

## 2021-06-25 ASSESSMENT — PATIENT HEALTH QUESTIONNAIRE - PHQ9
1. LITTLE INTEREST OR PLEASURE IN DOING THINGS: NOT AT ALL
2. FEELING DOWN, DEPRESSED, IRRITABLE, OR HOPELESS: NOT AT ALL
SUM OF ALL RESPONSES TO PHQ9 QUESTIONS 1 AND 2: 0

## 2021-06-25 ASSESSMENT — FIBROSIS 4 INDEX
FIB4 SCORE: 0.85
FIB4 SCORE: 1.32

## 2021-06-25 ASSESSMENT — ACTIVITIES OF DAILY LIVING (ADL): TOILETING: INDEPENDENT

## 2021-06-25 NOTE — PROGRESS NOTES
Report received from ADDIE Jones. Plan of care discussed at patient's bedside. Whiteboard has been updated. Pt is sleeping comfortably in bed and declines any further needs at this time. Safety precautions in place, bed alarm on, and call light within reach.

## 2021-06-25 NOTE — PROGRESS NOTES
"Hospital Medicine Daily Progress Note    Date of Service  6/25/2021    Chief Complaint  75 y.o. female admitted 6/23/2021 with shortness of breath    Hospital Course  \"75 y.o. female who presented 6/23/2021 with worsening shortness of breath.  Patient has had progressive SLB with orthopnea for the past couple days.  Patient has bilateral lower extremity edema which she states gets wrapped with compression stockings.  She noticed that she began to have more fluid leakage from her legs in the past couple weeks.  She stated she knows she has heart failure and problems with fluids but was not taking a diuretic.  Patient states she is extremely incontinent with Lasix.  She has chronic home oxygen at 2 L nasal cannula.  Patient denied any subjective fevers, coughing, abdominal pain, headache.\"    Interval Problem Update  6/24: Patient seen at bedside this morning.  She states that her shortness of breath has improved since yesterday, however she still having constant fevers.  We will continue with IV antibiotics, pending cultures.  As per nursing no other overnight events reported.    6/25: Patient seen at bedside this morning.  The patient still having fevers but less severe than yesterday.  Highest temperature this morning was 100.6, yesterday she was having 102.7.  We will continue with IV antibiotics, pending cultures.  Pending PT/OT evaluation, however the patient most likely will require acute placement.  The patient mentioning of chronic back pain for which we have ordered scheduled Tylenol and lidocaine patches, which will try to avoid opiates as much as possible.  As per nursing no other overnight events reported.    Consultants/Specialty  None for now    Code Status  Full Code    Disposition  Continue medical care. Pending PT/OT.    Review of Systems  Review of Systems   Constitutional: Positive for fever and malaise/fatigue. Negative for chills.   HENT: Negative for hearing loss and nosebleeds.    Eyes: " Negative for blurred vision and double vision.   Respiratory: Positive for cough and shortness of breath.    Cardiovascular: Negative for chest pain and palpitations.   Gastrointestinal: Negative for abdominal pain, heartburn and vomiting.   Genitourinary: Negative for dysuria and urgency.   Musculoskeletal: Negative for back pain and falls.   Skin: Negative for itching and rash.   Neurological: Negative for dizziness and headaches.   Psychiatric/Behavioral: Negative for substance abuse. The patient is not nervous/anxious.    All other systems reviewed and are negative.       Physical Exam  Temp:  [37 °C (98.6 °F)-38.2 °C (100.7 °F)] 37 °C (98.6 °F)  Pulse:  [72-93] 86  Resp:  [18-20] 20  BP: (113-157)/(40-67) 157/44  SpO2:  [90 %-100 %] 90 %    Physical Exam  Vitals and nursing note reviewed.   Constitutional:       Appearance: She is obese. She is ill-appearing.   HENT:      Head: Normocephalic and atraumatic.      Right Ear: External ear normal.      Left Ear: External ear normal.      Mouth/Throat:      Pharynx: No oropharyngeal exudate or posterior oropharyngeal erythema.   Eyes:      General:         Right eye: No discharge.         Left eye: No discharge.   Cardiovascular:      Rate and Rhythm: Normal rate and regular rhythm.      Pulses: Normal pulses.      Heart sounds: No murmur heard.   No gallop.    Pulmonary:      Effort: Respiratory distress present.      Breath sounds: Rhonchi present.   Abdominal:      General: Bowel sounds are normal.      Palpations: Abdomen is soft.      Tenderness: There is no abdominal tenderness. There is no guarding.   Musculoskeletal:         General: Normal range of motion.      Cervical back: Normal range of motion and neck supple. No tenderness.      Right lower leg: Edema present.      Left lower leg: Edema present.   Skin:     General: Skin is warm and dry.   Neurological:      General: No focal deficit present.      Mental Status: She is alert and oriented to person,  place, and time. Mental status is at baseline.      Motor: No weakness.   Psychiatric:         Mood and Affect: Mood normal.         Behavior: Behavior normal.         Thought Content: Thought content normal.         Judgment: Judgment normal.         Fluids    Intake/Output Summary (Last 24 hours) at 6/25/2021 1645  Last data filed at 6/25/2021 1400  Gross per 24 hour   Intake 1420 ml   Output 1900 ml   Net -480 ml       Laboratory  Recent Labs     06/23/21  1200 06/24/21  0008 06/25/21  0514   WBC 15.5* 11.3* 9.4   RBC 3.20* 3.07* 3.04*   HEMOGLOBIN 10.0* 9.7* 9.4*   HEMATOCRIT 32.1* 31.3* 30.5*   .3* 102.0* 100.3*   MCH 31.3 31.6 30.9   MCHC 31.2* 31.0* 30.8*   RDW 52.8* 55.5* 54.5*   PLATELETCT 266 223 234   MPV 9.9 9.9 10.3     Recent Labs     06/23/21  1200 06/24/21  0008 06/25/21  0514   SODIUM 137 139 137   POTASSIUM 4.4 4.2 4.1   CHLORIDE 105 104 102   CO2 21 25 24   GLUCOSE 293* 181* 199*   BUN 23* 23* 22   CREATININE 0.81 0.94 0.65   CALCIUM 9.0 8.9 9.0     Recent Labs     06/23/21  1532   INR 1.17*               Imaging  DX-CHEST-PORTABLE (1 VIEW)   Final Result      1.  Possible mild pulmonary interstitial edema      2.  Enlarged cardiac silhouette           Assessment/Plan  * Sepsis (HCC)- (present on admission)  Assessment & Plan  This is Sepsis Present on admission  SIRS criteria identified on my evaluation include: Tachycardia, with heart rate greater than 90 BPM and Leukocytosis, with WBC greater than 12,000  Source is PNA/CAP  Sepsis protocol initiated  Fluid resuscitation ordered per protocol -holding IV fluids patient appears to be volume overloaded  IV antibiotics as appropriate for source of sepsis -ceftriaxone and azithromycin  While organ dysfunction may be noted elsewhere in this problem list or in the chart, degree of organ dysfunction does not meet CMS criteria for severe sepsis  Associated acute on chronic hypoxic respiratory failure due to sepsis    Acute on chronic respiratory  failure with hypoxia (HCC)- (present on admission)  Assessment & Plan  Patient's acute on chronic respiratory failure requiring increased oxygen needs due to probably her pneumonia.  Patient does use chronic home oxygen 2 L nasal cannula.  -Oxygen supplementation, continue IV Lasix    6/24: We will continue with IV Lasix for now and antibiotic.    6/25: Patient somewhat improving.  Currently using 3 L, yesterday she was using 4 L.    Failure to thrive in adult  Assessment & Plan  PT/OT    UTI (urinary tract infection)  Assessment & Plan  Continue with ceftriaxone.  Pending cultures.    Fever  Assessment & Plan  Patient with continued fevers.  We will continue IV antibiotics for now.  We have ordered blood cultures, pending results.  If needed we will consult ID.    Pulmonary hypertension ([facility])- (present on admission)  Assessment & Plan  RVSP 45 mmHg.  11/25/2020.  - Continue Lasix  - continue oxygen supplementation    Current non-adherence to medical treatment- (present on admission)  Assessment & Plan  Patient not taking her diuretics at home, causes her too much incontinence  - continue counseling for adherence    Acute on chronic diastolic CHF (congestive heart failure), NYHA class 2 ([facility])- (present on admission)  Assessment & Plan  11/25/2020 - echo noted normal diastolic and systolic function.  Mild mitral regurg.  Mild tricuspid regurg.  Mild aortic stenosis.  Past diagnosis. Patient has evidence with pulmonary crackles, bilateral lower extremity edema with leaking venous insufficiency ulcers.  Patient has not been taking her Lasix at home.  -trial 40mg IV Lasix, patient does have sepsis appearance but is unable to tolerate more volume at this time.  Monitor for hypotension    DM (diabetes mellitus) ([facility])- (present on admission)  Assessment & Plan  Hold patient's home p.o. medications  Current A1c 8.6  ISS  Accuchecks  Hypoglycemia protocol    Dyslipidemia- (present on admission)  Assessment & Plan  Continue  atorvastatin    Essential hypertension- (present on admission)  Assessment & Plan  Holding patient's home blood pressure medications as she will need diuretics, at risk for hypotension due to her sepsis from pneumonia.    Anemia  Assessment & Plan  No signs of bleeding at this time.  Monitor, repeat CBC.       VTE prophylaxis: Lovenox

## 2021-06-25 NOTE — ASSESSMENT & PLAN NOTE
Continue with ceftriaxone.  Pending cultures.    6/27: Patient finished antibiotic therapy today with rocephin

## 2021-06-25 NOTE — PROGRESS NOTES
Telemetry Shift Summary      Rhythm: SR w/ BBB  HR Range: 70-81  Ectopy: R PAC, R PVC,   Measurements: .20/.14/.38  * PSVT 2.8 sec. Highest rate 164    Normal Values:  Rhythm: SR  HR Range:   Measurements: 0.12-0.20/ 0.06-0.10/ 0.30-0.52

## 2021-06-25 NOTE — PROGRESS NOTES
This RN was notified by SHLOMO Gant that pt had a temperature of 100.7 F. Top covers pulled off of pt's bed, ice packs placed bilaterally in axillary region, and Tylenol administered.      2218: Pt's temperature came down to 99.9 F

## 2021-06-25 NOTE — THERAPY
Occupational Therapy   Initial Evaluation     Patient Name: Gabrielle Olmstead  Age:  75 y.o., Sex:  female  Medical Record #: [id number]  Today's Date: 6/25/2021     Precautions: (P) Fall Risk  Comments: (P) BLE and buttock wounds,     Assessment  Patient is 75 y.o. female admitted with SOB, BLE edema. Hx of heart failure, home O2, DM, HTN, arthritis. A&Ox3, agreeable to activity. Moda with sup to sit, Cisco with EOB tasks. Limited R shldr flex. Generalized weakness. MaxA with toileting. STS with Cisco, tolerates 4 side-steps before BTB with Cisco. Pt is not at her baseline level. Lives alone; HH had been visiting. OT will follow while in house.          Plan  Recommend Occupational Therapy 3 times per week until therapy goals are met for the following treatments:  Neuro Re-Education / Balance, Self Care/Activities of Daily Living and Therapeutic Activities.    DC Equipment Recommendations: (P) Unable to determine at this time  Discharge Recommendations: (P) Recommend post-acute placement for additional occupational therapy services prior to discharge home      06/25/21 1531   Prior Living Situation   Prior Services Skilled Home Health Services   Housing / Facility 1 Story House   Steps Into Home 1   Steps In Home 0   Bathroom Set up Bathtub / Shower Combination;Shower Chair   Equipment Owned Front-Wheel Walker;Tub / Shower Seat   Lives with - Patient's Self Care Capacity Alone and Able to Care For Self   Comments Sleeps in her recliner. Pt did have a caregiver for some IADL's, but no longer. HH had been visiting. Friend does shopping.   Prior Level of ADL Function   Self Feeding Independent   Grooming / Hygiene Independent   Bathing Independent   Dressing Independent   Toileting Independent   Prior Level of IADL Function   Medication Management Independent   Laundry Requires Assist   Kitchen Mobility Independent   Finances Unable To Determine At This Time   Home Management Requires Assist   Shopping Requires Assist   Prior  "Level Of Mobility Independent With Device in Home   Driving / Transportation Relatives / Others Provide Transportation   Occupation (Pre-Hospital Vocational) Retired Due To Age   Leisure Interests Television   Cognition    Comments \"I feel awful\"   Active ROM Upper Body   Comments RUE with limited shldr flex due to shldr fx ~1 yr ago.   Strength Upper Body   Comments generalized weakness   Balance Assessment   Sitting Balance (Static) Fair +   Sitting Balance (Dynamic) Fair   Standing Balance (Static) Fair   Standing Balance (Dynamic) Fair -   Weight Shift Sitting Fair   Weight Shift Standing Fair   Bed Mobility    Supine to Sit Moderate Assist   Sit to Supine Minimal Assist   Scooting Supervised   ADL Assessment   Eating Independent   Grooming Minimal Assist;Seated   Toileting Maximal Assist   Functional Mobility   Sit to Stand Minimal Assist   Transfer Method Stand Step   Mobility 4 side-steps    Patient / Family Goals   Patient / Family Goal #1 home   Short Term Goals   Short Term Goal # 1 pt will perform sup to sit with Nicholas within 5 days   Short Term Goal # 2 pt will perform toileting in br with Sup within 5 days    Short Term Goal # 3 pt will perform grooming at sink with Sup within 5 days   Short Term Goal # 4 pt will perform ADL transfer with Sup within 5 days     "

## 2021-06-25 NOTE — CARE PLAN
The patient is Watcher - Medium risk of patient condition declining or worsening    Shift Goals  Clinical Goals: Monitor for increase in temperature, I's and O's  Patient Goals: Rest, remain comfortable    Progress made toward(s) clinical / shift goals:  Pt had multiple increases in temperature overnight but was carefully managed ice pack administration and PO antipyretic medication. I's and O's were tracked carefully overnight.    Patient is not progressing towards the following goals:      Problem: Hemodynamics  Goal: Patient's hemodynamics, fluid balance and neurologic status will be stable or improve  Outcome: Not Progressing   Pt continued to have mild spikes in temperature overnight.

## 2021-06-25 NOTE — PROGRESS NOTES
Spiritual Care Note    Patient Information     Patient's Name: Gabrielle Olmstead   MRN: [id number]    YOB: 1945   Age and Gender: 75 y.o. female   Service Area: Medical [facility]   Room (and Bed): [facility]/   Ethnicity or Nationality:     Primary Language: English   Scientologist/Spiritual preference: None   Place of Residence: [place]   Family/Friends/Others Present: No   Clinical Team Present: No   Medical Diagnosis(-es)/Procedure(s): Sepsis   Code Status: Full Code    Date of Admission: 6/23/2021   Length of Stay: 2 days        Spiritual Care Provider Information:  Name of Spiritual Care Provider: Concha Gongora  Title of Spiritual Care Provider: Associate   Phone Number: [phone]  E-mail: [email or web address]  Total time : 5 minutes    Encounter/Request Information  Encounter/Request Type   Visited With: Patient, Refused care  General Visit: Yes  Referral From/ Origin of Request: [place] management rounds, Verbal staff    Religous Needs/Values       Spiritual Assessment     Spiritual Care Encounters    Interaction/Conversation: ADDIE Jones referred the  to this pt, who very politely declined the visit.    Plan: Visit Upon Request    Notes:

## 2021-06-26 PROBLEM — E03.9 HYPOTHYROIDISM: Status: ACTIVE | Noted: 2021-06-26

## 2021-06-26 PROBLEM — E87.1 HYPONATREMIA: Status: ACTIVE | Noted: 2021-06-26

## 2021-06-26 LAB
ALBUMIN SERPL BCP-MCNC: 3.4 G/DL (ref 3.2–4.9)
ALBUMIN/GLOB SERPL: 1 G/DL
ALP SERPL-CCNC: 150 U/L (ref 30–99)
ALT SERPL-CCNC: 40 U/L (ref 2–50)
ANION GAP SERPL CALC-SCNC: 8 MMOL/L (ref 7–16)
AST SERPL-CCNC: 39 U/L (ref 12–45)
BACTERIA UR CULT: NORMAL
BASOPHILS # BLD AUTO: 0.3 % (ref 0–1.8)
BASOPHILS # BLD: 0.03 K/UL (ref 0–0.12)
BILIRUB SERPL-MCNC: 0.3 MG/DL (ref 0.1–1.5)
BUN SERPL-MCNC: 15 MG/DL (ref 8–22)
CALCIUM SERPL-MCNC: 9.3 MG/DL (ref 8.4–10.2)
CHLORIDE SERPL-SCNC: 95 MMOL/L (ref 96–112)
CO2 SERPL-SCNC: 29 MMOL/L (ref 20–33)
CREAT SERPL-MCNC: 0.74 MG/DL (ref 0.5–1.4)
EKG IMPRESSION: NORMAL
EOSINOPHIL # BLD AUTO: 0.13 K/UL (ref 0–0.51)
EOSINOPHIL NFR BLD: 1.1 % (ref 0–6.9)
ERYTHROCYTE [DISTWIDTH] IN BLOOD BY AUTOMATED COUNT: 52.2 FL (ref 35.9–50)
GLOBULIN SER CALC-MCNC: 3.5 G/DL (ref 1.9–3.5)
GLUCOSE BLD-MCNC: 239 MG/DL (ref 65–99)
GLUCOSE BLD-MCNC: 276 MG/DL (ref 65–99)
GLUCOSE BLD-MCNC: 301 MG/DL (ref 65–99)
GLUCOSE SERPL-MCNC: 286 MG/DL (ref 65–99)
HCT VFR BLD AUTO: 32.2 % (ref 37–47)
HGB BLD-MCNC: 10.1 G/DL (ref 12–16)
IMM GRANULOCYTES # BLD AUTO: 0.13 K/UL (ref 0–0.11)
IMM GRANULOCYTES NFR BLD AUTO: 1.1 % (ref 0–0.9)
LYMPHOCYTES # BLD AUTO: 1.67 K/UL (ref 1–4.8)
LYMPHOCYTES NFR BLD: 14.5 % (ref 22–41)
MAGNESIUM SERPL-MCNC: 1.7 MG/DL (ref 1.5–2.5)
MCH RBC QN AUTO: 31.2 PG (ref 27–33)
MCHC RBC AUTO-ENTMCNC: 31.4 G/DL (ref 33.6–35)
MCV RBC AUTO: 99.4 FL (ref 81.4–97.8)
MONOCYTES # BLD AUTO: 0.8 K/UL (ref 0–0.85)
MONOCYTES NFR BLD AUTO: 7 % (ref 0–13.4)
NEUTROPHILS # BLD AUTO: 8.72 K/UL (ref 2–7.15)
NEUTROPHILS NFR BLD: 76 % (ref 44–72)
NRBC # BLD AUTO: 0 K/UL
NRBC BLD-RTO: 0 /100 WBC
PLATELET # BLD AUTO: 245 K/UL (ref 164–446)
PMV BLD AUTO: 10.3 FL (ref 9–12.9)
POTASSIUM SERPL-SCNC: 3.7 MMOL/L (ref 3.6–5.5)
PROT SERPL-MCNC: 6.9 G/DL (ref 6–8.2)
RBC # BLD AUTO: 3.24 M/UL (ref 4.2–5.4)
SIGNIFICANT IND 70042: NORMAL
SITE SITE: NORMAL
SODIUM SERPL-SCNC: 132 MMOL/L (ref 135–145)
SOURCE SOURCE: NORMAL
WBC # BLD AUTO: 11.5 K/UL (ref 4.8–10.8)

## 2021-06-26 PROCEDURE — A9270 NON-COVERED ITEM OR SERVICE: HCPCS | Performed by: INTERNAL MEDICINE

## 2021-06-26 PROCEDURE — 700102 HCHG RX REV CODE 250 W/ 637 OVERRIDE(OP): Performed by: INTERNAL MEDICINE

## 2021-06-26 PROCEDURE — 93010 ELECTROCARDIOGRAM REPORT: CPT | Performed by: INTERNAL MEDICINE

## 2021-06-26 PROCEDURE — 99232 SBSQ HOSP IP/OBS MODERATE 35: CPT | Performed by: INTERNAL MEDICINE

## 2021-06-26 PROCEDURE — 82962 GLUCOSE BLOOD TEST: CPT | Mod: 91

## 2021-06-26 PROCEDURE — 94760 N-INVAS EAR/PLS OXIMETRY 1: CPT

## 2021-06-26 PROCEDURE — 94640 AIRWAY INHALATION TREATMENT: CPT

## 2021-06-26 PROCEDURE — 700111 HCHG RX REV CODE 636 W/ 250 OVERRIDE (IP): Performed by: INTERNAL MEDICINE

## 2021-06-26 PROCEDURE — 700105 HCHG RX REV CODE 258: Performed by: INTERNAL MEDICINE

## 2021-06-26 PROCEDURE — 770020 HCHG ROOM/CARE - TELE (206)

## 2021-06-26 PROCEDURE — 700101 HCHG RX REV CODE 250: Performed by: INTERNAL MEDICINE

## 2021-06-26 RX ORDER — BENAZEPRIL HYDROCHLORIDE 10 MG/1
20 TABLET ORAL
Status: DISCONTINUED | OUTPATIENT
Start: 2021-06-26 | End: 2021-06-28

## 2021-06-26 RX ORDER — AMLODIPINE BESYLATE AND BENAZEPRIL HYDROCHLORIDE 5; 20 MG/1; MG/1
1 CAPSULE ORAL DAILY
Status: DISCONTINUED | OUTPATIENT
Start: 2021-06-26 | End: 2021-06-26

## 2021-06-26 RX ORDER — MAGNESIUM SULFATE 1 G/100ML
1 INJECTION INTRAVENOUS ONCE
Status: COMPLETED | OUTPATIENT
Start: 2021-06-26 | End: 2021-06-26

## 2021-06-26 RX ORDER — DEXTROSE MONOHYDRATE 25 G/50ML
50 INJECTION, SOLUTION INTRAVENOUS
Status: DISCONTINUED | OUTPATIENT
Start: 2021-06-26 | End: 2021-07-01 | Stop reason: HOSPADM

## 2021-06-26 RX ORDER — LORAZEPAM 2 MG/ML
0.5 INJECTION INTRAMUSCULAR EVERY 4 HOURS PRN
Status: DISCONTINUED | OUTPATIENT
Start: 2021-06-26 | End: 2021-06-27

## 2021-06-26 RX ORDER — AMLODIPINE BESYLATE 5 MG/1
5 TABLET ORAL
Status: DISCONTINUED | OUTPATIENT
Start: 2021-06-26 | End: 2021-06-28

## 2021-06-26 RX ADMIN — BUDESONIDE AND FORMOTEROL FUMARATE DIHYDRATE 2 PUFF: 160; 4.5 AEROSOL RESPIRATORY (INHALATION) at 20:19

## 2021-06-26 RX ADMIN — GUAIFENESIN 600 MG: 600 TABLET, EXTENDED RELEASE ORAL at 17:21

## 2021-06-26 RX ADMIN — MAGNESIUM SULFATE IN DEXTROSE 1 G: 10 INJECTION, SOLUTION INTRAVENOUS at 12:26

## 2021-06-26 RX ADMIN — ACETAMINOPHEN 1000 MG: 500 TABLET, FILM COATED ORAL at 06:09

## 2021-06-26 RX ADMIN — CEFTRIAXONE SODIUM 2 G: 2 INJECTION, POWDER, FOR SOLUTION INTRAMUSCULAR; INTRAVENOUS at 06:09

## 2021-06-26 RX ADMIN — ACETAMINOPHEN 1000 MG: 500 TABLET, FILM COATED ORAL at 21:32

## 2021-06-26 RX ADMIN — ENOXAPARIN SODIUM 40 MG: 40 INJECTION SUBCUTANEOUS at 17:21

## 2021-06-26 RX ADMIN — INSULIN HUMAN 3 UNITS: 100 INJECTION, SOLUTION PARENTERAL at 12:31

## 2021-06-26 RX ADMIN — AMLODIPINE BESYLATE 5 MG: 5 TABLET ORAL at 13:14

## 2021-06-26 RX ADMIN — LEVOTHYROXINE SODIUM 25 MCG: 0.03 TABLET ORAL at 06:09

## 2021-06-26 RX ADMIN — BUDESONIDE AND FORMOTEROL FUMARATE DIHYDRATE 2 PUFF: 160; 4.5 AEROSOL RESPIRATORY (INHALATION) at 07:39

## 2021-06-26 RX ADMIN — FUROSEMIDE 40 MG: 10 INJECTION, SOLUTION INTRAMUSCULAR; INTRAVENOUS at 17:20

## 2021-06-26 RX ADMIN — INSULIN HUMAN 2 UNITS: 100 INJECTION, SOLUTION PARENTERAL at 06:29

## 2021-06-26 RX ADMIN — SERTRALINE HYDROCHLORIDE 50 MG: 50 TABLET ORAL at 06:10

## 2021-06-26 RX ADMIN — ATORVASTATIN CALCIUM 40 MG: 40 TABLET, FILM COATED ORAL at 06:09

## 2021-06-26 RX ADMIN — ACETAMINOPHEN 1000 MG: 500 TABLET, FILM COATED ORAL at 13:14

## 2021-06-26 RX ADMIN — GUAIFENESIN 600 MG: 600 TABLET, EXTENDED RELEASE ORAL at 06:09

## 2021-06-26 RX ADMIN — ALBUTEROL SULFATE 2 PUFF: 90 AEROSOL, METERED RESPIRATORY (INHALATION) at 20:20

## 2021-06-26 RX ADMIN — DOCUSATE SODIUM 50 MG AND SENNOSIDES 8.6 MG 2 TABLET: 8.6; 5 TABLET, FILM COATED ORAL at 06:09

## 2021-06-26 RX ADMIN — BENAZEPRIL HYDROCHLORIDE 20 MG: 10 TABLET ORAL at 13:13

## 2021-06-26 RX ADMIN — LIDOCAINE 1 PATCH: 50 PATCH TOPICAL at 12:26

## 2021-06-26 ASSESSMENT — ENCOUNTER SYMPTOMS
DIZZINESS: 0
FALLS: 0
CHILLS: 0
DOUBLE VISION: 0
SHORTNESS OF BREATH: 1
ABDOMINAL PAIN: 0
FEVER: 1
HEADACHES: 0
BLURRED VISION: 0
HEARTBURN: 0
BACK PAIN: 0
COUGH: 1
NERVOUS/ANXIOUS: 0
VOMITING: 0
PALPITATIONS: 0

## 2021-06-26 ASSESSMENT — PAIN DESCRIPTION - PAIN TYPE: TYPE: ACUTE PAIN

## 2021-06-26 ASSESSMENT — FIBROSIS 4 INDEX: FIB4 SCORE: 1.89

## 2021-06-26 ASSESSMENT — LIFESTYLE VARIABLES: SUBSTANCE_ABUSE: 0

## 2021-06-26 NOTE — PROGRESS NOTES
Report given to ADDIE Vásquez. Dx, medications, O2 needs, and poc reviewed. Pt has no signs of distress or complaints at this time. Pt has no acute needs at this time. Care fully relinquished.

## 2021-06-26 NOTE — FLOWSHEET NOTE
06/25/21 1835   Events/Summary/Plan   Events/Summary/Plan MDI x 2 f/w rinsing, used spacer for MDI's   Skin Inspection Respiratory Device Intact   Vital Signs   Pulse 92   Respiration (!) 22   Pulse Oximetry 97 %   $ Pulse Oximetry (Spot Check) Yes   Respiratory Assessment   Level of Consciousness Alert   Chest Exam   Work Of Breathing / Effort Moderate   Breath Sounds   RUL Breath Sounds Fine Crackles   RML Breath Sounds Clear;Diminished   RLL Breath Sounds Diminished   MOSHE Breath Sounds Clear   LLL Breath Sounds Diminished   Secretions   Cough Congested;Non Productive   How Sputum Obtained Spontaneous   Oxygen   O2 (LPM) 3.5   O2 Delivery Device Oxymask

## 2021-06-26 NOTE — PROGRESS NOTES
Patient has fluid restriction of 1500 ml.  Patient has reached fluid restriction but continues to ask for more stating that she cant breathe because her throat is dry.  Patient was admitted for fluid overload and is receiving IV lasix for diuresis. Patient educated multiple times about the importance of adhering to fluid restrictions and the effect that increased fluid has on respiratory function. Charge RN was notified and spoke to patient as well about fluid restriction and importance. Patient given a small amount of ice chips.  RN will continue to educate patient on importance of adhering to fluid restriction.

## 2021-06-26 NOTE — PROGRESS NOTES
I was paged by patient's nurse [Micki] for concern for elevated D-dimer and shortness of breath.  I evaluated the patient at bedside heart rates 88, blood pressure 140s over 70s, respiratory rate around 20.  Patient reports that she is feeling better now.  She reports that she did have shortness of breath after working with physical therapy earlier today.  I discussed the finding of elevated D-dimer and discussed ordering CT angiogram to rule out pulmonary embolism I explained potential risk of contrast-induced nephropathy especially as she has been getting diuretics.  Patient reports that she is feeling better now and wants to defer getting contrast at this point.    I will check a stat chest x-ray, to rule out worsening pneumonia, pleural effusion/empyema.  I will also check an EKG to look for indirect evidence for pulmonary embolism.     Plan of care discussed in detail with patient and patient nurse [Micki]

## 2021-06-26 NOTE — PROGRESS NOTES
Telemetry Shift Summary    RHYTHM:SR BBB  HR RANGE:66-81  ECTOPY:O PAC, R PVC  MEASUREMENTS:0.18/0.12/0.42    Normal Measurements  Rhythm SR  HR Range:   Measurements: 0.12-0.20/0.06-0.10/0.30-0.52    Tele strips reviewed and placed in chart.

## 2021-06-26 NOTE — THERAPY
Physical Therapy   Initial Evaluation     Patient Name: Gabrielle Olmstead  Age:  75 y.o., Sex:  female  Medical Record #: [id number]  Today's Date: 6/25/2021     Precautions: (P) Fall Risk    Assessment  Patient is 75 y.o. female who was recently admitted for SOB, acute respiratory failure, fevers, sepsis, and BLE edema. Pt presented to PT with impaired balance, impaired gait, weakness, pain, and dec activity tolerance. Pt current functional impairments are effecting her ability to safely perform bed mobility, sit<>stands, ambulation, transfers, and stair navigation. Pt currently has a poor insight into her deficits and states she does not want to go to rehab and will go home. Pt demonstrated with dec in SpO2 of 84% on 3.5 L O2, however, was able to recover with deep breathing and rest break back to 91%. With current mobility and activity tolerance pt is not a safe d/c home as she lives alone and is not currently at baseline with functional mobility. Pt will continue to benefit from skilled PT while in house, with recommendation for post acute therapy prior to d/c home.     Plan    Recommend Physical Therapy 3 times per week until therapy goals are met for the following treatments:  Bed Mobility, Community Re-integration, Equipment, Gait Training, Manual Therapy, Neuro Re-Education / Balance, Self Care/Home Evaluation, Stair Training, Therapeutic Activities and Therapeutic Exercises    DC Equipment Recommendations: (P) Unable to determine at this time  Discharge Recommendations: (P) Recommend post-acute placement for additional physical therapy services prior to discharge home     Objective       06/25/21 1701   Initial Contact Note    Initial Contact Note Order Received and Verified, Physical Therapy Evaluation in Progress with Full Report to Follow.   Precautions   Precautions Fall Risk   Comments BLE/buttock wounds    Vitals   O2 (LPM) 3.5   O2 Delivery Device Silicone Nasal Cannula   Vitals Comments 84% after standing  and side steps; able to recover back to 91%   Pain 0 - 10 Group   Location Buttock   Description Aching   Therapist Pain Assessment Prior to Activity;During Activity;Post Activity;Nurse Notified  (c/o moderate pain with mobility )   Prior Living Situation   Prior Services Skilled Home Health Services   Housing / Facility 1 Story House   Steps Into Home 1   Steps In Home 0   Bathroom Set up Bathtub / Shower Combination;Shower Chair   Equipment Owned 4-Wheel Walker   Lives with - Patient's Self Care Capacity Alone and Able to Care For Self   Comments pt states she sleeps in her recliner; has friend for shopping, otherwise pt states she was primarily I with ADL's and mobility    Prior Level of Functional Mobility   Bed Mobility Independent   Transfer Status Independent   Ambulation Independent   Distance Ambulation (Feet)   (household ambulation )   Assistive Devices Used 4-Wheel Walker   Stairs Independent   Comments pt reports of an IPLOF   Cognition    Cognition / Consciousness X   Level of Consciousness Alert   Safety Awareness Impaired   Comments requires frequent encouragment and education to participate with therapy; poor saftey and insight into current deficits    Passive ROM Lower Body   Passive ROM Lower Body WDL   Active ROM Lower Body    Active ROM Lower Body  WDL   Strength Lower Body   Lower Body Strength  X   Gross Strength Generalized Weakness, Equal Bilaterally   Comments presents with gross strength of 3+/5; functional for side steps at this time   Sensation Lower Body   Lower Extremity Sensation   WDL   Lower Body Muscle Tone   Lower Body Muscle Tone  WDL   Strength Upper Body   Upper Body Strength  X   Comments generally weak    Upper Body Muscle Tone   Upper Body Muscle Tone  WDL   Neurological Concerns   Neurological Concerns No   Coordination Lower Body    Coordination Lower Body  Not Tested   Balance Assessment   Sitting Balance (Static) Fair +   Sitting Balance (Dynamic) Fair   Standing Balance  (Static) Fair -   Standing Balance (Dynamic) Fair -   Weight Shift Sitting Fair   Weight Shift Standing Fair   Comments w/fww use; no yanet LOB noted with standing and side steps   Gait Analysis   Gait Level Of Assist Minimal Assist   Assistive Device Front Wheel Walker   Distance (Feet) 3   # of Times Distance was Traveled 1   Deviation Step To;Antalgic;Decreased Base Of Support;Shuffled Gait   Weight Bearing Status fwb   Comments limited in distance due to pain, fatigue, and participation; delcined to ambulate further distances    Bed Mobility    Supine to Sit Moderate Assist   Sit to Supine Minimal Assist   Scooting Supervised   Comments HOB elevated and rails up    Functional Mobility   Sit to Stand Minimal Assist   Transfer Method Stand Step   Mobility EOB, sit<>stand, side steps, back to bed    How much difficulty does the patient currently have...   Turning over in bed (including adjusting bedclothes, sheets and blankets)? 2   Sitting down on and standing up from a chair with arms (e.g., wheelchair, bedside commode, etc.) 2   Moving from lying on back to sitting on the side of the bed? 2   How much help from another person does the patient currently need...   Moving to and from a bed to a chair (including a wheelchair)? 2   Need to walk in a hospital room? 2   Climbing 3-5 steps with a railing? 1   6 clicks Mobility Score 11   Activity Tolerance   Sitting in Chair NT   Sitting Edge of Bed 9 mins   Standing 1 min   Comments limited due to pain and fatigue    Edema / Skin Assessment   Edema / Skin  X   Comments wounds at BLE and buttocks   Patient / Family Goals    Patient / Family Goal #1 to go home   Short Term Goals    Short Term Goal # 1 pt will go supine<>sit w/hob elevated and rails up w/spv in 6tx for safe d/c home   Short Term Goal # 2 pt will go sit<>stand w/fww w/spv in 6tx for safe d/c home    Short Term Goal # 3 pt will transfer bed<>chair w/fww w/spv in 6tx for safe d/c home   Short Term Goal # 4  pt will ambulate for 150ft w/fww w/spv in 6tx for safe d/c home    Short Term Goal # 5 pt will go up/down 1 step w/spv w/fww in 6tx for safe d/c home   Education Group   Education Provided Role of Physical Therapist   Role of Physical Therapist Patient Response Patient;Acceptance;Explanation;Demonstration;Verbal Demonstration;Action Demonstration   Problem List    Problems Impaired Bed Mobility;Impaired Transfers;Impaired Ambulation;Functional Strength Deficit;Impaired Balance;Decreased Activity Tolerance;Safety Awareness Deficits / Cognition   Anticipated Discharge Equipment and Recommendations   DC Equipment Recommendations Unable to determine at this time   Discharge Recommendations Recommend post-acute placement for additional physical therapy services prior to discharge home   Interdisciplinary Plan of Care Collaboration   IDT Collaboration with  Nursing   Patient Position at End of Therapy In Bed;Bed Alarm On;Tray Table within Reach;Call Light within Reach;Phone within Reach   Collaboration Comments aware of visit and recs    Session Information   Date / Session Number  6/25-1 (1/3, 7/1)

## 2021-06-26 NOTE — PROGRESS NOTES
"Patient change in status, MD notified. Writer rounded on patient, appears tachypnic, SOB, oxygen saturation 94-96@ on 4L with HOB 45 degrees. Patient stated to writer, \"don't leave me, I'm scared Robert.\" Patient appears anxious, NC changed to oxi-mask, respiratory called to bedside, charge RN updated on patient situation. MD called writer, ordered d-dimer. Patient has no other signs of distress or other acute needs at this time. Will continue to monitor.   "

## 2021-06-26 NOTE — FLOWSHEET NOTE
06/25/21 2340   Events/Summary/Plan   Events/Summary/Plan MDI (prn tx) FI02 4 LPM oxymask   Skin Inspection Respiratory Device Intact   Vital Signs   Pulse 88   Respiration (!) 25   Pulse Oximetry 94 %   $ Pulse Oximetry (Spot Check) Yes   Respiratory Assessment   Respiratory Pattern Within Normal Limits   Level of Consciousness Alert   Chest Exam   Work Of Breathing / Effort Increased Work of Breathing   Breath Sounds   RUL Breath Sounds Diminished   RML Breath Sounds Diminished   RLL Breath Sounds Diminished   MOSHE Breath Sounds Diminished   LLL Breath Sounds Diminished   Secretions   Cough Moist;Non Productive;Strong   How Sputum Obtained Spontaneous   Oxygen   O2 (LPM) 4   O2 Delivery Device Oxymask

## 2021-06-26 NOTE — CARE PLAN
The patient is Watcher - Medium risk of patient condition declining or worsening    Shift Goals  Clinical Goals: decrease 02 needs  Patient Goals: Breath better    Progress made toward(s) clinical / shift goals:  Patient satting in high 90s on 4L    Patient is not progressing towards the following goals:N/A      Problem: Knowledge Deficit - Standard  Goal: Patient and family/care givers will demonstrate understanding of plan of care, disease process/condition, diagnostic tests and medications  Patient demonstrates appropriate knowledge of disease process/condition, treatment plan, diagnostic tests, and medications.Patient is compliant and asks appropriate questions about disease process and POC.   Outcome: Progressing     Problem: Fall Risk  Goal: Patient will remain free from falls  Safety measures in place, bed in lowest position, side rails up x2, bed alarm on, call light in place, patient is free from falls at this time.   Outcome: Progressing     Problem: Pain - Standard  Goal: Alleviation of pain or a reduction in pain to the patient’s comfort goal  Patient denies pain at this time. Pain reassessed PRN, medications administered as needed.   Outcome: Progressing

## 2021-06-26 NOTE — PROGRESS NOTES
"Hospital Medicine Daily Progress Note    Date of Service  6/26/2021    Chief Complaint  75 y.o. female admitted 6/23/2021 with shortness of breath    Hospital Course  \"75 y.o. female who presented 6/23/2021 with worsening shortness of breath.  Patient has had progressive SLB with orthopnea for the past couple days.  Patient has bilateral lower extremity edema which she states gets wrapped with compression stockings.  She noticed that she began to have more fluid leakage from her legs in the past couple weeks.  She stated she knows she has heart failure and problems with fluids but was not taking a diuretic.  Patient states she is extremely incontinent with Lasix.  She has chronic home oxygen at 2 L nasal cannula.  Patient denied any subjective fevers, coughing, abdominal pain, headache.\"    Interval Problem Update  6/24: Patient seen at bedside this morning.  She states that her shortness of breath has improved since yesterday, however she still having constant fevers.  We will continue with IV antibiotics, pending cultures.  As per nursing no other overnight events reported.    6/25: Patient seen at bedside this morning.  The patient still having fevers but less severe than yesterday.  Highest temperature this morning was 100.6, yesterday she was having 102.7.  We will continue with IV antibiotics, pending cultures.  Pending PT/OT evaluation, however the patient most likely will require acute placement.  The patient mentioning of chronic back pain for which we have ordered scheduled Tylenol and lidocaine patches, which will try to avoid opiates as much as possible.  As per nursing no other overnight events reported.    6/26: Patient seen at bedside this morning.  D-dimer was elevated, however patient does not want to have a CTA of the chest, I talked to her about possibly VQ scan which she agreed.  We have ordered, pending results.  She has not had a fever since yesterday.  PT/OT recommended acute placement.  We " have sent referral for SNF.  As per nursing no other overnight events reported.    Consultants/Specialty  None for now    Code Status  Full Code    Disposition  Continue medical care.  SNF once medically cleared    Review of Systems  Review of Systems   Constitutional: Positive for fever and malaise/fatigue. Negative for chills.   HENT: Negative for hearing loss and nosebleeds.    Eyes: Negative for blurred vision and double vision.   Respiratory: Positive for cough and shortness of breath.    Cardiovascular: Negative for chest pain and palpitations.   Gastrointestinal: Negative for abdominal pain, heartburn and vomiting.   Genitourinary: Negative for dysuria and urgency.   Musculoskeletal: Negative for back pain and falls.   Skin: Negative for itching and rash.   Neurological: Negative for dizziness and headaches.   Psychiatric/Behavioral: Negative for substance abuse. The patient is not nervous/anxious.    All other systems reviewed and are negative.       Physical Exam  Temp:  [36.6 °C (97.8 °F)-37.8 °C (100 °F)] 37.8 °C (100 °F)  Pulse:  [83-94] 89  Resp:  [16-26] 20  BP: (132-157)/(41-70) 142/41  SpO2:  [78 %-98 %] 95 %    Physical Exam  Vitals and nursing note reviewed.   Constitutional:       Appearance: She is obese. She is ill-appearing.   HENT:      Head: Normocephalic and atraumatic.      Right Ear: External ear normal.      Left Ear: External ear normal.      Mouth/Throat:      Pharynx: No oropharyngeal exudate or posterior oropharyngeal erythema.   Eyes:      General:         Right eye: No discharge.         Left eye: No discharge.   Cardiovascular:      Rate and Rhythm: Normal rate and regular rhythm.      Pulses: Normal pulses.      Heart sounds: No murmur heard.   No gallop.    Pulmonary:      Effort: Respiratory distress present.      Breath sounds: Rhonchi present.   Abdominal:      General: Bowel sounds are normal.      Palpations: Abdomen is soft.      Tenderness: There is no abdominal  tenderness. There is no guarding.   Musculoskeletal:         General: Normal range of motion.      Cervical back: Normal range of motion and neck supple. No tenderness.      Right lower leg: Edema present.      Left lower leg: Edema present.   Skin:     General: Skin is warm and dry.   Neurological:      General: No focal deficit present.      Mental Status: She is alert and oriented to person, place, and time. Mental status is at baseline.      Motor: No weakness.   Psychiatric:         Mood and Affect: Mood normal.         Behavior: Behavior normal.         Thought Content: Thought content normal.         Judgment: Judgment normal.         Fluids    Intake/Output Summary (Last 24 hours) at 6/26/2021 1304  Last data filed at 6/26/2021 0639  Gross per 24 hour   Intake 1100 ml   Output 2400 ml   Net -1300 ml       Laboratory  Recent Labs     06/24/21  0008 06/25/21  0514 06/25/21  2339   WBC 11.3* 9.4 11.5*   RBC 3.07* 3.04* 3.24*   HEMOGLOBIN 9.7* 9.4* 10.1*   HEMATOCRIT 31.3* 30.5* 32.2*   .0* 100.3* 99.4*   MCH 31.6 30.9 31.2   MCHC 31.0* 30.8* 31.4*   RDW 55.5* 54.5* 52.2*   PLATELETCT 223 234 245   MPV 9.9 10.3 10.3     Recent Labs     06/24/21  0008 06/25/21  0514 06/25/21  2339   SODIUM 139 137 132*   POTASSIUM 4.2 4.1 3.7   CHLORIDE 104 102 95*   CO2 25 24 29   GLUCOSE 181* 199* 286*   BUN 23* 22 15   CREATININE 0.94 0.65 0.74   CALCIUM 8.9 9.0 9.3     Recent Labs     06/23/21  1532   INR 1.17*               Imaging  DX-CHEST-LIMITED (1 VIEW)   Final Result         Stable cardiomegaly.      DX-CHEST-PORTABLE (1 VIEW)   Final Result      1.  Possible mild pulmonary interstitial edema      2.  Enlarged cardiac silhouette      NM-LUNG PERFUSION IMAGING    (Results Pending)        Assessment/Plan  * Sepsis (HCC)- (present on admission)  Assessment & Plan  This is Sepsis Present on admission  SIRS criteria identified on my evaluation include: Tachycardia, with heart rate greater than 90 BPM and  Leukocytosis, with WBC greater than 12,000  Source is PNA/CAP  Sepsis protocol initiated  Fluid resuscitation ordered per protocol -holding IV fluids patient appears to be volume overloaded  IV antibiotics as appropriate for source of sepsis -ceftriaxone and azithromycin  While organ dysfunction may be noted elsewhere in this problem list or in the chart, degree of organ dysfunction does not meet CMS criteria for severe sepsis  Associated acute on chronic hypoxic respiratory failure due to sepsis    Acute on chronic respiratory failure with hypoxia (HCC)- (present on admission)  Assessment & Plan  Patient's acute on chronic respiratory failure requiring increased oxygen needs due to probably her pneumonia.  Patient does use chronic home oxygen 2 L nasal cannula.  -Oxygen supplementation, continue IV Lasix    6/24: We will continue with IV Lasix for now and antibiotic.    6/25: Patient somewhat improving.  Currently using 3 L, yesterday she was using 4 L.    6/26: D-dimer was elevated, the patient declined to do a CT angio of the chest, however was agreeable to a VQ scan which we have ordered.    Hypothyroidism  Assessment & Plan  Continue home medications.    Hyponatremia  Assessment & Plan  Mild, monitor    Chronic back pain  Assessment & Plan  We will try to avoid opiates.  We have scheduled Tylenol and lidocaine patches for now.    Failure to thrive in adult  Assessment & Plan  PT/OT    UTI (urinary tract infection)  Assessment & Plan  Continue with ceftriaxone.  Pending cultures.    Fever  Assessment & Plan  Patient with continued fevers.  We will continue IV antibiotics for now.  We have ordered blood cultures, pending results.  If needed we will consult ID.    6/26: Patient has not had a recorded fever since yesterday.    Pulmonary hypertension (HCC)- (present on admission)  Assessment & Plan  RVSP 45 mmHg.  11/25/2020.  - Continue Lasix  - continue oxygen supplementation    Current non-adherence to medical  treatment- (present on admission)  Assessment & Plan  Patient not taking her diuretics at home, causes her too much incontinence  - continue counseling for adherence    Acute on chronic diastolic CHF (congestive heart failure), NYHA class 2 ([facility])- (present on admission)  Assessment & Plan  11/25/2020 - echo noted normal diastolic and systolic function.  Mild mitral regurg.  Mild tricuspid regurg.  Mild aortic stenosis.  Past diagnosis. Patient has evidence with pulmonary crackles, bilateral lower extremity edema with leaking venous insufficiency ulcers.  Patient has not been taking her Lasix at home.  -trial 40mg IV Lasix, patient does have sepsis appearance but is unable to tolerate more volume at this time.  Monitor for hypotension    DM (diabetes mellitus) ([facility])- (present on admission)  Assessment & Plan  Hold patient's home p.o. medications  Current A1c 8.6  ISS  Accuchecks  Hypoglycemia protocol    6/26: We will increase to medium sliding scale.  Monitor and make changes accordingly.    Dyslipidemia- (present on admission)  Assessment & Plan  Continue atorvastatin    Essential hypertension- (present on admission)  Assessment & Plan  Holding patient's home blood pressure medications as she will need diuretics, at risk for hypotension due to her sepsis from pneumonia.    6/26: We will now reorder the patient's home medication for blood pressure.    Anemia  Assessment & Plan  No signs of bleeding at this time.  Monitor, repeat CBC.       VTE prophylaxis: Lovenox

## 2021-06-26 NOTE — PROGRESS NOTES
Patient's D-Dimer came back elevated 1.57.  Dr. Stroud notified, Dr. Stroud at bedside. Patient states that she feels better now.  STAT EKG, CXR and Labs ordered.  Patient not c/o pain at this time. Will continue to monitor patient.

## 2021-06-26 NOTE — PROGRESS NOTES
Telemetry Shift Summary     Measurement for strip printed at:  Rhythm: SR / BBB  Rate:80-90  Measurements: .18/.16/.40  Ectopy (reported by Monitor Tech and verified by RN): (r-o) PAC, (r) PVC     Normal Values  Rhythm: Sinus  HR:   Measurements: 0.12-0.20/0.06-0.10/0.30-0.52

## 2021-06-26 NOTE — PROGRESS NOTES
Report received from Abraham REAL. Pt resting in bed at the time of report. Plan of care assumed. Safety precautions in place and call light within reach.

## 2021-06-27 ENCOUNTER — APPOINTMENT (OUTPATIENT)
Dept: RADIOLOGY | Facility: MEDICAL CENTER | Age: 76
DRG: 871 | End: 2021-06-27
Attending: INTERNAL MEDICINE
Payer: MEDICARE

## 2021-06-27 LAB
ALBUMIN SERPL BCP-MCNC: 3.2 G/DL (ref 3.2–4.9)
ALBUMIN/GLOB SERPL: 0.9 G/DL
ALP SERPL-CCNC: 160 U/L (ref 30–99)
ALT SERPL-CCNC: 38 U/L (ref 2–50)
ANION GAP SERPL CALC-SCNC: 7 MMOL/L (ref 7–16)
AST SERPL-CCNC: 23 U/L (ref 12–45)
BASOPHILS # BLD AUTO: 0.4 % (ref 0–1.8)
BASOPHILS # BLD: 0.04 K/UL (ref 0–0.12)
BILIRUB SERPL-MCNC: 0.2 MG/DL (ref 0.1–1.5)
BUN SERPL-MCNC: 18 MG/DL (ref 8–22)
CALCIUM SERPL-MCNC: 9.6 MG/DL (ref 8.4–10.2)
CHLORIDE SERPL-SCNC: 99 MMOL/L (ref 96–112)
CO2 SERPL-SCNC: 31 MMOL/L (ref 20–33)
CREAT SERPL-MCNC: 0.63 MG/DL (ref 0.5–1.4)
EOSINOPHIL # BLD AUTO: 0.17 K/UL (ref 0–0.51)
EOSINOPHIL NFR BLD: 1.5 % (ref 0–6.9)
ERYTHROCYTE [DISTWIDTH] IN BLOOD BY AUTOMATED COUNT: 51.9 FL (ref 35.9–50)
GLOBULIN SER CALC-MCNC: 3.5 G/DL (ref 1.9–3.5)
GLUCOSE BLD-MCNC: 229 MG/DL (ref 65–99)
GLUCOSE BLD-MCNC: 255 MG/DL (ref 65–99)
GLUCOSE BLD-MCNC: 300 MG/DL (ref 65–99)
GLUCOSE BLD-MCNC: 301 MG/DL (ref 65–99)
GLUCOSE BLD-MCNC: 398 MG/DL (ref 65–99)
GLUCOSE SERPL-MCNC: 271 MG/DL (ref 65–99)
HCT VFR BLD AUTO: 31.3 % (ref 37–47)
HGB BLD-MCNC: 9.7 G/DL (ref 12–16)
IMM GRANULOCYTES # BLD AUTO: 0.14 K/UL (ref 0–0.11)
IMM GRANULOCYTES NFR BLD AUTO: 1.2 % (ref 0–0.9)
LYMPHOCYTES # BLD AUTO: 1.81 K/UL (ref 1–4.8)
LYMPHOCYTES NFR BLD: 15.9 % (ref 22–41)
MAGNESIUM SERPL-MCNC: 1.9 MG/DL (ref 1.5–2.5)
MCH RBC QN AUTO: 31.3 PG (ref 27–33)
MCHC RBC AUTO-ENTMCNC: 31 G/DL (ref 33.6–35)
MCV RBC AUTO: 101 FL (ref 81.4–97.8)
MONOCYTES # BLD AUTO: 0.73 K/UL (ref 0–0.85)
MONOCYTES NFR BLD AUTO: 6.4 % (ref 0–13.4)
NEUTROPHILS # BLD AUTO: 8.48 K/UL (ref 2–7.15)
NEUTROPHILS NFR BLD: 74.6 % (ref 44–72)
NRBC # BLD AUTO: 0 K/UL
NRBC BLD-RTO: 0 /100 WBC
PLATELET # BLD AUTO: 276 K/UL (ref 164–446)
PMV BLD AUTO: 10.4 FL (ref 9–12.9)
POTASSIUM SERPL-SCNC: 3.9 MMOL/L (ref 3.6–5.5)
PROT SERPL-MCNC: 6.7 G/DL (ref 6–8.2)
RBC # BLD AUTO: 3.1 M/UL (ref 4.2–5.4)
SODIUM SERPL-SCNC: 137 MMOL/L (ref 135–145)
WBC # BLD AUTO: 11.4 K/UL (ref 4.8–10.8)

## 2021-06-27 PROCEDURE — 700101 HCHG RX REV CODE 250: Performed by: INTERNAL MEDICINE

## 2021-06-27 PROCEDURE — A9270 NON-COVERED ITEM OR SERVICE: HCPCS | Performed by: INTERNAL MEDICINE

## 2021-06-27 PROCEDURE — 71045 X-RAY EXAM CHEST 1 VIEW: CPT

## 2021-06-27 PROCEDURE — 700111 HCHG RX REV CODE 636 W/ 250 OVERRIDE (IP): Performed by: INTERNAL MEDICINE

## 2021-06-27 PROCEDURE — 700105 HCHG RX REV CODE 258: Performed by: INTERNAL MEDICINE

## 2021-06-27 PROCEDURE — 700102 HCHG RX REV CODE 250 W/ 637 OVERRIDE(OP): Performed by: INTERNAL MEDICINE

## 2021-06-27 PROCEDURE — 99232 SBSQ HOSP IP/OBS MODERATE 35: CPT | Performed by: INTERNAL MEDICINE

## 2021-06-27 PROCEDURE — 94640 AIRWAY INHALATION TREATMENT: CPT

## 2021-06-27 PROCEDURE — 83735 ASSAY OF MAGNESIUM: CPT

## 2021-06-27 PROCEDURE — A9540 TC99M MAA: HCPCS

## 2021-06-27 PROCEDURE — 85025 COMPLETE CBC W/AUTO DIFF WBC: CPT

## 2021-06-27 PROCEDURE — 80053 COMPREHEN METABOLIC PANEL: CPT

## 2021-06-27 PROCEDURE — 82962 GLUCOSE BLOOD TEST: CPT

## 2021-06-27 PROCEDURE — 94760 N-INVAS EAR/PLS OXIMETRY 1: CPT

## 2021-06-27 PROCEDURE — 770020 HCHG ROOM/CARE - TELE (206)

## 2021-06-27 RX ADMIN — INSULIN GLARGINE 5 UNITS: 100 INJECTION, SOLUTION SUBCUTANEOUS at 18:04

## 2021-06-27 RX ADMIN — ENOXAPARIN SODIUM 40 MG: 40 INJECTION SUBCUTANEOUS at 17:56

## 2021-06-27 RX ADMIN — ACETAMINOPHEN 1000 MG: 500 TABLET, FILM COATED ORAL at 22:03

## 2021-06-27 RX ADMIN — ACETAMINOPHEN 1000 MG: 500 TABLET, FILM COATED ORAL at 06:44

## 2021-06-27 RX ADMIN — SERTRALINE HYDROCHLORIDE 50 MG: 50 TABLET ORAL at 06:45

## 2021-06-27 RX ADMIN — FUROSEMIDE 40 MG: 10 INJECTION, SOLUTION INTRAMUSCULAR; INTRAVENOUS at 17:55

## 2021-06-27 RX ADMIN — BENAZEPRIL HYDROCHLORIDE 20 MG: 10 TABLET ORAL at 06:45

## 2021-06-27 RX ADMIN — ATORVASTATIN CALCIUM 40 MG: 40 TABLET, FILM COATED ORAL at 06:45

## 2021-06-27 RX ADMIN — ACETAMINOPHEN 1000 MG: 500 TABLET, FILM COATED ORAL at 13:32

## 2021-06-27 RX ADMIN — AMLODIPINE BESYLATE 5 MG: 5 TABLET ORAL at 06:43

## 2021-06-27 RX ADMIN — LIDOCAINE 1 PATCH: 50 PATCH TOPICAL at 09:51

## 2021-06-27 RX ADMIN — GUAIFENESIN 600 MG: 600 TABLET, EXTENDED RELEASE ORAL at 06:43

## 2021-06-27 RX ADMIN — LEVOTHYROXINE SODIUM 25 MCG: 0.03 TABLET ORAL at 06:45

## 2021-06-27 RX ADMIN — BUDESONIDE AND FORMOTEROL FUMARATE DIHYDRATE 2 PUFF: 160; 4.5 AEROSOL RESPIRATORY (INHALATION) at 07:37

## 2021-06-27 RX ADMIN — GUAIFENESIN 600 MG: 600 TABLET, EXTENDED RELEASE ORAL at 17:55

## 2021-06-27 RX ADMIN — ALBUTEROL SULFATE 2 PUFF: 90 AEROSOL, METERED RESPIRATORY (INHALATION) at 19:24

## 2021-06-27 RX ADMIN — CEFTRIAXONE SODIUM 2 G: 2 INJECTION, POWDER, FOR SOLUTION INTRAMUSCULAR; INTRAVENOUS at 06:42

## 2021-06-27 RX ADMIN — BUDESONIDE AND FORMOTEROL FUMARATE DIHYDRATE 2 PUFF: 160; 4.5 AEROSOL RESPIRATORY (INHALATION) at 19:17

## 2021-06-27 ASSESSMENT — FIBROSIS 4 INDEX
FIB4 SCORE: 1.01
FIB4 SCORE: 1.89

## 2021-06-27 ASSESSMENT — ENCOUNTER SYMPTOMS
HEARTBURN: 0
SHORTNESS OF BREATH: 1
FEVER: 1
FALLS: 0
VOMITING: 0
COUGH: 1
ABDOMINAL PAIN: 0
HEADACHES: 0
DOUBLE VISION: 0
CHILLS: 0
NERVOUS/ANXIOUS: 0
DIZZINESS: 0
PALPITATIONS: 0
BACK PAIN: 0
BLURRED VISION: 0

## 2021-06-27 ASSESSMENT — PAIN DESCRIPTION - PAIN TYPE
TYPE: ACUTE PAIN

## 2021-06-27 ASSESSMENT — LIFESTYLE VARIABLES: SUBSTANCE_ABUSE: 0

## 2021-06-27 NOTE — PROGRESS NOTES
Telemetry Strip     Strip printed: 1527  Measurements from am strip were as follows:  Rhythm: SR BBB  HR: 75-90  Measurements: 0.16/ 0.14/ 0.38  Ectopy: o PAC, r PVC             Normal Values  Rhythm SR  HR Range    Measurements 0.12-0.20 / 0.06-0.10  / 0.30-0.52

## 2021-06-27 NOTE — PROGRESS NOTES
Received bedside report from Cami REAL. Pt resting in bed. IV ABX finished. Pt has all safety precautions in place.

## 2021-06-27 NOTE — PROGRESS NOTES
"Hospital Medicine Daily Progress Note    Date of Service  6/27/2021    Chief Complaint  75 y.o. female admitted 6/23/2021 with shortness of breath    Hospital Course  \"75 y.o. female who presented 6/23/2021 with worsening shortness of breath.  Patient has had progressive SLB with orthopnea for the past couple days.  Patient has bilateral lower extremity edema which she states gets wrapped with compression stockings.  She noticed that she began to have more fluid leakage from her legs in the past couple weeks.  She stated she knows she has heart failure and problems with fluids but was not taking a diuretic.  Patient states she is extremely incontinent with Lasix.  She has chronic home oxygen at 2 L nasal cannula.  Patient denied any subjective fevers, coughing, abdominal pain, headache.\"    Interval Problem Update  6/24: Patient seen at bedside this morning.  She states that her shortness of breath has improved since yesterday, however she still having constant fevers.  We will continue with IV antibiotics, pending cultures.  As per nursing no other overnight events reported.    6/25: Patient seen at bedside this morning.  The patient still having fevers but less severe than yesterday.  Highest temperature this morning was 100.6, yesterday she was having 102.7.  We will continue with IV antibiotics, pending cultures.  Pending PT/OT evaluation, however the patient most likely will require acute placement.  The patient mentioning of chronic back pain for which we have ordered scheduled Tylenol and lidocaine patches, which will try to avoid opiates as much as possible.  As per nursing no other overnight events reported.    6/26: Patient seen at bedside this morning.  D-dimer was elevated, however patient does not want to have a CTA of the chest, I talked to her about possibly VQ scan which she agreed.  We have ordered, pending results.  She has not had a fever since yesterday.  PT/OT recommended acute placement.  We " have sent referral for SNF.  As per nursing no other overnight events reported.    6/27: Patient seen at bedside this morning.  We are pending VQ scan.  Patient still does not want to do a CTA of the chest.  Today she will finish antibiotic treatment.  Pending placement.  As per nursing no other overnight events reported.    Consultants/Specialty  None for now    Code Status  Full Code    Disposition  Continue medical care.  SNF once medically cleared    Review of Systems  Review of Systems   Constitutional: Positive for fever and malaise/fatigue. Negative for chills.   HENT: Negative for hearing loss and nosebleeds.    Eyes: Negative for blurred vision and double vision.   Respiratory: Positive for cough and shortness of breath.    Cardiovascular: Negative for chest pain and palpitations.   Gastrointestinal: Negative for abdominal pain, heartburn and vomiting.   Genitourinary: Negative for dysuria and urgency.   Musculoskeletal: Negative for back pain and falls.   Skin: Negative for itching and rash.   Neurological: Negative for dizziness and headaches.   Psychiatric/Behavioral: Negative for substance abuse. The patient is not nervous/anxious.    All other systems reviewed and are negative.       Physical Exam  Temp:  [36.7 °C (98 °F)-36.9 °C (98.4 °F)] 36.9 °C (98.4 °F)  Pulse:  [69-82] 82  Resp:  [16-22] 16  BP: (130-159)/(34-68) 130/68  SpO2:  [88 %-96 %] 88 %    Physical Exam  Vitals and nursing note reviewed.   Constitutional:       Appearance: She is obese. She is ill-appearing.   HENT:      Head: Normocephalic and atraumatic.      Right Ear: External ear normal.      Left Ear: External ear normal.      Mouth/Throat:      Pharynx: No oropharyngeal exudate or posterior oropharyngeal erythema.   Eyes:      General:         Right eye: No discharge.         Left eye: No discharge.   Cardiovascular:      Rate and Rhythm: Normal rate and regular rhythm.      Pulses: Normal pulses.      Heart sounds: No murmur  heard.   No gallop.    Pulmonary:      Effort: Respiratory distress present.      Breath sounds: Rhonchi present.   Abdominal:      General: Bowel sounds are normal.      Palpations: Abdomen is soft.      Tenderness: There is no abdominal tenderness. There is no guarding.   Musculoskeletal:         General: Normal range of motion.      Cervical back: Normal range of motion and neck supple. No tenderness.      Right lower leg: Edema present.      Left lower leg: Edema present.   Skin:     General: Skin is warm and dry.   Neurological:      General: No focal deficit present.      Mental Status: She is alert and oriented to person, place, and time. Mental status is at baseline.      Motor: No weakness.   Psychiatric:         Mood and Affect: Mood normal.         Behavior: Behavior normal.         Thought Content: Thought content normal.         Judgment: Judgment normal.         Fluids    Intake/Output Summary (Last 24 hours) at 6/27/2021 1225  Last data filed at 6/27/2021 0600  Gross per 24 hour   Intake 600 ml   Output 400 ml   Net 200 ml       Laboratory  Recent Labs     06/25/21 0514 06/25/21 2339 06/27/21  0438   WBC 9.4 11.5* 11.4*   RBC 3.04* 3.24* 3.10*   HEMOGLOBIN 9.4* 10.1* 9.7*   HEMATOCRIT 30.5* 32.2* 31.3*   .3* 99.4* 101.0*   MCH 30.9 31.2 31.3   MCHC 30.8* 31.4* 31.0*   RDW 54.5* 52.2* 51.9*   PLATELETCT 234 245 276   MPV 10.3 10.3 10.4     Recent Labs     06/25/21 0514 06/25/21 2339 06/27/21  0438   SODIUM 137 132* 137   POTASSIUM 4.1 3.7 3.9   CHLORIDE 102 95* 99   CO2 24 29 31   GLUCOSE 199* 286* 271*   BUN 22 15 18   CREATININE 0.65 0.74 0.63   CALCIUM 9.0 9.3 9.6                   Imaging  DX-CHEST-PORTABLE (1 VIEW)   Final Result      1.  Persistent hypoinflation with LEFT lung base atelectasis.   2.  Mild pulmonary vascular congestion, worse than prior.   3.  Stable multichamber cardiac enlargement.      DX-CHEST-LIMITED (1 VIEW)   Final Result         Stable cardiomegaly.       DX-CHEST-PORTABLE (1 VIEW)   Final Result      1.  Possible mild pulmonary interstitial edema      2.  Enlarged cardiac silhouette      NM-LUNG VENT/PERF IMAGING    (Results Pending)        Assessment/Plan  * Sepsis (HCC)- (present on admission)  Assessment & Plan  This is Sepsis Present on admission  SIRS criteria identified on my evaluation include: Tachycardia, with heart rate greater than 90 BPM and Leukocytosis, with WBC greater than 12,000  Source is PNA/CAP  Sepsis protocol initiated  Fluid resuscitation ordered per protocol -holding IV fluids patient appears to be volume overloaded  IV antibiotics as appropriate for source of sepsis -ceftriaxone and azithromycin  While organ dysfunction may be noted elsewhere in this problem list or in the chart, degree of organ dysfunction does not meet CMS criteria for severe sepsis  Associated acute on chronic hypoxic respiratory failure due to sepsis    Acute on chronic respiratory failure with hypoxia (HCC)- (present on admission)  Assessment & Plan  Patient's acute on chronic respiratory failure requiring increased oxygen needs due to probably her pneumonia.  Patient does use chronic home oxygen 2 L nasal cannula.  -Oxygen supplementation, continue IV Lasix    6/24: We will continue with IV Lasix for now and antibiotic.    6/25: Patient somewhat improving.  Currently using 3 L, yesterday she was using 4 L.    6/26: D-dimer was elevated, the patient declined to do a CT angio of the chest, however was agreeable to a VQ scan which we have ordered.    Hypothyroidism  Assessment & Plan  Continue home medications.    Hyponatremia  Assessment & Plan  Mild, monitor    Chronic back pain  Assessment & Plan  We will try to avoid opiates.  We have scheduled Tylenol and lidocaine patches for now.    Failure to thrive in adult  Assessment & Plan  PT/OT    UTI (urinary tract infection)  Assessment & Plan  Continue with ceftriaxone.  Pending cultures.    Fever  Assessment &  Plan  Patient with continued fevers.  We will continue IV antibiotics for now.  We have ordered blood cultures, pending results.  If needed we will consult ID.    6/26: Patient has not had a recorded fever since yesterday.    Pulmonary hypertension (HCC)- (present on admission)  Assessment & Plan  RVSP 45 mmHg.  11/25/2020.  - Continue Lasix  - continue oxygen supplementation    Current non-adherence to medical treatment- (present on admission)  Assessment & Plan  Patient not taking her diuretics at home, causes her too much incontinence  - continue counseling for adherence    Acute on chronic diastolic CHF (congestive heart failure), NYHA class 2 ([facility])- (present on admission)  Assessment & Plan  11/25/2020 - echo noted normal diastolic and systolic function.  Mild mitral regurg.  Mild tricuspid regurg.  Mild aortic stenosis.  Past diagnosis. Patient has evidence with pulmonary crackles, bilateral lower extremity edema with leaking venous insufficiency ulcers.  Patient has not been taking her Lasix at home.  -trial 40mg IV Lasix, patient does have sepsis appearance but is unable to tolerate more volume at this time.  Monitor for hypotension    DM (diabetes mellitus) ([facility])- (present on admission)  Assessment & Plan  Hold patient's home p.o. medications  Current A1c 8.6  ISS  Accuchecks  Hypoglycemia protocol    6/26: We will increase to medium sliding scale.  Monitor and make changes accordingly.    Dyslipidemia- (present on admission)  Assessment & Plan  Continue atorvastatin    Essential hypertension- (present on admission)  Assessment & Plan  Holding patient's home blood pressure medications as she will need diuretics, at risk for hypotension due to her sepsis from pneumonia.    6/26: We will now reorder the patient's home medication for blood pressure.    Anemia  Assessment & Plan  No signs of bleeding at this time.  Monitor, repeat CBC.       VTE prophylaxis: Lovenox

## 2021-06-27 NOTE — PROGRESS NOTES
Tele strip at 0208 shows SR w/ BBB.      Measurements from am strip were as follows:  WA=0.20  QRS=0.12  QT=0.44    Tele Shift Summary:    Rhythm : SR  Rate : 67-81  Ectopy : Per CCT Lanette, pt had R PVCs.     Telemetry monitoring strips placed in pt's chart.

## 2021-06-27 NOTE — CARE PLAN
Problem: Respiratory  Goal: Patient will achieve/maintain optimum respiratory ventilation and gas exchange  Outcome: Progressing  Instructing patient to breath out of nose with the nasal cannula if they do not want the oxymask   The patient is Watcher - Medium risk of patient condition declining or worsening    Shift Goals  Clinical Goals: Control anxiety  Patient Goals: breath better    Progress made toward(s) clinical / shift goals:  pt 02 at 97%    Patient is not progressing towards the following goals:

## 2021-06-27 NOTE — CARE PLAN
Problem: Skin Integrity  Goal: Skin integrity is maintained or improved  Outcome: Discharged - Not Met  Note: Pt at high risk for skin breakdown. Pt encouraged to turn every 2 hours. Pt currently on right side. Pt has waffle cushion in place.       Problem: Pain - Standard  Goal: Alleviation of pain or a reduction in pain to the patient’s comfort goal  Outcome: Discharged - Not Met  Note: Pt stating pain in left hip. Scheduled medication given. Pt turned of hip.    The patient is Stable - Low risk of patient condition declining or worsening    Shift Goals  Clinical Goals: Control anxiety  Patient Goals: breath better    Progress made toward(s) clinical / shift goals:  Q 2 turns    Patient is not progressing towards the following goals:

## 2021-06-27 NOTE — PROGRESS NOTES
Patient came down to Nuclear Medicine Department and Ventilation images were obtained.   Patient unable to finish V/Q Lung Scan at this time due to no patent IV. Once patient obtains IV access scan can be completed. RN notified.

## 2021-06-27 NOTE — DISCHARGE PLANNING
"Anticipated Discharge Disposition:   SNF    Action:   Chart review complete.     Per MD and PT/OT, SNF placement recommended. SNF order in place. RN STEVEN to collect choice.     1350: RN CM attempted to collect SNF choice from patient. RN's at bedside giving medications and starting an IV. RN CM to return later when more appropriate.     PASRR: [id number]    1438: RN STEVEN met with patient at bedside to collect SNF choice. When asked if patient was open to SNF, patient stated \"No!\" RN CM inquired as to why the patient doesn't want SNF. Patient stated \"because I don't want to.\" patient stated that she was thinking about going back to [facility] but would not allow RN CM to send the referral to [facility] yet. RN CM also stated that per linda, 2 other choices would be needed. Patient stated \"I won't go anywhere but [facility]!\"     Patient appears irritable at this time. RN CM stated that she will reconnect with the patient tomorrow to discuss sending referral to [facility]. MD and bedside RN updated on conversation with patient.     Barriers to Discharge:   SNF choice  Medical Clearance    Plan:   Hospital care management will continue to assist with discharge planning needs.     "

## 2021-06-28 LAB
ANION GAP SERPL CALC-SCNC: 10 MMOL/L (ref 7–16)
BACTERIA BLD CULT: NORMAL
BACTERIA BLD CULT: NORMAL
BASOPHILS # BLD AUTO: 0.4 % (ref 0–1.8)
BASOPHILS # BLD: 0.04 K/UL (ref 0–0.12)
BUN SERPL-MCNC: 18 MG/DL (ref 8–22)
CALCIUM SERPL-MCNC: 9.5 MG/DL (ref 8.4–10.2)
CHLORIDE SERPL-SCNC: 96 MMOL/L (ref 96–112)
CO2 SERPL-SCNC: 30 MMOL/L (ref 20–33)
CREAT SERPL-MCNC: 0.56 MG/DL (ref 0.5–1.4)
EOSINOPHIL # BLD AUTO: 0.37 K/UL (ref 0–0.51)
EOSINOPHIL NFR BLD: 3.4 % (ref 0–6.9)
ERYTHROCYTE [DISTWIDTH] IN BLOOD BY AUTOMATED COUNT: 50.2 FL (ref 35.9–50)
GLUCOSE BLD-MCNC: 253 MG/DL (ref 65–99)
GLUCOSE BLD-MCNC: 272 MG/DL (ref 65–99)
GLUCOSE BLD-MCNC: 313 MG/DL (ref 65–99)
GLUCOSE BLD-MCNC: 356 MG/DL (ref 65–99)
GLUCOSE SERPL-MCNC: 283 MG/DL (ref 65–99)
HCT VFR BLD AUTO: 30.7 % (ref 37–47)
HGB BLD-MCNC: 9.6 G/DL (ref 12–16)
IMM GRANULOCYTES # BLD AUTO: 0.13 K/UL (ref 0–0.11)
IMM GRANULOCYTES NFR BLD AUTO: 1.2 % (ref 0–0.9)
LYMPHOCYTES # BLD AUTO: 2.06 K/UL (ref 1–4.8)
LYMPHOCYTES NFR BLD: 19 % (ref 22–41)
MAGNESIUM SERPL-MCNC: 1.8 MG/DL (ref 1.5–2.5)
MCH RBC QN AUTO: 31.1 PG (ref 27–33)
MCHC RBC AUTO-ENTMCNC: 31.3 G/DL (ref 33.6–35)
MCV RBC AUTO: 99.4 FL (ref 81.4–97.8)
MONOCYTES # BLD AUTO: 0.57 K/UL (ref 0–0.85)
MONOCYTES NFR BLD AUTO: 5.3 % (ref 0–13.4)
NEUTROPHILS # BLD AUTO: 7.66 K/UL (ref 2–7.15)
NEUTROPHILS NFR BLD: 70.7 % (ref 44–72)
NRBC # BLD AUTO: 0 K/UL
NRBC BLD-RTO: 0 /100 WBC
PLATELET # BLD AUTO: 311 K/UL (ref 164–446)
PMV BLD AUTO: 10.2 FL (ref 9–12.9)
POTASSIUM SERPL-SCNC: 3.6 MMOL/L (ref 3.6–5.5)
RBC # BLD AUTO: 3.09 M/UL (ref 4.2–5.4)
SIGNIFICANT IND 70042: NORMAL
SIGNIFICANT IND 70042: NORMAL
SITE SITE: NORMAL
SITE SITE: NORMAL
SODIUM SERPL-SCNC: 136 MMOL/L (ref 135–145)
SOURCE SOURCE: NORMAL
SOURCE SOURCE: NORMAL
WBC # BLD AUTO: 10.8 K/UL (ref 4.8–10.8)

## 2021-06-28 PROCEDURE — 97530 THERAPEUTIC ACTIVITIES: CPT

## 2021-06-28 PROCEDURE — 700102 HCHG RX REV CODE 250 W/ 637 OVERRIDE(OP): Performed by: INTERNAL MEDICINE

## 2021-06-28 PROCEDURE — A9270 NON-COVERED ITEM OR SERVICE: HCPCS | Performed by: INTERNAL MEDICINE

## 2021-06-28 PROCEDURE — 770020 HCHG ROOM/CARE - TELE (206)

## 2021-06-28 PROCEDURE — 700105 HCHG RX REV CODE 258: Performed by: INTERNAL MEDICINE

## 2021-06-28 PROCEDURE — 700111 HCHG RX REV CODE 636 W/ 250 OVERRIDE (IP): Performed by: INTERNAL MEDICINE

## 2021-06-28 PROCEDURE — 97116 GAIT TRAINING THERAPY: CPT

## 2021-06-28 PROCEDURE — 80048 BASIC METABOLIC PNL TOTAL CA: CPT

## 2021-06-28 PROCEDURE — 94760 N-INVAS EAR/PLS OXIMETRY 1: CPT

## 2021-06-28 PROCEDURE — 83735 ASSAY OF MAGNESIUM: CPT

## 2021-06-28 PROCEDURE — 99232 SBSQ HOSP IP/OBS MODERATE 35: CPT | Performed by: INTERNAL MEDICINE

## 2021-06-28 PROCEDURE — 94640 AIRWAY INHALATION TREATMENT: CPT

## 2021-06-28 PROCEDURE — 82962 GLUCOSE BLOOD TEST: CPT

## 2021-06-28 PROCEDURE — 85025 COMPLETE CBC W/AUTO DIFF WBC: CPT

## 2021-06-28 PROCEDURE — 700101 HCHG RX REV CODE 250: Performed by: INTERNAL MEDICINE

## 2021-06-28 RX ORDER — AMLODIPINE BESYLATE 5 MG/1
10 TABLET ORAL
Status: DISCONTINUED | OUTPATIENT
Start: 2021-06-29 | End: 2021-07-01 | Stop reason: HOSPADM

## 2021-06-28 RX ORDER — DOXYCYCLINE 100 MG/1
100 TABLET ORAL EVERY 12 HOURS
Status: COMPLETED | OUTPATIENT
Start: 2021-06-28 | End: 2021-06-30

## 2021-06-28 RX ORDER — BENAZEPRIL HYDROCHLORIDE 10 MG/1
20 TABLET ORAL
Status: DISCONTINUED | OUTPATIENT
Start: 2021-06-29 | End: 2021-07-01 | Stop reason: HOSPADM

## 2021-06-28 RX ORDER — CHOLECALCIFEROL (VITAMIN D3) 125 MCG
5 CAPSULE ORAL NIGHTLY
Status: DISCONTINUED | OUTPATIENT
Start: 2021-06-28 | End: 2021-07-01 | Stop reason: HOSPADM

## 2021-06-28 RX ADMIN — AMPICILLIN SODIUM AND SULBACTAM SODIUM 3 G: 2; 1 INJECTION, POWDER, FOR SOLUTION INTRAMUSCULAR; INTRAVENOUS at 23:56

## 2021-06-28 RX ADMIN — Medication 5 MG: at 02:13

## 2021-06-28 RX ADMIN — SERTRALINE HYDROCHLORIDE 50 MG: 50 TABLET ORAL at 06:30

## 2021-06-28 RX ADMIN — BUDESONIDE AND FORMOTEROL FUMARATE DIHYDRATE 2 PUFF: 160; 4.5 AEROSOL RESPIRATORY (INHALATION) at 07:14

## 2021-06-28 RX ADMIN — Medication 5 MG: at 22:13

## 2021-06-28 RX ADMIN — GUAIFENESIN 600 MG: 600 TABLET, EXTENDED RELEASE ORAL at 06:30

## 2021-06-28 RX ADMIN — FUROSEMIDE 40 MG: 10 INJECTION, SOLUTION INTRAMUSCULAR; INTRAVENOUS at 17:32

## 2021-06-28 RX ADMIN — GUAIFENESIN 600 MG: 600 TABLET, EXTENDED RELEASE ORAL at 17:37

## 2021-06-28 RX ADMIN — BUDESONIDE AND FORMOTEROL FUMARATE DIHYDRATE 2 PUFF: 160; 4.5 AEROSOL RESPIRATORY (INHALATION) at 19:42

## 2021-06-28 RX ADMIN — AMLODIPINE BESYLATE 5 MG: 5 TABLET ORAL at 06:30

## 2021-06-28 RX ADMIN — ACETAMINOPHEN 1000 MG: 500 TABLET, FILM COATED ORAL at 06:30

## 2021-06-28 RX ADMIN — ACETAMINOPHEN 1000 MG: 500 TABLET, FILM COATED ORAL at 14:37

## 2021-06-28 RX ADMIN — LIDOCAINE 1 PATCH: 50 PATCH TOPICAL at 09:27

## 2021-06-28 RX ADMIN — AMPICILLIN SODIUM AND SULBACTAM SODIUM 3 G: 2; 1 INJECTION, POWDER, FOR SOLUTION INTRAMUSCULAR; INTRAVENOUS at 17:32

## 2021-06-28 RX ADMIN — LEVOTHYROXINE SODIUM 25 MCG: 0.03 TABLET ORAL at 06:30

## 2021-06-28 RX ADMIN — ATORVASTATIN CALCIUM 40 MG: 40 TABLET, FILM COATED ORAL at 06:30

## 2021-06-28 RX ADMIN — ACETAMINOPHEN 1000 MG: 500 TABLET, FILM COATED ORAL at 22:14

## 2021-06-28 RX ADMIN — BENAZEPRIL HYDROCHLORIDE 20 MG: 10 TABLET ORAL at 06:30

## 2021-06-28 RX ADMIN — ENOXAPARIN SODIUM 40 MG: 40 INJECTION SUBCUTANEOUS at 17:35

## 2021-06-28 RX ADMIN — DOXYCYCLINE 100 MG: 100 TABLET, FILM COATED ORAL at 18:12

## 2021-06-28 ASSESSMENT — COGNITIVE AND FUNCTIONAL STATUS - GENERAL
STANDING UP FROM CHAIR USING ARMS: A LITTLE
MOVING FROM LYING ON BACK TO SITTING ON SIDE OF FLAT BED: A LITTLE
SUGGESTED CMS G CODE MODIFIER MOBILITY: CK
CLIMB 3 TO 5 STEPS WITH RAILING: A LOT
MOBILITY SCORE: 15
MOVING TO AND FROM BED TO CHAIR: UNABLE
TURNING FROM BACK TO SIDE WHILE IN FLAT BAD: A LITTLE
WALKING IN HOSPITAL ROOM: A LITTLE

## 2021-06-28 ASSESSMENT — ENCOUNTER SYMPTOMS
HEADACHES: 0
FEVER: 0
COUGH: 1
BACK PAIN: 0
CHILLS: 0
DIZZINESS: 0
PALPITATIONS: 0
ABDOMINAL PAIN: 0
FALLS: 0
NERVOUS/ANXIOUS: 0
BLURRED VISION: 0
DOUBLE VISION: 0
VOMITING: 0
SHORTNESS OF BREATH: 1
HEARTBURN: 0

## 2021-06-28 ASSESSMENT — FIBROSIS 4 INDEX: FIB4 SCORE: 0.9

## 2021-06-28 ASSESSMENT — PAIN DESCRIPTION - PAIN TYPE
TYPE: CHRONIC PAIN
TYPE: CHRONIC PAIN

## 2021-06-28 ASSESSMENT — GAIT ASSESSMENTS
ASSISTIVE DEVICE: 4 WHEEL WALKER
GAIT LEVEL OF ASSIST: SUPERVISED
DISTANCE (FEET): 120

## 2021-06-28 ASSESSMENT — LIFESTYLE VARIABLES: SUBSTANCE_ABUSE: 0

## 2021-06-28 NOTE — DISCHARGE PLANNING
Received Choice form at 1255  Agency/Facility Name: [facility]  Referral sent per Choice form @ 1345    This DPA notified RN via teams about needing more choices.      -[phone]  Agency/Facility Name: [facility]  Outcome: DPA left vmail. Awaiting call back.

## 2021-06-28 NOTE — CARE PLAN
Pt receiving ordered therapy and demonstrating compliance.  No adverse or ill effects observed.  No SOB or distress noted or observed.  Will continue to monitor.

## 2021-06-28 NOTE — CARE PLAN
The patient is Stable - Low risk of patient condition declining or worsening    Shift Goals  Clinical Goals: Control anxiety  Patient Goals: breath better    Progress made toward(s) clinical / shift goals:      Problem: Respiratory  Goal: Patient will achieve/maintain optimum respiratory ventilation and gas exchange  Outcome: Progressing     Problem: Fall Risk  Goal: Patient will remain free from falls  Outcome: Progressing     Problem: Skin Integrity  Goal: Skin integrity is maintained or improved  Outcome: Progressing      Patient is not progressing towards the following goals:

## 2021-06-28 NOTE — PROGRESS NOTES
"Hospital Medicine Daily Progress Note    Date of Service  6/28/2021    Chief Complaint  75 y.o. female admitted 6/23/2021 with shortness of breath    Hospital Course  \"75 y.o. female who presented 6/23/2021 with worsening shortness of breath.  Patient has had progressive SLB with orthopnea for the past couple days.  Patient has bilateral lower extremity edema which she states gets wrapped with compression stockings.  She noticed that she began to have more fluid leakage from her legs in the past couple weeks.  She stated she knows she has heart failure and problems with fluids but was not taking a diuretic.  Patient states she is extremely incontinent with Lasix.  She has chronic home oxygen at 2 L nasal cannula.  Patient denied any subjective fevers, coughing, abdominal pain, headache.\"    Interval Problem Update  6/24: Patient seen at bedside this morning.  She states that her shortness of breath has improved since yesterday, however she still having constant fevers.  We will continue with IV antibiotics, pending cultures.  As per nursing no other overnight events reported.    6/25: Patient seen at bedside this morning.  The patient still having fevers but less severe than yesterday.  Highest temperature this morning was 100.6, yesterday she was having 102.7.  We will continue with IV antibiotics, pending cultures.  Pending PT/OT evaluation, however the patient most likely will require acute placement.  The patient mentioning of chronic back pain for which we have ordered scheduled Tylenol and lidocaine patches, which will try to avoid opiates as much as possible.  As per nursing no other overnight events reported.    6/26: Patient seen at bedside this morning.  D-dimer was elevated, however patient does not want to have a CTA of the chest, I talked to her about possibly VQ scan which she agreed.  We have ordered, pending results.  She has not had a fever since yesterday.  PT/OT recommended acute placement.  We " have sent referral for SNF.  As per nursing no other overnight events reported.    6/27: Patient seen at bedside this morning.  We are pending VQ scan.  Patient still does not want to do a CTA of the chest.  Today she will finish antibiotic treatment.  Pending placement.  As per nursing no other overnight events reported.    6/28: Patient seen at bedside this morning patient still requiring more oxygen than baseline.  VQ scan was read as low probability for PE.  Patient still does not want to do a CTA of the chest.  We will hold anticoagulation for now.  We will continue antibiotic treatment with Unasyn and doxycycline.  We are pending placement.  As per nursing no other overnight events reported.    Consultants/Specialty  None for now    Code Status  Full Code    Disposition  Pending SNF placement.    Review of Systems  Review of Systems   Constitutional: Positive for malaise/fatigue. Negative for chills and fever.   HENT: Negative for hearing loss and nosebleeds.    Eyes: Negative for blurred vision and double vision.   Respiratory: Positive for cough and shortness of breath.    Cardiovascular: Negative for chest pain and palpitations.   Gastrointestinal: Negative for abdominal pain, heartburn and vomiting.   Genitourinary: Negative for dysuria and urgency.   Musculoskeletal: Negative for back pain and falls.   Skin: Negative for itching and rash.   Neurological: Negative for dizziness and headaches.   Psychiatric/Behavioral: Negative for substance abuse. The patient is not nervous/anxious.    All other systems reviewed and are negative.       Physical Exam  Temp:  [36.3 °C (97.4 °F)-36.8 °C (98.3 °F)] 36.6 °C (97.9 °F)  Pulse:  [73-84] 82  Resp:  [16-18] 18  BP: (113-162)/(39-84) 142/84  SpO2:  [90 %-98 %] 90 %    Physical Exam  Vitals and nursing note reviewed.   Constitutional:       Appearance: She is obese. She is ill-appearing.   HENT:      Head: Normocephalic and atraumatic.      Right Ear: External ear  normal.      Left Ear: External ear normal.      Mouth/Throat:      Pharynx: No oropharyngeal exudate or posterior oropharyngeal erythema.   Eyes:      General:         Right eye: No discharge.         Left eye: No discharge.   Cardiovascular:      Rate and Rhythm: Normal rate and regular rhythm.      Pulses: Normal pulses.      Heart sounds: No murmur heard.   No gallop.    Pulmonary:      Effort: Respiratory distress present.      Breath sounds: Rhonchi present.   Abdominal:      General: Bowel sounds are normal.      Palpations: Abdomen is soft.      Tenderness: There is no abdominal tenderness. There is no guarding.   Musculoskeletal:         General: Normal range of motion.      Cervical back: Normal range of motion and neck supple. No tenderness.      Right lower leg: Edema present.      Left lower leg: Edema present.   Skin:     General: Skin is warm and dry.   Neurological:      General: No focal deficit present.      Mental Status: She is alert and oriented to person, place, and time. Mental status is at baseline.      Motor: No weakness.   Psychiatric:         Mood and Affect: Mood normal.         Behavior: Behavior normal.         Thought Content: Thought content normal.         Judgment: Judgment normal.         Fluids    Intake/Output Summary (Last 24 hours) at 6/28/2021 1617  Last data filed at 6/28/2021 0900  Gross per 24 hour   Intake 870 ml   Output 400 ml   Net 470 ml       Laboratory  Recent Labs     06/25/21 2339 06/27/21 0438 06/28/21  0452   WBC 11.5* 11.4* 10.8   RBC 3.24* 3.10* 3.09*   HEMOGLOBIN 10.1* 9.7* 9.6*   HEMATOCRIT 32.2* 31.3* 30.7*   MCV 99.4* 101.0* 99.4*   MCH 31.2 31.3 31.1   MCHC 31.4* 31.0* 31.3*   RDW 52.2* 51.9* 50.2*   PLATELETCT 245 276 311   MPV 10.3 10.4 10.2     Recent Labs     06/25/21 2339 06/27/21  0438 06/28/21  0452   SODIUM 132* 137 136   POTASSIUM 3.7 3.9 3.6   CHLORIDE 95* 99 96   CO2 29 31 30   GLUCOSE 286* 271* 283*   BUN 15 18 18   CREATININE 0.74 0.63  0.56   CALCIUM 9.3 9.6 9.5                   Imaging  NM-LUNG VENT/PERF IMAGING   Final Result      1.  Low probability for pulmonary embolus.   2.  Airway disease.      DX-CHEST-PORTABLE (1 VIEW)   Final Result      1.  Persistent hypoinflation with LEFT lung base atelectasis.   2.  Mild pulmonary vascular congestion, worse than prior.   3.  Stable multichamber cardiac enlargement.      DX-CHEST-LIMITED (1 VIEW)   Final Result         Stable cardiomegaly.      DX-CHEST-PORTABLE (1 VIEW)   Final Result      1.  Possible mild pulmonary interstitial edema      2.  Enlarged cardiac silhouette           Assessment/Plan  * Acute on chronic respiratory failure with hypoxia (HCC)- (present on admission)  Assessment & Plan  Patient's acute on chronic respiratory failure requiring increased oxygen needs due to probably her pneumonia.  Patient does use chronic home oxygen 2 L nasal cannula.  -Oxygen supplementation, continue IV Lasix    6/24: We will continue with IV Lasix for now and antibiotic.    6/25: Patient somewhat improving.  Currently using 3 L, yesterday she was using 4 L.    6/26: D-dimer was elevated, the patient declined to do a CT angio of the chest, however was agreeable to a VQ scan which we have ordered.    6/28: VQ scan was read as low probability for pulmonary embolism.  Patient still does not want to do a CT angio of the chest.  Yesterday was the fifth day of antibiotics.  We will start with Unasyn and doxycycline for a couple more days to see if her respiratory status improves.  She did have increased procalcitonin on admission.    Sepsis (HCC)- (present on admission)  Assessment & Plan  This is Sepsis Present on admission  SIRS criteria identified on my evaluation include: Tachycardia, with heart rate greater than 90 BPM and Leukocytosis, with WBC greater than 12,000  Source is PNA/CAP  Sepsis protocol initiated  Fluid resuscitation ordered per protocol -holding IV fluids patient appears to be volume  overloaded  IV antibiotics as appropriate for source of sepsis -ceftriaxone and azithromycin  While organ dysfunction may be noted elsewhere in this problem list or in the chart, degree of organ dysfunction does not meet CMS criteria for severe sepsis  Associated acute on chronic hypoxic respiratory failure due to sepsis    6/28: Patient had sepsis on admission, currently not meeting criteria.  We will continue antibiotics with Unasyn and doxycycline for a couple more days, monitor and make changes accordingly.    Hypothyroidism  Assessment & Plan  Continue home medications.    Hyponatremia  Assessment & Plan  Mild, monitor    6/27: Resolved.    Chronic back pain  Assessment & Plan  We will try to avoid opiates.  We have scheduled Tylenol and lidocaine patches for now.    Failure to thrive in adult  Assessment & Plan  PT/OT    Pending placement.    UTI (urinary tract infection)  Assessment & Plan  Continue with ceftriaxone.  Pending cultures.    6/27: Patient finished antibiotic therapy today with rocephin    Fever  Assessment & Plan  Patient with continued fevers.  We will continue IV antibiotics for now.  We have ordered blood cultures, pending results.  If needed we will consult ID.    6/28: Patient has not had a recorded fever since 6/26.  Blood cultures negative to date.    Pulmonary hypertension (HCC)- (present on admission)  Assessment & Plan  RVSP 45 mmHg.  11/25/2020.  - Continue Lasix  - continue oxygen supplementation    Current non-adherence to medical treatment- (present on admission)  Assessment & Plan  Patient not taking her diuretics at home, causes her too much incontinence  - continue counseling for adherence    Acute on chronic diastolic CHF (congestive heart failure), NYHA class 2 (HCC)- (present on admission)  Assessment & Plan  11/25/2020 - echo noted normal diastolic and systolic function.  Mild mitral regurg.  Mild tricuspid regurg.  Mild aortic stenosis.  Past diagnosis. Patient has evidence  with pulmonary crackles, bilateral lower extremity edema with leaking venous insufficiency ulcers.  Patient has not been taking her Lasix at home.  -trial 40mg IV Lasix, patient does have sepsis appearance but is unable to tolerate more volume at this time.  Monitor for hypotension    6/28: We will continue with lasix at 40 mg IV daily.  Patient mentions she feels much better, lower extremity edema has improved.  It appears I/O's are not reliable. We will try to obtain more strict I/O's and consider switching to PO lasix when appropriate.    DM (diabetes mellitus) (HCC)- (present on admission)  Assessment & Plan  Hold patient's home p.o. medications  Current A1c 8.6  ISS  Accuchecks  Hypoglycemia protocol    6/26: We will increase to medium sliding scale.  Monitor and make changes accordingly.  6/27: We will add lantus 5  U daily.    6/28: We will increase lantus to 10 U    Dyslipidemia- (present on admission)  Assessment & Plan  Continue atorvastatin    Essential hypertension- (present on admission)  Assessment & Plan  Holding patient's home blood pressure medications as she will need diuretics, at risk for hypotension due to her sepsis from pneumonia.    6/26: We will now reorder the patient's home medication for blood pressure.  6/28: We have increased amlodipine to 10 mg.    Anemia  Assessment & Plan  No signs of bleeding at this time.  Monitor, repeat CBC.  Iron studies and vit b12 pending       VTE prophylaxis: Lovenox

## 2021-06-28 NOTE — CARE PLAN
The patient is Stable - Low risk of patient condition declining or worsening    Shift Goals  Clinical Goals: Control anxiety, make SNF choice  Problem: Knowledge Deficit - Standard  Goal: Patient and family/care givers will demonstrate understanding of plan of care, disease process/condition, diagnostic tests and medications  Outcome: Not Progressing     Patient Goals: breath better    Progress made toward(s) clinical / shift goals:  pt requesting re evaluation from PT before making choice for SNF or home health, educated pt on recommendations for SNF based on previous PT evaluation.     Patient is not progressing towards the following goals:      Problem: Knowledge Deficit - Standard  Goal: Patient and family/care givers will demonstrate understanding of plan of care, disease process/condition, diagnostic tests and medications  Outcome: Not Progressing

## 2021-06-28 NOTE — THERAPY
Physical Therapy   Daily Treatment     Patient Name: Gabrielle Olmstead  Age:  75 y.o., Sex:  female  Medical Record #: [id number]  Today's Date: 6/28/2021     Precautions: Fall Risk    Assessment    Pt states she is normally on 2LO2 at home, but is on 4L in the hospital. O2 sats at rest 94%, however they decreased to 89% with activity. Requires cues for resting and pacing. Recovery to 91% achieved after about 5 min of rest at EOB. Pt did not have any LOB or path deviations, however she is determined that she would like to go home. Lengthy discussion after therapy discussing the benefits of discharging to a post acute placement to allow for adequate recovery time. Pt verbalized understanding as to the need for support prior to discharging home. She will likely only need 1-2 more PT visits given the progress she has made since her evaluation.     Plan    Continue current treatment plan.    DC Equipment Recommendations:  None (pt has all necessary DME)  Discharge Recommendations: Recommend post-acute placement for additional physical therapy services prior to discharge home      Subjective    Eager to work with PT due to wanting to go home and didn't feel her PT evaluation was a true representation of her abilities because she was not feeling well     Objective     06/28/21 1234   Total Time Spent   Total Time Spent (Mins) 45   Charge Group   Charges  Yes   PT Gait Training 1   PT Therapeutic Activities 2   Vitals   O2 (LPM) 4   O2 Delivery Device Silicone Nasal Cannula   Vitals Comments 94% at rest; 89% with activity   Pain   Pain Scales 0 to 10 Scale    Pain 0 - 10 Group   Pain Rating Scale (NPRS) 0   Balance   Sitting Balance (Static) Good   Sitting Balance (Dynamic) Fair +   Standing Balance (Static) Fair   Standing Balance (Dynamic) Fair   Comments with 4WW   Gait Analysis   Gait Level Of Assist Supervised   Assistive Device 4 Wheel Walker   Distance (Feet) 120   # of Times Distance was Traveled 1   Skilled Intervention  Sequencing;Verbal Cuing   Comments cued for pacing; required 1 standing rest. Recluctant to sit and rest on the 4WW preferring to return to room . Quickly becomes SOB even in conversation; required 5 min rest to recover from ambulation   Bed Mobility    Supine to Sit Modified Independent  (HOB elevated; sleeps in a recliner)   Sit to Supine Minimal Assist  (fatigued and required assist with legs and respositioning)   Scooting Independent  (sitting EOB)   Skilled Intervention Verbal Cuing;Sequencing   Functional Mobility   Sit to Stand Modified Independent   Mobility in hallway; pacing strategies; 1 standing rest   Skilled Intervention Verbal Cuing   Comments cues for pacing and rest; pt tends to move quickly causing overexertion   How much difficulty does the patient currently have...   Turning over in bed (including adjusting bedclothes, sheets and blankets)? 3   Sitting down on and standing up from a chair with arms (e.g., wheelchair, bedside commode, etc.) 3   Moving from lying on back to sitting on the side of the bed? 1   How much help from another person does the patient currently need...   Moving to and from a bed to a chair (including a wheelchair)? 3   Need to walk in a hospital room? 3   Climbing 3-5 steps with a railing? 2   6 clicks Mobility Score 15   Short Term Goals    Short Term Goal # 1 pt will go supine<>sit w/hob elevated and rails up w/spv in 6tx for safe d/c home   Goal Outcome # 1 Goal met  (pt sleeps in a recliner)   Short Term Goal # 2 pt will go sit<>stand w/fww w/spv in 6tx for safe d/c home    Goal Outcome # 2 Goal met   Short Term Goal # 3 pt will transfer bed<>chair w/fww w/spv in 6tx for safe d/c home   Goal Outcome # 3 Goal met   Short Term Goal # 4 pt will ambulate for 150ft w/fww w/spv in 6tx for safe d/c home    Goal Outcome # 4 Goal not met  (120ft; exertion during gait)   Short Term Goal # 5 pt will go up/down 1 step w/spv w/fww in 6tx for safe d/c home   Goal Outcome # 5   (goal  not addressed; too SOB during gait)   Anticipated Discharge Equipment and Recommendations   DC Equipment Recommendations None  (pt has all necessary DME)   Discharge Recommendations Recommend post-acute placement for additional physical therapy services prior to discharge home   Interdisciplinary Plan of Care Collaboration   IDT Collaboration with  Nursing   Patient Position at End of Therapy In Bed;Bed Alarm On;Tray Table within Reach;Call Light within Reach;Phone within Reach   Collaboration Comments aware of PT findings   Session Information   Date / Session Number  6/28 2 (2/3, 7/1)     Judi Barlow, PT

## 2021-06-28 NOTE — PROGRESS NOTES
Received bedside report and assumed care of patient. Patient is A&Ox4 and awake in bed. Patient showing no signs of distress, no complaints of pain, and is on 5L/nasal cannula. Tele monitor in place. Fall precautions are in place. Call light within reach, bed in low position, 3 side rails up, and belongings are within reach.  Patient was updated on Plan of Care. Pt educated on use of call light for assistance. Patient educated on q2 hour turns. Patient verbalized understanding and demonstrated self repositioning.

## 2021-06-28 NOTE — FLOWSHEET NOTE
06/27/21 1917   Events/Summary/Plan   Events/Summary/Plan MDI Tx Given   Vital Signs   Pulse 84   Respiration 16   Pulse Oximetry 97 %   $ Pulse Oximetry (Spot Check) Yes   O2 Alarms Set & Reviewed Yes   Respiratory Assessment   Respiratory Pattern Within Normal Limits   Level of Consciousness Alert   Chest Exam   Work Of Breathing / Effort Mild   Breath Sounds   RUL Breath Sounds Diminished   RML Breath Sounds Diminished   RLL Breath Sounds Diminished   MOSHE Breath Sounds Diminished   LLL Breath Sounds Diminished   Secretions   Cough Congested   How Sputum Obtained Cough on Request   Sputum Amount Unable to Evaluate   Sputum Color Unable to Evaluate   Sputum Consistency Unable to Evaluate   Oxygen   O2 (LPM) 5   O2 Delivery Device Nasal Cannula

## 2021-06-28 NOTE — PROGRESS NOTES
Telemetry Strip     Strip printed: 1431  Measurements from am strip were as follows:  Rhythm: SR  HR: 70-80  Measurements: 0.18/ 0.14/ 0.36  Ectopy: r-o PAC, r coup             Normal Values  Rhythm SR  HR Range    Measurements 0.12-0.20 / 0.06-0.10  / 0.30-0.52

## 2021-06-28 NOTE — PROGRESS NOTES
Informed Dr. Gregorio of patient complaint that she cannot sleep. Also informed of elevated blood glucose in 300s. Orders in place

## 2021-06-28 NOTE — DISCHARGE PLANNING
"Anticipated Discharge Disposition:   SNF     Action:   Chart review complete.     0915: RN CM met with patient at bedside to discuss SNF choice further. At this time, patient is refusing to give SNF choice and wants to go home but is not willing to given HH choice either. She feels that we are \"judging her\" and she wants PT to reevaluate her. Bedside RN present for this conversation.     MD made aware of patient's request. PT to see patient again after lunch.     1234: RN CM informed by [facility] charge that PT reevaluated and is still saying SNF. Patient agrees. RN CM to collect choice.     1245: RN CM met with patient at bedside with Charge RN. Patient gave consent for choice to be sent to [facility]. Patient is refusing to have RN CM send choices to other SNF's. RN CM asked if [facility] declines, will she be able to provide other choices. Patient stated \"No.\"    1255: Choice form faxed to [facility]. [facility] made aware is refusing to have choice sent to other facilities.     Barriers to Discharge:   SNF acceptance   Medical Clearance    Plan:   Hospital care management will continue to assist with discharge planning needs.     "

## 2021-06-29 LAB
ALBUMIN SERPL BCP-MCNC: 3.4 G/DL (ref 3.2–4.9)
ALBUMIN/GLOB SERPL: 0.9 G/DL
ALP SERPL-CCNC: 252 U/L (ref 30–99)
ALT SERPL-CCNC: 79 U/L (ref 2–50)
ANION GAP SERPL CALC-SCNC: 10 MMOL/L (ref 7–16)
AST SERPL-CCNC: 50 U/L (ref 12–45)
BACTERIA BLD CULT: NORMAL
BACTERIA BLD CULT: NORMAL
BASOPHILS # BLD AUTO: 0.3 % (ref 0–1.8)
BASOPHILS # BLD: 0.03 K/UL (ref 0–0.12)
BILIRUB SERPL-MCNC: 0.2 MG/DL (ref 0.1–1.5)
BUN SERPL-MCNC: 20 MG/DL (ref 8–22)
CALCIUM SERPL-MCNC: 9.9 MG/DL (ref 8.4–10.2)
CHLORIDE SERPL-SCNC: 98 MMOL/L (ref 96–112)
CO2 SERPL-SCNC: 31 MMOL/L (ref 20–33)
CREAT SERPL-MCNC: 0.59 MG/DL (ref 0.5–1.4)
EOSINOPHIL # BLD AUTO: 0.54 K/UL (ref 0–0.51)
EOSINOPHIL NFR BLD: 4.6 % (ref 0–6.9)
ERYTHROCYTE [DISTWIDTH] IN BLOOD BY AUTOMATED COUNT: 49.5 FL (ref 35.9–50)
FERRITIN SERPL-MCNC: 241 NG/ML (ref 10–291)
GLOBULIN SER CALC-MCNC: 3.8 G/DL (ref 1.9–3.5)
GLUCOSE BLD-MCNC: 228 MG/DL (ref 65–99)
GLUCOSE BLD-MCNC: 274 MG/DL (ref 65–99)
GLUCOSE BLD-MCNC: 293 MG/DL (ref 65–99)
GLUCOSE BLD-MCNC: 331 MG/DL (ref 65–99)
GLUCOSE SERPL-MCNC: 248 MG/DL (ref 65–99)
HCT VFR BLD AUTO: 32.3 % (ref 37–47)
HGB BLD-MCNC: 10.1 G/DL (ref 12–16)
IMM GRANULOCYTES # BLD AUTO: 0.14 K/UL (ref 0–0.11)
IMM GRANULOCYTES NFR BLD AUTO: 1.2 % (ref 0–0.9)
IRON SATN MFR SERPL: 29 % (ref 15–55)
IRON SERPL-MCNC: 53 UG/DL (ref 40–170)
LYMPHOCYTES # BLD AUTO: 2.13 K/UL (ref 1–4.8)
LYMPHOCYTES NFR BLD: 18 % (ref 22–41)
MAGNESIUM SERPL-MCNC: 1.6 MG/DL (ref 1.5–2.5)
MCH RBC QN AUTO: 30.8 PG (ref 27–33)
MCHC RBC AUTO-ENTMCNC: 31.3 G/DL (ref 33.6–35)
MCV RBC AUTO: 98.5 FL (ref 81.4–97.8)
MONOCYTES # BLD AUTO: 0.63 K/UL (ref 0–0.85)
MONOCYTES NFR BLD AUTO: 5.3 % (ref 0–13.4)
NEUTROPHILS # BLD AUTO: 8.39 K/UL (ref 2–7.15)
NEUTROPHILS NFR BLD: 70.6 % (ref 44–72)
NRBC # BLD AUTO: 0 K/UL
NRBC BLD-RTO: 0 /100 WBC
PLATELET # BLD AUTO: 370 K/UL (ref 164–446)
PMV BLD AUTO: 10.1 FL (ref 9–12.9)
POTASSIUM SERPL-SCNC: 3.5 MMOL/L (ref 3.6–5.5)
PROT SERPL-MCNC: 7.2 G/DL (ref 6–8.2)
RBC # BLD AUTO: 3.28 M/UL (ref 4.2–5.4)
SIGNIFICANT IND 70042: NORMAL
SIGNIFICANT IND 70042: NORMAL
SITE SITE: NORMAL
SITE SITE: NORMAL
SODIUM SERPL-SCNC: 139 MMOL/L (ref 135–145)
SOURCE SOURCE: NORMAL
SOURCE SOURCE: NORMAL
TIBC SERPL-MCNC: 185 UG/DL (ref 250–450)
UIBC SERPL-MCNC: 132 UG/DL (ref 110–370)
VIT B12 SERPL-MCNC: 718 PG/ML (ref 211–911)
WBC # BLD AUTO: 11.9 K/UL (ref 4.8–10.8)

## 2021-06-29 PROCEDURE — 99232 SBSQ HOSP IP/OBS MODERATE 35: CPT | Performed by: INTERNAL MEDICINE

## 2021-06-29 PROCEDURE — 700111 HCHG RX REV CODE 636 W/ 250 OVERRIDE (IP): Performed by: INTERNAL MEDICINE

## 2021-06-29 PROCEDURE — 94760 N-INVAS EAR/PLS OXIMETRY 1: CPT

## 2021-06-29 PROCEDURE — 700102 HCHG RX REV CODE 250 W/ 637 OVERRIDE(OP): Performed by: INTERNAL MEDICINE

## 2021-06-29 PROCEDURE — 700101 HCHG RX REV CODE 250: Performed by: INTERNAL MEDICINE

## 2021-06-29 PROCEDURE — 82962 GLUCOSE BLOOD TEST: CPT | Mod: 91

## 2021-06-29 PROCEDURE — 700105 HCHG RX REV CODE 258: Performed by: INTERNAL MEDICINE

## 2021-06-29 PROCEDURE — 83735 ASSAY OF MAGNESIUM: CPT

## 2021-06-29 PROCEDURE — 83550 IRON BINDING TEST: CPT

## 2021-06-29 PROCEDURE — 80053 COMPREHEN METABOLIC PANEL: CPT

## 2021-06-29 PROCEDURE — A9270 NON-COVERED ITEM OR SERVICE: HCPCS | Performed by: INTERNAL MEDICINE

## 2021-06-29 PROCEDURE — 82728 ASSAY OF FERRITIN: CPT

## 2021-06-29 PROCEDURE — 85025 COMPLETE CBC W/AUTO DIFF WBC: CPT

## 2021-06-29 PROCEDURE — 770020 HCHG ROOM/CARE - TELE (206)

## 2021-06-29 PROCEDURE — 82607 VITAMIN B-12: CPT

## 2021-06-29 PROCEDURE — 94640 AIRWAY INHALATION TREATMENT: CPT

## 2021-06-29 PROCEDURE — 83540 ASSAY OF IRON: CPT

## 2021-06-29 RX ORDER — FUROSEMIDE 10 MG/ML
40 INJECTION INTRAMUSCULAR; INTRAVENOUS
Status: DISCONTINUED | OUTPATIENT
Start: 2021-06-29 | End: 2021-06-30

## 2021-06-29 RX ORDER — AMOXICILLIN AND CLAVULANATE POTASSIUM 500; 125 MG/1; MG/1
1 TABLET, FILM COATED ORAL EVERY 12 HOURS
Status: DISCONTINUED | OUTPATIENT
Start: 2021-06-29 | End: 2021-07-01 | Stop reason: HOSPADM

## 2021-06-29 RX ADMIN — FUROSEMIDE 40 MG: 10 INJECTION, SOLUTION INTRAMUSCULAR; INTRAVENOUS at 17:18

## 2021-06-29 RX ADMIN — LEVOTHYROXINE SODIUM 25 MCG: 0.03 TABLET ORAL at 05:31

## 2021-06-29 RX ADMIN — AMPICILLIN SODIUM AND SULBACTAM SODIUM 3 G: 2; 1 INJECTION, POWDER, FOR SOLUTION INTRAMUSCULAR; INTRAVENOUS at 11:40

## 2021-06-29 RX ADMIN — GUAIFENESIN 600 MG: 600 TABLET, EXTENDED RELEASE ORAL at 17:17

## 2021-06-29 RX ADMIN — AMLODIPINE BESYLATE 10 MG: 5 TABLET ORAL at 05:30

## 2021-06-29 RX ADMIN — ENOXAPARIN SODIUM 40 MG: 40 INJECTION SUBCUTANEOUS at 17:20

## 2021-06-29 RX ADMIN — BUDESONIDE AND FORMOTEROL FUMARATE DIHYDRATE 2 PUFF: 160; 4.5 AEROSOL RESPIRATORY (INHALATION) at 07:03

## 2021-06-29 RX ADMIN — AMOXICILLIN AND CLAVULANATE POTASSIUM 1 TABLET: 500; 125 TABLET, FILM COATED ORAL at 17:17

## 2021-06-29 RX ADMIN — ALBUTEROL SULFATE 2 PUFF: 90 AEROSOL, METERED RESPIRATORY (INHALATION) at 05:41

## 2021-06-29 RX ADMIN — Medication 5 MG: at 22:50

## 2021-06-29 RX ADMIN — DOXYCYCLINE 100 MG: 100 TABLET, FILM COATED ORAL at 17:17

## 2021-06-29 RX ADMIN — AMPICILLIN SODIUM AND SULBACTAM SODIUM 3 G: 2; 1 INJECTION, POWDER, FOR SOLUTION INTRAMUSCULAR; INTRAVENOUS at 05:31

## 2021-06-29 RX ADMIN — ACETAMINOPHEN 1000 MG: 500 TABLET, FILM COATED ORAL at 05:30

## 2021-06-29 RX ADMIN — SERTRALINE HYDROCHLORIDE 50 MG: 50 TABLET ORAL at 05:31

## 2021-06-29 RX ADMIN — ACETAMINOPHEN 1000 MG: 500 TABLET, FILM COATED ORAL at 14:10

## 2021-06-29 RX ADMIN — ACETAMINOPHEN 1000 MG: 500 TABLET, FILM COATED ORAL at 22:50

## 2021-06-29 RX ADMIN — LIDOCAINE 1 PATCH: 50 PATCH TOPICAL at 11:43

## 2021-06-29 RX ADMIN — BUDESONIDE AND FORMOTEROL FUMARATE DIHYDRATE 2 PUFF: 160; 4.5 AEROSOL RESPIRATORY (INHALATION) at 20:28

## 2021-06-29 RX ADMIN — DOXYCYCLINE 100 MG: 100 TABLET, FILM COATED ORAL at 05:30

## 2021-06-29 RX ADMIN — ATORVASTATIN CALCIUM 40 MG: 40 TABLET, FILM COATED ORAL at 05:30

## 2021-06-29 RX ADMIN — BENAZEPRIL HYDROCHLORIDE 20 MG: 10 TABLET ORAL at 05:29

## 2021-06-29 RX ADMIN — GUAIFENESIN 600 MG: 600 TABLET, EXTENDED RELEASE ORAL at 05:31

## 2021-06-29 ASSESSMENT — ENCOUNTER SYMPTOMS
FLANK PAIN: 0
DEPRESSION: 0
HEADACHES: 0
MYALGIAS: 0
NAUSEA: 0
HEMOPTYSIS: 0
SPUTUM PRODUCTION: 0
VOMITING: 0
BLOOD IN STOOL: 0
HEARTBURN: 0
SPEECH CHANGE: 0
BLURRED VISION: 0
WHEEZING: 0
SENSORY CHANGE: 0
BRUISES/BLEEDS EASILY: 0
PHOTOPHOBIA: 0
CLAUDICATION: 0
WEAKNESS: 0
COUGH: 0
PALPITATIONS: 0
DOUBLE VISION: 0
EYE DISCHARGE: 0
SORE THROAT: 0
CHILLS: 0
BACK PAIN: 0
ORTHOPNEA: 0
FEVER: 0
DIARRHEA: 0
SHORTNESS OF BREATH: 0
ABDOMINAL PAIN: 0
DIZZINESS: 0

## 2021-06-29 ASSESSMENT — FIBROSIS 4 INDEX: FIB4 SCORE: 0.9

## 2021-06-29 ASSESSMENT — PAIN DESCRIPTION - PAIN TYPE
TYPE: CHRONIC PAIN
TYPE: CHRONIC PAIN

## 2021-06-29 NOTE — DISCHARGE PLANNING
Anticipated Discharge Disposition: [facility]    Action: Patient accepted at [facility], transport at 2:00pm by [facility]. EUNICE completed, Dr. Paulino updated for d/c summary, RN, updated.     Barriers to Discharge: None    Plan: Pending d/c summary. Patient to d/c at 2:00pm to [facility].     *Update 11:48am: Dr. Paulino wants to hold one more day for Lasix. Check medical clearance tomorrow.     [facility] updated to cancel.

## 2021-06-29 NOTE — DISCHARGE PLANNING
Agency/Facility Name: [facility]  Outcome: [facility] called and left a voice message for Chaparrita stating per MD, the pt can't transport today.

## 2021-06-29 NOTE — PROGRESS NOTES
Telemetry Shift Summary    Rhythm SR w/BBB  HR Range 69-82  Ectopy oPAC, rPVC  Measurements 0.18/0.14/0.44        Normal Values  Rhythm SR  HR Range    Measurements 0.12-0.20 / 0.06-0.10  / 0.30-0.52

## 2021-06-29 NOTE — DISCHARGE PLANNING
Agency[facility]Facility Name: [facility]  Spoke To: Chaparrita  Outcome: Transport is scheduled for 1400 via [facility].    Fany Wheatley notified via Teams.

## 2021-06-29 NOTE — DISCHARGE PLANNING
Agency/Facility Name: [facility]  Outcome: Left a message for Chaparrita in admissions.  Awaiting a call back.    Fany Wheatley notified via Teams.

## 2021-06-29 NOTE — PROGRESS NOTES
Telemetry Strip     Strip printed: 1347  Measurements from am strip were as follows:  Rhythm: SR BBB  HR: 73-82  Measurements: 0.16/ 0.14/ 0.36  Ectopy: o PAC             Normal Values  Rhythm SR  HR Range    Measurements 0.12-0.20 / 0.06-0.10  / 0.30-0.52

## 2021-06-29 NOTE — CARE PLAN
Problem: Respiratory  Goal: Patient will achieve/maintain optimum respiratory ventilation and gas exchange  Outcome: Progressing     Problem: Skin Integrity  Goal: Skin integrity is maintained or improved  Outcome: Progressing   The patient is Stable - Low risk of patient condition declining or worsening    Shift Goals  Clinical Goals: decrease oxygen needs  Patient Goals: breath better    Progress made toward(s) clinical / shift goals:  pt maintaining oxygen saturation above 90% on 3 L NC, encouraged pt to deep breath and cough as well as use IS. Pt requires frequent reminders/encouragement. Pt also encouraged to turn self in bed and encouraged to mobilize with assistance. Pt has waffle cushion in place.    Patient is not progressing towards the following goals:

## 2021-06-29 NOTE — PROGRESS NOTES
"Hospital Medicine Daily Progress Note    Date of Service  6/29/2021    Chief Complaint  75 y.o. female admitted 6/23/2021 with shortness of breath     Hospital Course    \"75 y.o. female who presented 6/23/2021 with worsening shortness of breath.  Patient has had progressive SLB with orthopnea for the past couple days.  Patient has bilateral lower extremity edema which she states gets wrapped with compression stockings.  She noticed that she began to have more fluid leakage from her legs in the past couple weeks.  She stated she knows she has heart failure and problems with fluids but was not taking a diuretic.  Patient states she is extremely incontinent with Lasix.  She has chronic home oxygen at 2 L nasal cannula.  Patient denied any subjective fevers, coughing, abdominal pain, headache.\"      Interval Problem Update    6/29/2021: The patient was initially evaluated for shortness of breath.  At home she is chronically on 2 L via nasal cannula.  Her oxygenation requirements are improving daily.  She is currently being transitioned to p.o. antibiotics for community-acquired pneumonia.  VQ scan was negative for pulmonary embolism.  She has no identifiable lower risk factors for DVT.  No further evaluation needed as she is very close at her baseline oxygenation levels with current management.  Patient also with underlying pulmonary hypertension and receiving aggressive IV diuresis.  IV Lasix has been uptitrated and we will continue to monitor for appropriate response.  Patient states that her appetite is improving, no worsening shortness of breath, no productive cough noted.  She denies any further episodes of fevers or chills.  Patient has been accepted to skilled nursing facility with likely discharge in the morning.  No other overnight events reported.      Consultants/Specialty  None    Code Status  Full Code    Disposition  Likely discharge AM Discharge     Review of Systems  Review of Systems   Constitutional: " Negative for chills, fever and malaise/fatigue.   HENT: Negative for congestion, hearing loss, sore throat and tinnitus.    Eyes: Negative for blurred vision, double vision, photophobia and discharge.   Respiratory: Negative for cough, hemoptysis, sputum production, shortness of breath and wheezing.    Cardiovascular: Negative for chest pain, palpitations, orthopnea, claudication and leg swelling.   Gastrointestinal: Negative for abdominal pain, blood in stool, diarrhea, heartburn, melena, nausea and vomiting.   Genitourinary: Negative for dysuria, flank pain, hematuria and urgency.   Musculoskeletal: Negative for back pain, joint pain and myalgias.   Skin: Negative for itching and rash.   Neurological: Negative for dizziness, sensory change, speech change, weakness and headaches.   Endo/Heme/Allergies: Does not bruise/bleed easily.   Psychiatric/Behavioral: Negative for depression and suicidal ideas.        Physical Exam  Temp:  [36.3 °C (97.4 °F)-36.7 °C (98 °F)] 36.3 °C (97.4 °F)  Pulse:  [69-86] 74  Resp:  [18-20] 20  BP: (141-170)/(47-84) 170/66  SpO2:  [90 %-95 %] 93 %    Physical Exam  Vitals and nursing note reviewed.   Constitutional:       General: She is not in acute distress.     Appearance: Normal appearance. She is obese.   HENT:      Head: Normocephalic and atraumatic.      Mouth/Throat:      Mouth: Mucous membranes are moist.   Eyes:      Extraocular Movements: Extraocular movements intact.      Conjunctiva/sclera: Conjunctivae normal.      Pupils: Pupils are equal, round, and reactive to light.   Cardiovascular:      Rate and Rhythm: Normal rate and regular rhythm.      Heart sounds: No murmur heard.   No friction rub.   Pulmonary:      Effort: Pulmonary effort is normal. No respiratory distress.      Breath sounds: Normal breath sounds. No wheezing.      Comments: Decreased breath sounds at bases  Abdominal:      General: Abdomen is flat. Bowel sounds are normal.      Palpations: Abdomen is soft.       Tenderness: There is no abdominal tenderness. There is no guarding.   Musculoskeletal:         General: No swelling or tenderness. Normal range of motion.      Cervical back: Normal range of motion and neck supple.      Right lower leg: Edema present.      Left lower leg: Edema present.   Skin:     General: Skin is warm and dry.      Findings: No rash.   Neurological:      General: No focal deficit present.      Mental Status: She is alert and oriented to person, place, and time.      Cranial Nerves: No cranial nerve deficit.      Motor: No weakness.   Psychiatric:         Mood and Affect: Mood normal.         Behavior: Behavior normal.         Fluids    Intake/Output Summary (Last 24 hours) at 6/29/2021 1159  Last data filed at 6/29/2021 1000  Gross per 24 hour   Intake 413.33 ml   Output 2350 ml   Net -1936.67 ml       Laboratory  Recent Labs     06/27/21 0438 06/28/21  0452 06/29/21  0445   WBC 11.4* 10.8 11.9*   RBC 3.10* 3.09* 3.28*   HEMOGLOBIN 9.7* 9.6* 10.1*   HEMATOCRIT 31.3* 30.7* 32.3*   .0* 99.4* 98.5*   MCH 31.3 31.1 30.8   MCHC 31.0* 31.3* 31.3*   RDW 51.9* 50.2* 49.5   PLATELETCT 276 311 370   MPV 10.4 10.2 10.1     Recent Labs     06/27/21 0438 06/28/21  0452 06/29/21  0445   SODIUM 137 136 139   POTASSIUM 3.9 3.6 3.5*   CHLORIDE 99 96 98   CO2 31 30 31   GLUCOSE 271* 283* 248*   BUN 18 18 20   CREATININE 0.63 0.56 0.59   CALCIUM 9.6 9.5 9.9                   Imaging  NM-LUNG VENT/PERF IMAGING   Final Result      1.  Low probability for pulmonary embolus.   2.  Airway disease.      DX-CHEST-PORTABLE (1 VIEW)   Final Result      1.  Persistent hypoinflation with LEFT lung base atelectasis.   2.  Mild pulmonary vascular congestion, worse than prior.   3.  Stable multichamber cardiac enlargement.      DX-CHEST-LIMITED (1 VIEW)   Final Result         Stable cardiomegaly.      DX-CHEST-PORTABLE (1 VIEW)   Final Result      1.  Possible mild pulmonary interstitial edema      2.  Enlarged  cardiac silhouette           Assessment/Plan  * Acute on chronic respiratory failure with hypoxia (HCC)- (present on admission)  Assessment & Plan  Patient's acute on chronic respiratory failure requiring increased oxygen needs due to probably her pneumonia.  Patient does use chronic home oxygen 2 L nasal cannula.  -Oxygen supplementation, continue IV Lasix    6/24: We will continue with IV Lasix for now and antibiotic.    6/25: Patient somewhat improving.  Currently using 3 L, yesterday she was using 4 L.    6/26: D-dimer was elevated, the patient declined to do a CT angio of the chest, however was agreeable to a VQ scan which we have ordered.    6/28: VQ scan was read as low probability for pulmonary embolism.  Patient still does not want to do a CT angio of the chest.  Yesterday was the fifth day of antibiotics.  We will start with Unasyn and doxycycline for a couple more days to see if her respiratory status improves.  She did have increased procalcitonin on admission.    Hypothyroidism  Assessment & Plan  Continue home medications.    Hyponatremia  Assessment & Plan  Mild, monitor    6/27: Resolved.    Chronic back pain  Assessment & Plan  We will try to avoid opiates.  We have scheduled Tylenol and lidocaine patches for now.    Failure to thrive in adult  Assessment & Plan  PT/OT    Pending placement.    UTI (urinary tract infection)  Assessment & Plan  Continue with ceftriaxone.  Pending cultures.    6/27: Patient finished antibiotic therapy today with rocephin    Fever  Assessment & Plan  Patient with continued fevers.  We will continue IV antibiotics for now.  We have ordered blood cultures, pending results.  If needed we will consult ID.    6/28: Patient has not had a recorded fever since 6/26.  Blood cultures negative to date.    Sepsis (HCC)- (present on admission)  Assessment & Plan  This is Sepsis Present on admission  SIRS criteria identified on my evaluation include: Tachycardia, with heart rate  greater than 90 BPM and Leukocytosis, with WBC greater than 12,000  Source is PNA/CAP  Sepsis protocol initiated  Fluid resuscitation ordered per protocol -holding IV fluids patient appears to be volume overloaded  IV antibiotics as appropriate for source of sepsis -ceftriaxone and azithromycin  While organ dysfunction may be noted elsewhere in this problem list or in the chart, degree of organ dysfunction does not meet CMS criteria for severe sepsis  Associated acute on chronic hypoxic respiratory failure due to sepsis    6/28: Patient had sepsis on admission, currently not meeting criteria.  We will continue antibiotics with Unasyn and doxycycline for a couple more days, monitor and make changes accordingly.    Pulmonary hypertension (HCC)- (present on admission)  Assessment & Plan  RVSP 45 mmHg.  11/25/2020.  - Continue Lasix  - continue oxygen supplementation    Current non-adherence to medical treatment- (present on admission)  Assessment & Plan  Patient not taking her diuretics at home, causes her too much incontinence  - continue counseling for adherence    Acute on chronic diastolic CHF (congestive heart failure), NYHA class 2 (HCC)- (present on admission)  Assessment & Plan  11/25/2020 - echo noted normal diastolic and systolic function.  Mild mitral regurg.  Mild tricuspid regurg.  Mild aortic stenosis.  Past diagnosis. Patient has evidence with pulmonary crackles, bilateral lower extremity edema with leaking venous insufficiency ulcers.  Patient has not been taking her Lasix at home.  -trial 40mg IV Lasix, patient does have sepsis appearance but is unable to tolerate more volume at this time.  Monitor for hypotension    6/28: We will continue with lasix at 40 mg IV daily.  Patient mentions she feels much better, lower extremity edema has improved.  It appears I/O's are not reliable. We will try to obtain more strict I/O's and consider switching to PO lasix when appropriate.    DM (diabetes mellitus)  (HCC)- (present on admission)  Assessment & Plan  Hold patient's home p.o. medications  Current A1c 8.6  ISS  Accuchecks  Hypoglycemia protocol    6/26: We will increase to medium sliding scale.  Monitor and make changes accordingly.  6/27: We will add lantus 5  U daily.    6/28: We will increase lantus to 10 U    Dyslipidemia- (present on admission)  Assessment & Plan  Continue atorvastatin    Essential hypertension- (present on admission)  Assessment & Plan  Holding patient's home blood pressure medications as she will need diuretics, at risk for hypotension due to her sepsis from pneumonia.    6/26: We will now reorder the patient's home medication for blood pressure.  6/28: We have increased amlodipine to 10 mg.    Anemia  Assessment & Plan  No signs of bleeding at this time.  Monitor, repeat CBC.  Iron studies and vit b12 pending         VTE prophylaxis: lovenox

## 2021-06-29 NOTE — CARE PLAN
"The patient is Stable - Low risk of patient condition declining or worsening    Shift Goals  Clinical Goals: RT protocol, placement, dressing change  Patient Goals: rest, breath better    Progress made toward(s) clinical / shift goals:    Reports breathing better. Resting in between care.   Bilat LE dressing done.    Patient is not progressing towards the following goals:  Continue to try and wean oxygen needs as tolerated.   Increase mobility.    Problem: Respiratory  Goal: Patient will achieve/maintain optimum respiratory ventilation and gas exchange  Outcome: Progressing  Note: Pt on RT protocol. On 4L o2 nc down from 5L this am. Reports breathing is \"better\". Nonproductive, loose, wet cough.      Problem: Skin Integrity  Goal: Skin integrity is maintained or improved  Outcome: Progressing  Note: Bilat LE wound. Dressing changed per wound RN recommendation. Pt on waffle overlay. Repositioned in bed with very minimal help. Purewick in place.          "

## 2021-06-29 NOTE — PROGRESS NOTES
"Pt a&o x4. Loose, wet cough but  Nonproductive. Complaints of left \"sciatica pain\". Scheduled Tylenol given with good result. Large loose bm tonight. purewick in place. bilat LE dressing in place, changed tonight. Reports good po intake. No s/sx of aspiration noted. On 4L o2 nc. Able to make needs known.   "

## 2021-06-29 NOTE — DISCHARGE PLANNING
Agency/Facility Name: [facility]  Spoke To: Chaparrita  Outcome: Has a bed available today.    Fany Wheatley notified via Teams.

## 2021-06-29 NOTE — CARE PLAN
Pt receiving ordered therapy.  Tolerating without observable SOB or distress.  No adverse or ill reactions notes.  Will continue to monitor.

## 2021-06-29 NOTE — FLOWSHEET NOTE
06/28/21 1942   Events/Summary/Plan   Events/Summary/Plan MDI Tx Given   Vital Signs   Pulse 80   Respiration 18   Pulse Oximetry 91 %   $ Pulse Oximetry (Spot Check) Yes   O2 Alarms Set & Reviewed Yes   Respiratory Assessment   Respiratory Pattern Within Normal Limits   Level of Consciousness Alert   Chest Exam   Work Of Breathing / Effort Mild   Breath Sounds   RUL Breath Sounds Clear   RML Breath Sounds Diminished   RLL Breath Sounds Diminished   MOSHE Breath Sounds Clear   LLL Breath Sounds Diminished   Secretions   Cough Congested   How Sputum Obtained Cough on Request   Sputum Amount Unable to Evaluate   Sputum Color Unable to Evaluate   Sputum Consistency Unable to Evaluate   Oxygen   O2 (LPM) 4   O2 Delivery Device Silicone Nasal Cannula

## 2021-06-30 PROBLEM — N39.0 UTI (URINARY TRACT INFECTION): Status: RESOLVED | Noted: 2021-06-25 | Resolved: 2021-06-30

## 2021-06-30 PROBLEM — E87.1 HYPONATREMIA: Status: RESOLVED | Noted: 2021-06-26 | Resolved: 2021-06-30

## 2021-06-30 PROBLEM — R50.9 FEVER: Status: RESOLVED | Noted: 2021-06-24 | Resolved: 2021-06-30

## 2021-06-30 PROBLEM — J96.21 ACUTE ON CHRONIC RESPIRATORY FAILURE WITH HYPOXIA (HCC): Status: RESOLVED | Noted: 2020-04-04 | Resolved: 2021-06-30

## 2021-06-30 PROBLEM — A41.9 SEPSIS (HCC): Status: RESOLVED | Noted: 2021-06-23 | Resolved: 2021-06-30

## 2021-06-30 LAB
ANION GAP SERPL CALC-SCNC: 11 MMOL/L (ref 7–16)
BACTERIA BLD CULT: NORMAL
BACTERIA BLD CULT: NORMAL
BUN SERPL-MCNC: 20 MG/DL (ref 8–22)
CALCIUM SERPL-MCNC: 9.7 MG/DL (ref 8.4–10.2)
CHLORIDE SERPL-SCNC: 94 MMOL/L (ref 96–112)
CO2 SERPL-SCNC: 32 MMOL/L (ref 20–33)
CREAT SERPL-MCNC: 0.63 MG/DL (ref 0.5–1.4)
ERYTHROCYTE [DISTWIDTH] IN BLOOD BY AUTOMATED COUNT: 49.2 FL (ref 35.9–50)
GLUCOSE BLD-MCNC: 284 MG/DL (ref 65–99)
GLUCOSE BLD-MCNC: 331 MG/DL (ref 65–99)
GLUCOSE BLD-MCNC: 389 MG/DL (ref 65–99)
GLUCOSE SERPL-MCNC: 283 MG/DL (ref 65–99)
HCT VFR BLD AUTO: 34.3 % (ref 37–47)
HGB BLD-MCNC: 10.6 G/DL (ref 12–16)
MAGNESIUM SERPL-MCNC: 1.6 MG/DL (ref 1.5–2.5)
MCH RBC QN AUTO: 30.7 PG (ref 27–33)
MCHC RBC AUTO-ENTMCNC: 30.9 G/DL (ref 33.6–35)
MCV RBC AUTO: 99.4 FL (ref 81.4–97.8)
PHOSPHATE SERPL-MCNC: 3.3 MG/DL (ref 2.5–4.5)
PLATELET # BLD AUTO: 417 K/UL (ref 164–446)
PMV BLD AUTO: 9.9 FL (ref 9–12.9)
POTASSIUM SERPL-SCNC: 3.5 MMOL/L (ref 3.6–5.5)
RBC # BLD AUTO: 3.45 M/UL (ref 4.2–5.4)
SIGNIFICANT IND 70042: NORMAL
SIGNIFICANT IND 70042: NORMAL
SITE SITE: NORMAL
SITE SITE: NORMAL
SODIUM SERPL-SCNC: 137 MMOL/L (ref 135–145)
SOURCE SOURCE: NORMAL
SOURCE SOURCE: NORMAL
WBC # BLD AUTO: 12.2 K/UL (ref 4.8–10.8)

## 2021-06-30 PROCEDURE — 700102 HCHG RX REV CODE 250 W/ 637 OVERRIDE(OP): Performed by: INTERNAL MEDICINE

## 2021-06-30 PROCEDURE — 700101 HCHG RX REV CODE 250: Performed by: INTERNAL MEDICINE

## 2021-06-30 PROCEDURE — 82962 GLUCOSE BLOOD TEST: CPT

## 2021-06-30 PROCEDURE — 83735 ASSAY OF MAGNESIUM: CPT

## 2021-06-30 PROCEDURE — A9270 NON-COVERED ITEM OR SERVICE: HCPCS | Performed by: HOSPITALIST

## 2021-06-30 PROCEDURE — 84100 ASSAY OF PHOSPHORUS: CPT

## 2021-06-30 PROCEDURE — 99232 SBSQ HOSP IP/OBS MODERATE 35: CPT | Performed by: INTERNAL MEDICINE

## 2021-06-30 PROCEDURE — A9270 NON-COVERED ITEM OR SERVICE: HCPCS | Performed by: INTERNAL MEDICINE

## 2021-06-30 PROCEDURE — 94760 N-INVAS EAR/PLS OXIMETRY 1: CPT

## 2021-06-30 PROCEDURE — 94640 AIRWAY INHALATION TREATMENT: CPT

## 2021-06-30 PROCEDURE — 80048 BASIC METABOLIC PNL TOTAL CA: CPT

## 2021-06-30 PROCEDURE — 700111 HCHG RX REV CODE 636 W/ 250 OVERRIDE (IP): Performed by: INTERNAL MEDICINE

## 2021-06-30 PROCEDURE — 85027 COMPLETE CBC AUTOMATED: CPT

## 2021-06-30 PROCEDURE — 700102 HCHG RX REV CODE 250 W/ 637 OVERRIDE(OP): Performed by: HOSPITALIST

## 2021-06-30 PROCEDURE — 97116 GAIT TRAINING THERAPY: CPT

## 2021-06-30 PROCEDURE — 770020 HCHG ROOM/CARE - TELE (206)

## 2021-06-30 PROCEDURE — 97530 THERAPEUTIC ACTIVITIES: CPT

## 2021-06-30 RX ORDER — FUROSEMIDE 20 MG/1
20 TABLET ORAL
Status: DISCONTINUED | OUTPATIENT
Start: 2021-06-30 | End: 2021-07-01 | Stop reason: HOSPADM

## 2021-06-30 RX ORDER — ALPRAZOLAM 0.25 MG/1
0.25 TABLET ORAL 4 TIMES DAILY PRN
Status: DISCONTINUED | OUTPATIENT
Start: 2021-06-30 | End: 2021-07-01 | Stop reason: HOSPADM

## 2021-06-30 RX ORDER — AMOXICILLIN AND CLAVULANATE POTASSIUM 500; 125 MG/1; MG/1
1 TABLET, FILM COATED ORAL EVERY 12 HOURS
Qty: 6 TABLET | Refills: 0 | Status: SHIPPED | OUTPATIENT
Start: 2021-06-30 | End: 2021-07-03

## 2021-06-30 RX ORDER — POTASSIUM CHLORIDE 20 MEQ/1
20 TABLET, EXTENDED RELEASE ORAL DAILY
Status: DISCONTINUED | OUTPATIENT
Start: 2021-06-30 | End: 2021-07-01 | Stop reason: HOSPADM

## 2021-06-30 RX ADMIN — AMOXICILLIN AND CLAVULANATE POTASSIUM 1 TABLET: 500; 125 TABLET, FILM COATED ORAL at 17:18

## 2021-06-30 RX ADMIN — POTASSIUM CHLORIDE 20 MEQ: 1500 TABLET, EXTENDED RELEASE ORAL at 07:29

## 2021-06-30 RX ADMIN — ATORVASTATIN CALCIUM 40 MG: 40 TABLET, FILM COATED ORAL at 06:28

## 2021-06-30 RX ADMIN — BUDESONIDE AND FORMOTEROL FUMARATE DIHYDRATE 2 PUFF: 160; 4.5 AEROSOL RESPIRATORY (INHALATION) at 19:46

## 2021-06-30 RX ADMIN — LEVOTHYROXINE SODIUM 25 MCG: 0.03 TABLET ORAL at 06:30

## 2021-06-30 RX ADMIN — Medication 5 MG: at 22:59

## 2021-06-30 RX ADMIN — AMLODIPINE BESYLATE 10 MG: 5 TABLET ORAL at 06:27

## 2021-06-30 RX ADMIN — GUAIFENESIN 600 MG: 600 TABLET, EXTENDED RELEASE ORAL at 06:28

## 2021-06-30 RX ADMIN — FUROSEMIDE 20 MG: 20 TABLET ORAL at 10:41

## 2021-06-30 RX ADMIN — SERTRALINE HYDROCHLORIDE 50 MG: 50 TABLET ORAL at 06:28

## 2021-06-30 RX ADMIN — LIDOCAINE 1 PATCH: 50 PATCH TOPICAL at 10:41

## 2021-06-30 RX ADMIN — FUROSEMIDE 20 MG: 20 TABLET ORAL at 17:18

## 2021-06-30 RX ADMIN — DOXYCYCLINE 100 MG: 100 TABLET, FILM COATED ORAL at 06:27

## 2021-06-30 RX ADMIN — FUROSEMIDE 40 MG: 10 INJECTION, SOLUTION INTRAMUSCULAR; INTRAVENOUS at 06:28

## 2021-06-30 RX ADMIN — ACETAMINOPHEN 1000 MG: 500 TABLET, FILM COATED ORAL at 06:28

## 2021-06-30 RX ADMIN — ENOXAPARIN SODIUM 40 MG: 40 INJECTION SUBCUTANEOUS at 17:19

## 2021-06-30 RX ADMIN — AMOXICILLIN AND CLAVULANATE POTASSIUM 1 TABLET: 500; 125 TABLET, FILM COATED ORAL at 06:28

## 2021-06-30 RX ADMIN — ALPRAZOLAM 0.25 MG: 0.25 TABLET ORAL at 23:06

## 2021-06-30 RX ADMIN — GUAIFENESIN 600 MG: 600 TABLET, EXTENDED RELEASE ORAL at 17:18

## 2021-06-30 RX ADMIN — BENAZEPRIL HYDROCHLORIDE 20 MG: 10 TABLET ORAL at 06:28

## 2021-06-30 RX ADMIN — ACETAMINOPHEN 1000 MG: 500 TABLET, FILM COATED ORAL at 14:02

## 2021-06-30 RX ADMIN — BUDESONIDE AND FORMOTEROL FUMARATE DIHYDRATE 2 PUFF: 160; 4.5 AEROSOL RESPIRATORY (INHALATION) at 07:58

## 2021-06-30 RX ADMIN — ACETAMINOPHEN 1000 MG: 500 TABLET, FILM COATED ORAL at 22:59

## 2021-06-30 RX ADMIN — ONDANSETRON 4 MG: 2 INJECTION INTRAMUSCULAR; INTRAVENOUS at 07:26

## 2021-06-30 RX ADMIN — ALBUTEROL SULFATE 2 PUFF: 90 AEROSOL, METERED RESPIRATORY (INHALATION) at 07:58

## 2021-06-30 ASSESSMENT — COGNITIVE AND FUNCTIONAL STATUS - GENERAL
MOVING FROM LYING ON BACK TO SITTING ON SIDE OF FLAT BED: A LITTLE
TURNING FROM BACK TO SIDE WHILE IN FLAT BAD: A LOT
SUGGESTED CMS G CODE MODIFIER MOBILITY: CK
CLIMB 3 TO 5 STEPS WITH RAILING: A LOT
MOVING TO AND FROM BED TO CHAIR: A LOT
MOBILITY SCORE: 15
STANDING UP FROM CHAIR USING ARMS: A LITTLE
WALKING IN HOSPITAL ROOM: A LITTLE

## 2021-06-30 ASSESSMENT — ENCOUNTER SYMPTOMS
MYALGIAS: 0
PHOTOPHOBIA: 0
BACK PAIN: 0
BLURRED VISION: 0
SENSORY CHANGE: 0
SPEECH CHANGE: 0
FEVER: 0
CHILLS: 0
HEMOPTYSIS: 0
NAUSEA: 0
VOMITING: 0
SPUTUM PRODUCTION: 0
PALPITATIONS: 0
COUGH: 0
HEADACHES: 0
DEPRESSION: 0
HEARTBURN: 0
DOUBLE VISION: 0
BRUISES/BLEEDS EASILY: 0
DIZZINESS: 0
ABDOMINAL PAIN: 0

## 2021-06-30 ASSESSMENT — PAIN DESCRIPTION - PAIN TYPE
TYPE: CHRONIC PAIN
TYPE: CHRONIC PAIN

## 2021-06-30 ASSESSMENT — GAIT ASSESSMENTS
DISTANCE (FEET): 125
ASSISTIVE DEVICE: 4 WHEEL WALKER
DEVIATION: SHUFFLED GAIT;BRADYKINETIC
GAIT LEVEL OF ASSIST: SUPERVISED

## 2021-06-30 ASSESSMENT — FIBROSIS 4 INDEX: FIB4 SCORE: 1.14

## 2021-06-30 NOTE — CARE PLAN
Problem: Respiratory  Goal: Patient will achieve/maintain optimum respiratory ventilation and gas exchange  Outcome: Progressing     Problem: Skin Integrity  Goal: Skin integrity is maintained or improved  Outcome: Progressing   The patient is Stable - Low risk of patient condition declining or worsening    Shift Goals  Clinical Goals: Saturate >90% while asleep requiring 3.5L or less of O2.  Patient Goals: breath better    Progress made toward(s) clinical / shift goals:  pt maintains oxygen saturation above 90 % on 1 liter when wearing oxy mask. Pt currently on 5 L NC. Will encourage mobilization. Waffle cushion in place. Q2 turns performed when pt agreeable.     Patient is not progressing towards the following goals:

## 2021-06-30 NOTE — DISCHARGE SUMMARY
"Discharge Summary    CHIEF COMPLAINT ON ADMISSION  Chief Complaint   Patient presents with   • Shortness of Breath       Reason for Admission  EMS     CODE STATUS  Full Code    HPI & HOSPITAL COURSE  This is a 75 y.o. female here with shortness of breath        \"75 y.o. female who presented 6/23/2021 with worsening shortness of breath.  Patient has had progressive SLB with orthopnea for the past couple days.  Patient has bilateral lower extremity edema which she states gets wrapped with compression stockings.  She noticed that she began to have more fluid leakage from her legs in the past couple weeks.  She stated she knows she has heart failure and problems with fluids but was not taking a diuretic.  Patient states she is extremely incontinent with Lasix.  She has chronic home oxygen at 2 L nasal cannula.  Patient denied any subjective fevers, coughing, abdominal pain, headache.\"       6/29/2021: The patient was initially evaluated for shortness of breath.  At home she is chronically on 2 L via nasal cannula.  Her oxygenation requirements are improving daily.  She is currently being transitioned to p.o. antibiotics for community-acquired pneumonia.  VQ scan was negative for pulmonary embolism.  She has no identifiable lower risk factors for DVT.  No further evaluation needed as she is very close at her baseline oxygenation levels with current management.  Patient also with underlying pulmonary hypertension/ congestive heart failure and receiving aggressive IV diuresis.  IV Lasix has been uptitrated and we will continue to monitor for appropriate response.  Patient states that her appetite is improving, no worsening shortness of breath, no productive cough noted.  She denies any further episodes of fevers or chills.  Patient has been accepted to skilled nursing facility with likely discharge in the morning.  No other overnight events reported.    6/30/2021: The patient with interval improvement in her symptoms. She " is currently medically cleared for discharge to SNF for PT.              Therefore, she is discharged in fair and stable condition to skilled nursing facility.    The patient met 2-midnight criteria for an inpatient stay at the time of discharge.      FOLLOW UP ITEMS POST DISCHARGE  Follow up with PCP in one week    DISCHARGE DIAGNOSES  Principal Problem (Resolved):    Acute on chronic respiratory failure with hypoxia (HCC) POA: Yes  Active Problems:    Anemia POA: Yes    Essential hypertension POA: Yes    Dyslipidemia POA: Yes    DM (diabetes mellitus) (HCC) POA: Yes    Acute on chronic diastolic CHF (congestive heart failure), NYHA class 2 (HCC) POA: Yes    Current non-adherence to medical treatment POA: Yes    Pulmonary hypertension (HCC) POA: Yes    Failure to thrive in adult POA: Yes    Chronic back pain POA: Yes    Hypothyroidism POA: Unknown  Resolved Problems:    Sepsis (HCC) POA: Yes    Fever POA: Yes    UTI (urinary tract infection) POA: Yes    Hyponatremia POA: Yes      FOLLOW UP  No future appointments.  No follow-up provider specified.    MEDICATIONS ON DISCHARGE     Medication List      START taking these medications      Instructions   amoxicillin-clavulanate 500-125 MG Tabs  Commonly known as: AUGMENTIN   Take 1 tablet by mouth every 12 hours for 3 days.  Dose: 1 tablet        CONTINUE taking these medications      Instructions   amlodipine-benazepril 5-20 MG per capsule  Commonly known as: LOTREL   Take 1 Cap by mouth every day.  Dose: 1 capsule     bumetanide 1 MG Tabs  Commonly known as: BUMEX   Take 1 mg by mouth every day.  Dose: 1 mg     fluticasone-salmeterol 250-50 MCG/DOSE Aepb  Commonly known as: ADVAIR   Inhale 1 Puff by mouth every 12 hours.  Dose: 1 Puff     glimepiride 4 MG Tabs  Commonly known as: AMARYL   Take 4 mg by mouth every day.  Dose: 4 mg     HYDROcodone-acetaminophen 5-325 MG Tabs per tablet  Commonly known as: NORCO   Take 0.5 Tablets by mouth every day.  Dose: 0.5 tablet       levothyroxine 25 MCG Tabs  Commonly known as: SYNTHROID   Take 25 mcg by mouth every day.  Dose: 25 mcg     Lipitor 40 MG Tabs  Generic drug: atorvastatin   Take 40 mg by mouth every day.  Dose: 40 mg     metFORMIN  MG Tb24  Commonly known as: GLUCOPHAGE XR   Take 500 mg by mouth 2 Times a Day.  Dose: 500 mg     MUCINEX PO   Take 1 tablet by mouth 2 times a day as needed (For cough).  Dose: 1 tablet     oxybutynin 15 MG CR tablet  Commonly known as: DITROPAN XL   Take 15 mg by mouth every evening.  Dose: 15 mg     potassium chloride SA 10 MEQ Tbcr  Commonly known as: K-DUR   Take 10 mEq by mouth every day.  Dose: 10 mEq     PROBIOTIC PO   Take 1 capsule by mouth every day.  Dose: 1 capsule     sertraline 50 MG Tabs  Commonly known as: Zoloft   Take 50 mg by mouth every day.  Dose: 50 mg        STOP taking these medications    furosemide 20 MG Tabs  Commonly known as: LASIX     ibuprofen 200 MG Tabs  Commonly known as: MOTRIN            Allergies  Allergies   Allergen Reactions   • Nkda [No Known Drug Allergy]        DIET  Orders Placed This Encounter   Procedures   • Diet Order Diet: Consistent CHO (Diabetic); Fluid modifications: (optional): 1500 ml Fluid Restriction     Standing Status:   Standing     Number of Occurrences:   1     Order Specific Question:   Diet:     Answer:   Consistent CHO (Diabetic) [4]     Order Specific Question:   Fluid modifications: (optional)     Answer:   1500 ml Fluid Restriction [9]       ACTIVITY  As tolerated and directed by skilled nursing.  Weight bearing as tolerated    LINES, DRAINS, AND WOUNDS  This is an automated list. Peripheral IVs will be removed prior to discharge.  Peripheral IV 06/28/21 20 G Right Wrist (Active)   Site Assessment Clean;Dry;Intact 06/30/21 0750   Dressing Type Transparent 06/30/21 0750   Line Status Saline locked;Scrubbed the hub prior to access;Flushed 06/30/21 0750   Dressing Status Clean;Dry;Intact 06/30/21 0750   Dressing Intervention N/A  06/30/21 0750   Dressing Change Due 07/05/21 06/28/21 2000   Date Primary Tubing Changed 06/28/21 06/28/21 2000   Date Secondary Tubing Changed 06/28/21 06/28/21 2000   NEXT Primary Tubing Change  06/30/21 06/28/21 2000   NEXT Secondary Tubing Change  06/29/21 06/28/21 2000   Infiltration Grading ([facility] [place] 0 06/30/21 0750   Phlebitis Scale ([facility] Only) 0 06/30/21 0750     External Urinary Catheter (Female Wick) (Active)   Collection Container Suction container 06/29/21 2020   Output (mL) 1000 mL 06/28/21 1800      Wound 11/24/20 Venous Ulcer Pretibial Right (Active)   Wound Image   06/23/21 2211   Site Assessment JACKY 06/29/21 2020   Periwound Assessment JACKY 06/29/21 2020   Margins Attached edges 06/25/21 2100   Closure Open to air 06/25/21 2100   Drainage Amount Scant 06/23/21 2211   Drainage Description Serosanguineous 06/23/21 2211   Treatments Site care;Cleansed 06/23/21 2211   Wound Cleansing Normal Saline Irrigation 06/23/21 2211   Periwound Protectant Skin Protectant Wipes to Periwound 06/23/21 2211   Dressing Options Honey Colloid;Mepilex;Tubigrip 06/27/21 1000   Dressing Changed Observed 06/27/21 1000   Dressing Status Open to Air 06/23/21 2100   Dressing Change/Treatment Frequency Every 48 hrs, and As Needed 06/27/21 2000   NEXT Dressing Change/Treatment Date 06/26/21 06/23/21 2211   NEXT Weekly Photo (Inpatient Only) 06/30/21 06/27/21 1000   Non-staged Wound Description Full thickness 06/23/21 2211   Wound Length (cm) 3.5 cm 06/23/21 2211   Wound Width (cm) 2 cm 06/23/21 2211   Wound Surface Area (cm^2) 7 cm^2 06/23/21 2211   Wound Bed Granulation (%) 60 % 06/23/21 2211   Wound Bed Slough (%) 40 % 06/23/21 2211   Wound Odor None 06/23/21 2211   Pulses Right;1+;DP;PT 06/23/21 2211   Exposed Structures None 06/23/21 2211   WOUND NURSE ONLY - Time Spent with Patient (mins) 60 06/23/21 2211       Wound 06/23/21 Pretibial Left (Active)   Wound Image   06/23/21 2212   Site Assessment JACKY 06/30/21 0720      Periwound Assessment JACKY 06/30/21 0720   Margins Defined edges 06/26/21 1915   Closure Other (Comment) 06/28/21 2000   Drainage Amount None 06/28/21 2000   Treatments Cleansed;Compression 06/28/21 2000   Wound Cleansing Normal Saline Irrigation 06/28/21 2000   Dressing Options Honey Colloid 06/28/21 2000   Dressing Changed Changed 06/28/21 2000   Dressing Status Removed;New drainage 06/28/21 2000   Dressing Change/Treatment Frequency Every 48 hrs, and As Needed 06/28/21 2000   NEXT Dressing Change/Treatment Date 06/30/21 06/28/21 2000       Wound 06/23/21 Hand Right (Active)   Wound Image   06/23/21 2212   Site Assessment Healing ridge 06/28/21 2000   Periwound Assessment Clean;Intact;Dry 06/29/21 2020   Margins Attached edges 06/28/21 0927   Closure Open to air 06/28/21 2000   Drainage Amount None 06/28/21 0927   Drainage Description JACKY 06/24/21 2120   Dressing Options Open to Air 06/29/21 2020   Dressing Changed Observed 06/27/21 2000   Dressing Status Open to Air 06/27/21 2000   Dressing Change/Treatment Frequency Every 48 hrs, and As Needed 06/24/21 2120       Wound 06/23/21 Pressure Injury Buttocks Left stage 2 POA (Active)   Wound Image   06/26/21 2154   Site Assessment Early/partial granulation;Red;Pink 06/29/21 2020   Periwound Assessment Pink 06/29/21 2020   Margins Defined edges 06/29/21 2020   Closure Adhesive bandage 06/27/21 2000   Drainage Amount None 06/29/21 2020   Drainage Description Serosanguineous 06/29/21 2020   Treatments Cleansed 06/29/21 2020   Wound Cleansing Approved Wound Cleanser 06/29/21 2020   Periwound Protectant Barrier Paste 06/29/21 2020   Dressing Cleansing/Solutions Not Applicable 06/29/21 2020   Dressing Options Hydrocolloid Thin 06/29/21 2020   Dressing Changed Observed 06/29/21 2020   Dressing Status Clean;Dry;Intact 06/29/21 2020   Dressing Change/Treatment Frequency As Needed 06/29/21 2020   NEXT Dressing Change/Treatment Date 06/24/21 06/23/21 2232   NEXT Weekly Photo  (Inpatient Only) 06/30/21 06/23/21 2232   Pressure Injury Stage 2 06/23/21 2232   Non-staged Wound Description Full thickness 06/23/21 2232   Wound Length (cm) 0.5 cm 06/23/21 2232   Wound Width (cm) 1.5 cm 06/23/21 2232   Wound Depth (cm) 0.2 cm 06/23/21 2232   Wound Surface Area (cm^2) 0.75 cm^2 06/23/21 2232   Wound Volume (cm^3) 0.15 cm^3 06/23/21 2232   Wound Bed Granulation (%) 100 % 06/23/21 2232   Shape fissure 06/23/21 2232   Wound Odor None 06/23/21 2232   Exposed Structures None 06/23/21 2232       Wound 06/23/21 Pretibial Distal Right (Active)   Wound Image   06/23/21 2232   Site Assessment JACKY 06/29/21 2020   Periwound Assessment JACKY 06/29/21 2020   Margins Defined edges 06/25/21 2100   Closure Open to air 06/25/21 2100   Drainage Amount Scant 06/28/21 2000   Drainage Description Brown;Serosanguineous 06/28/21 2000   Treatments Cleansed;Compression 06/28/21 2000   Wound Cleansing Normal Saline Irrigation 06/28/21 2000   Periwound Protectant Moisture Barrier 06/28/21 2000   Dressing Options Honey Colloid;Mepilex 06/28/21 2000   Dressing Changed Changed 06/28/21 2000   Dressing Status Old drainage 06/28/21 2000   Dressing Change/Treatment Frequency Every 48 hrs, and As Needed 06/28/21 2000   NEXT Dressing Change/Treatment Date 06/30/21 06/28/21 2000       Peripheral IV 06/28/21 20 G Right Wrist (Active)   Site Assessment Clean;Dry;Intact 06/30/21 0750   Dressing Type Transparent 06/30/21 0750   Line Status Saline locked;Scrubbed the hub prior to access;Flushed 06/30/21 0750   Dressing Status Clean;Dry;Intact 06/30/21 0750   Dressing Intervention N/A 06/30/21 0750   Dressing Change Due 07/05/21 06/28/21 2000   Date Primary Tubing Changed 06/28/21 06/28/21 2000   Date Secondary Tubing Changed 06/28/21 06/28/21 2000   NEXT Primary Tubing Change  06/30/21 06/28/21 2000   NEXT Secondary Tubing Change  06/29/21 06/28/21 2000   Infiltration Grading ([facility], [facility]) 0 06/30/21 0750   Phlebitis Scale ([facility] Only) 0  06/30/21 0750               MENTAL STATUS ON TRANSFER      Alert and oriented x3       CONSULTATIONS  None    PROCEDURES  None     LABORATORY  Lab Results   Component Value Date    SODIUM 137 06/30/2021    POTASSIUM 3.5 (L) 06/30/2021    CHLORIDE 94 (L) 06/30/2021    CO2 32 06/30/2021    GLUCOSE 283 (H) 06/30/2021    BUN 20 06/30/2021    CREATININE 0.63 06/30/2021        Lab Results   Component Value Date    WBC 12.2 (H) 06/30/2021    HEMOGLOBIN 10.6 (L) 06/30/2021    HEMATOCRIT 34.3 (L) 06/30/2021    PLATELETCT 417 06/30/2021        Total time of the discharge process exceeds 35 minutes.

## 2021-06-30 NOTE — PROGRESS NOTES
Telemetry Shift Summary     Rhythm SR with BBB  HR Range 67-79  Ectopy  oPACs  Measurements .20/.14/.44              Normal Values  Rhythm SR  HR Range    Measurements 0.12-0.20 / 0.06-0.10  / 0.30-0.52

## 2021-06-30 NOTE — FLOWSHEET NOTE
06/30/21 0755   Events/Summary/Plan   Events/Summary/Plan MDIx2   Vital Signs   Pulse 71  (post 72)   Respiration 16  (post 16)   Pulse Oximetry 89 %   $ Pulse Oximetry (Spot Check) Yes   Respiratory Assessment   Respiratory Pattern Within Normal Limits   Level of Consciousness Alert   Chest Exam   Work Of Breathing / Effort Mild   Breath Sounds   RUL Breath Sounds Diminished  (increased aeration following inhalers)   RML Breath Sounds Diminished   RLL Breath Sounds Diminished   MOSHE Breath Sounds Diminished   LLL Breath Sounds Diminished   Oxygen   O2 (LPM) 4   O2 Delivery Device Nasal Cannula

## 2021-06-30 NOTE — PROGRESS NOTES
Bedside report received from Ivone REAL. Patient is in bed and stable. Bed alarm is on. Bed locked and in lowest position, upper side rails up, call light in reach, whiteboard updated. POC discussed and pt verbalizes understanding.

## 2021-06-30 NOTE — DISCHARGE INSTRUCTIONS
Discharge Instructions    Discharged to other by medical transportation with self. Discharged via wheelchair, hospital escort: Yes.  Special equipment needed: Not Applicable    Be sure to schedule a follow-up appointment with your primary care doctor or any specialists as instructed.     Discharge Plan:   Diet Plan: Discussed  Activity Level: Discussed  Confirmed Follow up Appointment: Patient to Call and Schedule Appointment  Confirmed Symptoms Management: Discussed  Medication Reconciliation Updated: Yes    I understand that a diet low in cholesterol, fat, and sodium is recommended for good health. Unless I have been given specific instructions below for another diet, I accept this instruction as my diet prescription.   Other diet: diabetic    Special Instructions: None    · Is patient discharged on Warfarin / Coumadin?   No     Depression / Suicide Risk    As you are discharged from this [facility] facility, it is important to learn how to keep safe from harming yourself.    Recognize the warning signs:  · Abrupt changes in personality, positive or negative- including increase in energy   · Giving away possessions  · Change in eating patterns- significant weight changes-  positive or negative  · Change in sleeping patterns- unable to sleep or sleeping all the time   · Unwillingness or inability to communicate  · Depression  · Unusual sadness, discouragement and loneliness  · Talk of wanting to die  · Neglect of personal appearance   · Rebelliousness- reckless behavior  · Withdrawal from people/activities they love  · Confusion- inability to concentrate     If you or a loved one observes any of these behaviors or has concerns about self-harm, here's what you can do:  · Talk about it- your feelings and reasons for harming yourself  · Remove any means that you might use to hurt yourself (examples: pills, rope, extension cords, firearm)  · Get professional help from the community (Mental Health, Substance Abuse,  psychological counseling)  · Do not be alone:Call your Safe Contact- someone whom you trust who will be there for you.  · Call your local CRISIS HOTLINE 924-1621 or 400-030-2728  · Call your local Children's Mobile Crisis Response Team [place] (659) 986-1381 or www.[employer]  · Call the toll free National Suicide Prevention Hotlines   · National Suicide Prevention Lifeline 504-347-DXUE (9210)  · National Hope Line Network 800-SUICIDE (745-5036)    Discharge Instructions    Discharged to other by [facility] with escort. Discharged via wheelchair, hospital escort: Yes.  Special equipment needed: Oxygen    Be sure to schedule a follow-up appointment with your primary care doctor or any specialists as instructed.     Discharge Plan:        I understand that a diet low in cholesterol, fat, and sodium is recommended for good health. Unless I have been given specific instructions below for another diet, I accept this instruction as my diet prescription.   Other diet: Diabetic/Cardiac    Special Instructions: None    · Is patient discharged on Warfarin / Coumadin?   No       Community-Acquired Pneumonia, Adult  Pneumonia is an infection of the lungs. It causes swelling in the airways of the lungs. Mucus and fluid may also build up inside the airways.  One type of pneumonia can happen while a person is in a hospital. A different type can happen when a person is not in a hospital (community-acquired pneumonia).   What are the causes?    This condition is caused by germs (viruses, bacteria, or fungi). Some types of germs can be passed from one person to another. This can happen when you breathe in droplets from the cough or sneeze of an infected person.  What increases the risk?  You are more likely to develop this condition if you:  · Have a long-term (chronic) disease, such as:  ? Chronic obstructive pulmonary disease (COPD).  ? Asthma.  ? Cystic fibrosis.  ? Congestive heart failure.  ? Diabetes.  ? Kidney  disease.  · Have HIV.  · Have sickle cell disease.  · Have had your spleen removed.  · Do not take good care of your teeth and mouth (poor dental hygiene).  · Have a medical condition that increases the risk of breathing in droplets from your own mouth and nose.  · Have a weakened body defense system (immune system).  · Are a smoker.  · Travel to areas where the germs that cause this illness are common.  · Are around certain animals or the places they live.  What are the signs or symptoms?  · A dry cough.  · A wet (productive) cough.  · Fever.  · Sweating.  · Chest pain. This often happens when breathing deeply or coughing.  · Fast breathing or trouble breathing.  · Shortness of breath.  · Shaking chills.  · Feeling tired (fatigue).  · Muscle aches.  How is this treated?  Treatment for this condition depends on many things. Most adults can be treated at home. In some cases, treatment must happen in a hospital. Treatment may include:  · Medicines given by mouth or through an IV tube.  · Being given extra oxygen.  · Respiratory therapy.  In rare cases, treatment for very bad pneumonia may include:  · Using a machine to help you breathe.  · Having a procedure to remove fluid from around your lungs.  Follow these instructions at home:  Medicines  · Take over-the-counter and prescription medicines only as told by your doctor.  ? Only take cough medicine if you are losing sleep.  · If you were prescribed an antibiotic medicine, take it as told by your doctor. Do not stop taking the antibiotic even if you start to feel better.  General instructions    · Sleep with your head and neck raised (elevated). You can do this by sleeping in a recliner or by putting a few pillows under your head.  · Rest as needed. Get at least 8 hours of sleep each night.  · Drink enough water to keep your pee (urine) pale yellow.  · Eat a healthy diet that includes plenty of vegetables, fruits, whole grains, low-fat dairy products, and lean  protein.  · Do not use any products that contain nicotine or tobacco. These include cigarettes, e-cigarettes, and chewing tobacco. If you need help quitting, ask your doctor.  · Keep all follow-up visits as told by your doctor. This is important.  How is this prevented?  A shot (vaccine) can help prevent pneumonia. Shots are often suggested for:  · People older than 65 years of age.  · People older than 19 years of age who:  ? Are having cancer treatment.  ? Have long-term (chronic) lung disease.  ? Have problems with their body's defense system.  You may also prevent pneumonia if you take these actions:  · Get the flu (influenza) shot every year.  · Go to the dentist as often as told.  · Wash your hands often. If you cannot use soap and water, use hand .  Contact a doctor if:  · You have a fever.  · You lose sleep because your cough medicine does not help.  Get help right away if:  · You are short of breath and it gets worse.  · You have more chest pain.  · Your sickness gets worse. This is very serious if:  ? You are an older adult.  ? Your body's defense system is weak.  · You cough up blood.  Summary  · Pneumonia is an infection of the lungs.  · Most adults can be treated at home. Some will need treatment in a hospital.  · Drink enough water to keep your pee pale yellow.  · Get at least 8 hours of sleep each night.  This information is not intended to replace advice given to you by your health care provider. Make sure you discuss any questions you have with your health care provider.  Document Released: 06/05/2009 Document Revised: 04/08/2020 Document Reviewed: 08/15/2019  Elsevier Patient Education © 2020 Elsevier Inc.      Heart Failure Action Plan  A heart failure action plan helps you understand what to do when you have symptoms of heart failure. Follow the plan that was created by you and your health care provider. Review your plan each time you visit your health care provider.  Red zone  These  signs and symptoms mean you should get medical help right away:  · You have trouble breathing when resting.  · You have a dry cough that is getting worse.  · You have swelling or pain in your legs or abdomen that is getting worse.  · You suddenly gain more than 2-3 lb (0.9-1.4 kg) in a day, or more than 5 lb (2.3 kg) in one week. This amount may be more or less depending on your condition.  · You have trouble staying awake or you feel confused.  · You have chest pain.  · You do not have an appetite.  · You pass out.  If you experience any of these symptoms:  · Call your local emergency services (911 in the [place] right away or seek help at the emergency department of the nearest hospital.  Yellow zone  These signs and symptoms mean your condition may be getting worse and you should make some changes:  · You have trouble breathing when you are active or you need to sleep with extra pillows.  · You have swelling in your legs or abdomen.  · You gain 2-3 lb (0.9-1.4 kg) in one day, or 5 lb (2.3 kg) in one week. This amount may be more or less depending on your condition.  · You get tired easily.  · You have trouble sleeping.  · You have a dry cough.  If you experience any of these symptoms:  · Contact your health care provider within the next day.  · Your health care provider may adjust your medicines.  Green zone  These signs mean you are doing well and can continue what you are doing:  · You do not have shortness of breath.  · You have very little swelling or no new swelling.  · Your weight is stable (no gain or loss).  · You have a normal activity level.  · You do not have chest pain or any other new symptoms.  Follow these instructions at home:  · Take over-the-counter and prescription medicines only as told by your health care provider.  · Weigh yourself daily. Your target weight is __________ lb (__________ kg).  ? Call your health care provider if you gain more than __________ lb (__________ kg) in a day, or more  than __________ lb (__________ kg) in one week.  · Eat a heart-healthy diet. Work with a diet and nutrition specialist (dietitian) to create an eating plan that is best for you.  · Keep all follow-up visits as told by your health care provider. This is important.  Where to find more information  · American Heart Association: www.heart.org  Summary  · Follow the action plan that was created by you and your health care provider.  · Get help right away if you have any symptoms in the Red zone.  This information is not intended to replace advice given to you by your health care provider. Make sure you discuss any questions you have with your health care provider.  Document Released: 01/27/2018 Document Revised: 11/30/2018 Document Reviewed: 01/27/2018  [employer] Patient Education © 2020 [employer]      Diabetes Mellitus and Standards of Medical Care  Managing diabetes (diabetes mellitus) can be complicated. Your diabetes treatment may be managed by a team of health care providers, including:  · A physician who specializes in diabetes (endocrinologist).  · A nurse practitioner or physician assistant.  · Nurses.  · A diet and nutrition specialist (registered dietitian).  · A certified diabetes educator (CDE).  · An exercise specialist.  · A pharmacist.  · An eye doctor.  · A foot specialist (podiatrist).  · A dentist.  · A primary care provider.  · A mental health provider.  Your health care providers follow guidelines to help you get the best quality of care. The following schedule is a general guideline for your diabetes management plan. Your health care providers may give you more specific instructions.  Physical exams  Upon being diagnosed with diabetes mellitus, and each year after that, your health care provider will ask about your medical and family history. He or she will also do a physical exam. Your exam may include:  · Measuring your height, weight, and body mass index (BMI).  · Checking your blood pressure.  This will be done at every routine medical visit. Your target blood pressure may vary depending on your medical conditions, your age, and other factors.  · Thyroid gland exam.  · Skin exam.  · Screening for damage to your nerves (peripheral neuropathy). This may include checking the pulse in your legs and feet and checking the level of sensation in your hands and feet.  · A complete foot exam to inspect the structure and skin of your feet, including checking for cuts, bruises, redness, blisters, sores, or other problems.  · Screening for blood vessel (vascular) problems, which may include checking the pulse in your legs and feet and checking your temperature.  Blood tests  Depending on your treatment plan and your personal needs, you may have the following tests done:  · HbA1c (hemoglobin A1c). This test provides information about blood sugar (glucose) control over the previous 2-3 months. It is used to adjust your treatment plan, if needed. This test will be done:  ? At least 2 times a year, if you are meeting your treatment goals.  ? 4 times a year, if you are not meeting your treatment goals or if treatment goals have changed.  · Lipid testing, including total, LDL, and HDL cholesterol and triglyceride levels.  ? The goal for LDL is less than 100 mg/dL (5.5 mmol/L). If you are at high risk for complications, the goal is less than 70 mg/dL (3.9 mmol/L).  ? The goal for HDL is 40 mg/dL (2.2 mmol/L) or higher for men and 50 mg/dL (2.8 mmol/L) or higher for women. An HDL cholesterol of 60 mg/dL (3.3 mmol/L) or higher gives some protection against heart disease.  ? The goal for triglycerides is less than 150 mg/dL (8.3 mmol/L).  · Liver function tests.  · Kidney function tests.  · Thyroid function tests.  Dental and eye exams  · Visit your dentist two times a year.  · If you have type 1 diabetes, your health care provider may recommend an eye exam 3-5 years after you are diagnosed, and then once a year after your  first exam.  ? For children with type 1 diabetes, a health care provider may recommend an eye exam when your child is age 10 or older and has had diabetes for 3-5 years. After the first exam, your child should get an eye exam once a year.  · If you have type 2 diabetes, your health care provider may recommend an eye exam as soon as you are diagnosed, and then once a year after your first exam.  Immunizations    · The yearly flu (influenza) vaccine is recommended for everyone 6 months or older who has diabetes.  · The pneumonia (pneumococcal) vaccine is recommended for everyone 2 years or older who has diabetes. If you are 65 or older, you may get the pneumonia vaccine as a series of two separate shots.  · The hepatitis B vaccine is recommended for adults shortly after being diagnosed with diabetes.  · Adults and children with diabetes should receive all other vaccines according to age-specific recommendations from the Centers for Disease Control and Prevention (CDC).  Mental and emotional health  Screening for symptoms of eating disorders, anxiety, and depression is recommended at the time of diagnosis and afterward as needed. If your screening shows that you have symptoms (positive screening result), you may need more evaluation and you may work with a mental health care provider.  Treatment plan  Your treatment plan will be reviewed at every medical visit. You and your health care provider will discuss:  · How you are taking your medicines, including insulin.  · Any side effects you are experiencing.  · Your blood glucose target goals.  · The frequency of your blood glucose monitoring.  · Lifestyle habits, such as activity level as well as tobacco, alcohol, and substance use.  Diabetes self-management education  Your health care provider will assess how well you are monitoring your blood glucose levels and whether you are taking your insulin correctly. He or she may refer you to:  · A certified diabetes educator  to manage your diabetes throughout your life, starting at diagnosis.  · A registered dietitian who can create or review your personal nutrition plan.  · An exercise specialist who can discuss your activity level and exercise plan.  Summary  · Managing diabetes (diabetes mellitus) can be complicated. Your diabetes treatment may be managed by a team of health care providers.  · Your health care providers follow guidelines in order to help you get the best quality of care.  · Standards of care including having regular physical exams, blood tests, blood pressure monitoring, immunizations, screening tests, and education about how to manage your diabetes.  · Your health care providers may also give you more specific instructions based on your individual health.  This information is not intended to replace advice given to you by your health care provider. Make sure you discuss any questions you have with your health care provider.  Document Released: 10/15/2010 Document Revised: 09/06/2019 Document Reviewed: 09/15/2017  Elsevier Patient Education © 2020 [employer]      Hand Washing  Germs such as bacteria, viruses, and parasites are found everywhere. They can be in the air and water. They can also be on surfaces like food, door handles, and your skin. Every day, your hands touch germs. Many of these germs can make you and your family sick. Washing your hands is one of the best ways to lower your risk of getting and sharing germs.  When should I wash my hands?  You should wash your hands whenever you think they are dirty. You should also wash your hands:  · Before:  ? Visiting a baby or anyone with a weakened disease-fighting system (immunesystem).  ? Putting in and taking out contact lenses.  · After:  ? Using the bathroom or helping someone else use the bathroom.  ? Working or playing outside.  ? Touching or taking out the garbage.  ? Touching anything dirty around your home.  ? Sneezing, coughing, or blowing your  nose.  ? Using a phone, including your mobile phone.  ? Touching an animal, animal food, animal poop, or its toys or leash.  ? Touching money.  ? Using household  or poisonous chemicals.  ? Handling dirty clothes, bedding, or rags.  ? Using public transportation.  ? Going shopping, especially if you use a shopping cart or basket.  ? Shaking hands.  ? Handling livestock, such as cows or sheep.  · Before and after:  ? Preparing food.  ? Eating.  ? Visiting or taking care of someone who is sick. This includes touching used tissues, toys, and clothes.  ? Changing a bandage (dressing).  ? Taking care of an injury or wound.  ? Giving or taking medicine.  ? Preparing a bottle for a baby.  ? Feeding a baby or young child.  ? Changing a diaper.  What is the right way to wash my hands?    2. Wet your hands with clean, running water. Turn off the water or move your hands out of the running water.  3. Apply liquid soap or bar soap to your hands.  4. Rub your hands together quickly to create lather.  5. Keep rubbing your hands together for at least 20 seconds. Thoroughly scrub all parts of your hands. This includes scrubbing under your fingernails and between your fingers.  6. Rinse your hands with clean, running water. Do this until all the soap is gone.  7. Dry your hands using an air dryer or a clean paper or cloth towel, or let your hands air-dry. Do not use your clothing or a dirty towel to dry your hands.  If you are in a public restroom, use your towel:  · To turn off the water faucet.  · To open the bathroom door.  How can I clean my hands if I do not have soap and water?    If soap and clean water are not available, use a hand-washing wipe, spray, or gel (hand ). Use one that contains at least 60% alcohol. If you are handling food, gels are not recommended as a replacement for hand washing with soap and water.  To use these products, follow the directions on the product, and:  · Apply enough product to  cover your hands.  · Make sure you wipe, rub, or spray the product so that it reaches every part of your hands and wrists. Include the backs of your hands, between your fingers, and under your fingernails.  · Rub the product onto your hands until it dries.  Summary  · Every day, your hands touch germs. Many of these germs can make you and your family sick.  · Washing your hands is one of the best ways to lower your risk of getting and sharing germs.  · If soap and clean water are not available, use a hand-washing wipe, spray, or gel.  This information is not intended to replace advice given to you by your health care provider. Make sure you discuss any questions you have with your health care provider.  Document Released: 11/30/2009 Document Revised: 09/26/2018 Document Reviewed: 09/26/2018  [employer] Patient Education © 2020 [employer]      Depression / Suicide Risk    As you are discharged from this [facility] facility, it is important to learn how to keep safe from harming yourself.    Recognize the warning signs:  · Abrupt changes in personality, positive or negative- including increase in energy   · Giving away possessions  · Change in eating patterns- significant weight changes-  positive or negative  · Change in sleeping patterns- unable to sleep or sleeping all the time   · Unwillingness or inability to communicate  · Depression  · Unusual sadness, discouragement and loneliness  · Talk of wanting to die  · Neglect of personal appearance   · Rebelliousness- reckless behavior  · Withdrawal from people/activities they love  · Confusion- inability to concentrate     If you or a loved one observes any of these behaviors or has concerns about self-harm, here's what you can do:  · Talk about it- your feelings and reasons for harming yourself  · Remove any means that you might use to hurt yourself (examples: pills, rope, extension cords, firearm)  · Get professional help from the community (Mental Health,  Substance Abuse, psychological counseling)  · Do not be alone:Call your Safe Contact- someone whom you trust who will be there for you.  · Call your local CRISIS HOTLINE 000-0629 or 913-645-2731  · Call your local Children's Mobile Crisis Response Team [place] (776) 038-7999 or www.[employer]  · Call the toll free National Suicide Prevention Hotlines   · National Suicide Prevention Lifeline 037-417-YPFU (0382)  · National Hope Line Network 800-SUICIDE (916-1305)

## 2021-06-30 NOTE — DISCHARGE PLANNING
Agency/Facility Name: [facility]  Spoke To: Chaparrita  Outcome: Bed and transport available 7/1 @ 1200.    RN CM informed

## 2021-06-30 NOTE — PROGRESS NOTES
Telemetry Strip     Strip printed: 1447  Measurements from am strip were as follows:  Rhythm: SR BBB  HR: 73-84  Measurements: 0.16/ 0.14/ 0.38  Ectopy: o PAC             Normal Values  Rhythm SR  HR Range    Measurements 0.12-0.20 / 0.06-0.10  / 0.30-0.52

## 2021-06-30 NOTE — CARE PLAN
The patient is Stable - Low risk of patient condition declining or worsening    Shift Goals  Clinical Goals: Saturate >90% while asleep requiring 3.5L or less of O2.  Patient Goals: breath better    Progress made toward(s) clinical / shift goals:  Patient has slept majority of the shift and saturated >90% on 3.5L or less O2.    Patient is not progressing towards the following goals: NA

## 2021-06-30 NOTE — DISCHARGE PLANNING
[facility] does not have a bed today. They will be able to take the patient tomorrow. Transport arranged by [facility] for 1200 on 7/1 via [facility] van. Updated COBRA completed and signed my MD and patient. Updated bedside RN.     PLAN: Patient to transfer to [facility] tomorrow at 1200.

## 2021-06-30 NOTE — THERAPY
Physical Therapy   Daily Treatment     Patient Name: Gabrielle Olmstead  Age:  75 y.o., Sex:  female  Medical Record #: [id number]  Today's Date: 6/30/2021     Precautions: (P) Fall Risk    Assessment    Pt prefers to use 4WW, refusing FWW. Pt does ambulate well with 4WW, no LOB, steady limited by mod SOB on 6L nc. Pt is approp for SNF.     Plan    Continue current treatment plan.    DC Equipment Recommendations: None (pt has all necessary DME)  Discharge Recommendations: (P) Recommend post-acute placement for additional physical therapy services prior to discharge home       06/30/21 1525   Cognition    Comments Pt agreeable for PT, states R UE still hurts after humeral fracture from 1 yr ago   Balance   Sitting Balance (Static) Fair   Sitting Balance (Dynamic) Fair   Standing Balance (Static) Fair   Standing Balance (Dynamic) Fair -   Weight Shift Sitting Fair   Weight Shift Standing Fair   Skilled Intervention Postural Facilitation;Sequencing;Tactile Cuing;Verbal Cuing   Comments with 4WW   Gait Analysis   Gait Level Of Assist Supervised   Assistive Device 4 Wheel Walker   Distance (Feet) 125   # of Times Distance was Traveled 1   Deviation Shuffled Gait;Bradykinetic   Bed Mobility    Supine to Sit Minimal Assist   Functional Mobility   Sit to Stand Supervised  (SBA)   Bed, Chair, Wheelchair Transfer Supervised  (SBA)   Transfer Method Stand Step   Activity Tolerance   Sitting Edge of Bed 12 min, performed seated grooming, required assist for tasks that involved R UE due to pain and decreased ROM   Standing mod SOB with activity, on 6L nc while ambulating   Short Term Goals    Short Term Goal # 1 pt will go supine<>sit w/hob elevated and rails up w/spv in 6tx for safe d/c home   Goal Outcome # 1 Progressing as expected   Short Term Goal # 2 pt will go sit<>stand w/fww w/spv in 6tx for safe d/c home    Goal Outcome # 2 Goal met   Short Term Goal # 3 pt will transfer bed<>chair w/fww w/spv in 6tx for safe d/c home   Goal  Outcome # 3 Goal met   Short Term Goal # 4 pt will ambulate for 150ft w/fww w/spv in 6tx for safe d/c home    Goal Outcome # 4 Progressing as expected   Short Term Goal # 5 pt will go up/down 1 step w/spv w/fww in 6tx for safe d/c home   Goal Outcome # 5 Goal not met

## 2021-06-30 NOTE — PROGRESS NOTES
"Hospital Medicine Daily Progress Note    Date of Service  6/30/2021    Chief Complaint  75 y.o. female admitted 6/23/2021 with shortness of breath     Hospital Course    \"75 y.o. female who presented 6/23/2021 with worsening shortness of breath.  Patient has had progressive SLB with orthopnea for the past couple days.  Patient has bilateral lower extremity edema which she states gets wrapped with compression stockings.  She noticed that she began to have more fluid leakage from her legs in the past couple weeks.  She stated she knows she has heart failure and problems with fluids but was not taking a diuretic.  Patient states she is extremely incontinent with Lasix.  She has chronic home oxygen at 2 L nasal cannula.  Patient denied any subjective fevers, coughing, abdominal pain, headache.\"      Interval Problem Update    6/29/2021: The patient was initially evaluated for shortness of breath.  At home she is chronically on 2 L via nasal cannula.  Her oxygenation requirements are improving daily.  She is currently being transitioned to p.o. antibiotics for community-acquired pneumonia.  VQ scan was negative for pulmonary embolism.  She has no identifiable lower risk factors for DVT.  No further evaluation needed as she is very close at her baseline oxygenation levels with current management.  Patient also with underlying pulmonary hypertension and receiving aggressive IV diuresis.  IV Lasix has been uptitrated and we will continue to monitor for appropriate response.  Patient states that her appetite is improving, no worsening shortness of breath, no productive cough noted.  She denies any further episodes of fevers or chills.  Patient has been accepted to skilled nursing facility with likely discharge in the morning.  No other overnight events reported.    6/30/2021: The patient seen and evaluated at bedside and appears comfortable.  She denies any interval worsening of shortness of breath, no productive cough, " diarrhea, no chest pains or palpitations noted.  Patient is currently medically cleared for discharge however SNF cannot accept her until tomorrow at noon.  Patient was noted to have mild elevated leukocytosis however this may be reactive to underlying pneumonia that she is actively being treated.  She has no new focus of infection on clinical history or physical examination.  Continue to monitor.  No other overnight events reported.      Consultants/Specialty  None    Code Status  Full Code    Disposition  Likely discharge AM Discharge     Review of Systems  Review of Systems   Constitutional: Negative for chills and fever.   HENT: Negative for hearing loss and tinnitus.    Eyes: Negative for blurred vision, double vision and photophobia.   Respiratory: Negative for cough, hemoptysis and sputum production.    Cardiovascular: Negative for chest pain and palpitations.   Gastrointestinal: Negative for abdominal pain, heartburn, nausea and vomiting.   Genitourinary: Negative for dysuria and urgency.   Musculoskeletal: Negative for back pain, joint pain and myalgias.   Skin: Negative for itching and rash.   Neurological: Negative for dizziness, sensory change, speech change and headaches.   Endo/Heme/Allergies: Does not bruise/bleed easily.   Psychiatric/Behavioral: Negative for depression and suicidal ideas.        Physical Exam  Temp:  [36.7 °C (98 °F)-37.1 °C (98.7 °F)] 36.7 °C (98.1 °F)  Pulse:  [70-80] 71  Resp:  [16-20] 16  BP: (125-182)/(46-99) 125/46  SpO2:  [89 %-96 %] 89 %    Physical Exam  Vitals and nursing note reviewed.   Constitutional:       General: She is not in acute distress.     Appearance: Normal appearance. She is obese.   HENT:      Head: Normocephalic and atraumatic.      Mouth/Throat:      Mouth: Mucous membranes are moist.   Eyes:      Extraocular Movements: Extraocular movements intact.      Conjunctiva/sclera: Conjunctivae normal.      Pupils: Pupils are equal, round, and reactive to light.    Cardiovascular:      Rate and Rhythm: Normal rate and regular rhythm.      Heart sounds: No murmur heard.   No friction rub.   Pulmonary:      Effort: Pulmonary effort is normal.      Breath sounds: Normal breath sounds.      Comments: Decreased breath sounds at bases  Abdominal:      General: Abdomen is flat. Bowel sounds are normal.      Palpations: Abdomen is soft.   Musculoskeletal:         General: No swelling or tenderness. Normal range of motion.      Cervical back: Normal range of motion and neck supple.      Right lower leg: No edema.      Left lower leg: No edema.   Skin:     General: Skin is warm and dry.      Findings: No rash.   Neurological:      General: No focal deficit present.      Mental Status: She is alert and oriented to person, place, and time. Mental status is at baseline.   Psychiatric:         Mood and Affect: Mood normal.         Behavior: Behavior normal.         Fluids    Intake/Output Summary (Last 24 hours) at 6/30/2021 1150  Last data filed at 6/30/2021 0959  Gross per 24 hour   Intake 980 ml   Output 2500 ml   Net -1520 ml       Laboratory  Recent Labs     06/28/21  0452 06/29/21 0445 06/30/21  0418   WBC 10.8 11.9* 12.2*   RBC 3.09* 3.28* 3.45*   HEMOGLOBIN 9.6* 10.1* 10.6*   HEMATOCRIT 30.7* 32.3* 34.3*   MCV 99.4* 98.5* 99.4*   MCH 31.1 30.8 30.7   MCHC 31.3* 31.3* 30.9*   RDW 50.2* 49.5 49.2   PLATELETCT 311 370 417   MPV 10.2 10.1 9.9     Recent Labs     06/28/21  0452 06/29/21 0445 06/30/21  0418   SODIUM 136 139 137   POTASSIUM 3.6 3.5* 3.5*   CHLORIDE 96 98 94*   CO2 30 31 32   GLUCOSE 283* 248* 283*   BUN 18 20 20   CREATININE 0.56 0.59 0.63   CALCIUM 9.5 9.9 9.7                   Imaging  NM-LUNG VENT/PERF IMAGING   Final Result      1.  Low probability for pulmonary embolus.   2.  Airway disease.      DX-CHEST-PORTABLE (1 VIEW)   Final Result      1.  Persistent hypoinflation with LEFT lung base atelectasis.   2.  Mild pulmonary vascular congestion, worse than prior.    3.  Stable multichamber cardiac enlargement.      DX-CHEST-LIMITED (1 VIEW)   Final Result         Stable cardiomegaly.      DX-CHEST-PORTABLE (1 VIEW)   Final Result      1.  Possible mild pulmonary interstitial edema      2.  Enlarged cardiac silhouette           Assessment/Plan  Hypothyroidism  Assessment & Plan  Continue home medications.    Chronic back pain- (present on admission)  Assessment & Plan  We will try to avoid opiates.  We have scheduled Tylenol and lidocaine patches for now.    Failure to thrive in adult- (present on admission)  Assessment & Plan  PT/OT    Pending placement.    Pulmonary hypertension (HCC)- (present on admission)  Assessment & Plan  RVSP 45 mmHg.  11/25/2020.  - Continue Lasix  - continue oxygen supplementation    Current non-adherence to medical treatment- (present on admission)  Assessment & Plan  Patient not taking her diuretics at home, causes her too much incontinence  - continue counseling for adherence    Acute on chronic diastolic CHF (congestive heart failure), NYHA class 2 ([facility])- (present on admission)  Assessment & Plan  11/25/2020 - echo noted normal diastolic and systolic function.  Mild mitral regurg.  Mild tricuspid regurg.  Mild aortic stenosis.  Past diagnosis. Patient has evidence with pulmonary crackles, bilateral lower extremity edema with leaking venous insufficiency ulcers.  Patient has not been taking her Lasix at home.  -trial 40mg IV Lasix, patient does have sepsis appearance but is unable to tolerate more volume at this time.  Monitor for hypotension    6/28: We will continue with lasix at 40 mg IV daily.  Patient mentions she feels much better, lower extremity edema has improved.  It appears I/O's are not reliable. We will try to obtain more strict I/O's and consider switching to PO lasix when appropriate.    DM (diabetes mellitus) ([facility])- (present on admission)  Assessment & Plan  Hold patient's home p.o. medications  Current A1c  8.6  ISS  Accuchecks  Hypoglycemia protocol    6/26: We will increase to medium sliding scale.  Monitor and make changes accordingly.  6/27: We will add lantus 5  U daily.    6/28: We will increase lantus to 10 U    Dyslipidemia- (present on admission)  Assessment & Plan  Continue atorvastatin    Essential hypertension- (present on admission)  Assessment & Plan  Holding patient's home blood pressure medications as she will need diuretics, at risk for hypotension due to her sepsis from pneumonia.    6/26: We will now reorder the patient's home medication for blood pressure.  6/28: We have increased amlodipine to 10 mg.    Anemia- (present on admission)  Assessment & Plan  No signs of bleeding at this time.  Monitor, repeat CBC.  Iron studies and vit b12 pending       VTE prophylaxis: lovenox

## 2021-07-01 VITALS
HEART RATE: 70 BPM | WEIGHT: 225.97 LBS | RESPIRATION RATE: 18 BRPM | OXYGEN SATURATION: 93 % | BODY MASS INDEX: 40.04 KG/M2 | TEMPERATURE: 97.5 F | DIASTOLIC BLOOD PRESSURE: 48 MMHG | SYSTOLIC BLOOD PRESSURE: 126 MMHG | HEIGHT: 63 IN

## 2021-07-01 LAB
ALBUMIN SERPL BCP-MCNC: 3.3 G/DL (ref 3.2–4.9)
ALBUMIN/GLOB SERPL: 1 G/DL
ALP SERPL-CCNC: 250 U/L (ref 30–99)
ALT SERPL-CCNC: 89 U/L (ref 2–50)
ANION GAP SERPL CALC-SCNC: 8 MMOL/L (ref 7–16)
AST SERPL-CCNC: 46 U/L (ref 12–45)
BASOPHILS # BLD AUTO: 0.3 % (ref 0–1.8)
BASOPHILS # BLD: 0.04 K/UL (ref 0–0.12)
BILIRUB SERPL-MCNC: 0.2 MG/DL (ref 0.1–1.5)
BUN SERPL-MCNC: 35 MG/DL (ref 8–22)
CALCIUM SERPL-MCNC: 9.4 MG/DL (ref 8.4–10.2)
CHLORIDE SERPL-SCNC: 97 MMOL/L (ref 96–112)
CO2 SERPL-SCNC: 33 MMOL/L (ref 20–33)
CREAT SERPL-MCNC: 0.88 MG/DL (ref 0.5–1.4)
EKG IMPRESSION: NORMAL
EOSINOPHIL # BLD AUTO: 0.72 K/UL (ref 0–0.51)
EOSINOPHIL NFR BLD: 6 % (ref 0–6.9)
ERYTHROCYTE [DISTWIDTH] IN BLOOD BY AUTOMATED COUNT: 51.9 FL (ref 35.9–50)
GLOBULIN SER CALC-MCNC: 3.3 G/DL (ref 1.9–3.5)
GLUCOSE BLD-MCNC: 274 MG/DL (ref 65–99)
GLUCOSE BLD-MCNC: 286 MG/DL (ref 65–99)
GLUCOSE BLD-MCNC: 304 MG/DL (ref 65–99)
GLUCOSE SERPL-MCNC: 289 MG/DL (ref 65–99)
HCT VFR BLD AUTO: 33.9 % (ref 37–47)
HGB BLD-MCNC: 10.3 G/DL (ref 12–16)
IMM GRANULOCYTES # BLD AUTO: 0.24 K/UL (ref 0–0.11)
IMM GRANULOCYTES NFR BLD AUTO: 2 % (ref 0–0.9)
LYMPHOCYTES # BLD AUTO: 2.83 K/UL (ref 1–4.8)
LYMPHOCYTES NFR BLD: 23.7 % (ref 22–41)
MAGNESIUM SERPL-MCNC: 1.5 MG/DL (ref 1.5–2.5)
MCH RBC QN AUTO: 31.1 PG (ref 27–33)
MCHC RBC AUTO-ENTMCNC: 30.4 G/DL (ref 33.6–35)
MCV RBC AUTO: 102.4 FL (ref 81.4–97.8)
MONOCYTES # BLD AUTO: 0.51 K/UL (ref 0–0.85)
MONOCYTES NFR BLD AUTO: 4.3 % (ref 0–13.4)
NEUTROPHILS # BLD AUTO: 7.61 K/UL (ref 2–7.15)
NEUTROPHILS NFR BLD: 63.7 % (ref 44–72)
NRBC # BLD AUTO: 0 K/UL
NRBC BLD-RTO: 0 /100 WBC
PHOSPHATE SERPL-MCNC: 3.6 MG/DL (ref 2.5–4.5)
PLATELET # BLD AUTO: 400 K/UL (ref 164–446)
PMV BLD AUTO: 9.5 FL (ref 9–12.9)
POTASSIUM SERPL-SCNC: 4.2 MMOL/L (ref 3.6–5.5)
PROT SERPL-MCNC: 6.6 G/DL (ref 6–8.2)
RBC # BLD AUTO: 3.31 M/UL (ref 4.2–5.4)
SODIUM SERPL-SCNC: 138 MMOL/L (ref 135–145)
TROPONIN T SERPL-MCNC: 21 NG/L (ref 6–19)
WBC # BLD AUTO: 12 K/UL (ref 4.8–10.8)

## 2021-07-01 PROCEDURE — 93005 ELECTROCARDIOGRAM TRACING: CPT | Performed by: INTERNAL MEDICINE

## 2021-07-01 PROCEDURE — A9270 NON-COVERED ITEM OR SERVICE: HCPCS | Performed by: INTERNAL MEDICINE

## 2021-07-01 PROCEDURE — 80053 COMPREHEN METABOLIC PANEL: CPT

## 2021-07-01 PROCEDURE — 84100 ASSAY OF PHOSPHORUS: CPT

## 2021-07-01 PROCEDURE — 94760 N-INVAS EAR/PLS OXIMETRY 1: CPT

## 2021-07-01 PROCEDURE — 700102 HCHG RX REV CODE 250 W/ 637 OVERRIDE(OP): Performed by: INTERNAL MEDICINE

## 2021-07-01 PROCEDURE — 85025 COMPLETE CBC W/AUTO DIFF WBC: CPT

## 2021-07-01 PROCEDURE — 84484 ASSAY OF TROPONIN QUANT: CPT

## 2021-07-01 PROCEDURE — 93010 ELECTROCARDIOGRAM REPORT: CPT | Performed by: STUDENT IN AN ORGANIZED HEALTH CARE EDUCATION/TRAINING PROGRAM

## 2021-07-01 PROCEDURE — 83735 ASSAY OF MAGNESIUM: CPT

## 2021-07-01 PROCEDURE — 99239 HOSP IP/OBS DSCHRG MGMT >30: CPT | Performed by: INTERNAL MEDICINE

## 2021-07-01 PROCEDURE — 700101 HCHG RX REV CODE 250: Performed by: INTERNAL MEDICINE

## 2021-07-01 PROCEDURE — 82962 GLUCOSE BLOOD TEST: CPT

## 2021-07-01 RX ADMIN — LIDOCAINE 1 PATCH: 50 PATCH TOPICAL at 09:53

## 2021-07-01 RX ADMIN — BENAZEPRIL HYDROCHLORIDE 20 MG: 10 TABLET ORAL at 06:14

## 2021-07-01 RX ADMIN — AMOXICILLIN AND CLAVULANATE POTASSIUM 1 TABLET: 500; 125 TABLET, FILM COATED ORAL at 06:14

## 2021-07-01 RX ADMIN — GUAIFENESIN 600 MG: 600 TABLET, EXTENDED RELEASE ORAL at 06:14

## 2021-07-01 RX ADMIN — LIDOCAINE HYDROCHLORIDE 30 ML: 20 SOLUTION OROPHARYNGEAL at 07:56

## 2021-07-01 RX ADMIN — AMLODIPINE BESYLATE 10 MG: 5 TABLET ORAL at 06:14

## 2021-07-01 RX ADMIN — POTASSIUM CHLORIDE 20 MEQ: 1500 TABLET, EXTENDED RELEASE ORAL at 06:14

## 2021-07-01 RX ADMIN — LEVOTHYROXINE SODIUM 25 MCG: 0.03 TABLET ORAL at 06:14

## 2021-07-01 RX ADMIN — BUDESONIDE AND FORMOTEROL FUMARATE DIHYDRATE 2 PUFF: 160; 4.5 AEROSOL RESPIRATORY (INHALATION) at 08:03

## 2021-07-01 RX ADMIN — ATORVASTATIN CALCIUM 40 MG: 40 TABLET, FILM COATED ORAL at 06:14

## 2021-07-01 RX ADMIN — FUROSEMIDE 20 MG: 20 TABLET ORAL at 06:14

## 2021-07-01 RX ADMIN — ACETAMINOPHEN 1000 MG: 500 TABLET, FILM COATED ORAL at 06:13

## 2021-07-01 RX ADMIN — SERTRALINE HYDROCHLORIDE 50 MG: 50 TABLET ORAL at 06:13

## 2021-07-01 ASSESSMENT — FIBROSIS 4 INDEX: FIB4 SCORE: 1.01

## 2021-07-01 NOTE — PROGRESS NOTES
Pt resting in bed, eating breakfast; states acid reflux resolved to 0/10 post GI cocktail administration.

## 2021-07-01 NOTE — CARE PLAN
The patient is Stable - Low risk of patient condition declining or worsening    Shift Goals  Clinical Goals: Less anxiety; sleep  Patient Goals: Discharge; less anxiety    Progress made toward(s) clinical / shift goals:  Melatonin for sleep. Xanax for anxiety. Cluster care and provide education and active listening.     Patient is not progressing towards the following goals:

## 2021-07-01 NOTE — DISCHARGE PLANNING
Anticipated Discharge Disposition: SNF    Action: Received in report pt is scheduled to d/[facility] to [facility] today at 1200.   COBRA packet completed and placed in pt's chart.     Barriers to Discharge: None    Plan: D/C to SNF

## 2021-07-01 NOTE — CARE PLAN
The patient is Stable - Low risk of patient condition declining or worsening    Shift Goals  Clinical Goals: resolve acid reflux  Patient Goals: dishcarge to planned facility    Progress made toward(s) clinical / shift goals:  medicated per MAR GI cocktail with relief.

## 2021-07-01 NOTE — PROGRESS NOTES
Report received from ADDIE Tovar. Pt complains of epigastric-chest pain/acid reflux 6/10 present since earlier this AM. Primary MD contacted and updated. RN getting EKG and troponins, labs and MD to order GI cocktail. Pt is in no apparent distress, VSS. Pt alert and oriented x 4 and instructed to notify RN with any and all needs and agrees.

## 2021-07-01 NOTE — PROGRESS NOTES
Telemetry Shift Summary    Rhythm SR w/ BBB  HR Range 66-83  Ectopy o-fPAC  Measurements 0.16/0.14/0.44        Normal Values  Rhythm SR  HR Range    Measurements 0.12-0.20 / 0.06-0.10  / 0.30-0.52

## 2021-07-01 NOTE — DISCHARGE SUMMARY
"Discharge Summary    CHIEF COMPLAINT ON ADMISSION  Chief Complaint   Patient presents with   • Shortness of Breath       Reason for Admission  EMS     CODE STATUS  Full Code    HPI & HOSPITAL COURSE  This is a 75 y.o. female here with shortness of breath        \"75 y.o. female who presented 6/23/2021 with worsening shortness of breath.  Patient has had progressive SLB with orthopnea for the past couple days.  Patient has bilateral lower extremity edema which she states gets wrapped with compression stockings.  She noticed that she began to have more fluid leakage from her legs in the past couple weeks.  She stated she knows she has heart failure and problems with fluids but was not taking a diuretic.  Patient states she is extremely incontinent with Lasix.  She has chronic home oxygen at 2 L nasal cannula.  Patient denied any subjective fevers, coughing, abdominal pain, headache.\"       6/29/2021: The patient was initially evaluated for shortness of breath.  At home she is chronically on 2 L via nasal cannula.  Her oxygenation requirements are improving daily.  She is currently being transitioned to p.o. antibiotics for community-acquired pneumonia.  VQ scan was negative for pulmonary embolism.  She has no identifiable lower risk factors for DVT.  No further evaluation needed as she is very close at her baseline oxygenation levels with current management.  Patient also with underlying pulmonary hypertension/ congestive heart failure and receiving aggressive IV diuresis.  IV Lasix has been uptitrated and we will continue to monitor for appropriate response.  Patient states that her appetite is improving, no worsening shortness of breath, no productive cough noted.  She denies any further episodes of fevers or chills.  Patient has been accepted to skilled nursing facility with likely discharge in the morning.  No other overnight events reported.    6/30/2021: The patient with interval improvement in her symptoms. She " is currently medically cleared for discharge to SNF for PT.     7/1/2021: Discharge was held due to bed availability at accepting facility. Medically cleared for discharge.              Therefore, she is discharged in fair and stable condition to skilled nursing facility.    The patient met 2-midnight criteria for an inpatient stay at the time of discharge.      FOLLOW UP ITEMS POST DISCHARGE  Follow up with PCP in one week    DISCHARGE DIAGNOSES  Principal Problem (Resolved):    Acute on chronic respiratory failure with hypoxia (HCC) POA: Yes  Active Problems:    Anemia POA: Yes    Essential hypertension POA: Yes    Dyslipidemia POA: Yes    DM (diabetes mellitus) (HCC) POA: Yes    Acute on chronic diastolic CHF (congestive heart failure), NYHA class 2 ([facility]) POA: Yes    Current non-adherence to medical treatment POA: Yes    Pulmonary hypertension (HCC) POA: Yes    Failure to thrive in adult POA: Yes    Chronic back pain POA: Yes    Hypothyroidism POA: Unknown  Resolved Problems:    Sepsis (HCC) POA: Yes    Fever POA: Yes    UTI (urinary tract infection) POA: Yes    Hyponatremia POA: Yes      FOLLOW UP  No future appointments.  Anthony Martinez M.D.  [address]  [facility] [address]  [phone]    Schedule an appointment as soon as possible for a visit in 1 week  follow up from hospitalization      MEDICATIONS ON DISCHARGE     Medication List      START taking these medications      Instructions   amoxicillin-clavulanate 500-125 MG Tabs  Commonly known as: AUGMENTIN   Take 1 tablet by mouth every 12 hours for 3 days.  Dose: 1 tablet        CONTINUE taking these medications      Instructions   amlodipine-benazepril 5-20 MG per capsule  Commonly known as: LOTREL   Take 1 Cap by mouth every day.  Dose: 1 capsule     bumetanide 1 MG Tabs  Commonly known as: BUMEX   Take 1 mg by mouth every day.  Dose: 1 mg     fluticasone-salmeterol 250-50 MCG/DOSE Aepb  Commonly known as: ADVAIR   Inhale 1 Puff by mouth every 12  hours.  Dose: 1 Puff     glimepiride 4 MG Tabs  Commonly known as: AMARYL   Take 4 mg by mouth every day.  Dose: 4 mg     HYDROcodone-acetaminophen 5-325 MG Tabs per tablet  Commonly known as: NORCO   Take 0.5 Tablets by mouth every day.  Dose: 0.5 tablet     levothyroxine 25 MCG Tabs  Commonly known as: SYNTHROID   Take 25 mcg by mouth every day.  Dose: 25 mcg     Lipitor 40 MG Tabs  Generic drug: atorvastatin   Take 40 mg by mouth every day.  Dose: 40 mg     metFORMIN  MG Tb24  Commonly known as: GLUCOPHAGE XR   Take 500 mg by mouth 2 Times a Day.  Dose: 500 mg     MUCINEX PO   Take 1 tablet by mouth 2 times a day as needed (For cough).  Dose: 1 tablet     oxybutynin 15 MG CR tablet  Commonly known as: DITROPAN XL   Take 15 mg by mouth every evening.  Dose: 15 mg     potassium chloride SA 10 MEQ Tbcr  Commonly known as: K-DUR   Take 10 mEq by mouth every day.  Dose: 10 mEq     PROBIOTIC PO   Take 1 capsule by mouth every day.  Dose: 1 capsule     sertraline 50 MG Tabs  Commonly known as: Zoloft   Take 50 mg by mouth every day.  Dose: 50 mg        STOP taking these medications    furosemide 20 MG Tabs  Commonly known as: LASIX     ibuprofen 200 MG Tabs  Commonly known as: MOTRIN            Allergies  Allergies   Allergen Reactions   • Nkda [No Known Drug Allergy]        DIET  Orders Placed This Encounter   Procedures   • Diet Order Diet: Consistent CHO (Diabetic); Fluid modifications: (optional): 1500 ml Fluid Restriction     Standing Status:   Standing     Number of Occurrences:   1     Order Specific Question:   Diet:     Answer:   Consistent CHO (Diabetic) [4]     Order Specific Question:   Fluid modifications: (optional)     Answer:   1500 ml Fluid Restriction [9]       ACTIVITY  As tolerated and directed by skilled nursing.  Weight bearing as tolerated    LINES, DRAINS, AND WOUNDS  This is an automated list. Peripheral IVs will be removed prior to discharge.  Peripheral IV 06/28/21 20 G Right Wrist  (Active)   Site Assessment Clean;Dry;Intact 06/30/21 0750   Dressing Type Transparent 06/30/21 0750   Line Status Saline locked;Scrubbed the hub prior to access;Flushed 06/30/21 0750   Dressing Status Clean;Dry;Intact 06/30/21 0750   Dressing Intervention N/A 06/30/21 0750   Dressing Change Due 07/05/21 06/28/21 2000   Date Primary Tubing Changed 06/28/21 06/28/21 2000   Date Secondary Tubing Changed 06/28/21 06/28/21 2000   NEXT Primary Tubing Change  06/30/21 06/28/21 2000   NEXT Secondary Tubing Change  06/29/21 06/28/21 2000   [facility] 0 06/30/21 0750   Phlebitis Scale ([facility] Only) 0 06/30/21 0750     External Urinary Catheter (Female Wick) (Active)   Collection Container Suction container 06/29/21 2020   Output (mL) 1000 mL 06/28/21 1800      Wound 11/24/20 Venous Ulcer Pretibial Right (Active)   Wound Image   06/23/21 2211   Site Assessment JACKY 06/29/21 2020   Periwound Assessment JACKY 06/29/21 2020   Margins Attached edges 06/25/21 2100   Closure Open to air 06/25/21 2100   Drainage Amount Scant 06/23/21 2211   Drainage Description Serosanguineous 06/23/21 2211   Treatments Site care;Cleansed 06/23/21 2211   Wound Cleansing Normal Saline Irrigation 06/23/21 2211   Periwound Protectant Skin Protectant Wipes to Periwound 06/23/21 2211   Dressing Options Honey Colloid;Mepilex;Tubigrip 06/27/21 1000   Dressing Changed Observed 06/27/21 1000   Dressing Status Open to Air 06/23/21 2100   Dressing Change/Treatment Frequency Every 48 hrs, and As Needed 06/27/21 2000   NEXT Dressing Change/Treatment Date 06/26/21 06/23/21 2211   NEXT Weekly Photo (Inpatient Only) 06/30/21 06/27/21 1000   Non-staged Wound Description Full thickness 06/23/21 2211   Wound Length (cm) 3.5 cm 06/23/21 2211   Wound Width (cm) 2 cm 06/23/21 2211   Wound Surface Area (cm^2) 7 cm^2 06/23/21 2211   Wound Bed Granulation (%) 60 % 06/23/21 2211   Wound Bed Slough (%) 40 % 06/23/21 2211   Wound Odor None 06/23/21 2211    Pulses Right;1+;DP;PT 06/23/21 2211   Exposed Structures None 06/23/21 2211   WOUND NURSE ONLY - Time Spent with Patient (mins) 60 06/23/21 2211       Wound 06/23/21 Pretibial Left (Active)   Wound Image   06/23/21 2212   Site Assessment JACKY 06/30/21 0720   Periwound Assessment JACKY 06/30/21 0720   Margins Defined edges 06/26/21 1915   Closure Other (Comment) 06/28/21 2000   Drainage Amount None 06/28/21 2000   Treatments Cleansed;Compression 06/28/21 2000   Wound Cleansing Normal Saline Irrigation 06/28/21 2000   Dressing Options Honey Colloid 06/28/21 2000   Dressing Changed Changed 06/28/21 2000   Dressing Status Removed;New drainage 06/28/21 2000   Dressing Change/Treatment Frequency Every 48 hrs, and As Needed 06/28/21 2000   NEXT Dressing Change/Treatment Date 06/30/21 06/28/21 2000       Wound 06/23/21 Hand Right (Active)   Wound Image   06/23/21 2212   Site Assessment Healing ridge 06/28/21 2000   Periwound Assessment Clean;Intact;Dry 06/29/21 2020   Margins Attached edges 06/28/21 0927   Closure Open to air 06/28/21 2000   Drainage Amount None 06/28/21 0927   Drainage Description JACKY 06/24/21 2120   Dressing Options Open to Air 06/29/21 2020   Dressing Changed Observed 06/27/21 2000   Dressing Status Open to Air 06/27/21 2000   Dressing Change/Treatment Frequency Every 48 hrs, and As Needed 06/24/21 2120       Wound 06/23/21 Pressure Injury Buttocks Left stage 2 POA (Active)   Wound Image   06/26/21 2154   Site Assessment Early/partial granulation;Red;Pink 06/29/21 2020   Periwound Assessment Pink 06/29/21 2020   Margins Defined edges 06/29/21 2020   Closure Adhesive bandage 06/27/21 2000   Drainage Amount None 06/29/21 2020   Drainage Description Serosanguineous 06/29/21 2020   Treatments Cleansed 06/29/21 2020   Wound Cleansing Approved Wound Cleanser 06/29/21 2020   Periwound Protectant Barrier Paste 06/29/21 2020   Dressing Cleansing/Solutions Not Applicable 06/29/21 2020   Dressing Options  Hydrocolloid Thin 06/29/21 2020   Dressing Changed Observed 06/29/21 2020   Dressing Status Clean;Dry;Intact 06/29/21 2020   Dressing Change/Treatment Frequency As Needed 06/29/21 2020   NEXT Dressing Change/Treatment Date 06/24/21 06/23/21 2232   NEXT Weekly Photo (Inpatient Only) 06/30/21 06/23/21 2232   Pressure Injury Stage 2 06/23/21 2232   Non-staged Wound Description Full thickness 06/23/21 2232   Wound Length (cm) 0.5 cm 06/23/21 2232   Wound Width (cm) 1.5 cm 06/23/21 2232   Wound Depth (cm) 0.2 cm 06/23/21 2232   Wound Surface Area (cm^2) 0.75 cm^2 06/23/21 2232   Wound Volume (cm^3) 0.15 cm^3 06/23/21 2232   Wound Bed Granulation (%) 100 % 06/23/21 2232   Shape fissure 06/23/21 2232   Wound Odor None 06/23/21 2232   Exposed Structures None 06/23/21 2232       Wound 06/23/21 Pretibial Distal Right (Active)   Wound Image   06/23/21 2232   Site Assessment JACKY 06/29/21 2020   Periwound Assessment JACKY 06/29/21 2020   Margins Defined edges 06/25/21 2100   Closure Open to air 06/25/21 2100   Drainage Amount Scant 06/28/21 2000   Drainage Description Brown;Serosanguineous 06/28/21 2000   Treatments Cleansed;Compression 06/28/21 2000   Wound Cleansing Normal Saline Irrigation 06/28/21 2000   Periwound Protectant Moisture Barrier 06/28/21 2000   Dressing Options Honey Colloid;Mepilex 06/28/21 2000   Dressing Changed Changed 06/28/21 2000   Dressing Status Old drainage 06/28/21 2000   Dressing Change/Treatment Frequency Every 48 hrs, and As Needed 06/28/21 2000   NEXT Dressing Change/Treatment Date 06/30/21 06/28/21 2000       Peripheral IV 06/28/21 20 G Right Wrist (Active)   Site Assessment Clean;Dry;Intact 06/30/21 0750   Dressing Type Transparent 06/30/21 0750   Line Status Saline locked;Scrubbed the hub prior to access;Flushed 06/30/21 0750   Dressing Status Clean;Dry;Intact 06/30/21 0750   Dressing Intervention N/A 06/30/21 0750   Dressing Change Due 07/05/21 06/28/21 2000   Date Primary Tubing Changed  06/28/21 06/28/21 2000   Date Secondary Tubing Changed 06/28/21 06/28/21 2000   NEXT Primary Tubing Change  06/30/21 06/28/21 2000   NEXT Secondary Tubing Change  06/29/21 06/28/21 2000   Infiltration Grading ([facility]) 0 06/30/21 0750   Phlebitis Scale ([facility] Only) 0 06/30/21 0750               MENTAL STATUS ON TRANSFER      Alert and oriented x3       CONSULTATIONS  None    PROCEDURES  None     LABORATORY  Lab Results   Component Value Date    SODIUM 138 07/01/2021    POTASSIUM 4.2 07/01/2021    CHLORIDE 97 07/01/2021    CO2 33 07/01/2021    GLUCOSE 289 (H) 07/01/2021    BUN 35 (H) 07/01/2021    CREATININE 0.88 07/01/2021        Lab Results   Component Value Date    WBC 12.0 (H) 07/01/2021    HEMOGLOBIN 10.3 (L) 07/01/2021    HEMATOCRIT 33.9 (L) 07/01/2021    PLATELETCT 400 07/01/2021        Total time of the discharge process exceeds 35 minutes.

## 2021-07-05 PROBLEM — J18.9 COMMUNITY ACQUIRED PNEUMONIA: Status: ACTIVE | Noted: 2021-07-05

## 2021-07-06 NOTE — DOCUMENTATION QUERY
[facility]                                                                       Query Response Note      PATIENT:               MICHEAL VALENCIA  ACCT #:                  [id number]  MRN:                     [id number]  :                      1945  ADMIT DATE:       2021 11:52 AM  DISCH DATE:        2021 12:47 PM  RESPONDING  PROVIDER #:        [id number]           QUERY TEXT:    Pressure injury Left buttock stage 2 present on admission is documented in the wound team note 21       Please specify if you:    NOTE:  If an appropriate response is not listed below, please respond with a new note.      The patient's Clinical Indicators include:  - Findings:  Wound team note 21:  pressure injury Left buttock stage 2 present on admission     - Treatments: wound team assessment, barrier paste, normal saline irrigation     - Risk factors:  Sepsis, pneumonia, urinary incontinence, DM 2, adult failure to thrive     Thank You,  Evy Hernandez RN  Clinical Documentation   [email or web address]  Connect via [employer] Messenger  Options provided:   -- Agree with Wound team assessment of pressure injury Left buttock stage 2 present on admission   -- Disagree with Wound team assessment of pressure injury Left buttock stage 2 present on admission   -- Unable to determine      Query created by: Evy Hernandez on 2021 5:34 AM    RESPONSE TEXT:    Unable to determine          Electronically signed by:  NEY PICKENS MD 2021 1:36 PM

## 2021-07-09 PROBLEM — R60.0 LOWER LEG EDEMA: Status: ACTIVE | Noted: 2021-07-09

## 2022-02-16 ENCOUNTER — APPOINTMENT (OUTPATIENT)
Dept: RADIOLOGY | Facility: MEDICAL CENTER | Age: 77
DRG: 870 | End: 2022-02-16
Attending: EMERGENCY MEDICINE
Payer: MEDICARE

## 2022-02-16 ENCOUNTER — HOSPITAL ENCOUNTER (INPATIENT)
Facility: MEDICAL CENTER | Age: 77
LOS: 13 days | DRG: 870 | End: 2022-03-02
Attending: EMERGENCY MEDICINE | Admitting: INTERNAL MEDICINE
Payer: MEDICARE

## 2022-02-16 DIAGNOSIS — R06.00 DYSPNEA, UNSPECIFIED TYPE: ICD-10-CM

## 2022-02-16 DIAGNOSIS — L03.116 CELLULITIS OF LEFT LOWER EXTREMITY: ICD-10-CM

## 2022-02-16 DIAGNOSIS — I27.20 PULMONARY HYPERTENSION (HCC): ICD-10-CM

## 2022-02-16 DIAGNOSIS — L89.329 PRESSURE INJURY OF SKIN OF LEFT BUTTOCK, UNSPECIFIED INJURY STAGE: ICD-10-CM

## 2022-02-16 DIAGNOSIS — E11.9 TYPE 2 DIABETES MELLITUS WITHOUT COMPLICATION, WITHOUT LONG-TERM CURRENT USE OF INSULIN (HCC): ICD-10-CM

## 2022-02-16 DIAGNOSIS — I21.4 NON-ST ELEVATION MI (NSTEMI) (HCC): ICD-10-CM

## 2022-02-16 DIAGNOSIS — L89.890 PRESSURE INJURY OF OTHER SITE, UNSTAGEABLE (HCC): ICD-10-CM

## 2022-02-16 DIAGNOSIS — L03.317 CELLULITIS OF BUTTOCK: ICD-10-CM
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
## 2022-02-16 DIAGNOSIS — B37.9 CANDIDA INFECTION: ICD-10-CM

## 2022-02-16 DIAGNOSIS — J44.1 COPD WITH ACUTE EXACERBATION (HCC): ICD-10-CM

## 2022-02-16 DIAGNOSIS — I50.9 ACUTE ON CHRONIC CONGESTIVE HEART FAILURE, UNSPECIFIED HEART FAILURE TYPE (HCC): ICD-10-CM

## 2022-02-16 DIAGNOSIS — I10 ESSENTIAL HYPERTENSION: ICD-10-CM

## 2022-02-16 DIAGNOSIS — J96.21 ACUTE ON CHRONIC RESPIRATORY FAILURE WITH HYPOXIA (HCC): ICD-10-CM

## 2022-02-16 DIAGNOSIS — E78.5 DYSLIPIDEMIA: ICD-10-CM

## 2022-02-16 DIAGNOSIS — R62.7 FAILURE TO THRIVE IN ADULT: ICD-10-CM

## 2022-02-16 DIAGNOSIS — A41.9 SEPSIS WITHOUT ACUTE ORGAN DYSFUNCTION, DUE TO UNSPECIFIED ORGANISM (HCC): ICD-10-CM

## 2022-02-16 DIAGNOSIS — E87.6 HYPOKALEMIA: ICD-10-CM

## 2022-02-16 DIAGNOSIS — Z71.89 ACP (ADVANCE CARE PLANNING): ICD-10-CM

## 2022-02-16 DIAGNOSIS — I50.33 ACUTE ON CHRONIC DIASTOLIC CHF (CONGESTIVE HEART FAILURE), NYHA CLASS 2 (HCC): ICD-10-CM

## 2022-02-16 DIAGNOSIS — Z79.899 DVT PROPHYLAXIS: ICD-10-CM

## 2022-02-16 PROBLEM — R06.02 SHORTNESS OF BREATH: Status: ACTIVE | Noted: 2022-02-16

## 2022-02-16 PROBLEM — R79.89 ELEVATED TROPONIN: Status: ACTIVE | Noted: 2022-02-16

## 2022-02-16 PROBLEM — R19.7 DIARRHEA: Status: ACTIVE | Noted: 2022-02-16

## 2022-02-16 PROBLEM — J96.11 CHRONIC HYPOXEMIC RESPIRATORY FAILURE (HCC): Status: ACTIVE | Noted: 2022-02-16

## 2022-02-16 PROBLEM — L89.90 DECUBITUS ULCER: Status: ACTIVE | Noted: 2022-02-16

## 2022-02-16 LAB
ALBUMIN SERPL BCP-MCNC: 3.3 G/DL (ref 3.2–4.9)
ALBUMIN/GLOB SERPL: 0.9 G/DL
ALP SERPL-CCNC: 170 U/L (ref 30–99)
ALT SERPL-CCNC: 38 U/L (ref 2–50)
ANION GAP SERPL CALC-SCNC: 13 MMOL/L (ref 7–16)
APPEARANCE UR: CLEAR
AST SERPL-CCNC: 34 U/L (ref 12–45)
BASOPHILS # BLD AUTO: 0.1 % (ref 0–1.8)
BASOPHILS # BLD: 0.02 K/UL (ref 0–0.12)
BILIRUB SERPL-MCNC: 0.8 MG/DL (ref 0.1–1.5)
BILIRUB UR QL STRIP.AUTO: NEGATIVE
BUN SERPL-MCNC: 28 MG/DL (ref 8–22)
CALCIUM SERPL-MCNC: 9.8 MG/DL (ref 8.4–10.2)
CHLORIDE SERPL-SCNC: 98 MMOL/L (ref 96–112)
CO2 SERPL-SCNC: 24 MMOL/L (ref 20–33)
COLOR UR: YELLOW
CREAT SERPL-MCNC: 0.94 MG/DL (ref 0.5–1.4)
EKG IMPRESSION: NORMAL
EOSINOPHIL # BLD AUTO: 0.07 K/UL (ref 0–0.51)
EOSINOPHIL NFR BLD: 0.5 % (ref 0–6.9)
ERYTHROCYTE [DISTWIDTH] IN BLOOD BY AUTOMATED COUNT: 51.3 FL (ref 35.9–50)
FLUAV RNA SPEC QL NAA+PROBE: NEGATIVE
FLUBV RNA SPEC QL NAA+PROBE: NEGATIVE
GLOBULIN SER CALC-MCNC: 3.7 G/DL (ref 1.9–3.5)
GLUCOSE BLD-MCNC: 209 MG/DL (ref 65–99)
GLUCOSE SERPL-MCNC: 247 MG/DL (ref 65–99)
GLUCOSE UR STRIP.AUTO-MCNC: NEGATIVE MG/DL
HCT VFR BLD AUTO: 31.9 % (ref 37–47)
HGB BLD-MCNC: 9.8 G/DL (ref 12–16)
IMM GRANULOCYTES # BLD AUTO: 0.11 K/UL (ref 0–0.11)
IMM GRANULOCYTES NFR BLD AUTO: 0.7 % (ref 0–0.9)
KETONES UR STRIP.AUTO-MCNC: NEGATIVE MG/DL
LACTATE BLD-SCNC: 1.2 MMOL/L (ref 0.5–2)
LACTATE BLD-SCNC: 1.7 MMOL/L (ref 0.5–2)
LEUKOCYTE ESTERASE UR QL STRIP.AUTO: NEGATIVE
LYMPHOCYTES # BLD AUTO: 1.13 K/UL (ref 1–4.8)
LYMPHOCYTES NFR BLD: 7.3 % (ref 22–41)
MCH RBC QN AUTO: 31.6 PG (ref 27–33)
MCHC RBC AUTO-ENTMCNC: 30.7 G/DL (ref 33.6–35)
MCV RBC AUTO: 102.9 FL (ref 81.4–97.8)
MICRO URNS: NORMAL
MONOCYTES # BLD AUTO: 1.14 K/UL (ref 0–0.85)
MONOCYTES NFR BLD AUTO: 7.4 % (ref 0–13.4)
NEUTROPHILS # BLD AUTO: 13.02 K/UL (ref 2–7.15)
NEUTROPHILS NFR BLD: 84 % (ref 44–72)
NITRITE UR QL STRIP.AUTO: NEGATIVE
NRBC # BLD AUTO: 0 K/UL
NRBC BLD-RTO: 0 /100 WBC
NT-PROBNP SERPL IA-MCNC: 681 PG/ML (ref 0–125)
PH UR STRIP.AUTO: 5.5 [PH] (ref 5–8)
PLATELET # BLD AUTO: 288 K/UL (ref 164–446)
PMV BLD AUTO: 10.7 FL (ref 9–12.9)
POTASSIUM SERPL-SCNC: 4.5 MMOL/L (ref 3.6–5.5)
PROT SERPL-MCNC: 7 G/DL (ref 6–8.2)
PROT UR QL STRIP: NEGATIVE MG/DL
RBC # BLD AUTO: 3.1 M/UL (ref 4.2–5.4)
RBC UR QL AUTO: NEGATIVE
RSV RNA SPEC QL NAA+PROBE: NEGATIVE
SARS-COV-2 RNA RESP QL NAA+PROBE: NOTDETECTED
SODIUM SERPL-SCNC: 135 MMOL/L (ref 135–145)
SP GR UR STRIP.AUTO: 1.01
SPECIMEN SOURCE: NORMAL
TROPONIN T SERPL-MCNC: 20 NG/L (ref 6–19)
TROPONIN T SERPL-MCNC: 20 NG/L (ref 6–19)
TROPONIN T SERPL-MCNC: 23 NG/L (ref 6–19)
WBC # BLD AUTO: 15.5 K/UL (ref 4.8–10.8)

## 2022-02-16 PROCEDURE — 81003 URINALYSIS AUTO W/O SCOPE: CPT

## 2022-02-16 PROCEDURE — 700101 HCHG RX REV CODE 250: Performed by: EMERGENCY MEDICINE

## 2022-02-16 PROCEDURE — 36415 COLL VENOUS BLD VENIPUNCTURE: CPT

## 2022-02-16 PROCEDURE — 700102 HCHG RX REV CODE 250 W/ 637 OVERRIDE(OP): Performed by: EMERGENCY MEDICINE

## 2022-02-16 PROCEDURE — A9270 NON-COVERED ITEM OR SERVICE: HCPCS | Performed by: HOSPITALIST

## 2022-02-16 PROCEDURE — 87040 BLOOD CULTURE FOR BACTERIA: CPT | Mod: 91

## 2022-02-16 PROCEDURE — 82962 GLUCOSE BLOOD TEST: CPT

## 2022-02-16 PROCEDURE — 93005 ELECTROCARDIOGRAM TRACING: CPT | Performed by: HOSPITALIST

## 2022-02-16 PROCEDURE — 83880 ASSAY OF NATRIURETIC PEPTIDE: CPT

## 2022-02-16 PROCEDURE — G0378 HOSPITAL OBSERVATION PER HR: HCPCS

## 2022-02-16 PROCEDURE — 0241U HCHG SARS-COV-2 COVID-19 NFCT DS RESP RNA 4 TRGT MIC: CPT

## 2022-02-16 PROCEDURE — 99285 EMERGENCY DEPT VISIT HI MDM: CPT

## 2022-02-16 PROCEDURE — 700105 HCHG RX REV CODE 258: Performed by: EMERGENCY MEDICINE

## 2022-02-16 PROCEDURE — 99220 PR INITIAL OBSERVATION CARE,LEVL III: CPT | Performed by: HOSPITALIST

## 2022-02-16 PROCEDURE — 96375 TX/PRO/DX INJ NEW DRUG ADDON: CPT

## 2022-02-16 PROCEDURE — C9803 HOPD COVID-19 SPEC COLLECT: HCPCS | Performed by: EMERGENCY MEDICINE

## 2022-02-16 PROCEDURE — 94760 N-INVAS EAR/PLS OXIMETRY 1: CPT

## 2022-02-16 PROCEDURE — 84484 ASSAY OF TROPONIN QUANT: CPT

## 2022-02-16 PROCEDURE — 87150 DNA/RNA AMPLIFIED PROBE: CPT

## 2022-02-16 PROCEDURE — 700111 HCHG RX REV CODE 636 W/ 250 OVERRIDE (IP): Performed by: HOSPITALIST

## 2022-02-16 PROCEDURE — 96372 THER/PROPH/DIAG INJ SC/IM: CPT

## 2022-02-16 PROCEDURE — 700105 HCHG RX REV CODE 258: Performed by: HOSPITALIST

## 2022-02-16 PROCEDURE — 85025 COMPLETE CBC W/AUTO DIFF WBC: CPT

## 2022-02-16 PROCEDURE — 700102 HCHG RX REV CODE 250 W/ 637 OVERRIDE(OP): Performed by: HOSPITALIST

## 2022-02-16 PROCEDURE — 93005 ELECTROCARDIOGRAM TRACING: CPT | Performed by: EMERGENCY MEDICINE

## 2022-02-16 PROCEDURE — 96367 TX/PROPH/DG ADDL SEQ IV INF: CPT

## 2022-02-16 PROCEDURE — 700111 HCHG RX REV CODE 636 W/ 250 OVERRIDE (IP): Performed by: EMERGENCY MEDICINE

## 2022-02-16 PROCEDURE — 71045 X-RAY EXAM CHEST 1 VIEW: CPT

## 2022-02-16 PROCEDURE — 80053 COMPREHEN METABOLIC PANEL: CPT

## 2022-02-16 PROCEDURE — 96365 THER/PROPH/DIAG IV INF INIT: CPT

## 2022-02-16 PROCEDURE — A9270 NON-COVERED ITEM OR SERVICE: HCPCS | Performed by: EMERGENCY MEDICINE

## 2022-02-16 PROCEDURE — 83605 ASSAY OF LACTIC ACID: CPT

## 2022-02-16 RX ORDER — BISACODYL 10 MG
10 SUPPOSITORY, RECTAL RECTAL
Status: DISCONTINUED | OUTPATIENT
Start: 2022-02-16 | End: 2022-02-16

## 2022-02-16 RX ORDER — ACETAMINOPHEN 325 MG/1
650 TABLET ORAL EVERY 6 HOURS PRN
Status: DISCONTINUED | OUTPATIENT
Start: 2022-02-16 | End: 2022-02-20

## 2022-02-16 RX ORDER — LEVOTHYROXINE SODIUM 0.03 MG/1
25 TABLET ORAL DAILY
Status: DISCONTINUED | OUTPATIENT
Start: 2022-02-17 | End: 2022-02-19

## 2022-02-16 RX ORDER — FUROSEMIDE 10 MG/ML
20 INJECTION INTRAMUSCULAR; INTRAVENOUS
Status: DISCONTINUED | OUTPATIENT
Start: 2022-02-17 | End: 2022-02-17

## 2022-02-16 RX ORDER — POTASSIUM CHLORIDE 750 MG/1
CAPSULE, EXTENDED RELEASE ORAL
Status: SHIPPED | COMMUNITY
Start: 2022-02-15 | End: 2022-02-16

## 2022-02-16 RX ORDER — BENAZEPRIL HYDROCHLORIDE 10 MG/1
20 TABLET ORAL
Status: DISCONTINUED | OUTPATIENT
Start: 2022-02-17 | End: 2022-02-25

## 2022-02-16 RX ORDER — CEFAZOLIN SODIUM IN 0.9 % NACL 2 G/100 ML
2000 PLASTIC BAG, INJECTION (ML) INTRAVENOUS ONCE
Status: COMPLETED | OUTPATIENT
Start: 2022-02-16 | End: 2022-02-16

## 2022-02-16 RX ORDER — FUROSEMIDE 10 MG/ML
40 INJECTION INTRAMUSCULAR; INTRAVENOUS ONCE
Status: COMPLETED | OUTPATIENT
Start: 2022-02-16 | End: 2022-02-16

## 2022-02-16 RX ORDER — POLYETHYLENE GLYCOL 3350 17 G/17G
1 POWDER, FOR SOLUTION ORAL
Status: DISCONTINUED | OUTPATIENT
Start: 2022-02-16 | End: 2022-02-16

## 2022-02-16 RX ORDER — SODIUM CHLORIDE, SODIUM LACTATE, POTASSIUM CHLORIDE, AND CALCIUM CHLORIDE .6; .31; .03; .02 G/100ML; G/100ML; G/100ML; G/100ML
30 INJECTION, SOLUTION INTRAVENOUS
Status: DISCONTINUED | OUTPATIENT
Start: 2022-02-16 | End: 2022-03-02 | Stop reason: HOSPADM

## 2022-02-16 RX ORDER — AMOXICILLIN 250 MG
2 CAPSULE ORAL 2 TIMES DAILY
Status: DISCONTINUED | OUTPATIENT
Start: 2022-02-16 | End: 2022-02-16

## 2022-02-16 RX ORDER — NYSTATIN 100000 [USP'U]/G
POWDER TOPICAL ONCE
Status: COMPLETED | OUTPATIENT
Start: 2022-02-16 | End: 2022-02-16

## 2022-02-16 RX ORDER — AMLODIPINE BESYLATE 5 MG/1
5 TABLET ORAL
Status: DISCONTINUED | OUTPATIENT
Start: 2022-02-17 | End: 2022-02-19

## 2022-02-16 RX ORDER — DEXTROSE MONOHYDRATE 25 G/50ML
50 INJECTION, SOLUTION INTRAVENOUS
Status: DISCONTINUED | OUTPATIENT
Start: 2022-02-16 | End: 2022-02-18

## 2022-02-16 RX ORDER — BUDESONIDE AND FORMOTEROL FUMARATE DIHYDRATE 160; 4.5 UG/1; UG/1
2 AEROSOL RESPIRATORY (INHALATION) EVERY 12 HOURS
Status: DISCONTINUED | OUTPATIENT
Start: 2022-02-16 | End: 2022-02-19

## 2022-02-16 RX ORDER — ATORVASTATIN CALCIUM 40 MG/1
40 TABLET, FILM COATED ORAL DAILY
Status: DISCONTINUED | OUTPATIENT
Start: 2022-02-17 | End: 2022-02-19

## 2022-02-16 RX ORDER — AMLODIPINE BESYLATE AND BENAZEPRIL HYDROCHLORIDE 5; 20 MG/1; MG/1
1 CAPSULE ORAL DAILY
Status: DISCONTINUED | OUTPATIENT
Start: 2022-02-16 | End: 2022-02-16

## 2022-02-16 RX ORDER — ALBUTEROL SULFATE 90 UG/1
4 AEROSOL, METERED RESPIRATORY (INHALATION)
Status: COMPLETED | OUTPATIENT
Start: 2022-02-16 | End: 2022-02-16

## 2022-02-16 RX ORDER — SODIUM CHLORIDE, SODIUM LACTATE, POTASSIUM CHLORIDE, AND CALCIUM CHLORIDE .6; .31; .03; .02 G/100ML; G/100ML; G/100ML; G/100ML
500 INJECTION, SOLUTION INTRAVENOUS
Status: DISCONTINUED | OUTPATIENT
Start: 2022-02-16 | End: 2022-03-02 | Stop reason: HOSPADM

## 2022-02-16 RX ADMIN — ENOXAPARIN SODIUM 40 MG: 40 INJECTION SUBCUTANEOUS at 20:03

## 2022-02-16 RX ADMIN — NYSTATIN: 100000 POWDER TOPICAL at 20:33

## 2022-02-16 RX ADMIN — BUDESONIDE AND FORMOTEROL FUMARATE DIHYDRATE 2 PUFF: 160; 4.5 AEROSOL RESPIRATORY (INHALATION) at 20:02

## 2022-02-16 RX ADMIN — INSULIN HUMAN 2 UNITS: 100 INJECTION, SOLUTION PARENTERAL at 20:28

## 2022-02-16 RX ADMIN — WATER 2000 MG: 1000 INJECTION, SOLUTION INTRAVENOUS at 15:44

## 2022-02-16 RX ADMIN — FUROSEMIDE 40 MG: 10 INJECTION, SOLUTION INTRAVENOUS at 16:42

## 2022-02-16 RX ADMIN — SODIUM CHLORIDE 3 G: 900 INJECTION INTRAVENOUS at 23:33

## 2022-02-16 RX ADMIN — ALBUTEROL SULFATE 4 PUFF: 90 AEROSOL, METERED RESPIRATORY (INHALATION) at 15:44

## 2022-02-16 ASSESSMENT — LIFESTYLE VARIABLES
ON A TYPICAL DAY WHEN YOU DRINK ALCOHOL HOW MANY DRINKS DO YOU HAVE: 0
HOW MANY TIMES IN THE PAST YEAR HAVE YOU HAD 5 OR MORE DRINKS IN A DAY: 0
EVER HAD A DRINK FIRST THING IN THE MORNING TO STEADY YOUR NERVES TO GET RID OF A HANGOVER: NO
TOTAL SCORE: 0
EVER FELT BAD OR GUILTY ABOUT YOUR DRINKING: NO
HAVE PEOPLE ANNOYED YOU BY CRITICIZING YOUR DRINKING: NO
TOTAL SCORE: 0
AVERAGE NUMBER OF DAYS PER WEEK YOU HAVE A DRINK CONTAINING ALCOHOL: 0
DO YOU DRINK ALCOHOL: NO
CONSUMPTION TOTAL: NEGATIVE
ALCOHOL_USE: NO
TOTAL SCORE: 0
HAVE YOU EVER FELT YOU SHOULD CUT DOWN ON YOUR DRINKING: NO

## 2022-02-16 ASSESSMENT — ENCOUNTER SYMPTOMS
ABDOMINAL PAIN: 0
STRIDOR: 0
NERVOUS/ANXIOUS: 0
VOMITING: 0
BRUISES/BLEEDS EASILY: 0
EYE REDNESS: 0
FLANK PAIN: 0
SHORTNESS OF BREATH: 1
DIARRHEA: 1
FOCAL WEAKNESS: 0
COUGH: 0
MYALGIAS: 0
CHILLS: 0
EYE DISCHARGE: 0
FEVER: 0

## 2022-02-16 ASSESSMENT — PAIN DESCRIPTION - PAIN TYPE: TYPE: ACUTE PAIN

## 2022-02-16 ASSESSMENT — FIBROSIS 4 INDEX
FIB4 SCORE: 1.46
FIB4 SCORE: 0.93

## 2022-02-16 NOTE — ED PROVIDER NOTES
ED Provider Note    Scribed for Roel Gomez M.D. by Nuha Melendez. 2/16/2022, 2:57 PM.    Primary care provider: Anthony Martinez M.D.  Means of arrival: EMS  History obtained from: Patient  History limited by: None    CHIEF COMPLAINT  Chief Complaint   Patient presents with   • Shortness of Breath       HPI  Gabrielle Olmstead is a 76 y.o. female who presents to the Emergency Department for evaluation of weakness onset three days ago. Patient was brought in via EMS after her care giver reported patient cannot get up on her own. Patient states she has pain everywhere. She is unable to go to the bathroom on her own. She is too weak to get up from bed. The last time she could get up was three days ago. Patient takes Bumex but has not been compliant since two days ago due to diarrhea. She states she has always had diarrhea but has found her medication makes it worse. Patient is on 2 L of oxygen. She is experiencing worsening shortness of breath and increased chest pain. She states she has to sit up more to breathe easier. She has difficulty laying down flat. Patient sleeps on a recliner. She has noticed more swelling in her legs. Patient has wounds on her legs and has not had her dressing changed in about one month. Patient's home health nurse normally changes her dressings. She states her leg wounds are more painful when she tries to get up. Patient also has sores on her buttocks from immobility. She admits to associated symptoms of fever of 100.6 °F , abdominal pain with palpation, nausea, buttocks pain, and leg pain but denies dysuria or sore throat. No alleviating factors were reported. She denies any known COVID-19 exposures. Patient uses an inhaler. Patient has a history of hypertension.      REVIEW OF SYSTEMS  Pertinent positives include weakness, shortness of breath, chest pain, leg pain, leg swelling, fever, abdominal pain with palpation, nausea, buttocks pain. Pertinent negatives include dysuria  "or sore throat. All other systems negative.    PAST MEDICAL HISTORY   has a past medical history of Arthritis, ASTHMA, Breath shortness, Bronchitis, Diabetes, EMPHYSEMA, Hypertension, and Other specified disorder of intestines.    SURGICAL HISTORY   has a past surgical history that includes cyst excision (8/6/2010); hysterectomy robotic (11/28/2011); and lesion excision general (11/28/2011).    SOCIAL HISTORY  Social History     Tobacco Use   • Smoking status: Former Smoker     Packs/day: 2.00     Years: 40.00     Pack years: 80.00     Types: Cigarettes   • Smokeless tobacco: Never Used   • Tobacco comment: 45 YEARS  11/2 PPD   Substance Use Topics   • Alcohol use: No     Comment: OCCASSIONAL   • Drug use: No      Social History     Substance and Sexual Activity   Drug Use No       FAMILY HISTORY  Family History   Problem Relation Age of Onset   • Hypertension Mother        CURRENT MEDICATIONS  Home Medications     Reviewed by Lowell Evans (Pharmacy Tech) on 02/16/22 at 1558  Med List Status: Complete   Medication Last Dose Status   amlodipine-benazepril (LOTREL) 5-20 MG per capsule 2/16/2022 Active   atorvastatin (LIPITOR) 40 MG Tab 2/16/2022 Active   bumetanide (BUMEX) 1 MG Tab 2/12/2022 Active   fluticasone-salmeterol (ADVAIR) 250-50 MCG/DOSE AEPB 2/16/2022 Active   glimepiride (AMARYL) 4 MG Tab 2/16/2022 Active   levothyroxine (SYNTHROID) 25 MCG Tab 2/16/2022 Active   metFORMIN ER (GLUCOPHAGE XR) 500 MG TABLET SR 24 HR 2/16/2022 Active   oxybutynin (DITROPAN XL) 15 MG CR tablet 2/15/2022 Active   potassium chloride SA (K-DUR) 10 MEQ Tab CR 2/16/2022 Active   Probiotic Product (PROBIOTIC PO) 2/16/2022 Active   sertraline (ZOLOFT) 50 MG Tab 2/16/2022 Active                ALLERGIES  Allergies   Allergen Reactions   • Nkda [No Known Drug Allergy]        PHYSICAL EXAM  VITAL SIGNS: /53   Pulse 87   Temp 36.6 °C (97.9 °F) (Temporal)   Resp 15   Ht 1.6 m (5' 3\")   Wt 102 kg (224 lb 13.9 oz)   SpO2 " 97%   BMI 39.83 kg/m²     Constitutional: Well developed, Well nourished, moerate distress.   HENT: Normocephalic, Atraumatic, mask in place.  Eyes: Conjunctiva normal, No discharge.   Neck: Supple, No stridor   Cardiovascular: Normal heart rate, Normal rhythm, No murmurs, equal pulses.   Pulmonary: Mild respiratory distress, bilateral wheezes, rales bilaterally in bases, No rhonchi.  Chest: No chest wall tenderness or deformity.   Abdomen: Obese, mildly tender suprapubically, right sided CVA tenderness, No masses, no rebound, no guarding.   Back: No CVA tenderness.   Musculoskeletal: 2+ pitting edema bilaterally from knees down, left lower extremity has several sites of weeping fluid and medial aspect has 2 cm by 2 cm ulcer with some purulent drainage and no fluctuance, with surrounding warmth and erythema  Skin: Warm, Dry,  No rash. Erythema and some skin breakdown in skin fold over abdomen in groin with what appears to be a yeast infection, 2 cm by 2 cm necrotic-looking stage 2 decubitus ulcer on left buttocks with 10 cm by 8 cm surrounding erythema and induration  Neurologic: Alert & oriented x 3, Normal motor function,  No focal deficits noted.   Psychiatric: Affect normal, Judgment normal, Mood normal.       LABS  Results for orders placed or performed during the hospital encounter of 02/16/22   CBC WITH DIFFERENTIAL   Result Value Ref Range    WBC 15.5 (H) 4.8 - 10.8 K/uL    RBC 3.10 (L) 4.20 - 5.40 M/uL    Hemoglobin 9.8 (L) 12.0 - 16.0 g/dL    Hematocrit 31.9 (L) 37.0 - 47.0 %    .9 (H) 81.4 - 97.8 fL    MCH 31.6 27.0 - 33.0 pg    MCHC 30.7 (L) 33.6 - 35.0 g/dL    RDW 51.3 (H) 35.9 - 50.0 fL    Platelet Count 288 164 - 446 K/uL    MPV 10.7 9.0 - 12.9 fL    Neutrophils-Polys 84.00 (H) 44.00 - 72.00 %    Lymphocytes 7.30 (L) 22.00 - 41.00 %    Monocytes 7.40 0.00 - 13.40 %    Eosinophils 0.50 0.00 - 6.90 %    Basophils 0.10 0.00 - 1.80 %    Immature Granulocytes 0.70 0.00 - 0.90 %    Nucleated RBC  0.00 /100 WBC    Neutrophils (Absolute) 13.02 (H) 2.00 - 7.15 K/uL    Lymphs (Absolute) 1.13 1.00 - 4.80 K/uL    Monos (Absolute) 1.14 (H) 0.00 - 0.85 K/uL    Eos (Absolute) 0.07 0.00 - 0.51 K/uL    Baso (Absolute) 0.02 0.00 - 0.12 K/uL    Immature Granulocytes (abs) 0.11 0.00 - 0.11 K/uL    NRBC (Absolute) 0.00 K/uL   COMP METABOLIC PANEL   Result Value Ref Range    Sodium 135 135 - 145 mmol/L    Potassium 4.5 3.6 - 5.5 mmol/L    Chloride 98 96 - 112 mmol/L    Co2 24 20 - 33 mmol/L    Anion Gap 13.0 7.0 - 16.0    Glucose 247 (H) 65 - 99 mg/dL    Bun 28 (H) 8 - 22 mg/dL    Creatinine 0.94 0.50 - 1.40 mg/dL    Calcium 9.8 8.4 - 10.2 mg/dL    AST(SGOT) 34 12 - 45 U/L    ALT(SGPT) 38 2 - 50 U/L    Alkaline Phosphatase 170 (H) 30 - 99 U/L    Total Bilirubin 0.8 0.1 - 1.5 mg/dL    Albumin 3.3 3.2 - 4.9 g/dL    Total Protein 7.0 6.0 - 8.2 g/dL    Globulin 3.7 (H) 1.9 - 3.5 g/dL    A-G Ratio 0.9 g/dL   TROPONIN   Result Value Ref Range    Troponin T 20 (H) 6 - 19 ng/L   proBrain Natriuretic Peptide, NT   Result Value Ref Range    NT-proBNP 681 (H) 0 - 125 pg/mL   URINALYSIS (UA)    Specimen: Urine   Result Value Ref Range    Color Yellow     Character Clear     Specific Gravity 1.010 <1.035    Ph 5.5 5.0 - 8.0    Glucose Negative Negative mg/dL    Ketones Negative Negative mg/dL    Protein Negative Negative mg/dL    Bilirubin Negative Negative    Nitrite Negative Negative    Leukocyte Esterase Negative Negative    Occult Blood Negative Negative    Micro Urine Req see below    COV-2, FLU A/B, AND RSV BY PCR (2-4 HOURS CEPHEID): Collect NP swab in VTM    Specimen: Respirate   Result Value Ref Range    Influenza virus A RNA Negative Negative    Influenza virus B, PCR Negative Negative    RSV, PCR Negative Negative    SARS-CoV-2 by PCR NotDetected     SARS-CoV-2 Source Nasal Swab    ESTIMATED GFR   Result Value Ref Range    GFR If African American >60 >60 mL/min/1.73 m 2    GFR If Non African American 58 (A) >60 mL/min/1.73 m  2   EKG   Result Value Ref Range    Report       [facility] Emergency Dept.    Test Date:  2022  Pt Name:    MICHEAL VALENCIA                  Department: EDSM  MRN:        [id number]                      Room:       -ROOM 2  Gender:     Female                       Technician: SELENA  :        [id number]                   Requested By:TAMMIE GRAHAM  Order #:    [id number]                    Reading MD: TAMMIE GRAHAM MD    Measurements  Intervals                                Axis  Rate:       84                           P:          -6  NV:         178                          QRS:        -65  QRSD:       131                          T:          58  QT:         372  QTc:        440    Interpretive Statements  Sinus rhythm, rate of 84, leftward axis, no ST elevation  Atrial premature complex  RBBB and LAFB  Compared to ECG 2021 07:09:29  Atrial premature complex(es) now present  Electronically Signed On 2022 16:33:06 PST by TAMMIE GRAHAM MD         All labs reviewed by me.    EKG  12 Lead EKG interpreted by me as above.     RADIOLOGY  DX-CHEST-PORTABLE (1 VIEW)   Final Result      Stable cardiomegaly        The radiologist's interpretation of all radiological studies have been reviewed by me.    COURSE & MEDICAL DECISION MAKING  Pertinent Labs & Imaging studies reviewed. (See chart for details)    Medical records were reviewed and show patient has a history of CHF and hypertension. She is on 2 L of oxygen according to a Doctor's note from .     2:57 PM - Patient seen and examined at bedside. I informed patient of plan to check for heart conditions such as CHF and treat with antibiotics. Patient will be treated with albuterol 4 puff inhaler.  Ordered EKG, COV-2/flu/RSV by PCR, CBC with diff, CMP, Troponin, pBNP, lactic acid, blood culture,UA to evaluate her symptoms. The differential diagnoses include but are not limited to: Cellulitis, sepsis, myocardial  infarction, congestive heart failure, pneumonia, COVID-19, electrolyte abnormality, dehydration    4:34 PM Paged Hospitalist.    4:40 PM I discussed the patient's case and the above findings with Dr. Stroud (Hospitalist) who agrees to evaluate the patient.      Medical Decision Making: At this point time I think the patient has a combination of some sepsis from cellulitis of her buttocks as well as her left lower leg as well as some mild congestive heart failure from not taking her Bumex for the last several days.  proBNP is slightly elevated in the 600 range.  I think this is causing her to have increased shortness of breath.  She is at her home O2 level though.  Chest x-ray does not show any obvious pneumonia and she is negative for Covid.  I have started on broad-spectrum antibiotics for her cellulitis.    DISPOSITION:  Patient will be hospitalized by Dr. Stroud (Hospitalist) in guarded condition.       FINAL IMPRESSION  1. Pressure injury of skin of left buttock, unspecified injury stage    2. Cellulitis of buttock    3. Cellulitis of left lower extremity    4. Dyspnea, unspecified type    5. Acute on chronic congestive heart failure, unspecified heart failure type (HCC)    6. Sepsis without acute organ dysfunction, due to unspecified organism (HCC)    7. Candida infection          Nuha HOLLIDAY (Becky), am scribing for, and in the presence of, Roel Gomez M.D.    Electronically signed by: Nuha Love), 2/16/2022    IRoel M.D. personally performed the services described in this documentation, as scribed by Nuha Melendez in my presence, and it is both accurate and complete. C    The note accurately reflects work and decisions made by me.  Roel Gomez M.D.  2/16/2022  5:22 PM

## 2022-02-16 NOTE — ED NOTES
Pt's posterior visualized per ERP Dr Wilson RN, edt Jeri; decubitus wound noted to L buttocks/sacral region, per erp.

## 2022-02-16 NOTE — ED TRIAGE NOTES
"Pt silvano higgins from home, c/o sob. Pt seen by home health RN for wound care to BLE, pt states she has not taken her lasix \"in a while\" as she \"has to pee\" more frequently, and 'doesn't like to get up'. Pt has hx chf, BLE edema, htn, copd. Pt on o2 2l via nc at baseline  "

## 2022-02-17 ENCOUNTER — APPOINTMENT (OUTPATIENT)
Dept: CARDIOLOGY | Facility: MEDICAL CENTER | Age: 77
DRG: 870 | End: 2022-02-17
Attending: HOSPITALIST
Payer: MEDICARE

## 2022-02-17 PROBLEM — J44.1 COPD WITH ACUTE EXACERBATION (HCC): Status: ACTIVE | Noted: 2022-02-17

## 2022-02-17 PROBLEM — J96.21 ACUTE ON CHRONIC RESPIRATORY FAILURE WITH HYPOXIA (HCC): Status: ACTIVE | Noted: 2022-02-16

## 2022-02-17 LAB
ALBUMIN SERPL BCP-MCNC: 2.9 G/DL (ref 3.2–4.9)
ALBUMIN/GLOB SERPL: 0.9 G/DL
ALP SERPL-CCNC: 164 U/L (ref 30–99)
ALT SERPL-CCNC: 36 U/L (ref 2–50)
ANION GAP SERPL CALC-SCNC: 13 MMOL/L (ref 7–16)
AST SERPL-CCNC: 41 U/L (ref 12–45)
BASOPHILS # BLD AUTO: 0.2 % (ref 0–1.8)
BASOPHILS # BLD: 0.03 K/UL (ref 0–0.12)
BILIRUB SERPL-MCNC: 0.6 MG/DL (ref 0.1–1.5)
BUN SERPL-MCNC: 27 MG/DL (ref 8–22)
CALCIUM SERPL-MCNC: 9.1 MG/DL (ref 8.4–10.2)
CHLORIDE SERPL-SCNC: 99 MMOL/L (ref 96–112)
CO2 SERPL-SCNC: 22 MMOL/L (ref 20–33)
CREAT SERPL-MCNC: 0.95 MG/DL (ref 0.5–1.4)
EKG IMPRESSION: NORMAL
EOSINOPHIL # BLD AUTO: 0.08 K/UL (ref 0–0.51)
EOSINOPHIL NFR BLD: 0.5 % (ref 0–6.9)
ERYTHROCYTE [DISTWIDTH] IN BLOOD BY AUTOMATED COUNT: 51.5 FL (ref 35.9–50)
GLOBULIN SER CALC-MCNC: 3.3 G/DL (ref 1.9–3.5)
GLUCOSE BLD-MCNC: 158 MG/DL (ref 65–99)
GLUCOSE BLD-MCNC: 189 MG/DL (ref 65–99)
GLUCOSE BLD-MCNC: 284 MG/DL (ref 65–99)
GLUCOSE BLD-MCNC: 378 MG/DL (ref 65–99)
GLUCOSE SERPL-MCNC: 158 MG/DL (ref 65–99)
HCT VFR BLD AUTO: 29.1 % (ref 37–47)
HGB BLD-MCNC: 8.8 G/DL (ref 12–16)
IMM GRANULOCYTES # BLD AUTO: 0.18 K/UL (ref 0–0.11)
IMM GRANULOCYTES NFR BLD AUTO: 1.1 % (ref 0–0.9)
LACTATE BLD-SCNC: 1.3 MMOL/L (ref 0.5–2)
LYMPHOCYTES # BLD AUTO: 1.47 K/UL (ref 1–4.8)
LYMPHOCYTES NFR BLD: 9.1 % (ref 22–41)
MAGNESIUM SERPL-MCNC: 1.7 MG/DL (ref 1.5–2.5)
MCH RBC QN AUTO: 31.5 PG (ref 27–33)
MCHC RBC AUTO-ENTMCNC: 30.2 G/DL (ref 33.6–35)
MCV RBC AUTO: 104.3 FL (ref 81.4–97.8)
MONOCYTES # BLD AUTO: 1.02 K/UL (ref 0–0.85)
MONOCYTES NFR BLD AUTO: 6.3 % (ref 0–13.4)
NEUTROPHILS # BLD AUTO: 13.3 K/UL (ref 2–7.15)
NEUTROPHILS NFR BLD: 82.8 % (ref 44–72)
NRBC # BLD AUTO: 0 K/UL
NRBC BLD-RTO: 0 /100 WBC
PLATELET # BLD AUTO: 276 K/UL (ref 164–446)
PMV BLD AUTO: 10.7 FL (ref 9–12.9)
POTASSIUM SERPL-SCNC: 4 MMOL/L (ref 3.6–5.5)
PROT SERPL-MCNC: 6.2 G/DL (ref 6–8.2)
RBC # BLD AUTO: 2.79 M/UL (ref 4.2–5.4)
SODIUM SERPL-SCNC: 134 MMOL/L (ref 135–145)
WBC # BLD AUTO: 16.1 K/UL (ref 4.8–10.8)

## 2022-02-17 PROCEDURE — 700102 HCHG RX REV CODE 250 W/ 637 OVERRIDE(OP): Performed by: HOSPITALIST

## 2022-02-17 PROCEDURE — A9270 NON-COVERED ITEM OR SERVICE: HCPCS | Performed by: HOSPITALIST

## 2022-02-17 PROCEDURE — 99233 SBSQ HOSP IP/OBS HIGH 50: CPT | Performed by: INTERNAL MEDICINE

## 2022-02-17 PROCEDURE — 85025 COMPLETE CBC W/AUTO DIFF WBC: CPT

## 2022-02-17 PROCEDURE — 93306 TTE W/DOPPLER COMPLETE: CPT

## 2022-02-17 PROCEDURE — 770020 HCHG ROOM/CARE - TELE (206)

## 2022-02-17 PROCEDURE — 96372 THER/PROPH/DIAG INJ SC/IM: CPT

## 2022-02-17 PROCEDURE — 83735 ASSAY OF MAGNESIUM: CPT

## 2022-02-17 PROCEDURE — 96376 TX/PRO/DX INJ SAME DRUG ADON: CPT

## 2022-02-17 PROCEDURE — 96375 TX/PRO/DX INJ NEW DRUG ADDON: CPT

## 2022-02-17 PROCEDURE — 82962 GLUCOSE BLOOD TEST: CPT

## 2022-02-17 PROCEDURE — 700111 HCHG RX REV CODE 636 W/ 250 OVERRIDE (IP): Performed by: HOSPITALIST

## 2022-02-17 PROCEDURE — 700105 HCHG RX REV CODE 258: Performed by: HOSPITALIST

## 2022-02-17 PROCEDURE — 93010 ELECTROCARDIOGRAM REPORT: CPT | Performed by: INTERNAL MEDICINE

## 2022-02-17 PROCEDURE — 700111 HCHG RX REV CODE 636 W/ 250 OVERRIDE (IP): Performed by: INTERNAL MEDICINE

## 2022-02-17 PROCEDURE — A9270 NON-COVERED ITEM OR SERVICE: HCPCS | Performed by: INTERNAL MEDICINE

## 2022-02-17 PROCEDURE — 700102 HCHG RX REV CODE 250 W/ 637 OVERRIDE(OP): Performed by: INTERNAL MEDICINE

## 2022-02-17 PROCEDURE — 700101 HCHG RX REV CODE 250: Performed by: INTERNAL MEDICINE

## 2022-02-17 PROCEDURE — 94760 N-INVAS EAR/PLS OXIMETRY 1: CPT

## 2022-02-17 PROCEDURE — 94640 AIRWAY INHALATION TREATMENT: CPT

## 2022-02-17 PROCEDURE — 83605 ASSAY OF LACTIC ACID: CPT

## 2022-02-17 PROCEDURE — 80053 COMPREHEN METABOLIC PANEL: CPT

## 2022-02-17 RX ORDER — BUMETANIDE 1 MG/1
1 TABLET ORAL
Status: DISCONTINUED | OUTPATIENT
Start: 2022-02-17 | End: 2022-02-19

## 2022-02-17 RX ORDER — IPRATROPIUM BROMIDE AND ALBUTEROL SULFATE 2.5; .5 MG/3ML; MG/3ML
3 SOLUTION RESPIRATORY (INHALATION)
Status: DISCONTINUED | OUTPATIENT
Start: 2022-02-17 | End: 2022-02-18

## 2022-02-17 RX ORDER — METHYLPREDNISOLONE SODIUM SUCCINATE 125 MG/2ML
125 INJECTION, POWDER, LYOPHILIZED, FOR SOLUTION INTRAMUSCULAR; INTRAVENOUS ONCE
Status: COMPLETED | OUTPATIENT
Start: 2022-02-17 | End: 2022-02-17

## 2022-02-17 RX ORDER — FUROSEMIDE 10 MG/ML
40 INJECTION INTRAMUSCULAR; INTRAVENOUS
Status: DISCONTINUED | OUTPATIENT
Start: 2022-02-17 | End: 2022-02-17

## 2022-02-17 RX ORDER — AZITHROMYCIN 250 MG/1
500 TABLET, FILM COATED ORAL DAILY
Status: COMPLETED | OUTPATIENT
Start: 2022-02-17 | End: 2022-02-19

## 2022-02-17 RX ORDER — AMOXICILLIN AND CLAVULANATE POTASSIUM 875; 125 MG/1; MG/1
1 TABLET, FILM COATED ORAL EVERY 12 HOURS
Status: DISCONTINUED | OUTPATIENT
Start: 2022-02-17 | End: 2022-02-19

## 2022-02-17 RX ORDER — ONDANSETRON 2 MG/ML
4 INJECTION INTRAMUSCULAR; INTRAVENOUS EVERY 4 HOURS PRN
Status: DISCONTINUED | OUTPATIENT
Start: 2022-02-17 | End: 2022-03-02 | Stop reason: HOSPADM

## 2022-02-17 RX ORDER — PREDNISONE 50 MG/1
50 TABLET ORAL DAILY
Status: DISCONTINUED | OUTPATIENT
Start: 2022-02-18 | End: 2022-02-19

## 2022-02-17 RX ORDER — NYSTATIN 100000 [USP'U]/G
POWDER TOPICAL 2 TIMES DAILY
Status: ACTIVE | OUTPATIENT
Start: 2022-02-17 | End: 2022-02-22

## 2022-02-17 RX ORDER — IPRATROPIUM BROMIDE AND ALBUTEROL SULFATE 2.5; .5 MG/3ML; MG/3ML
3 SOLUTION RESPIRATORY (INHALATION)
Status: DISCONTINUED | OUTPATIENT
Start: 2022-02-17 | End: 2022-03-02 | Stop reason: HOSPADM

## 2022-02-17 RX ADMIN — IPRATROPIUM BROMIDE AND ALBUTEROL SULFATE 3 ML: .5; 2.5 SOLUTION RESPIRATORY (INHALATION) at 20:18

## 2022-02-17 RX ADMIN — BUDESONIDE AND FORMOTEROL FUMARATE DIHYDRATE 2 PUFF: 160; 4.5 AEROSOL RESPIRATORY (INHALATION) at 06:34

## 2022-02-17 RX ADMIN — ATORVASTATIN CALCIUM 40 MG: 40 TABLET, FILM COATED ORAL at 06:34

## 2022-02-17 RX ADMIN — LEVOTHYROXINE SODIUM 25 MCG: 0.03 TABLET ORAL at 06:34

## 2022-02-17 RX ADMIN — AMOXICILLIN AND CLAVULANATE POTASSIUM 1 TABLET: 875; 125 TABLET, FILM COATED ORAL at 12:16

## 2022-02-17 RX ADMIN — INSULIN HUMAN 1 UNITS: 100 INJECTION, SOLUTION PARENTERAL at 06:30

## 2022-02-17 RX ADMIN — FUROSEMIDE 20 MG: 10 INJECTION, SOLUTION INTRAVENOUS at 06:23

## 2022-02-17 RX ADMIN — AZITHROMYCIN MONOHYDRATE 500 MG: 250 TABLET ORAL at 12:16

## 2022-02-17 RX ADMIN — NYSTATIN: 100000 POWDER TOPICAL at 18:47

## 2022-02-17 RX ADMIN — INSULIN HUMAN 5 UNITS: 100 INJECTION, SOLUTION PARENTERAL at 21:17

## 2022-02-17 RX ADMIN — INSULIN HUMAN 1 UNITS: 100 INJECTION, SOLUTION PARENTERAL at 12:30

## 2022-02-17 RX ADMIN — IPRATROPIUM BROMIDE AND ALBUTEROL SULFATE 3 ML: .5; 2.5 SOLUTION RESPIRATORY (INHALATION) at 23:50

## 2022-02-17 RX ADMIN — BUMETANIDE 1 MG: 1 TABLET ORAL at 12:30

## 2022-02-17 RX ADMIN — SODIUM CHLORIDE 3 G: 900 INJECTION INTRAVENOUS at 06:28

## 2022-02-17 RX ADMIN — ONDANSETRON 4 MG: 2 INJECTION INTRAMUSCULAR; INTRAVENOUS at 03:38

## 2022-02-17 RX ADMIN — IPRATROPIUM BROMIDE AND ALBUTEROL SULFATE 3 ML: .5; 2.5 SOLUTION RESPIRATORY (INHALATION) at 11:24

## 2022-02-17 RX ADMIN — SERTRALINE HYDROCHLORIDE 50 MG: 50 TABLET ORAL at 06:34

## 2022-02-17 RX ADMIN — METHYLPREDNISOLONE SODIUM SUCCINATE 125 MG: 125 INJECTION, POWDER, FOR SOLUTION INTRAMUSCULAR; INTRAVENOUS at 12:17

## 2022-02-17 RX ADMIN — IPRATROPIUM BROMIDE AND ALBUTEROL SULFATE 3 ML: .5; 2.5 SOLUTION RESPIRATORY (INHALATION) at 14:28

## 2022-02-17 RX ADMIN — INSULIN HUMAN 3 UNITS: 100 INJECTION, SOLUTION PARENTERAL at 18:47

## 2022-02-17 ASSESSMENT — ENCOUNTER SYMPTOMS
SHORTNESS OF BREATH: 1
VOMITING: 0
DIZZINESS: 0
FEVER: 0
ABDOMINAL PAIN: 0
DEPRESSION: 0
CHILLS: 0
NAUSEA: 0
MYALGIAS: 0
COUGH: 0
HEADACHES: 0

## 2022-02-17 ASSESSMENT — PAIN DESCRIPTION - PAIN TYPE: TYPE: ACUTE PAIN

## 2022-02-17 ASSESSMENT — FIBROSIS 4 INDEX: FIB4 SCORE: 1.88

## 2022-02-17 NOTE — ASSESSMENT & PLAN NOTE
In the indeterminate range  Patient without active chest pain  Case was discussed between Dr. Rivas (critical care) and Dr. Esquivel (cardiology) who states that its likely demand ischemia in the setting of respiratory failure. She was started on heparin drip which has since been discontinued.  Her troponins are downtrending at this time  Stable, repeat troponins for acute chest pain

## 2022-02-17 NOTE — RESPIRATORY CARE
New order for breathing tx noted.  Pt found in flat position, on back unable to sit up by herself. Wheezing audibly. Mask tx given, pt unable to do mouthpiece tx or flutter because unable to hold with hands or close mouth around neb or flutter. Will honor q 4 tx x 24 hours and then reaccess .

## 2022-02-17 NOTE — PROGRESS NOTES
Hospital Medicine Daily Progress Note    Date of Service  2/17/2022    Chief Complaint  Gabrielle Olmstead is a 76 y.o. female admitted 2/16/2022 with dyspnea and diarrhea.     Hospital Course   76 y.o. female with a past medical history of chronic obstructive pulmonary disease, chronic hypoxemic respiratory failure on 2 L of oxygen at baseline, hypertension, diabetes, dyslipidemia and hypothyroidism who presented 2/16/2022 with generalized weakness shortness of breath and diarrhea.  She reported noticing worsening swelling in both lower extremities in addition to redness on the left leg. Patient admitted started on IV abx for leg cellulitis, wound care consulted for decubitus ulcer and started on IV diuresis for volume overload.     Interval Problem Update  Echo pending. Patient with fever yesterday evening, requiring 5 L NC this morning. Reports she is on 2 L baseline. Patient audibly wheezing complaining of dyspnea, denies cough. Start IV steroids and SVNs for COPD exacerbation.     I have personally seen and examined the patient at bedside. I discussed the plan of care with patient, bedside RN, charge RN and .    Consultants/Specialty  N/A    Code Status  Full Code    Disposition  Patient is not medically cleared for discharge.   Anticipate discharge to TBD.  I have placed the appropriate orders for post-discharge needs.    Review of Systems  Review of Systems   Constitutional: Positive for malaise/fatigue. Negative for chills and fever.   Respiratory: Positive for shortness of breath. Negative for cough.    Cardiovascular: Positive for leg swelling. Negative for chest pain.   Gastrointestinal: Negative for abdominal pain, nausea and vomiting.   Genitourinary: Negative for dysuria.   Musculoskeletal: Negative for myalgias.   Skin: Negative for rash.   Neurological: Negative for dizziness and headaches.   Psychiatric/Behavioral: Negative for depression.   All other systems reviewed and are negative.        Physical Exam  Temp:  [36.6 °C (97.9 °F)-38.1 °C (100.5 °F)] 37.1 °C (98.8 °F)  Pulse:  [79-93] 79  Resp:  [15-28] 22  BP: (121-156)/(38-75) 122/38  SpO2:  [84 %-100 %] 90 %    Physical Exam  Vitals and nursing note reviewed.   Constitutional:       General: She is in acute distress.      Appearance: She is ill-appearing.   HENT:      Head: Normocephalic and atraumatic.   Eyes:      Conjunctiva/sclera: Conjunctivae normal.      Pupils: Pupils are equal, round, and reactive to light.   Cardiovascular:      Rate and Rhythm: Normal rate and regular rhythm.      Pulses: Normal pulses.      Heart sounds: Normal heart sounds.   Pulmonary:      Effort: Respiratory distress (increased work of breathing) present.      Breath sounds: Wheezing present. No rales.      Comments: On NC   Abdominal:      General: Abdomen is flat. There is no distension.      Palpations: Abdomen is soft.      Tenderness: There is no abdominal tenderness.   Musculoskeletal:         General: Normal range of motion.      Cervical back: Normal range of motion and neck supple.      Right lower leg: Edema present.      Left lower leg: Edema present.   Skin:     General: Skin is warm and dry.      Findings: Erythema (left LE ) present.      Comments: Chronic venous stasis changes    Neurological:      General: No focal deficit present.      Mental Status: She is alert and oriented to person, place, and time.      Cranial Nerves: No cranial nerve deficit.      Motor: Weakness present.   Psychiatric:         Mood and Affect: Mood normal.         Fluids    Intake/Output Summary (Last 24 hours) at 2/17/2022 1240  Last data filed at 2/17/2022 0400  Gross per 24 hour   Intake 1450 ml   Output --   Net 1450 ml       Laboratory  Recent Labs     02/16/22  1520 02/17/22  0155   WBC 15.5* 16.1*   RBC 3.10* 2.79*   HEMOGLOBIN 9.8* 8.8*   HEMATOCRIT 31.9* 29.1*   .9* 104.3*   MCH 31.6 31.5   MCHC 30.7* 30.2*   RDW 51.3* 51.5*   PLATELETCT 288 276   MPV 10.7  10.7     Recent Labs     02/16/22  1520 02/17/22  0155   SODIUM 135 134*   POTASSIUM 4.5 4.0   CHLORIDE 98 99   CO2 24 22   GLUCOSE 247* 158*   BUN 28* 27*   CREATININE 0.94 0.95   CALCIUM 9.8 9.1                   Imaging  DX-CHEST-PORTABLE (1 VIEW)   Final Result      Stable cardiomegaly      EC-ECHOCARDIOGRAM COMPLETE W/O CONT    (Results Pending)        Assessment/Plan  COPD with acute exacerbation (HCC)  Assessment & Plan  Audibly wheezing on exam   Solumedrol IV x1   Start prednisone tomorrow x4 days   duonebs   RT consulted   Azithromycin   Continue home symbicort    Shortness of breath- (present on admission)  Assessment & Plan  COVID-19 negative   Appears volume overloaded, BNP elevated.  Oxygen as needed, Respiratory protocol, Bronchodilators, Incentive spirometry  Intravenous diuretics.  Daily strict input and output  Daily standing weights.  Daily BMP to watch renal function, electrolytes.  Replace electrolytes as needed.  Echo pending   tx for copd    Decubitus ulcer- (present on admission)  Assessment & Plan  Started on antibiotics in the emergency room, continue following cultures and sensitivities.  IP consult wound care    Cellulitis of left lower extremity- (present on admission)  Assessment & Plan  Continue augmentin  Supportive care   Diuresis     Diarrhea- (present on admission)  Assessment & Plan  Pt reports this is chronic   No BM since admission, dc c diff testing   Continue to monitor     Elevated troponin- (present on admission)  Assessment & Plan  In the indeterminate range  Denies chest pain.  EKG shows  a sinus rhythm with a rate of 84, there is no ST elevation there is evidence of right bundle branch block seen on prior EKGs, .  Continuous cardiac monitoring    Stat EKG, troponin for chest pain or worsening shortness of breath     Acute on chronic respiratory failure with hypoxia (HCC)- (present on admission)  Assessment & Plan  On 2 L NC baseline  Oxygen as needed, Respiratory  protocol, Bronchodilators, Incentive spirometry   tx as above for copd/chf    Hypothyroidism- (present on admission)  Assessment & Plan  Resume home levothyroxine    DM (diabetes mellitus) (HCC)- (present on admission)  Assessment & Plan  With hyperglycemia  Last glycated hemoglobin was 9 %  I will start short acting insulin for now  Accu-Checks, hypoglycemia protocol     Dyslipidemia- (present on admission)  Assessment & Plan  Resume home atorvastatin    Essential hypertension- (present on admission)  Assessment & Plan  Resume home amlodipine, Benzapril with hold parameters       VTE prophylaxis: enoxaparin ppx    I have performed a physical exam and reviewed and updated ROS and Plan today (2/17/2022). In review of yesterday's note (2/16/2022), there are no changes except as documented above.

## 2022-02-17 NOTE — DIETARY
NUTRITION SERVICES: BMI - Pt with BMI >40 (=Body mass index is 46.24 kg/m².), Class III obesity. Weight loss counseling not appropriate in acute care setting.     Pt also noted to have decub ulcer. Wound consult is pending.     RECOMMEND  1. If appropriate at DC please refer to outpatient nutrition services for weight management.   2. RD will follow for results of wound consult and adequate PO intake.

## 2022-02-17 NOTE — CARE PLAN
Problem: Respiratory  Goal: Patient will achieve/maintain optimum respiratory ventilation and gas exchange  Description: Target End Date:  Prior to discharge or change in level of care    Document on Assessment flowsheet    1.  Assess and monitor rate, rhythm, depth and effort of respiration  2.  Breath sounds assessed qshift and/or as needed  3.  Assess O2 saturation, administer/titrate oxygen as ordered  4.  Position patient for maximum ventilatory efficiency  5.  Turn, cough, and deep breath with splinting to improve effectiveness  6.  Collaborate with RT to administer medication/treatments per order  7.  Encourage use of incentive spirometer and encourage patient to cough after use and utilize splinting techniques if applicable  8.  Airway suctioning  9.  Monitor sputum production for changes in color, consistency and frequency  10. Perform frequent oral hygiene  11. Alternate physical activity with rest periods  Outcome: Progressing     Problem: Bronchoconstriction  Goal: Improve in air movement and diminished wheezing  Description: Target End Date:  2 to 3 days    1.  Implement inhaled treatments  2.  Evaluate and manage medication effects  Outcome: Progressing

## 2022-02-17 NOTE — PROGRESS NOTES
Telemetry Shift Summary     Rhythm: SR w/ BBB   Rate: 80-89  Measurements: 0.20/0.14/0.38  Ectopy (reported by Monitor Tech): o-f PVC, o-f Coup, f Trig      Normal Values  Rhythm: Sinus  HR:   Measurements: 0.12-0.20/0.06-0.10/0.30-0.52

## 2022-02-17 NOTE — ASSESSMENT & PLAN NOTE
Continue wound care  Photos reviewed, no acute signs of active cellulitis   Has completed antibiotics for concern of left lower extremity cellulitis   As per ID, continue Unasyn, end date 3/5/22 for infected pressure ulcer present on admission

## 2022-02-17 NOTE — ASSESSMENT & PLAN NOTE
With hyperglycemia  Last glycated hemoglobin was 9 %  Increase to high ISS, glargine 30 U BID  Accu-Checks, hypoglycemia protocol   Pending evaluation by speech for diet  Titrate insulin as tolerated

## 2022-02-17 NOTE — H&P
Hospital Medicine History & Physical Note    Date of Service  2/16/2022    Primary Care Physician  Anthony Martinez M.D.    Consultants  None     Code Status  Full Code    Chief Complaint  Chief Complaint   Patient presents with   • Shortness of Breath     History of Presenting Illness  Gabrielle Olmstead is a 76 y.o. female with a past medical history of chronic obstructive pulmonary disease, chronic hypoxemic respiratory failure on 2 L of oxygen at baseline, hypertension, diabetes, dyslipidemia and hypothyroidism who presented 2/16/2022 with generalized weakness shortness of breath and diarrhea.  Patient reports that she has not been feeling well over the past week.  She has progressively worsening generalized weakness and worsening shortness of breath, particularly over the past 2 days.  She also reports having frequent loose bowel movements.  She denies noticing any blood or mucus in stool.  She denies having nausea and vomiting.  She reports noticing worsening swelling in both lower extremities in addition to redness on the left leg.    I discussed the plan of care with emergency department physician, and the patient.    Review of Systems  Review of Systems   Constitutional: Positive for malaise/fatigue. Negative for chills and fever.   Eyes: Negative for discharge and redness.   Respiratory: Positive for shortness of breath. Negative for cough and stridor.    Cardiovascular: Negative for chest pain and leg swelling.   Gastrointestinal: Positive for diarrhea. Negative for abdominal pain and vomiting.   Genitourinary: Negative for flank pain.   Musculoskeletal: Negative for myalgias.   Skin: Negative.    Neurological: Negative for focal weakness.   Endo/Heme/Allergies: Does not bruise/bleed easily.   Psychiatric/Behavioral: The patient is not nervous/anxious.      Past Medical History   has a past medical history of Arthritis, ASTHMA, Breath shortness, Bronchitis, Diabetes, EMPHYSEMA, Hypertension, and Other  specified disorder of intestines.    Surgical History   has a past surgical history that includes cyst excision (8/6/2010); hysterectomy robotic (11/28/2011); and lesion excision general (11/28/2011).     Family History  family history includes Hypertension in her mother.      Social History   reports that she has quit smoking. Her smoking use included cigarettes. She has a 80.00 pack-year smoking history. She has never used smokeless tobacco. She reports that she does not drink alcohol and does not use drugs.    Allergies  Allergies   Allergen Reactions   • Nkda [No Known Drug Allergy]      Medications  Prior to Admission Medications   Prescriptions Last Dose Informant Patient Reported? Taking?   Probiotic Product (PROBIOTIC PO) 2/16/2022 at AM Patient Yes No   Sig: Take 1 capsule by mouth every day.   amlodipine-benazepril (LOTREL) 5-20 MG per capsule 2/16/2022 at AM Patient Yes No   Sig: Take 1 Cap by mouth every day.   atorvastatin (LIPITOR) 40 MG Tab 2/16/2022 at AM Patient Yes No   Sig: Take 40 mg by mouth every day.   bumetanide (BUMEX) 1 MG Tab 2/12/2022 Patient Yes No   Sig: Take 1 mg by mouth every day.   fluticasone-salmeterol (ADVAIR) 250-50 MCG/DOSE AEPB 2/16/2022 at AM Patient Yes No   Sig: Inhale 1 Puff by mouth every 12 hours.   glimepiride (AMARYL) 4 MG Tab 2/16/2022 at AM Patient Yes No   Sig: Take 4 mg by mouth every day.   levothyroxine (SYNTHROID) 25 MCG Tab 2/16/2022 at AM Patient Yes No   Sig: Take 25 mcg by mouth every day.   metFORMIN ER (GLUCOPHAGE XR) 500 MG TABLET SR 24 HR 2/16/2022 at AM Patient Yes No   Sig: Take 500 mg by mouth 2 Times a Day.   oxybutynin (DITROPAN XL) 15 MG CR tablet 2/15/2022 at PM Patient Yes No   Sig: Take 15 mg by mouth every evening.   potassium chloride SA (K-DUR) 10 MEQ Tab CR 2/16/2022 at AM Patient Yes No   Sig: Take 10 mEq by mouth every day.   sertraline (ZOLOFT) 50 MG Tab 2/16/2022 at AM Patient Yes No   Sig: Take 50 mg by mouth every day.       Facility-Administered Medications: None     Physical Exam  Temp:  [36.6 °C (97.9 °F)] 36.6 °C (97.9 °F)  Pulse:  [79-90] 86  Resp:  [15-28] 22  BP: (139-156)/(53-75) 140/56  SpO2:  [97 %-100 %] 99 %  Blood Pressure : 139/75   Temperature: 36.6 °C (97.9 °F)   Pulse: 83   Respiration: (!) 30   Pulse Oximetry: 99 %     Physical Exam  Constitutional:       General: She is not in acute distress.  HENT:      Head: Normocephalic and atraumatic.      Right Ear: External ear normal.      Left Ear: External ear normal.      Nose: No congestion or rhinorrhea.      Mouth/Throat:      Mouth: Mucous membranes are moist.      Pharynx: No oropharyngeal exudate or posterior oropharyngeal erythema.   Eyes:      General: No scleral icterus.        Right eye: No discharge.         Left eye: No discharge.      Conjunctiva/sclera: Conjunctivae normal.      Pupils: Pupils are equal, round, and reactive to light.   Cardiovascular:      Heart sounds:     No friction rub. No gallop.   Pulmonary:      Effort: Pulmonary effort is normal.   Abdominal:      General: Abdomen is flat. There is no distension.      Tenderness: There is no guarding.   Musculoskeletal:         General: No swelling.      Cervical back: Neck supple. No rigidity. No muscular tenderness.      Right lower leg: No edema.      Left lower leg: No edema.   Skin:     Capillary Refill: Capillary refill takes 2 to 3 seconds.      Coloration: Skin is not jaundiced or pale.      Findings: Erythema present. No bruising.      Comments: Left lower extremity erythema   Neurological:      Mental Status: She is alert and oriented to person, place, and time.   Psychiatric:         Mood and Affect: Mood normal.         Judgment: Judgment normal.       Laboratory:  Recent Labs     02/16/22  1520   WBC 15.5*   RBC 3.10*   HEMOGLOBIN 9.8*   HEMATOCRIT 31.9*   .9*   MCH 31.6   MCHC 30.7*   RDW 51.3*   PLATELETCT 288   MPV 10.7     Recent Labs     02/16/22  1520   SODIUM 135    POTASSIUM 4.5   CHLORIDE 98   CO2 24   GLUCOSE 247*   BUN 28*   CREATININE 0.94   CALCIUM 9.8     Recent Labs     02/16/22  1520   ALTSGPT 38   ASTSGOT 34   ALKPHOSPHAT 170*   TBILIRUBIN 0.8   GLUCOSE 247*         Recent Labs     02/16/22  1520   NTPROBNP 681*         Recent Labs     02/16/22  1520   TROPONINT 20*     Imaging:  DX-CHEST-PORTABLE (1 VIEW)   Final Result      Stable cardiomegaly        I personally reviewed patient EKG it shows a sinus rhythm with a rate of 84, there is no ST elevation there is evidence of right bundle branch block seen on prior EKGs, .    Assessment/Plan:  I anticipate this patient is appropriate for observation status at this time.    Decubitus ulcer- (present on admission)  Assessment & Plan  Started on antibiotics in the emergency room, continue following cultures and sensitivities.  IP consult wound care    Elevated troponin- (present on admission)  Assessment & Plan  In the indeterminate range  Denies chest pain.  EKG shows  a sinus rhythm with a rate of 84, there is no ST elevation there is evidence of right bundle branch block seen on prior EKGs, .  Continuous cardiac monitoring    Stat EKG, troponin for chest pain or worsening shortness of breath     Shortness of breath- (present on admission)  Assessment & Plan  Rule out COVID-19   Appears volume overloaded, BNP elevated.  Oxygen as needed, Respiratory protocol, Bronchodilators, Incentive spirometry  Intravenous diuretics.  Daily strict input and output  Daily standing weights.  Daily BMP to watch renal function, electrolytes.  Replace electrolytes as needed.  We will check echocardiography    Cellulitis of left lower extremity- (present on admission)  Assessment & Plan  Unasyn for now, follow on cultures and sensitivities    Diarrhea- (present on admission)  Assessment & Plan  Differentials include viral infection, C. difficile colitis    I will check stool for C. difficile toxins.  I will check for  rotavirus, crypto, Giardia.  Supportive care with intravenous fluids monitor electrolytes and replace accordingly     Chronic hypoxemic respiratory failure (HCC)- (present on admission)  Assessment & Plan  Oxygen as needed, Respiratory protocol, Bronchodilators, Incentive spirometry     DM (diabetes mellitus) (HCC)- (present on admission)  Assessment & Plan  With hyperglycemia  Last glycated hemoglobin was 9 %  I will start short acting insulin for now  Accu-Checks, hypoglycemia protocol     Essential hypertension- (present on admission)  Assessment & Plan  Resume home amlodipine, Benzapril with hold parameters    Hypothyroidism- (present on admission)  Assessment & Plan  Resume home levothyroxine    Dyslipidemia- (present on admission)  Assessment & Plan  Resume home atorvastatin    VTE prophylaxis: SCDs/TEDs and enoxaparin ppx

## 2022-02-17 NOTE — ASSESSMENT & PLAN NOTE
Multifactorial, related to CHF and COPD  Oxygen as needed, Respiratory protocol, Bronchodilators, Incentive spirometry   tx as above for copd/chf

## 2022-02-17 NOTE — ASSESSMENT & PLAN NOTE
COVID-19 negative   Appears volume overloaded, BNP elevated.  Oxygen as needed, Respiratory protocol, Bronchodilators, Incentive spirometry  Intravenous diuretics.  Daily strict input and output  Daily standing weights.  Daily BMP to watch renal function, electrolytes.  Replace electrolytes as needed.  Echo with EF of 40% and wall motion abnormalities   Completed treatment for COPD exacerbation, did require intubation from 2/19 to 2/24

## 2022-02-18 LAB
ALBUMIN SERPL BCP-MCNC: 3 G/DL (ref 3.2–4.9)
BASOPHILS # BLD AUTO: 0.1 % (ref 0–1.8)
BASOPHILS # BLD: 0.02 K/UL (ref 0–0.12)
BUN SERPL-MCNC: 43 MG/DL (ref 8–22)
CALCIUM SERPL-MCNC: 9.3 MG/DL (ref 8.4–10.2)
CHLORIDE SERPL-SCNC: 99 MMOL/L (ref 96–112)
CO2 SERPL-SCNC: 22 MMOL/L (ref 20–33)
CREAT SERPL-MCNC: 1.2 MG/DL (ref 0.5–1.4)
EOSINOPHIL # BLD AUTO: 0 K/UL (ref 0–0.51)
EOSINOPHIL NFR BLD: 0 % (ref 0–6.9)
ERYTHROCYTE [DISTWIDTH] IN BLOOD BY AUTOMATED COUNT: 48.6 FL (ref 35.9–50)
GLUCOSE BLD-MCNC: 293 MG/DL (ref 65–99)
GLUCOSE BLD-MCNC: 347 MG/DL (ref 65–99)
GLUCOSE BLD-MCNC: 349 MG/DL (ref 65–99)
GLUCOSE BLD-MCNC: 386 MG/DL (ref 65–99)
GLUCOSE BLD-MCNC: 408 MG/DL (ref 65–99)
GLUCOSE BLD-MCNC: 437 MG/DL (ref 65–99)
GLUCOSE SERPL-MCNC: 340 MG/DL (ref 65–99)
HCT VFR BLD AUTO: 30.2 % (ref 37–47)
HGB BLD-MCNC: 9.4 G/DL (ref 12–16)
IMM GRANULOCYTES # BLD AUTO: 0.2 K/UL (ref 0–0.11)
IMM GRANULOCYTES NFR BLD AUTO: 1.2 % (ref 0–0.9)
LV EJECT FRACT  99904: 70
LV EJECT FRACT MOD 2C 99903: 73.9
LV EJECT FRACT MOD 4C 99902: 68.85
LV EJECT FRACT MOD BP 99901: 70.8
LYMPHOCYTES # BLD AUTO: 0.98 K/UL (ref 1–4.8)
LYMPHOCYTES NFR BLD: 5.9 % (ref 22–41)
MCH RBC QN AUTO: 31.8 PG (ref 27–33)
MCHC RBC AUTO-ENTMCNC: 31.1 G/DL (ref 33.6–35)
MCV RBC AUTO: 102 FL (ref 81.4–97.8)
MONOCYTES # BLD AUTO: 0.62 K/UL (ref 0–0.85)
MONOCYTES NFR BLD AUTO: 3.7 % (ref 0–13.4)
NEUTROPHILS # BLD AUTO: 14.92 K/UL (ref 2–7.15)
NEUTROPHILS NFR BLD: 89.1 % (ref 44–72)
NRBC # BLD AUTO: 0 K/UL
NRBC BLD-RTO: 0 /100 WBC
PHOSPHATE SERPL-MCNC: 4 MG/DL (ref 2.5–4.5)
PLATELET # BLD AUTO: 303 K/UL (ref 164–446)
PMV BLD AUTO: 11.1 FL (ref 9–12.9)
POTASSIUM SERPL-SCNC: 4.5 MMOL/L (ref 3.6–5.5)
RBC # BLD AUTO: 2.96 M/UL (ref 4.2–5.4)
SODIUM SERPL-SCNC: 133 MMOL/L (ref 135–145)
WBC # BLD AUTO: 16.7 K/UL (ref 4.8–10.8)

## 2022-02-18 PROCEDURE — 700102 HCHG RX REV CODE 250 W/ 637 OVERRIDE(OP): Performed by: HOSPITALIST

## 2022-02-18 PROCEDURE — 94760 N-INVAS EAR/PLS OXIMETRY 1: CPT

## 2022-02-18 PROCEDURE — 770020 HCHG ROOM/CARE - TELE (206)

## 2022-02-18 PROCEDURE — 93306 TTE W/DOPPLER COMPLETE: CPT | Mod: 26 | Performed by: INTERNAL MEDICINE

## 2022-02-18 PROCEDURE — 97166 OT EVAL MOD COMPLEX 45 MIN: CPT

## 2022-02-18 PROCEDURE — 97162 PT EVAL MOD COMPLEX 30 MIN: CPT

## 2022-02-18 PROCEDURE — 85025 COMPLETE CBC W/AUTO DIFF WBC: CPT

## 2022-02-18 PROCEDURE — 94669 MECHANICAL CHEST WALL OSCILL: CPT

## 2022-02-18 PROCEDURE — 99233 SBSQ HOSP IP/OBS HIGH 50: CPT | Performed by: INTERNAL MEDICINE

## 2022-02-18 PROCEDURE — 700111 HCHG RX REV CODE 636 W/ 250 OVERRIDE (IP): Performed by: INTERNAL MEDICINE

## 2022-02-18 PROCEDURE — A9270 NON-COVERED ITEM OR SERVICE: HCPCS | Performed by: HOSPITALIST

## 2022-02-18 PROCEDURE — 700102 HCHG RX REV CODE 250 W/ 637 OVERRIDE(OP): Performed by: INTERNAL MEDICINE

## 2022-02-18 PROCEDURE — 700111 HCHG RX REV CODE 636 W/ 250 OVERRIDE (IP): Performed by: HOSPITALIST

## 2022-02-18 PROCEDURE — 94640 AIRWAY INHALATION TREATMENT: CPT

## 2022-02-18 PROCEDURE — 97602 WOUND(S) CARE NON-SELECTIVE: CPT

## 2022-02-18 PROCEDURE — 99222 1ST HOSP IP/OBS MODERATE 55: CPT | Performed by: INTERNAL MEDICINE

## 2022-02-18 PROCEDURE — A9270 NON-COVERED ITEM OR SERVICE: HCPCS | Performed by: INTERNAL MEDICINE

## 2022-02-18 PROCEDURE — 80069 RENAL FUNCTION PANEL: CPT

## 2022-02-18 PROCEDURE — 700101 HCHG RX REV CODE 250: Performed by: INTERNAL MEDICINE

## 2022-02-18 PROCEDURE — 82962 GLUCOSE BLOOD TEST: CPT

## 2022-02-18 RX ORDER — IPRATROPIUM BROMIDE AND ALBUTEROL SULFATE 2.5; .5 MG/3ML; MG/3ML
3 SOLUTION RESPIRATORY (INHALATION)
Status: DISCONTINUED | OUTPATIENT
Start: 2022-02-18 | End: 2022-02-19

## 2022-02-18 RX ORDER — DEXTROSE MONOHYDRATE 25 G/50ML
50 INJECTION, SOLUTION INTRAVENOUS
Status: DISCONTINUED | OUTPATIENT
Start: 2022-02-18 | End: 2022-02-20

## 2022-02-18 RX ORDER — DEXTROSE MONOHYDRATE 25 G/50ML
50 INJECTION, SOLUTION INTRAVENOUS
Status: DISCONTINUED | OUTPATIENT
Start: 2022-02-18 | End: 2022-02-18

## 2022-02-18 RX ADMIN — LEVOTHYROXINE SODIUM 25 MCG: 0.03 TABLET ORAL at 05:48

## 2022-02-18 RX ADMIN — ONDANSETRON 4 MG: 2 INJECTION INTRAMUSCULAR; INTRAVENOUS at 18:29

## 2022-02-18 RX ADMIN — INSULIN HUMAN 3 UNITS: 100 INJECTION, SOLUTION PARENTERAL at 06:35

## 2022-02-18 RX ADMIN — AZITHROMYCIN MONOHYDRATE 500 MG: 250 TABLET ORAL at 05:46

## 2022-02-18 RX ADMIN — IPRATROPIUM BROMIDE AND ALBUTEROL SULFATE 3 ML: .5; 2.5 SOLUTION RESPIRATORY (INHALATION) at 11:14

## 2022-02-18 RX ADMIN — INSULIN HUMAN 4 UNITS: 100 INJECTION, SOLUTION PARENTERAL at 11:35

## 2022-02-18 RX ADMIN — INSULIN GLARGINE 10 UNITS: 100 INJECTION, SOLUTION SUBCUTANEOUS at 09:21

## 2022-02-18 RX ADMIN — NYSTATIN: 100000 POWDER TOPICAL at 05:54

## 2022-02-18 RX ADMIN — IPRATROPIUM BROMIDE AND ALBUTEROL SULFATE 3 ML: .5; 2.5 SOLUTION RESPIRATORY (INHALATION) at 08:26

## 2022-02-18 RX ADMIN — NYSTATIN: 100000 POWDER TOPICAL at 17:22

## 2022-02-18 RX ADMIN — AMOXICILLIN AND CLAVULANATE POTASSIUM 1 TABLET: 875; 125 TABLET, FILM COATED ORAL at 17:25

## 2022-02-18 RX ADMIN — IPRATROPIUM BROMIDE AND ALBUTEROL SULFATE 3 ML: .5; 2.5 SOLUTION RESPIRATORY (INHALATION) at 19:31

## 2022-02-18 RX ADMIN — PREDNISONE 50 MG: 50 TABLET ORAL at 05:48

## 2022-02-18 RX ADMIN — ENOXAPARIN SODIUM 40 MG: 40 INJECTION SUBCUTANEOUS at 05:54

## 2022-02-18 RX ADMIN — BUDESONIDE AND FORMOTEROL FUMARATE DIHYDRATE 2 PUFF: 160; 4.5 AEROSOL RESPIRATORY (INHALATION) at 19:33

## 2022-02-18 RX ADMIN — BUDESONIDE AND FORMOTEROL FUMARATE DIHYDRATE 2 PUFF: 160; 4.5 AEROSOL RESPIRATORY (INHALATION) at 05:54

## 2022-02-18 RX ADMIN — ATORVASTATIN CALCIUM 40 MG: 40 TABLET, FILM COATED ORAL at 05:46

## 2022-02-18 RX ADMIN — BUMETANIDE 1 MG: 1 TABLET ORAL at 05:49

## 2022-02-18 RX ADMIN — IPRATROPIUM BROMIDE AND ALBUTEROL SULFATE 3 ML: .5; 2.5 SOLUTION RESPIRATORY (INHALATION) at 03:42

## 2022-02-18 RX ADMIN — SERTRALINE HYDROCHLORIDE 50 MG: 50 TABLET ORAL at 05:48

## 2022-02-18 RX ADMIN — AMOXICILLIN AND CLAVULANATE POTASSIUM 1 TABLET: 875; 125 TABLET, FILM COATED ORAL at 05:47

## 2022-02-18 RX ADMIN — IPRATROPIUM BROMIDE AND ALBUTEROL SULFATE 3 ML: .5; 2.5 SOLUTION RESPIRATORY (INHALATION) at 15:26

## 2022-02-18 RX ADMIN — ONDANSETRON 4 MG: 2 INJECTION INTRAMUSCULAR; INTRAVENOUS at 22:50

## 2022-02-18 ASSESSMENT — COGNITIVE AND FUNCTIONAL STATUS - GENERAL
TOILETING: A LOT
DAILY ACTIVITIY SCORE: 15
MOVING TO AND FROM BED TO CHAIR: A LITTLE
TURNING FROM BACK TO SIDE WHILE IN FLAT BAD: A LOT
MOVING FROM LYING ON BACK TO SITTING ON SIDE OF FLAT BED: A LOT
TOILETING: A LOT
WALKING IN HOSPITAL ROOM: A LOT
DAILY ACTIVITIY SCORE: 15
WALKING IN HOSPITAL ROOM: A LITTLE
PERSONAL GROOMING: A LITTLE
SUGGESTED CMS G CODE MODIFIER MOBILITY: CL
STANDING UP FROM CHAIR USING ARMS: A LOT
MOVING FROM LYING ON BACK TO SITTING ON SIDE OF FLAT BED: A LITTLE
MOVING TO AND FROM BED TO CHAIR: A LOT
DRESSING REGULAR UPPER BODY CLOTHING: A LOT
STANDING UP FROM CHAIR USING ARMS: A LITTLE
MOBILITY SCORE: 14
DRESSING REGULAR LOWER BODY CLOTHING: A LOT
CLIMB 3 TO 5 STEPS WITH RAILING: A LOT
PERSONAL GROOMING: A LITTLE
HELP NEEDED FOR BATHING: A LOT
CLIMB 3 TO 5 STEPS WITH RAILING: TOTAL
SUGGESTED CMS G CODE MODIFIER DAILY ACTIVITY: CK
TURNING FROM BACK TO SIDE WHILE IN FLAT BAD: A LOT
SUGGESTED CMS G CODE MODIFIER MOBILITY: CL
MOBILITY SCORE: 13
SUGGESTED CMS G CODE MODIFIER DAILY ACTIVITY: CK
DRESSING REGULAR UPPER BODY CLOTHING: A LOT
HELP NEEDED FOR BATHING: A LOT
DRESSING REGULAR LOWER BODY CLOTHING: A LOT

## 2022-02-18 ASSESSMENT — GAIT ASSESSMENTS
DEVIATION: STEP TO;DECREASED BASE OF SUPPORT
GAIT LEVEL OF ASSIST: CONTACT GUARD ASSIST
ASSISTIVE DEVICE: 4 WHEEL WALKER
DISTANCE (FEET): 5

## 2022-02-18 ASSESSMENT — ENCOUNTER SYMPTOMS
PSYCHIATRIC NEGATIVE: 1
GASTROINTESTINAL NEGATIVE: 1
EYES NEGATIVE: 1
MYALGIAS: 0
DIZZINESS: 0
WEAKNESS: 1
DEPRESSION: 0
NAUSEA: 0
FEVER: 0
SHORTNESS OF BREATH: 1
VOMITING: 0
CHILLS: 0
HEADACHES: 0
ABDOMINAL PAIN: 0
COUGH: 0
MUSCULOSKELETAL NEGATIVE: 1

## 2022-02-18 ASSESSMENT — ACTIVITIES OF DAILY LIVING (ADL): TOILETING: INDEPENDENT

## 2022-02-18 ASSESSMENT — PAIN DESCRIPTION - PAIN TYPE
TYPE: ACUTE PAIN

## 2022-02-18 NOTE — FLOWSHEET NOTE
02/18/22 1100   Vital Signs   Pulse 72   Respiration 20   Pulse Oximetry 93 %   $ Pulse Oximetry (Spot Check) Yes   Respiratory Assessment   Level of Consciousness Alert   Breath Sounds   RUL Breath Sounds Fine Crackles   RML Breath Sounds Expiratory Wheezes   RLL Breath Sounds Diminished   MOSHE Breath Sounds Fine Crackles   LLL Breath Sounds Diminished   Secretions   Cough Congested   How Sputum Obtained Cough on Request   Oxygen   O2 (LPM) 3   Oxygen Equipment Initial Setup   O2 Delivery Device Silicone Nasal Cannula

## 2022-02-18 NOTE — CARE PLAN
Problem: Bronchoconstriction  Goal: Improve in air movement and diminished wheezing  Description: Target End Date:  2 to 3 days    1.  Implement inhaled treatments  2.  Evaluate and manage medication effects  Outcome: Not Progressing

## 2022-02-18 NOTE — PROGRESS NOTES
Hospital Medicine Daily Progress Note    Date of Service  2/18/2022    Chief Complaint  Gabrielle Olmstead is a 76 y.o. female admitted 2/16/2022 with dyspnea and diarrhea.     Hospital Course   76 y.o. female with a past medical history of chronic obstructive pulmonary disease, chronic hypoxemic respiratory failure on 2 L of oxygen at baseline, hypertension, diabetes, dyslipidemia and hypothyroidism who presented 2/16/2022 with generalized weakness shortness of breath and diarrhea.  She reported noticing worsening swelling in both lower extremities in addition to redness on the left leg. Patient admitted started on IV abx for leg cellulitis, wound care consulted for decubitus ulcer and started on IV diuresis for volume overload.     Interval Problem Update  2/17 Echo pending. Patient with fever yesterday evening, requiring 5 L NC this morning. Reports she is on 2 L baseline. Patient audibly wheezing complaining of dyspnea, denies cough. Start IV steroids and SVNs for COPD exacerbation.   2/18 Hypoxia improved on 3 L NC this morning. Mild SRIRAM this morning, hold ACEi. Echo EF 70%, mod AS, and RVSP 58 mmHg. Discussed with COPD coordinator, pulmonology consulted. Glucose uncontrolled likely due to steroids, increase to moderate ISS and start glargine. Wheezing has improved, still reporting dyspnea. Denies pain in her legs, edema has resolved. Pending PT/OT.     I have personally seen and examined the patient at bedside. I discussed the plan of care with patient, bedside RN, charge RN and .    Consultants/Specialty  N/A    Code Status  Full Code    Disposition  Patient is not medically cleared for discharge.   Anticipate discharge to TBD.  I have placed the appropriate orders for post-discharge needs.    Review of Systems  Review of Systems   Constitutional: Positive for malaise/fatigue. Negative for chills and fever.   Respiratory: Positive for shortness of breath. Negative for cough.    Cardiovascular: Negative  for chest pain and leg swelling.   Gastrointestinal: Negative for abdominal pain, nausea and vomiting.   Genitourinary: Negative for dysuria.   Musculoskeletal: Negative for myalgias.   Skin: Negative for rash.   Neurological: Negative for dizziness and headaches.   Psychiatric/Behavioral: Negative for depression.   All other systems reviewed and are negative.       Physical Exam  Temp:  [36.4 °C (97.6 °F)-38.2 °C (100.7 °F)] 36.7 °C (98.1 °F)  Pulse:  [62-86] 72  Resp:  [18-22] 20  BP: (108-144)/(41-66) 126/50  SpO2:  [90 %-100 %] 93 %    Physical Exam  Vitals and nursing note reviewed.   Constitutional:       General: She is not in acute distress.     Appearance: She is ill-appearing.   HENT:      Head: Normocephalic and atraumatic.   Eyes:      Conjunctiva/sclera: Conjunctivae normal.      Pupils: Pupils are equal, round, and reactive to light.   Cardiovascular:      Rate and Rhythm: Normal rate and regular rhythm.      Pulses: Normal pulses.      Heart sounds: Normal heart sounds.   Pulmonary:      Effort: No respiratory distress.      Breath sounds: Wheezing (mild expiratory ) present. No rales.      Comments: On NC   Getting breathing tx during my exam  Abdominal:      General: Abdomen is flat. There is no distension.      Palpations: Abdomen is soft.      Tenderness: There is no abdominal tenderness.   Musculoskeletal:         General: Normal range of motion.      Cervical back: Normal range of motion and neck supple.      Right lower leg: No edema.      Left lower leg: No edema.   Skin:     General: Skin is warm and dry.      Findings: Erythema (left LE, resolved) and lesion (small scabs b/l LE) present.      Comments: Chronic venous stasis changes    Neurological:      General: No focal deficit present.      Mental Status: She is alert and oriented to person, place, and time.      Cranial Nerves: No cranial nerve deficit.      Motor: Weakness present.   Psychiatric:         Mood and Affect: Mood normal.          Fluids    Intake/Output Summary (Last 24 hours) at 2/18/2022 1135  Last data filed at 2/18/2022 0826  Gross per 24 hour   Intake --   Output 725 ml   Net -725 ml       Laboratory  Recent Labs     02/16/22  1520 02/17/22  0155 02/18/22  0347   WBC 15.5* 16.1* 16.7*   RBC 3.10* 2.79* 2.96*   HEMOGLOBIN 9.8* 8.8* 9.4*   HEMATOCRIT 31.9* 29.1* 30.2*   .9* 104.3* 102.0*   MCH 31.6 31.5 31.8   MCHC 30.7* 30.2* 31.1*   RDW 51.3* 51.5* 48.6   PLATELETCT 288 276 303   MPV 10.7 10.7 11.1     Recent Labs     02/16/22  1520 02/17/22  0155 02/18/22  0347   SODIUM 135 134* 133*   POTASSIUM 4.5 4.0 4.5   CHLORIDE 98 99 99   CO2 24 22 22   GLUCOSE 247* 158* 340*   BUN 28* 27* 43*   CREATININE 0.94 0.95 1.20   CALCIUM 9.8 9.1 9.3                   Imaging  EC-ECHOCARDIOGRAM COMPLETE W/O CONT   Final Result      DX-CHEST-PORTABLE (1 VIEW)   Final Result      Stable cardiomegaly           Assessment/Plan  COPD with acute exacerbation (HCC)  Assessment & Plan  Audibly wheezing on exam   S/p Solumedrol IV x1   Continue prednisone x4 days   duonebs   RT consulted   Azithromycin   Continue symbicort  Pulmonology consulted    Shortness of breath- (present on admission)  Assessment & Plan  COVID-19 negative   Appears volume overloaded, BNP elevated.  Oxygen as needed, Respiratory protocol, Bronchodilators, Incentive spirometry  Intravenous diuretics.  Daily strict input and output  Daily standing weights.  Daily BMP to watch renal function, electrolytes.  Replace electrolytes as needed.  Echo noted  tx for copd    Decubitus ulcer- (present on admission)  Assessment & Plan  Started on antibiotics in the emergency room, continue following cultures and sensitivities.  IP consult wound care    Cellulitis of left lower extremity- (present on admission)  Assessment & Plan  Continue augmentin  Supportive care   Diuresis     improving    Diarrhea- (present on admission)  Assessment & Plan  Pt reports this is chronic   No BM since  admission, dc c diff testing   Continue to monitor     Elevated troponin- (present on admission)  Assessment & Plan  In the indeterminate range  Denies chest pain.  EKG shows  a sinus rhythm with a rate of 84, there is no ST elevation there is evidence of right bundle branch block seen on prior EKGs, .  Continuous cardiac monitoring    Stat EKG, troponin for chest pain or worsening shortness of breath     Acute on chronic respiratory failure with hypoxia (HCC)- (present on admission)  Assessment & Plan  On 2 L NC baseline  Oxygen as needed, Respiratory protocol, Bronchodilators, Incentive spirometry   tx as above for copd/chf    Hypothyroidism- (present on admission)  Assessment & Plan  Resume home levothyroxine    DM (diabetes mellitus) (HCC)- (present on admission)  Assessment & Plan  With hyperglycemia  Last glycated hemoglobin was 9 %  Increase to moderate ISS, glargine 10 U  Accu-Checks, hypoglycemia protocol     Dyslipidemia- (present on admission)  Assessment & Plan  Resume home atorvastatin    Essential hypertension- (present on admission)  Assessment & Plan  Resume home amlodipine, Benzapril with hold parameters       VTE prophylaxis: enoxaparin ppx    I have performed a physical exam and reviewed and updated ROS and Plan today (2/18/2022). In review of yesterday's note (2/17/2022), there are no changes except as documented above.

## 2022-02-18 NOTE — CONSULTS
Pulmonary Consultation    Date of consult: 2/18/2022    Referring Physician  Allyson Ulloa D.O.    Reason for Consultation  Assess for COPD exacerbation    History of Presenting Illness  76 y.o. female who presented 2/16/2022 with leg swelling b/l wirh cellulitis of right lower extremity, Sob and diarrhea   Hx of chronic obstructive pulmonary disease (no PFTs and no CT chest ), chronic hypoxemic respiratory failure on 2 L of oxygen at baseline, hypertension, diabetes, dyslipidemia and hypothyroidism and moderate aortic stenosis    Her Fio2 requirement went up from 2 l to 5 l and was wheezing and dyspneac on exam and started on rx for COPD. She improved with Fio2 requirement back to baseline    Code Status  Full Code    Review of Systems  Review of Systems   Constitutional: Positive for malaise/fatigue.   HENT: Negative.    Eyes: Negative.    Respiratory: Positive for shortness of breath.    Cardiovascular: Positive for leg swelling.   Gastrointestinal: Negative.    Genitourinary: Negative.    Musculoskeletal: Negative.    Skin: Negative.    Neurological: Positive for weakness.   Endo/Heme/Allergies: Negative.    Psychiatric/Behavioral: Negative.        Past Medical History   has a past medical history of Arthritis, ASTHMA, Breath shortness, Bronchitis, Diabetes, EMPHYSEMA, Hypertension, and Other specified disorder of intestines.    Surgical History   has a past surgical history that includes cyst excision (8/6/2010); hysterectomy robotic (11/28/2011); and lesion excision general (11/28/2011).    Family History  family history includes Hypertension in her mother.    Social History   reports that she has quit smoking. Her smoking use included cigarettes. She has a 80.00 pack-year smoking history. She has never used smokeless tobacco. She reports that she does not drink alcohol and does not use drugs.    Medications  Home Medications     Reviewed by Lowell Evans (Pharmacy Tech) on 02/16/22 at 1558  Med  List Status: Complete   Medication Last Dose Status   amlodipine-benazepril (LOTREL) 5-20 MG per capsule 2/16/2022 Active   atorvastatin (LIPITOR) 40 MG Tab 2/16/2022 Active   bumetanide (BUMEX) 1 MG Tab 2/12/2022 Active   fluticasone-salmeterol (ADVAIR) 250-50 MCG/DOSE AEPB 2/16/2022 Active   glimepiride (AMARYL) 4 MG Tab 2/16/2022 Active   levothyroxine (SYNTHROID) 25 MCG Tab 2/16/2022 Active   metFORMIN ER (GLUCOPHAGE XR) 500 MG TABLET SR 24 HR 2/16/2022 Active   oxybutynin (DITROPAN XL) 15 MG CR tablet 2/15/2022 Active   potassium chloride SA (K-DUR) 10 MEQ Tab CR 2/16/2022 Active   Probiotic Product (PROBIOTIC PO) 2/16/2022 Active   sertraline (ZOLOFT) 50 MG Tab 2/16/2022 Active              Current Facility-Administered Medications   Medication Dose Route Frequency Provider Last Rate Last Admin   • insulin GLARGINE (Lantus,Semglee) injection  10 Units Subcutaneous QAM INSULIN KATINA UgaldeOMichael   10 Units at 02/18/22 0921   • insulin regular (HumuLIN R,NovoLIN R) injection  1-6 Units Subcutaneous 4X/DAY ACHS Allyson Ulloa D.O.        And   • dextrose 50% (D50W) injection 50 mL  50 mL Intravenous Q15 MIN PRN Allyson Ulloa D.O.       • ondansetron (ZOFRAN) syringe/vial injection 4 mg  4 mg Intravenous Q4HRS PRN Alley Longo M.D.   4 mg at 02/17/22 0338   • Respiratory Therapy Consult   Nebulization Continuous RT Allyson Ulloa D.O.       • ipratropium-albuterol (DUONEB) nebulizer solution  3 mL Nebulization Q4HRS (RT) Allyson Ulloa D.O.   3 mL at 02/18/22 1114   • ipratropium-albuterol (DUONEB) nebulizer solution  3 mL Nebulization Q2HRS PRN (RT) Allyson A Artemio, D.O.       • predniSONE (DELTASONE) tablet 50 mg  50 mg Oral DAILY NILDA Ugalde.OMichael   50 mg at 02/18/22 0548   • azithromycin (ZITHROMAX) tablet 500 mg  500 mg Oral DAILY Allyson Ulloa D.O.   500 mg at 02/18/22 0546   • bumetanide (BUMEX) tablet 1 mg  1 mg Oral Q DAY Allyson Ulloa D.O.   1 mg at 02/18/22 0549   •  amoxicillin-clavulanate (AUGMENTIN) 875-125 MG per tablet 1 Tablet  1 Tablet Oral Q12HRS Allyson Ulloa D.O.   1 Tablet at 02/18/22 0547   • nystatin (MYCOSTATIN) powder   Topical BID Allyson Ulloa D.O.   Given at 02/18/22 0554   • atorvastatin (LIPITOR) tablet 40 mg  40 mg Oral DAILY Asejuan miguel Stroud M.D.   40 mg at 02/18/22 0546   • budesonide-formoterol (SYMBICORT) 160-4.5 MCG/ACT inhaler 2 Puff  2 Puff Inhalation Q12HRS Asejuan miguel Stroud M.D.   2 Puff at 02/18/22 0554   • levothyroxine (SYNTHROID) tablet 25 mcg  25 mcg Oral DAILY Thomas Stroud M.D.   25 mcg at 02/18/22 0548   • sertraline (Zoloft) tablet 50 mg  50 mg Oral DAILY Thomas Stroud M.D.   50 mg at 02/18/22 0548   • Pharmacy Consult Request  1 Each Other PHARMACY TO DOSE Thomas Stroud M.D.       • amLODIPine (NORVASC) tablet 5 mg  5 mg Oral Q DAY Thomas Stroud M.D.        And   • [Held by provider] benazepril (LOTENSIN) tablet 20 mg  20 mg Oral Q DAY Thomas Stroud M.D.       • acetaminophen (Tylenol) tablet 650 mg  650 mg Oral Q6HRS PRN Thomas Stroud M.D.       • lactated ringers infusion (BOLUS)  500 mL Intravenous Once PRN Thomas Stroud M.D.       • lactated ringers infusion (BOLUS): BMI greater than 30  30 mL/kg (Ideal) Intravenous Once PRN Thomas Stroud M.D.       • enoxaparin (LOVENOX) inj 40 mg  40 mg Subcutaneous DAILY Asejuan miguel Stroud M.D.   40 mg at 02/18/22 0554       Allergies  Allergies   Allergen Reactions   • Nkda [No Known Drug Allergy]        Vital Signs last 24 hours  Temp:  [36.4 °C (97.6 °F)-38.2 °C (100.7 °F)] 36.7 °C (98.1 °F)  Pulse:  [62-86] 72  Resp:  [18-22] 20  BP: (108-144)/(41-66) 126/50  SpO2:  [90 %-100 %] 93 %    Physical Exam  Physical Exam  Constitutional:       Appearance: She is obese. She is ill-appearing.      Comments: unkempt   HENT:      Mouth/Throat:      Mouth: Mucous membranes are dry.   Eyes:      Extraocular Movements: Extraocular movements intact.      Pupils: Pupils are equal,  round, and reactive to light.   Cardiovascular:      Rate and Rhythm: Normal rate.      Heart sounds: Murmur heard.   Pulmonary:      Comments: Diminished b/l bs  Abdominal:      General: Bowel sounds are normal.      Palpations: Abdomen is soft.   Musculoskeletal:      Right lower leg: Edema present.      Left lower leg: Edema present.   Neurological:      General: No focal deficit present.      Mental Status: She is oriented to person, place, and time.   Psychiatric:         Mood and Affect: Mood normal.         Behavior: Behavior normal.         Thought Content: Thought content normal.         Judgment: Judgment normal.         Fluids    Intake/Output Summary (Last 24 hours) at 2022 1209  Last data filed at 2022 0826  Gross per 24 hour   Intake --   Output 725 ml   Net -725 ml       Laboratory  Recent Results (from the past 48 hour(s))   EKG    Collection Time: 22  3:03 PM   Result Value Ref Range    Report       [facility]    Test Date:  2022  Pt Name:    MICHEAL VALENCIA                  Department: [facility]  MRN:        [id number]                      [facility]  Gender:     Female                       Technician: SELENA  :        1945                   Requested By:TAMMIE GRAHAM  Order #:    [id number]                    Reading MD: TAMMIE GRAHAM MD    Measurements  Intervals                                Axis  Rate:       84                           P:          -6  DC:         178                          QRS:        -65  QRSD:       131                          T:          58  QT:         372  QTc:        440    Interpretive Statements  Sinus rhythm, rate of 84, leftward axis, no ST elevation  Atrial premature complex  RBBB and LAFB  Compared to ECG 2021 07:09:29  Atrial premature complex(es) now present  Electronically Signed On 2022 16:33:06 PST by TAMMIE GRAHAM MD     BLOOD CULTURE    Collection Time:  02/16/22  3:15 PM    Specimen: Peripheral; Blood   Result Value Ref Range    Significant Indicator NEG     Source BLD     Site PERIPHERAL     Culture Result       No Growth  Note: Blood cultures are incubated for 5 days and  are monitored continuously.Positive blood cultures  are called to the RN and reported as soon as  they are identified.  Blood culture testing and Gram stain, if indicated, are  performed at [facility], [address] [place] [address].  Positive blood cultures are  sent to [facility], [address] [place] [address], for organism identification and  susceptibility testing.     CBC WITH DIFFERENTIAL    Collection Time: 02/16/22  3:20 PM   Result Value Ref Range    WBC 15.5 (H) 4.8 - 10.8 K/uL    RBC 3.10 (L) 4.20 - 5.40 M/uL    Hemoglobin 9.8 (L) 12.0 - 16.0 g/dL    Hematocrit 31.9 (L) 37.0 - 47.0 %    .9 (H) 81.4 - 97.8 fL    MCH 31.6 27.0 - 33.0 pg    MCHC 30.7 (L) 33.6 - 35.0 g/dL    RDW 51.3 (H) 35.9 - 50.0 fL    Platelet Count 288 164 - 446 K/uL    MPV 10.7 9.0 - 12.9 fL    Neutrophils-Polys 84.00 (H) 44.00 - 72.00 %    Lymphocytes 7.30 (L) 22.00 - 41.00 %    Monocytes 7.40 0.00 - 13.40 %    Eosinophils 0.50 0.00 - 6.90 %    Basophils 0.10 0.00 - 1.80 %    Immature Granulocytes 0.70 0.00 - 0.90 %    Nucleated RBC 0.00 /100 WBC    Neutrophils (Absolute) 13.02 (H) 2.00 - 7.15 K/uL    Lymphs (Absolute) 1.13 1.00 - 4.80 K/uL    Monos (Absolute) 1.14 (H) 0.00 - 0.85 K/uL    Eos (Absolute) 0.07 0.00 - 0.51 K/uL    Baso (Absolute) 0.02 0.00 - 0.12 K/uL    Immature Granulocytes (abs) 0.11 0.00 - 0.11 K/uL    NRBC (Absolute) 0.00 K/uL   COMP METABOLIC PANEL    Collection Time: 02/16/22  3:20 PM   Result Value Ref Range    Sodium 135 135 - 145 mmol/L    Potassium 4.5 3.6 - 5.5 mmol/L    Chloride 98 96 - 112 mmol/L    Co2 24 20 - 33 mmol/L    Anion Gap 13.0 7.0 - 16.0    Glucose 247 (H) 65 - 99 mg/dL    Bun 28 (H) 8 - 22 mg/dL    Creatinine 0.94  0.50 - 1.40 mg/dL    Calcium 9.8 8.4 - 10.2 mg/dL    AST(SGOT) 34 12 - 45 U/L    ALT(SGPT) 38 2 - 50 U/L    Alkaline Phosphatase 170 (H) 30 - 99 U/L    Total Bilirubin 0.8 0.1 - 1.5 mg/dL    Albumin 3.3 3.2 - 4.9 g/dL    Total Protein 7.0 6.0 - 8.2 g/dL    Globulin 3.7 (H) 1.9 - 3.5 g/dL    A-G Ratio 0.9 g/dL   TROPONIN    Collection Time: 02/16/22  3:20 PM   Result Value Ref Range    Troponin T 20 (H) 6 - 19 ng/L   proBrain Natriuretic Peptide, NT    Collection Time: 02/16/22  3:20 PM   Result Value Ref Range    NT-proBNP 681 (H) 0 - 125 pg/mL   ESTIMATED GFR    Collection Time: 02/16/22  3:20 PM   Result Value Ref Range    GFR If African American >60 >60 mL/min/1.73 m 2    GFR If Non African American 58 (A) >60 mL/min/1.73 m 2   BLOOD CULTURE    Collection Time: 02/16/22  3:30 PM    Specimen: Peripheral; Blood   Result Value Ref Range    Significant Indicator NEG     Source BLD     Site PERIPHERAL     Culture Result       No Growth  Note: Blood cultures are incubated for 5 days and  are monitored continuously.Positive blood cultures  are called to the RN and reported as soon as  they are identified.  Blood culture testing and Gram stain, if indicated, are  performed at [facility], [address] [place] [address].  Positive blood cultures are  sent to [facility], [address] [place] [address], for organism identification and  susceptibility testing.     URINALYSIS (UA)    Collection Time: 02/16/22  3:35 PM    Specimen: Urine   Result Value Ref Range    Color Yellow     Character Clear     Specific Gravity 1.010 <1.035    Ph 5.5 5.0 - 8.0    Glucose Negative Negative mg/dL    Ketones Negative Negative mg/dL    Protein Negative Negative mg/dL    Bilirubin Negative Negative    Nitrite Negative Negative    Leukocyte Esterase Negative Negative    Occult Blood Negative Negative    Micro Urine Req see below    COV-2, FLU A/B, AND RSV BY PCR (2-4 HOURS CEPHEID): Collect  NP swab in VTM    Collection Time: 22  3:35 PM    Specimen: Respirate   Result Value Ref Range    Influenza virus A RNA Negative Negative    Influenza virus B, PCR Negative Negative    RSV, PCR Negative Negative    SARS-CoV-2 by PCR NotDetected     SARS-CoV-2 Source Nasal Swab    LACTIC ACID    Collection Time: 22  6:06 PM   Result Value Ref Range    Lactic Acid 1.7 0.5 - 2.0 mmol/L   TROPONIN    Collection Time: 22  6:06 PM   Result Value Ref Range    Troponin T 20 (H) 6 - 19 ng/L   POCT glucose device results    Collection Time: 22  8:27 PM   Result Value Ref Range    Glucose - Accu-Ck 209 (H) 65 - 99 mg/dL   Lactic Acid Every four hours after STAT order    Collection Time: 22  9:38 PM   Result Value Ref Range    Lactic Acid 1.2 0.5 - 2.0 mmol/L   TROPONIN    Collection Time: 22  9:38 PM   Result Value Ref Range    Troponin T 23 (H) 6 - 19 ng/L   EKG (NOW)    Collection Time: 22 10:07 PM   Result Value Ref Range    Report       [facility]    Test Date:  2022  Pt Name:    MICHEAL VALENCIA                  Department: [facility]  MRN:        [id number]                      Room:       3303  Gender:     Female                       Technician: [id number]  :        1945                   Requested By:TAMMIE GRAHAM  Order #:    [id number]                    Reading MD: Darvin Chaudhry MD    Measurements  Intervals                                Axis  Rate:       81                           P:          35  [place]:         175                          QRS:        -66  QRSD:       162                          T:          46  QT:         378  QTc:        439    Interpretive Statements  Sinus rhythm  Atrial premature complex  RBBB and LAFB  Probable left ventricular hypertrophy  Electronically Signed On 2022 16:38:51 PST by Darvin Chaudhry MD     CBC with Differential    Collection Time: 22  1:55 AM   Result Value Ref Range    WBC 16.1 (H) 4.8 - 10.8 K/uL     RBC 2.79 (L) 4.20 - 5.40 M/uL    Hemoglobin 8.8 (L) 12.0 - 16.0 g/dL    Hematocrit 29.1 (L) 37.0 - 47.0 %    .3 (H) 81.4 - 97.8 fL    MCH 31.5 27.0 - 33.0 pg    MCHC 30.2 (L) 33.6 - 35.0 g/dL    RDW 51.5 (H) 35.9 - 50.0 fL    Platelet Count 276 164 - 446 K/uL    MPV 10.7 9.0 - 12.9 fL    Neutrophils-Polys 82.80 (H) 44.00 - 72.00 %    Lymphocytes 9.10 (L) 22.00 - 41.00 %    Monocytes 6.30 0.00 - 13.40 %    Eosinophils 0.50 0.00 - 6.90 %    Basophils 0.20 0.00 - 1.80 %    Immature Granulocytes 1.10 (H) 0.00 - 0.90 %    Nucleated RBC 0.00 /100 WBC    Neutrophils (Absolute) 13.30 (H) 2.00 - 7.15 K/uL    Lymphs (Absolute) 1.47 1.00 - 4.80 K/uL    Monos (Absolute) 1.02 (H) 0.00 - 0.85 K/uL    Eos (Absolute) 0.08 0.00 - 0.51 K/uL    Baso (Absolute) 0.03 0.00 - 0.12 K/uL    Immature Granulocytes (abs) 0.18 (H) 0.00 - 0.11 K/uL    NRBC (Absolute) 0.00 K/uL   Comp Metabolic Panel (CMP)    Collection Time: 02/17/22  1:55 AM   Result Value Ref Range    Sodium 134 (L) 135 - 145 mmol/L    Potassium 4.0 3.6 - 5.5 mmol/L    Chloride 99 96 - 112 mmol/L    Co2 22 20 - 33 mmol/L    Anion Gap 13.0 7.0 - 16.0    Glucose 158 (H) 65 - 99 mg/dL    Bun 27 (H) 8 - 22 mg/dL    Creatinine 0.95 0.50 - 1.40 mg/dL    Calcium 9.1 8.4 - 10.2 mg/dL    AST(SGOT) 41 12 - 45 U/L    ALT(SGPT) 36 2 - 50 U/L    Alkaline Phosphatase 164 (H) 30 - 99 U/L    Total Bilirubin 0.6 0.1 - 1.5 mg/dL    Albumin 2.9 (L) 3.2 - 4.9 g/dL    Total Protein 6.2 6.0 - 8.2 g/dL    Globulin 3.3 1.9 - 3.5 g/dL    A-G Ratio 0.9 g/dL   Magnesium    Collection Time: 02/17/22  1:55 AM   Result Value Ref Range    Magnesium 1.7 1.5 - 2.5 mg/dL   LACTIC ACID    Collection Time: 02/17/22  1:55 AM   Result Value Ref Range    Lactic Acid 1.3 0.5 - 2.0 mmol/L   ESTIMATED GFR    Collection Time: 02/17/22  1:55 AM   Result Value Ref Range    GFR If African American >60 >60 mL/min/1.73 m 2    GFR If Non  57 (A) >60 mL/min/1.73 m 2   POCT glucose device results     Collection Time: 02/17/22  6:25 AM   Result Value Ref Range    Glucose - Accu-Ck 158 (H) 65 - 99 mg/dL   POCT glucose device results    Collection Time: 02/17/22 12:15 PM   Result Value Ref Range    Glucose - Accu-Ck 189 (H) 65 - 99 mg/dL   POCT glucose device results    Collection Time: 02/17/22  6:31 PM   Result Value Ref Range    Glucose - Accu-Ck 284 (H) 65 - 99 mg/dL   POCT glucose device results    Collection Time: 02/17/22  9:16 PM   Result Value Ref Range    Glucose - Accu-Ck 378 (H) 65 - 99 mg/dL   EC-ECHOCARDIOGRAM COMPLETE W/O CONT    Collection Time: 02/17/22  9:30 PM   Result Value Ref Range    Eject.Frac. MOD BP 70.8     Eject.Frac. MOD 4C 68.85     Eject.Frac. MOD 2C 73.9     Left Ventrical Ejection Fraction 70    CBC WITH DIFFERENTIAL    Collection Time: 02/18/22  3:47 AM   Result Value Ref Range    WBC 16.7 (H) 4.8 - 10.8 K/uL    RBC 2.96 (L) 4.20 - 5.40 M/uL    Hemoglobin 9.4 (L) 12.0 - 16.0 g/dL    Hematocrit 30.2 (L) 37.0 - 47.0 %    .0 (H) 81.4 - 97.8 fL    MCH 31.8 27.0 - 33.0 pg    MCHC 31.1 (L) 33.6 - 35.0 g/dL    RDW 48.6 35.9 - 50.0 fL    Platelet Count 303 164 - 446 K/uL    MPV 11.1 9.0 - 12.9 fL    Neutrophils-Polys 89.10 (H) 44.00 - 72.00 %    Lymphocytes 5.90 (L) 22.00 - 41.00 %    Monocytes 3.70 0.00 - 13.40 %    Eosinophils 0.00 0.00 - 6.90 %    Basophils 0.10 0.00 - 1.80 %    Immature Granulocytes 1.20 (H) 0.00 - 0.90 %    Nucleated RBC 0.00 /100 WBC    Neutrophils (Absolute) 14.92 (H) 2.00 - 7.15 K/uL    Lymphs (Absolute) 0.98 (L) 1.00 - 4.80 K/uL    Monos (Absolute) 0.62 0.00 - 0.85 K/uL    Eos (Absolute) 0.00 0.00 - 0.51 K/uL    Baso (Absolute) 0.02 0.00 - 0.12 K/uL    Immature Granulocytes (abs) 0.20 (H) 0.00 - 0.11 K/uL    NRBC (Absolute) 0.00 K/uL   Renal Function Panel    Collection Time: 02/18/22  3:47 AM   Result Value Ref Range    Sodium 133 (L) 135 - 145 mmol/L    Potassium 4.5 3.6 - 5.5 mmol/L    Chloride 99 96 - 112 mmol/L    Co2 22 20 - 33 mmol/L    Glucose 340  (H) 65 - 99 mg/dL    Creatinine 1.20 0.50 - 1.40 mg/dL    Bun 43 (H) 8 - 22 mg/dL    Calcium 9.3 8.4 - 10.2 mg/dL    Phosphorus 4.0 2.5 - 4.5 mg/dL    Albumin 3.0 (L) 3.2 - 4.9 g/dL   ESTIMATED GFR    Collection Time: 02/18/22  3:47 AM   Result Value Ref Range    GFR If  53 (A) >60 mL/min/1.73 m 2    GFR If Non  44 (A) >60 mL/min/1.73 m 2   POCT glucose device results    Collection Time: 02/18/22  6:34 AM   Result Value Ref Range    Glucose - Accu-Ck 293 (H) 65 - 99 mg/dL   POCT glucose device results    Collection Time: 02/18/22 11:11 AM   Result Value Ref Range    Glucose - Accu-Ck 349 (H) 65 - 99 mg/dL       Imaging  CXR on admission reviewed c/w cardiomegaly    Assessment/Plan  #probable COPD  Hx of emphysema  80 pk year hx of smoking  Admitted with cellulitis je appeared to have a copd exacerbation in hospital  Improved  We can do bedside PFT to document obstruction or can be done as an outpt

## 2022-02-18 NOTE — PROGRESS NOTES
Telemetry Shift Summary    Rhythm SR  HR Range 80-90  Ectopy freq PVC & PAC & trigem, rare couplets  Measurements 0.20/0.12/0.36          Normal Values  Rhythm SR  HR Range    Measurements 0.12-0.20 / 0.06-0.10  / 0.30-0.52

## 2022-02-18 NOTE — WOUND TEAM
[facility]  Inpatient Services  Initial Wound and Skin Care Evaluation    Admission Date: 2/16/2022     Last order of IP CONSULT TO WOUND CARE was found on 2/16/2022 from Hospital Encounter on 2/16/2022     HPI, PMH, SH: Reviewed    Past Surgical History:   Procedure Laterality Date   • HYSTERECTOMY ROBOTIC  11/28/2011    Performed by REKHA BYERS at SURGERY [facility] ORS   • LESION EXCISION GENERAL  11/28/2011    Performed by REKHA BYERS at SURGERY [facility] ORS   • CYST EXCISION  8/6/2010    Performed by ANA PAVON at SURGERY SAME DAY [facility] ORS     Social History     Tobacco Use   • Smoking status: Former Smoker     Packs/day: 2.00     Years: 40.00     Pack years: 80.00     Types: Cigarettes   • Smokeless tobacco: Never Used   • Tobacco comment: 45 YEARS  11/2 PPD   Substance Use Topics   • Alcohol use: No     Comment: OCCASSIONAL     Chief Complaint   Patient presents with   • Shortness of Breath     Diagnosis: Sepsis (HCC) [A41.9]  Acute on chronic respiratory failure with hypoxia (HCC) [J96.21]    Unit where seen by Wound Team: 3303/02     WOUND CONSULT/FOLLOW UP RELATED TO:  Left buttock, BLE     WOUND HISTORY:  Patient admitted for worsening edema to BLE. She was admitted for IV abx for her left leg cellulitis. Patient has a history of a decubitus ulcer to left buttock that she developed in June 2021.     WOUND ASSESSMENT/LDA     Wound 06/23/21 Pressure Injury Buttocks Left stage 2 POA; seen 2/18 unstageable POA (Active)   Wound Image   02/18/22 1400   Site Assessment Yellow 02/18/22 1400   Periwound Assessment Red;Denuded 02/18/22 1400   Margins Undefined edges;Unattached edges 02/18/22 1400   Closure Secondary intention 02/18/22 1400   Drainage Amount Small 02/18/22 1400   Drainage Description Tan 02/18/22 1400   Treatments Cleansed;Site care 02/18/22 1400   Wound Cleansing Foam Cleanser/Washcloth 02/18/22 1400   Periwound Protectant Barrier Paste 02/18/22 1400   Dressing  Cleansing/Solutions Not Applicable 02/18/22 1400   Dressing Options Open to Air 02/18/22 1400   Dressing Status Open to Air 02/18/22 1400   Dressing Change/Treatment Frequency Daily, and As Needed 02/18/22 1400   NEXT Dressing Change/Treatment Date 02/19/22 02/18/22 1400   Pressure Injury Stage U 02/18/22 1400   Non-staged Wound Description Not applicable 02/18/22 1400   Wound Length (cm) 1.5 cm 02/18/22 1400   Wound Width (cm) 1 cm 02/18/22 1400   Wound Surface Area (cm^2) 1.5 cm^2 02/18/22 1400   Shape circular 02/18/22 1400   Wound Odor None 02/18/22 1400   Exposed Structures JACKY 02/18/22 1400   WOUND NURSE ONLY - Time Spent with Patient (mins) 30 02/18/22 1400   Wound 02/16/22 (Active)   Wound Image    02/18/22 1400   Site Assessment Dry;Red 02/18/22 1400   Periwound Assessment Fragile;Edema;Red 02/18/22 1400   Margins Undefined edges 02/18/22 1400   Closure Secondary intention 02/18/22 1400   Drainage Amount None 02/18/22 1400   Treatments Cleansed;Site care 02/18/22 1400   Wound Cleansing Foam Cleanser/Washcloth 02/18/22 1400   Periwound Protectant Viscopaste 02/18/22 1400   Dressing Cleansing/Solutions Not Applicable 02/18/22 1400   Dressing Options Viscopaste;Mepilex 02/18/22 1400   Dressing Changed New 02/18/22 1400   Dressing Status Clean;Dry;Intact 02/18/22 1400   Dressing Change/Treatment Frequency Daily, and As Needed 02/18/22 1400   NEXT Dressing Change/Treatment Date 02/19/22 02/18/22 1400   Non-staged Wound Description Partial thickness 02/18/22 1400   Shape diffuse, irregular 02/18/22 1400   Wound Odor None 02/18/22 1400   WOUND NURSE ONLY - Time Spent with Patient (mins) 30 02/18/22 1400          Vascular:    NEL:   No results found.    Lab Values:    Lab Results   Component Value Date/Time    WBC 16.7 (H) 02/18/2022 03:47 AM    RBC 2.96 (L) 02/18/2022 03:47 AM    HEMOGLOBIN 9.4 (L) 02/18/2022 03:47 AM    HEMATOCRIT 30.2 (L) 02/18/2022 03:47 AM    HBA1C 9.0 (H) 06/24/2021 12:08 AM        Culture  Results show:  No results found for this or any previous visit (from the past 720 hour(s)).    Pain Level/Medicated:  Denies pain     INTERVENTIONS BY WOUND TEAM:  Chart and images reviewed. Discussed with bedside RN. All areas of concern (based on picture review, LDA review and discussion with bedside RN) have been thoroughly assessed. Documentation of areas based on significant findings. This RN in to assess patient. Performed standard wound care which includes appropriate positioning, dressing removal and non-selective debridement. Pictures and measurements obtained weekly if/when required.    LEFT BUTTOCK, LLE   Preparation for Dressing removal: NA  Non-selectively Debrided with:  foam cleanser and washcloth  and gauze.  Sharp debridement: NA  Cammie wound: Cleansed with foam cleanser and washcloth, Prepped with barrier paste  Primary Dressing: viscopaste   Secondary (Outer) Dressing: mepilex     Interdisciplinary consultation: Patient, Bedside RN    EVALUATION / RATIONALE FOR TREATMENT:  Most Recent Date:  2/18/22: BLE edematous and with moderate amount of xerosis. Partial thickness skin breakdown greater along left LE and erythema also present to this extremity. Viscopatch zinc impregnated gauze applied to encourage re-epithelialization of superficial 100% viable wound bed, and to provide a non-stick wound contact layer. Unable to place compression dressing at this time due to cellulitis to LE.     Left buttock with a documented stage II pressure injury that patient obtained in June 2021. Upon assessment, small wound to left buttock obscured with tan/yellow slough. Patient is incontinent of urine. Barrier paste applied for now, however recommend viscopaste.      Goals: Steady decrease in wound area and depth weekly.    WOUND TEAM PLAN OF CARE ([X] for frequency of wound follow up,):   Nursing to follow orders written for wound care. Contact wound team if area fails to progress, deteriorates or with any  questions/concerns  Dressing changes by wound team:                   Follow up 3 times weekly:                NPWT change 3 times weekly:     Follow up 1-2 times weekly:      Follow up Bi-Monthly:                   Follow up as needed:   X  Other (explain):     NURSING PLAN OF CARE ORDERS (X):  Dressing changes: See Dressing Care orders: X  Skin care: See Skin Care orders: X  RN Prevention Protocol: X  Rectal tube care: See Rectal Tube Care orders:   Other orders:    RSKIN:   CURRENTLY IN PLACE (X), APPLIED THIS VISIT (A), ORDERED (O):   Q shift Isrrael:  X  Q shift pressure point assessments:  X    Surface/Positioning   Pressure redistribution mattress            Low Airloss          Bariatric foam      Bariatric PAYTON     Waffle cushion        Waffle Overlay      O    Reposition q 2 hours    O  TAPs Turning system     Z Ravin Pillow     Offloading/Redistribution   Sacral Mepilex (Silicone dressing)     Heel Mepilex (Silicone dressing)         Heel float boots (Prevalon boot)             Float Heels off Bed with Pillows           Respiratory   Silicone O2 tubing       X  Gray Foam Ear protectors   X  Cannula fixation Device (Tender )          High flow offloading Clip    Elastic head band offloading device      Anchorfast                                                         Trach with Optifoam split foam             Containment/Moisture Prevention  Rectal tube or BMS    Purwick/Condom Cath        Nye Catheter    Barrier wipes           Barrier paste     A  Antifungal tx      Interdry        Mobilization    Up to chair      X  Ambulate    X  PT/OT      Nutrition   Dietician        Diabetes Education      PO X    TF     TPN     NPO   # days     Other        Anticipated discharge plans:   LTACH:        SNF/Rehab:                  Home Health Care:           Outpatient Wound Center:            Self/Family Care:        Other:                  Vac Discharge Needs:   Not Applicable Pt not on a wound vac:      X  Regular Vac while inpatient, alternative dressing at DC:        Regular Vac in use and continued at DC:            Reg. Vac w/ Skin Sub/Biologic in use. Will need to be changed 2x wkly:      Veraflo Vac while inpatient, ok to transition to Regular Vac on Discharge:           Veraflo Vac while inpatient, will need to remain on Veraflo Vac upon discharge:

## 2022-02-18 NOTE — RESPIRATORY CARE
COPD EDUCATION by COPD CLINICAL EDUCATOR  2/17/2022 at 4:29 PM by Carolina Salvador, RRT     Patient reviewed by COPD education team. Patient does not have a history or diagnosis of COPD, Emphysema is listed in medical hx, NO PFT on file and patient has a very long hx of smoking 80 pk yr hx.  Patient showed no interest in the COPD program. Request Dr. Nicole Forrest to consult.    COPD Screen  COPD Risk Screening  Do you have a history of COPD?: No (Pt has Emphysema in chart no formal hx dx COPD, 80 pk yr hx smoking)    COPD Assessment  COPD Clinical Specialists ONLY  COPD Education Initiated: Yes--Short Intervention,No--Quick Screen (Pt declined education,pt admit for sepsis present on admission -Cellulitis lower extremity  Troponin 23, hx CHF, takes Advair for Asthma, states she had a PFT and will never do it again. Respiraotry said pt unable to do flutter or hold nebulizer)  DME Company: unsure - pt has caregiver how called EMS to being pt in  Physician Name: Anthony Martinez M.D.  Pulmonologist Name: NONE COPD was added 2/17/22 and Q4tx for audible wheezing when pt was laying down, Emphysema in chart but NO formal dx, pt is non adherence to medical tx and has had open wounds since June, asked if pt would participate in bedside PFT pt states never, not sure in pt state she could do a PFT as pt unable to hold nebulizer and needs a mask. Per RT treatments are not helping and pt does not do these at home.  Referrals Initiated:  (Pt declined)  Is this a COPD exacerbation patient?: No    PFT Results    No results found for: PFT NONE and pt refused to do a bedside PFT.    Meds to Beds        MY COPD ACTION PLAN     It is recommended that patients and physicians /healthcare providers complete this action plan together. This plan should be discussed at each physician visit and updated as needed.    The green, yellow and red zones show groups of symptoms of COPD. This list of symptoms is not comprehensive,  "and you may experience other symptoms. In the \"Actions\" column, your healthcare provider has recommended actions for you to take based on your symptoms.    Patient Name: Gabrielle Olmstead   YOB: 1945   Last Updated on:     Green Zone:  I am doing well today Actions   •  Usual activitiy and exercise level •  Take daily medications   •  Usual amounts of cough and phlegm/mucus •  Use oxygen as prescribed   •  Sleep well at night •  Continue regular exercise/diet plan   •  Appetite is good •  At all times avoid cigarette smoke, inhaled irritants     Daily Medications (these medications are taken every day):   Fluticosone/Salmeterol (Advair) 1 Puff Twice daily        Yellow Zone:  I am having a bad day or a COPD flare Actions   •  More breathless than usual •  Continue daily medications   •  I have less energy for my daily activities •  Use quick relief inhaler as ordered   •  Increased or thicker phlegm/mucus •  Use oxygen as prescribed   •  Using quick relief inhaler/nebulizer more often •  Get plenty of rest   •  Swelling of ankles more than usual •  Use pursed lip breathing   •  More coughing than usual •  At all times avoid cigarette smoke, inhaled irritants   •  I feel like I have a \"chest cold\"   •  Poor sleep and my symptoms woke me up   •  My appetite is not good   •  My medicine is not helping    •  Call provider immediately if symptoms don’t improve     Continue daily medications, add rescue medications:               Medications to be used during a flare up, (as Discussed with Provider):              Red Zone:  I need urgent medical care Actions   •  Severe shortness of breath even at rest •  Call 911 or seek medical care immediately   •  Not able to do any activity because of breathing    •  Fever or shaking chills    •  Feeling confused or very drowsy     •  Chest pains    •  Coughing up blood              "

## 2022-02-18 NOTE — CARE PLAN
Problem: Respiratory  Goal: Patient will achieve/maintain optimum respiratory ventilation and gas exchange  Description: Target End Date:  Prior to discharge or change in level of care    Document on Assessment flowsheet    1.  Assess and monitor rate, rhythm, depth and effort of respiration  2.  Breath sounds assessed qshift and/or as needed  3.  Assess O2 saturation, administer/titrate oxygen as ordered  4.  Position patient for maximum ventilatory efficiency  5.  Turn, cough, and deep breath with splinting to improve effectiveness  6.  Collaborate with RT to administer medication/treatments per order  7.  Encourage use of incentive spirometer and encourage patient to cough after use and utilize splinting techniques if applicable  8.  Airway suctioning  9.  Monitor sputum production for changes in color, consistency and frequency  10. Perform frequent oral hygiene  11. Alternate physical activity with rest periods  2/18/2022 0451 by Raquel Martinez, RRT  Outcome: Not Progressing  2/18/2022 0451 by Raquel Martinez, RRT  Outcome: Progressing

## 2022-02-18 NOTE — DISCHARGE PLANNING
[facility] Transitional Care Coordination    Referral from:  Dr Ulloa  Insurance Provider on Facesheet: Medicare  Potential Rehab Diagnosis:  Debility    Chart review indicates patient may need on going medical management and may have therapy needs to possibly meet inpatient rehab facility criteria with the goal of returning to community.    D/C support: Lives alone - will need to verify dc support.     Physiatry consultation pended per protocol.     Last Covid test:  2/16 Not detected    Debility - COPD exacerbation, pending PT eval. Physiatry to consult Monday 2/21, TCC will follow.     Thank you for the referral.

## 2022-02-18 NOTE — PROGRESS NOTES
Telemetry Shift Summary     Rhythm: SR w/ a BBB   Rate: 72-76  Measurements: 0.20/0.12/0.40  Ectopy (reported by Monitor Tech): f PAC,  r PVC     Normal Values  Rhythm: Sinus  HR:   Measurements: 0.12-0.20/0.06-0.10/0.30-0.52

## 2022-02-18 NOTE — PROGRESS NOTES
COVID crisis charting in effect-- Tier 2.    Received bedside report.  Assumed pt care.   Assessment and chart check complete.  Pt is AA0X3, no signs of distress, denies nausea/vomiting  Monitor blood glucose level, insulin given per scaling.  Maintain oxygen at 3 LPM.  Fall precautions in place, treaded socks on pt, bed in low position.   Call light within reach.   Educated pt to call if needing anything.   Hourly rounding.

## 2022-02-18 NOTE — DOCUMENTATION QUERY
[facility]                                                                       Query Response Note      PATIENT:               MICHEAL VALENCIA  ACCT #:                  [id number]  MRN:                     [id number]  :                      1945  ADMIT DATE:       2022 2:37 PM  DISCH DATE:          RESPONDING  PROVIDER #:        [id number]           QUERY TEXT:    SIRS criteria 3/4 on admit and sepsis per ED provider only.  Based upon the above referenced clinical indicators and treatment, please provide a corresponding diagnosis if possible.      NOTE:  If an appropriate response is not listed below, please respond with a new note.      The patient's Clinical Indicators include:  - Findings:  ED provider note:  sepsis without acute organ dysfunction   - Admit labs:  WBC 15.5, lactic acid 1.2, procalcitonin not drawn, immature granulocytes 0.70 - 1.10  - Admit vitals 22 1450:  T 97.9, RR 28, HR 90, /53  - blood cultures negative x 2     - Treatments:  antibiotics, IVF, wound team consultation,  wound and blood cultures     - Risk factors:  cellulitis of L lower extremity, decubitus ulcer    Thank You,  Evy Hernandez RN  Clinical Documentation   [email or web address]  Connect via [email or web address] Messenger  Options provided:   -- Sepsis present on admission, (please document known or suspected etiology)   -- Sepsis developed after admission, (please document known or suspected etiology)   -- Sepsis has been ruled out   -- SIRS that is unrelated to any infection   -- SIRS of non-infectious origin with acute organ dysfunction   -- Unable to determine      Query created by: Evy Hernandez on 2022 3:23 PM    RESPONSE TEXT:    sepsis present on admission - Cellulitis of left lower extremity          Electronically signed by:  LEE BROWN MD 2022 4:05 PM

## 2022-02-18 NOTE — CARE PLAN
Problem: Bronchoconstriction  Goal: Improve in air movement and diminished wheezing  Description: Target End Date:  2 to 3 days    1.  Implement inhaled treatments  2.  Evaluate and manage medication effects  Outcome: Progressing     Patient is receiving Duoneb Qid and Symbicort BID.

## 2022-02-18 NOTE — THERAPY
Physical Therapy   Initial Evaluation     Patient Name: Gabrielle Olmstead  Age:  76 y.o., Sex:  female  Medical Record #: [id number]  Today's Date: 2/18/2022     Precautions  Precautions: Fall Risk  Comments: poor activity tolerance    Assessment  Patient is 76 y.o. female who was recently admitted for COPD exacerbation, SOB, weakness, and diarrhea. Pt presented to PT with impaired balance, impaired gait, weakness, and poor upright activity tolerance. Pt was primarily limited by poor activity tolerance and was only able to tolerate 6 steps and sitting for about 6-7 mins. Pt demonstrated with dec in SpO2 down to 86% with upright mobility however, was able to recover back to 90% with deep breathing and rest breaks. Pt will continue to benefit from skilled PT while in house. With current functional mobility and activity tolerance pt will benefit from post acute therapy prior to d/c home.     Plan    Recommend Physical Therapy 4 times per week until therapy goals are met for the following treatments:  Bed Mobility, Community Re-integration, Equipment, Gait Training, Manual Therapy, Neuro Re-Education / Balance, Self Care/Home Evaluation, Stair Training, Therapeutic Activities, and Therapeutic Exercises    DC Equipment Recommendations: (P) 4-Wheeled Walker  Discharge Recommendations: (P) Recommend post-acute placement for additional physical therapy services prior to discharge home    Objective       02/18/22 0910   Initial Contact Note    Initial Contact Note Order Received and Verified, Physical Therapy Evaluation in Progress with Full Report to Follow.   Precautions   Precautions Fall Risk   Comments poor activity tolerance   Vitals   O2 (LPM) 3   O2 Delivery Device Silicone Nasal Cannula   Pain 0 - 10 Group   Location Leg   Location Orientation Right;Left   Description Aching   Therapist Pain Assessment Prior to Activity;During Activity;Post Activity;Nurse Notified  (c/o minimal pain during mobility; not rated)   Prior  Living Situation   Prior Services None   Housing / Facility 1 Story Apartment / Condo   Steps Into Home 1   Steps In Home 0   Equipment Owned 4-Wheel Walker   Lives with - Patient's Self Care Capacity Alone and Able to Care For Self   Comments states she has friends and neighbors who assist intermittently   Prior Level of Functional Mobility   Bed Mobility Independent   Transfer Status Independent   Ambulation Independent   Distance Ambulation (Feet)   (household distances)   Assistive Devices Used 4-Wheel Walker   Stairs Independent   Comments reports of an IPLOF   Cognition    Cognition / Consciousness WDL   Level of Consciousness Alert   Comments pleasant/cooperative   Passive ROM Lower Body   Passive ROM Lower Body WDL   Active ROM Lower Body    Active ROM Lower Body  WDL   Strength Lower Body   Lower Body Strength  X   Gross Strength Generalized Weakness, Equal Bilaterally   Comments presents with gross strength of 4/5 in B LE   Sensation Lower Body   Lower Extremity Sensation   WDL   Lower Body Muscle Tone   Lower Body Muscle Tone  WDL   Strength Upper Body   Upper Body Strength  WDL   Upper Body Muscle Tone   Upper Body Muscle Tone  WDL   Neurological Concerns   Neurological Concerns No   Coordination Lower Body    Coordination Lower Body  WDL   Balance Assessment   Sitting Balance (Static) Fair +   Sitting Balance (Dynamic) Fair   Standing Balance (Static) Fair -   Standing Balance (Dynamic) Fair -   Weight Shift Sitting Fair   Weight Shift Standing Fair   Comments w/fww   Gait Analysis   Gait Level Of Assist Contact Guard Assist   Assistive Device 4 Wheel Walker   Distance (Feet) 5   # of Times Distance was Traveled 1   Deviation Step To;Decreased Base Of Support   Weight Bearing Status fwb   Comments limited due to fatigue; only able to take a few steps at bedside and requried to be seated due to SOB and dec in SpO2   Bed Mobility    Supine to Sit Moderate Assist   Sit to Supine Minimal Assist   Scooting  Contact Guard Assist   Comments HOB elevated and rails up; pt states she sleeps in her recliner   Functional Mobility   Sit to Stand Contact Guard Assist   Bed, Chair, Wheelchair Transfer Contact Guard Assist   Transfer Method Stand Step   Mobility EOB, sit<>stand, steps at bedside, back to bed   Comments cues for handplacement upon standing   How much difficulty does the patient currently have...   Turning over in bed (including adjusting bedclothes, sheets and blankets)? 2   Sitting down on and standing up from a chair with arms (e.g., wheelchair, bedside commode, etc.) 3   Moving from lying on back to sitting on the side of the bed? 2   How much help from another person does the patient currently need...   Moving to and from a bed to a chair (including a wheelchair)? 3   Need to walk in a hospital room? 3   Climbing 3-5 steps with a railing? 1   6 clicks Mobility Score 14   Activity Tolerance   Sitting in Chair NT   Sitting Edge of Bed 6 mins   Standing 1-2 mins   Comments limited due to SOB and fatigue   Edema / Skin Assessment   Edema / Skin  Not Assessed   Patient / Family Goals    Patient / Family Goal #1 to go home   Short Term Goals    Short Term Goal # 1 pt will go sit<>stand w/4ww w/spv in 6tx   Short Term Goal # 2 pt will transfer bed<>chair w/4ww w/spv in 6tx for safe d/c home   Short Term Goal # 3 pt will ambulate for 150ft w/4ww w/spv in 6tx for safe d/c home   Short Term Goal # 4 pt will go up/down 1 step w/spv in 6tx for safe d/c home   Education Group   Education Provided Role of Physical Therapist   Role of Physical Therapist Patient Response Patient;Acceptance;Explanation;Demonstration;Action Demonstration;Verbal Demonstration   Problem List    Problems Impaired Transfers;Impaired Bed Mobility;Impaired Ambulation;Functional Strength Deficit;Impaired Balance;Decreased Activity Tolerance   Anticipated Discharge Equipment and Recommendations   DC Equipment Recommendations 4-Wheeled Walker    Discharge Recommendations Recommend post-acute placement for additional physical therapy services prior to discharge home   Interdisciplinary Plan of Care Collaboration   IDT Collaboration with  Nursing   Patient Position at End of Therapy In Bed;Call Light within Reach;Tray Table within Reach;Phone within Reach;Bed Alarm On   Collaboration Comments aware of visit and recs   Session Information   Date / Session Number  2/18-1 (1/4, 2/24)

## 2022-02-19 ENCOUNTER — APPOINTMENT (OUTPATIENT)
Dept: RADIOLOGY | Facility: MEDICAL CENTER | Age: 77
DRG: 870 | End: 2022-02-19
Attending: INTERNAL MEDICINE
Payer: MEDICARE

## 2022-02-19 ENCOUNTER — APPOINTMENT (OUTPATIENT)
Dept: RADIOLOGY | Facility: MEDICAL CENTER | Age: 77
DRG: 870 | End: 2022-02-19
Attending: HOSPITALIST
Payer: MEDICARE

## 2022-02-19 ENCOUNTER — APPOINTMENT (OUTPATIENT)
Dept: CARDIOLOGY | Facility: MEDICAL CENTER | Age: 77
DRG: 870 | End: 2022-02-19
Attending: INTERNAL MEDICINE
Payer: MEDICARE

## 2022-02-19 PROBLEM — R78.81 BACTEREMIA: Status: ACTIVE | Noted: 2022-02-19

## 2022-02-19 PROBLEM — I21.4 NSTEMI (NON-ST ELEVATED MYOCARDIAL INFARCTION) (HCC): Status: ACTIVE | Noted: 2022-02-19

## 2022-02-19 LAB
ALBUMIN SERPL BCP-MCNC: 3.1 G/DL (ref 3.2–4.9)
ALBUMIN/GLOB SERPL: 0.9 G/DL
ALP SERPL-CCNC: 237 U/L (ref 30–99)
ALT SERPL-CCNC: 52 U/L (ref 2–50)
ANION GAP SERPL CALC-SCNC: 10 MMOL/L (ref 7–16)
ANION GAP SERPL CALC-SCNC: 12 MMOL/L (ref 7–16)
ANION GAP SERPL CALC-SCNC: 13 MMOL/L (ref 7–16)
APTT PPP: 27.2 SEC (ref 24.7–36)
AST SERPL-CCNC: 34 U/L (ref 12–45)
BASE EXCESS BLDA CALC-SCNC: -2 MMOL/L (ref -4–3)
BASE EXCESS BLDA CALC-SCNC: -2 MMOL/L (ref -4–3)
BASE EXCESS BLDA CALC-SCNC: 0 MMOL/L (ref -4–3)
BILIRUB SERPL-MCNC: 0.3 MG/DL (ref 0.1–1.5)
BODY TEMPERATURE: ABNORMAL DEGREES
BREATHS SETTING VENT: 20
BUN SERPL-MCNC: 54 MG/DL (ref 8–22)
BUN SERPL-MCNC: 55 MG/DL (ref 8–22)
BUN SERPL-MCNC: 58 MG/DL (ref 8–22)
CALCIUM SERPL-MCNC: 10 MG/DL (ref 8.4–10.2)
CALCIUM SERPL-MCNC: 9.6 MG/DL (ref 8.4–10.2)
CALCIUM SERPL-MCNC: 9.8 MG/DL (ref 8.4–10.2)
CENTIMETERS OF WATER PRESSURE ICMH: 8 CMH20
CHLORIDE SERPL-SCNC: 96 MMOL/L (ref 96–112)
CHLORIDE SERPL-SCNC: 97 MMOL/L (ref 96–112)
CHLORIDE SERPL-SCNC: 98 MMOL/L (ref 96–112)
CO2 BLDA-SCNC: 29 MMOL/L (ref 20–33)
CO2 BLDA-SCNC: 30 MMOL/L (ref 20–33)
CO2 BLDA-SCNC: 30 MMOL/L (ref 20–33)
CO2 SERPL-SCNC: 23 MMOL/L (ref 20–33)
CO2 SERPL-SCNC: 24 MMOL/L (ref 20–33)
CO2 SERPL-SCNC: 28 MMOL/L (ref 20–33)
CREAT SERPL-MCNC: 1.25 MG/DL (ref 0.5–1.4)
CREAT SERPL-MCNC: 1.33 MG/DL (ref 0.5–1.4)
CREAT SERPL-MCNC: 1.44 MG/DL (ref 0.5–1.4)
DELSYS IDSYS: ABNORMAL
EKG IMPRESSION: NORMAL
END TIDAL CARBON DIOXIDE IECO2: 48 MMHG
ERYTHROCYTE [DISTWIDTH] IN BLOOD BY AUTOMATED COUNT: 49.6 FL (ref 35.9–50)
FLUAV RNA SPEC QL NAA+PROBE: NEGATIVE
FLUBV RNA SPEC QL NAA+PROBE: NEGATIVE
GLOBULIN SER CALC-MCNC: 3.5 G/DL (ref 1.9–3.5)
GLUCOSE BLD-MCNC: 209 MG/DL (ref 65–99)
GLUCOSE BLD-MCNC: 264 MG/DL (ref 65–99)
GLUCOSE BLD-MCNC: 293 MG/DL (ref 65–99)
GLUCOSE BLD-MCNC: 325 MG/DL (ref 65–99)
GLUCOSE BLD-MCNC: 343 MG/DL (ref 65–99)
GLUCOSE BLD-MCNC: 355 MG/DL (ref 65–99)
GLUCOSE BLD-MCNC: 374 MG/DL (ref 65–99)
GLUCOSE BLD-MCNC: 393 MG/DL (ref 65–99)
GLUCOSE BLD-MCNC: 445 MG/DL (ref 65–99)
GLUCOSE BLD-MCNC: 454 MG/DL (ref 65–99)
GLUCOSE BLD-MCNC: 507 MG/DL (ref 65–99)
GLUCOSE SERPL-MCNC: 329 MG/DL (ref 65–99)
GLUCOSE SERPL-MCNC: 375 MG/DL (ref 65–99)
GLUCOSE SERPL-MCNC: 433 MG/DL (ref 65–99)
HCO3 BLDA-SCNC: 27.3 MMOL/L (ref 17–25)
HCO3 BLDA-SCNC: 27.9 MMOL/L (ref 17–25)
HCO3 BLDA-SCNC: 28.5 MMOL/L (ref 17–25)
HCT VFR BLD AUTO: 31.7 % (ref 37–47)
HGB BLD-MCNC: 9.5 G/DL (ref 12–16)
HOROWITZ INDEX BLDA+IHG-RTO: 178 MM[HG]
HOROWITZ INDEX BLDA+IHG-RTO: 329 MM[HG]
HOROWITZ INDEX BLDA+IHG-RTO: 90 MM[HG]
INR PPP: 1.2 (ref 0.87–1.13)
LACTATE BLD-SCNC: 1.4 MMOL/L (ref 0.5–2)
LACTATE BLD-SCNC: 1.8 MMOL/L (ref 0.5–2)
LACTATE BLD-SCNC: 2.1 MMOL/L (ref 0.5–2)
LACTATE BLD-SCNC: 2.1 MMOL/L (ref 0.5–2)
LACTATE BLD-SCNC: 2.2 MMOL/L (ref 0.5–2)
LACTATE BLD-SCNC: 3.8 MMOL/L (ref 0.5–2)
LV EJECT FRACT  99904: 40
LV EJECT FRACT MOD 2C 99903: 24.5
LV EJECT FRACT MOD 4C 99902: 53.29
LV EJECT FRACT MOD BP 99901: 40.03
MCH RBC QN AUTO: 31.1 PG (ref 27–33)
MCHC RBC AUTO-ENTMCNC: 30 G/DL (ref 33.6–35)
MCV RBC AUTO: 103.9 FL (ref 81.4–97.8)
MODE IMODE: ABNORMAL
NT-PROBNP SERPL IA-MCNC: 3459 PG/ML (ref 0–125)
O2/TOTAL GAS SETTING VFR VENT: 50 %
O2/TOTAL GAS SETTING VFR VENT: 70 %
O2/TOTAL GAS SETTING VFR VENT: 90 %
PCO2 BLDA: 65.3 MMHG (ref 26–37)
PCO2 BLDA: 69.1 MMHG (ref 26–37)
PCO2 BLDA: 71.7 MMHG (ref 26–37)
PEEP END EXPIRATORY PRESSURE IPEEP: 8 CMH20
PH BLDA: 7.2 [PH] (ref 7.4–7.5)
PH BLDA: 7.21 [PH] (ref 7.4–7.5)
PH BLDA: 7.25 [PH] (ref 7.4–7.5)
PH TEMP ADJ BLDA: 7.24 [PH] (ref 7.4–7.5)
PLATELET # BLD AUTO: 370 K/UL (ref 164–446)
PMV BLD AUTO: 10.9 FL (ref 9–12.9)
PO2 BLDA: 296 MMHG (ref 64–87)
PO2 BLDA: 63 MMHG (ref 64–87)
PO2 BLDA: 89 MMHG (ref 64–87)
PO2 TEMP ADJ BLDA: 91 MMHG (ref 64–87)
POTASSIUM SERPL-SCNC: 4.1 MMOL/L (ref 3.6–5.5)
POTASSIUM SERPL-SCNC: 4.2 MMOL/L (ref 3.6–5.5)
POTASSIUM SERPL-SCNC: 5 MMOL/L (ref 3.6–5.5)
PROT SERPL-MCNC: 6.6 G/DL (ref 6–8.2)
PROTHROMBIN TIME: 14.3 SEC (ref 12–14.6)
RBC # BLD AUTO: 3.05 M/UL (ref 4.2–5.4)
RSV RNA SPEC QL NAA+PROBE: NEGATIVE
SAO2 % BLDA: 100 % (ref 93–99)
SAO2 % BLDA: 85 % (ref 93–99)
SAO2 % BLDA: 95 % (ref 93–99)
SARS-COV-2 RNA RESP QL NAA+PROBE: NOTDETECTED
SODIUM SERPL-SCNC: 133 MMOL/L (ref 135–145)
SODIUM SERPL-SCNC: 133 MMOL/L (ref 135–145)
SODIUM SERPL-SCNC: 135 MMOL/L (ref 135–145)
SPECIMEN DRAWN FROM PATIENT: ABNORMAL
SPECIMEN SOURCE: NORMAL
TIDAL VOLUME IVT: 320 ML
TROPONIN T SERPL-MCNC: 131 NG/L (ref 6–19)
TROPONIN T SERPL-MCNC: 220 NG/L (ref 6–19)
TROPONIN T SERPL-MCNC: 351 NG/L (ref 6–19)
TROPONIN T SERPL-MCNC: 355 NG/L (ref 6–19)
UFH PPP CHRO-ACNC: 0.11 IU/ML
UFH PPP CHRO-ACNC: 0.45 IU/ML
UFH PPP CHRO-ACNC: 0.57 IU/ML
WBC # BLD AUTO: 16.7 K/UL (ref 4.8–10.8)

## 2022-02-19 PROCEDURE — 700111 HCHG RX REV CODE 636 W/ 250 OVERRIDE (IP): Performed by: INTERNAL MEDICINE

## 2022-02-19 PROCEDURE — 770022 HCHG ROOM/CARE - ICU (200)

## 2022-02-19 PROCEDURE — 5A1955Z RESPIRATORY VENTILATION, GREATER THAN 96 CONSECUTIVE HOURS: ICD-10-PCS | Performed by: INTERNAL MEDICINE

## 2022-02-19 PROCEDURE — 71045 X-RAY EXAM CHEST 1 VIEW: CPT

## 2022-02-19 PROCEDURE — 36600 WITHDRAWAL OF ARTERIAL BLOOD: CPT

## 2022-02-19 PROCEDURE — 84484 ASSAY OF TROPONIN QUANT: CPT | Mod: 91

## 2022-02-19 PROCEDURE — 85027 COMPLETE CBC AUTOMATED: CPT

## 2022-02-19 PROCEDURE — 94799 UNLISTED PULMONARY SVC/PX: CPT

## 2022-02-19 PROCEDURE — 93010 ELECTROCARDIOGRAM REPORT: CPT | Performed by: INTERNAL MEDICINE

## 2022-02-19 PROCEDURE — 0BH17EZ INSERTION OF ENDOTRACHEAL AIRWAY INTO TRACHEA, VIA NATURAL OR ARTIFICIAL OPENING: ICD-10-PCS | Performed by: INTERNAL MEDICINE

## 2022-02-19 PROCEDURE — 94002 VENT MGMT INPAT INIT DAY: CPT

## 2022-02-19 PROCEDURE — 82962 GLUCOSE BLOOD TEST: CPT | Mod: 91

## 2022-02-19 PROCEDURE — 80048 BASIC METABOLIC PNL TOTAL CA: CPT | Mod: 91

## 2022-02-19 PROCEDURE — 80053 COMPREHEN METABOLIC PANEL: CPT

## 2022-02-19 PROCEDURE — 99292 CRITICAL CARE ADDL 30 MIN: CPT | Mod: 25 | Performed by: INTERNAL MEDICINE

## 2022-02-19 PROCEDURE — 700101 HCHG RX REV CODE 250: Performed by: INTERNAL MEDICINE

## 2022-02-19 PROCEDURE — A9270 NON-COVERED ITEM OR SERVICE: HCPCS | Performed by: INTERNAL MEDICINE

## 2022-02-19 PROCEDURE — C9803 HOPD COVID-19 SPEC COLLECT: HCPCS | Performed by: HOSPITALIST

## 2022-02-19 PROCEDURE — A9270 NON-COVERED ITEM OR SERVICE: HCPCS | Performed by: HOSPITALIST

## 2022-02-19 PROCEDURE — 94760 N-INVAS EAR/PLS OXIMETRY 1: CPT

## 2022-02-19 PROCEDURE — 700111 HCHG RX REV CODE 636 W/ 250 OVERRIDE (IP): Performed by: HOSPITALIST

## 2022-02-19 PROCEDURE — 0241U HCHG SARS-COV-2 COVID-19 NFCT DS RESP RNA 4 TRGT MIC: CPT

## 2022-02-19 PROCEDURE — 94660 CPAP INITIATION&MGMT: CPT

## 2022-02-19 PROCEDURE — 93308 TTE F-UP OR LMTD: CPT

## 2022-02-19 PROCEDURE — 700105 HCHG RX REV CODE 258: Performed by: INTERNAL MEDICINE

## 2022-02-19 PROCEDURE — 85730 THROMBOPLASTIN TIME PARTIAL: CPT

## 2022-02-19 PROCEDURE — 700102 HCHG RX REV CODE 250 W/ 637 OVERRIDE(OP): Performed by: HOSPITALIST

## 2022-02-19 PROCEDURE — 700111 HCHG RX REV CODE 636 W/ 250 OVERRIDE (IP)

## 2022-02-19 PROCEDURE — 83880 ASSAY OF NATRIURETIC PEPTIDE: CPT

## 2022-02-19 PROCEDURE — 83605 ASSAY OF LACTIC ACID: CPT | Mod: 91

## 2022-02-19 PROCEDURE — 74018 RADEX ABDOMEN 1 VIEW: CPT

## 2022-02-19 PROCEDURE — 700101 HCHG RX REV CODE 250

## 2022-02-19 PROCEDURE — 93005 ELECTROCARDIOGRAM TRACING: CPT | Performed by: INTERNAL MEDICINE

## 2022-02-19 PROCEDURE — 85610 PROTHROMBIN TIME: CPT

## 2022-02-19 PROCEDURE — 31500 INSERT EMERGENCY AIRWAY: CPT | Performed by: INTERNAL MEDICINE

## 2022-02-19 PROCEDURE — 700102 HCHG RX REV CODE 250 W/ 637 OVERRIDE(OP): Performed by: INTERNAL MEDICINE

## 2022-02-19 PROCEDURE — 5A09357 ASSISTANCE WITH RESPIRATORY VENTILATION, LESS THAN 24 CONSECUTIVE HOURS, CONTINUOUS POSITIVE AIRWAY PRESSURE: ICD-10-PCS | Performed by: INTERNAL MEDICINE

## 2022-02-19 PROCEDURE — 99291 CRITICAL CARE FIRST HOUR: CPT | Mod: 25 | Performed by: INTERNAL MEDICINE

## 2022-02-19 PROCEDURE — 93308 TTE F-UP OR LMTD: CPT | Mod: 26 | Performed by: INTERNAL MEDICINE

## 2022-02-19 PROCEDURE — 94640 AIRWAY INHALATION TREATMENT: CPT

## 2022-02-19 PROCEDURE — 82803 BLOOD GASES ANY COMBINATION: CPT

## 2022-02-19 PROCEDURE — 85520 HEPARIN ASSAY: CPT | Mod: 91

## 2022-02-19 RX ORDER — POLYETHYLENE GLYCOL 3350 17 G/17G
1 POWDER, FOR SOLUTION ORAL
Status: DISCONTINUED | OUTPATIENT
Start: 2022-02-19 | End: 2022-03-02 | Stop reason: HOSPADM

## 2022-02-19 RX ORDER — HEPARIN SODIUM 5000 [USP'U]/100ML
0-30 INJECTION, SOLUTION INTRAVENOUS CONTINUOUS
Status: DISCONTINUED | OUTPATIENT
Start: 2022-02-19 | End: 2022-02-23

## 2022-02-19 RX ORDER — FAMOTIDINE 20 MG/1
20 TABLET, FILM COATED ORAL DAILY
Status: DISCONTINUED | OUTPATIENT
Start: 2022-02-19 | End: 2022-02-27

## 2022-02-19 RX ORDER — BISACODYL 10 MG
10 SUPPOSITORY, RECTAL RECTAL
Status: DISCONTINUED | OUTPATIENT
Start: 2022-02-19 | End: 2022-03-02 | Stop reason: HOSPADM

## 2022-02-19 RX ORDER — NOREPINEPHRINE BITARTRATE 0.03 MG/ML
INJECTION, SOLUTION INTRAVENOUS
Status: COMPLETED
Start: 2022-02-19 | End: 2022-02-19

## 2022-02-19 RX ORDER — ASPIRIN 325 MG
325 TABLET ORAL DAILY
Status: DISCONTINUED | OUTPATIENT
Start: 2022-02-19 | End: 2022-02-26

## 2022-02-19 RX ORDER — FUROSEMIDE 10 MG/ML
40 INJECTION INTRAMUSCULAR; INTRAVENOUS
Status: DISCONTINUED | OUTPATIENT
Start: 2022-02-19 | End: 2022-02-21

## 2022-02-19 RX ORDER — MIDAZOLAM HYDROCHLORIDE 1 MG/ML
5 INJECTION INTRAMUSCULAR; INTRAVENOUS ONCE
Status: COMPLETED | OUTPATIENT
Start: 2022-02-19 | End: 2022-02-19

## 2022-02-19 RX ORDER — M-VIT,TX,IRON,MINS/CALC/FOLIC 27MG-0.4MG
1 TABLET ORAL DAILY
Status: DISCONTINUED | OUTPATIENT
Start: 2022-02-20 | End: 2022-03-02 | Stop reason: HOSPADM

## 2022-02-19 RX ORDER — LORAZEPAM 2 MG/ML
INJECTION INTRAMUSCULAR
Status: COMPLETED
Start: 2022-02-19 | End: 2022-02-19

## 2022-02-19 RX ORDER — PROCHLORPERAZINE EDISYLATE 5 MG/ML
10 INJECTION INTRAMUSCULAR; INTRAVENOUS EVERY 6 HOURS PRN
Status: DISCONTINUED | OUTPATIENT
Start: 2022-02-19 | End: 2022-03-02 | Stop reason: HOSPADM

## 2022-02-19 RX ORDER — BUMETANIDE 1 MG/1
1 TABLET ORAL
Status: DISCONTINUED | OUTPATIENT
Start: 2022-02-20 | End: 2022-02-25

## 2022-02-19 RX ORDER — ATORVASTATIN CALCIUM 40 MG/1
40 TABLET, FILM COATED ORAL DAILY
Status: DISCONTINUED | OUTPATIENT
Start: 2022-02-20 | End: 2022-03-02 | Stop reason: HOSPADM

## 2022-02-19 RX ORDER — LORAZEPAM 2 MG/ML
1 INJECTION INTRAMUSCULAR ONCE
Status: COMPLETED | OUTPATIENT
Start: 2022-02-19 | End: 2022-02-19

## 2022-02-19 RX ORDER — LEVOTHYROXINE SODIUM 0.03 MG/1
25 TABLET ORAL DAILY
Status: DISCONTINUED | OUTPATIENT
Start: 2022-02-20 | End: 2022-03-02 | Stop reason: HOSPADM

## 2022-02-19 RX ORDER — AMOXICILLIN 250 MG
2 CAPSULE ORAL 2 TIMES DAILY
Status: DISCONTINUED | OUTPATIENT
Start: 2022-02-19 | End: 2022-03-02 | Stop reason: HOSPADM

## 2022-02-19 RX ORDER — ETOMIDATE 2 MG/ML
40 INJECTION INTRAVENOUS ONCE
Status: COMPLETED | OUTPATIENT
Start: 2022-02-19 | End: 2022-02-19

## 2022-02-19 RX ORDER — HEPARIN SODIUM 1000 [USP'U]/ML
40 INJECTION, SOLUTION INTRAVENOUS; SUBCUTANEOUS PRN
Status: DISCONTINUED | OUTPATIENT
Start: 2022-02-19 | End: 2022-02-23

## 2022-02-19 RX ORDER — NOREPINEPHRINE BITARTRATE 0.03 MG/ML
0-30 INJECTION, SOLUTION INTRAVENOUS CONTINUOUS
Status: DISCONTINUED | OUTPATIENT
Start: 2022-02-19 | End: 2022-02-23

## 2022-02-19 RX ORDER — DEXTROSE MONOHYDRATE 25 G/50ML
25-50 INJECTION, SOLUTION INTRAVENOUS PRN
Status: DISCONTINUED | OUTPATIENT
Start: 2022-02-19 | End: 2022-02-20

## 2022-02-19 RX ORDER — ASCORBIC ACID 500 MG
500 TABLET ORAL 2 TIMES DAILY
Status: DISCONTINUED | OUTPATIENT
Start: 2022-02-19 | End: 2022-03-02 | Stop reason: HOSPADM

## 2022-02-19 RX ADMIN — Medication 25 MCG/HR: at 08:35

## 2022-02-19 RX ADMIN — BUMETANIDE 1 MG: 1 TABLET ORAL at 05:44

## 2022-02-19 RX ADMIN — SODIUM CHLORIDE 4.5 UNITS/HR: 9 INJECTION, SOLUTION INTRAVENOUS at 09:40

## 2022-02-19 RX ADMIN — PREDNISONE 50 MG: 50 TABLET ORAL at 05:44

## 2022-02-19 RX ADMIN — IPRATROPIUM BROMIDE AND ALBUTEROL SULFATE 3 ML: .5; 2.5 SOLUTION RESPIRATORY (INHALATION) at 06:42

## 2022-02-19 RX ADMIN — AZITHROMYCIN MONOHYDRATE 500 MG: 250 TABLET ORAL at 05:44

## 2022-02-19 RX ADMIN — OXYCODONE HYDROCHLORIDE AND ACETAMINOPHEN 500 MG: 500 TABLET ORAL at 18:06

## 2022-02-19 RX ADMIN — METRONIDAZOLE 500 MG: 500 INJECTION, SOLUTION INTRAVENOUS at 21:50

## 2022-02-19 RX ADMIN — PROCHLORPERAZINE EDISYLATE 10 MG: 5 INJECTION INTRAMUSCULAR; INTRAVENOUS at 00:13

## 2022-02-19 RX ADMIN — LEVOTHYROXINE SODIUM 25 MCG: 0.03 TABLET ORAL at 05:43

## 2022-02-19 RX ADMIN — INSULIN HUMAN 5 UNITS: 100 INJECTION, SOLUTION PARENTERAL at 09:09

## 2022-02-19 RX ADMIN — HEPARIN SODIUM 18 UNITS/KG/HR: 5000 INJECTION, SOLUTION INTRAVENOUS at 08:53

## 2022-02-19 RX ADMIN — ETOMIDATE 40 MG: 2 INJECTION INTRAVENOUS at 08:30

## 2022-02-19 RX ADMIN — AMOXICILLIN AND CLAVULANATE POTASSIUM 1 TABLET: 875; 125 TABLET, FILM COATED ORAL at 05:44

## 2022-02-19 RX ADMIN — NOREPINEPHRINE BITARTRATE 15 MCG/MIN: 0.03 INJECTION, SOLUTION INTRAVENOUS at 12:55

## 2022-02-19 RX ADMIN — FUROSEMIDE 40 MG: 10 INJECTION, SOLUTION INTRAVENOUS at 07:08

## 2022-02-19 RX ADMIN — FAMOTIDINE 20 MG: 10 INJECTION INTRAVENOUS at 09:08

## 2022-02-19 RX ADMIN — ASPIRIN 325 MG ORAL TABLET 325 MG: 325 PILL ORAL at 18:04

## 2022-02-19 RX ADMIN — MIDAZOLAM HYDROCHLORIDE 2 MG: 1 INJECTION, SOLUTION INTRAMUSCULAR; INTRAVENOUS at 08:31

## 2022-02-19 RX ADMIN — DEXMEDETOMIDINE HYDROCHLORIDE 0.4 MCG/KG/HR: 100 INJECTION, SOLUTION, CONCENTRATE INTRAVENOUS at 19:32

## 2022-02-19 RX ADMIN — ATORVASTATIN CALCIUM 40 MG: 40 TABLET, FILM COATED ORAL at 05:44

## 2022-02-19 RX ADMIN — DEXMEDETOMIDINE HYDROCHLORIDE 0.2 MCG/KG/HR: 100 INJECTION, SOLUTION, CONCENTRATE INTRAVENOUS at 08:58

## 2022-02-19 RX ADMIN — LORAZEPAM 1 MG: 2 INJECTION INTRAMUSCULAR; INTRAVENOUS at 07:45

## 2022-02-19 RX ADMIN — AMLODIPINE BESYLATE 5 MG: 5 TABLET ORAL at 05:43

## 2022-02-19 RX ADMIN — SODIUM CHLORIDE 9 UNITS/HR: 9 INJECTION, SOLUTION INTRAVENOUS at 19:32

## 2022-02-19 RX ADMIN — NOREPINEPHRINE BITARTRATE 15 MCG/MIN: 1 INJECTION INTRAVENOUS at 12:55

## 2022-02-19 RX ADMIN — METRONIDAZOLE 500 MG: 500 INJECTION, SOLUTION INTRAVENOUS at 10:56

## 2022-02-19 RX ADMIN — ENOXAPARIN SODIUM 40 MG: 40 INJECTION SUBCUTANEOUS at 05:43

## 2022-02-19 RX ADMIN — LORAZEPAM 1 MG: 2 INJECTION INTRAMUSCULAR at 07:45

## 2022-02-19 RX ADMIN — FUROSEMIDE 40 MG: 10 INJECTION, SOLUTION INTRAVENOUS at 17:21

## 2022-02-19 RX ADMIN — INSULIN GLARGINE 10 UNITS: 100 INJECTION, SOLUTION SUBCUTANEOUS at 05:53

## 2022-02-19 RX ADMIN — NYSTATIN: 100000 POWDER TOPICAL at 17:22

## 2022-02-19 RX ADMIN — SENNOSIDES AND DOCUSATE SODIUM 2 TABLET: 50; 8.6 TABLET ORAL at 17:24

## 2022-02-19 RX ADMIN — SERTRALINE HYDROCHLORIDE 50 MG: 50 TABLET ORAL at 05:44

## 2022-02-19 ASSESSMENT — FIBROSIS 4 INDEX
FIB4 SCORE: 1.4
FIB4 SCORE: 1.4

## 2022-02-19 ASSESSMENT — PAIN DESCRIPTION - PAIN TYPE
TYPE: ACUTE PAIN
TYPE: ACUTE PAIN

## 2022-02-19 ASSESSMENT — PULMONARY FUNCTION TESTS: EPAP_CMH2O: 8

## 2022-02-19 NOTE — ASSESSMENT & PLAN NOTE
- may be supply and demand  - continue to trend troponin  - d/w cardiology see body of text on 2/19  - Heparin, ASA and lipitor  - Hyperdynamic last ECHO repeat 2/19 showed EF now 40%  - IVC less than a cm with SRIRAM suggesting intravascular volume depletion -- hold diuretics

## 2022-02-19 NOTE — PROGRESS NOTES
OVERNIGHT HOSPITALIST:    AVERY response called at 6:30 am this am. Patient with worsening SOB and increasing O2 demand, from 4 L to 15 L.Came to the room to evaluate her. She is in acute respiratory distress, using accessory muscles to breath. Has end expiratory wheezing, but mostly central rales. Currently receiving breathing treatment.    -Transfer to the ICU for BiPAP trial  -40 mg IV Lasix ordered  -ABG, BNP, Troponin  -Portable chest Xray    -Low threshold for intubation.

## 2022-02-19 NOTE — PROGRESS NOTES
Telemetry Shift Summary     Rhythm: SR with a BBB   Rate: 77-90  Measurements: 0.20/0.16/0.38  Ectopy (reported by Monitor Tech): f PAC, r-o PVC      Normal Values  Rhythm: Sinus  HR:   Measurements: 0.12-0.20/0.06-0.10/0.30-0.52

## 2022-02-19 NOTE — FLOWSHEET NOTE
Patient received in ICU at 0700 on oxy mask 15 L. O2 sat 92%. RT at bedside-transitioned patient to bipap. Dr. Rivera en route to unit. Patient placed on bedside monitor- sinus tach with BBB.

## 2022-02-19 NOTE — ASSESSMENT & PLAN NOTE
COPD (no PFTs or CT chest), SONIA, OHS and heart failure  Hypercapnea was predominant feature that resulted in intubation 2/19  ? COPD and heart failure and NSTEMI combination  Not clear  Not wheezing  CXR suggests heart failure  Driving pressure not high but asynchronous with ventilator and so extending expiration time closer to 3 to allow for better asynchrony

## 2022-02-19 NOTE — DIETARY
Nutrition Services: Update   Day 2 of admit.  Gabrielle Olmstead is a 76 y.o. female with admitting DX of Sepsis, Acute on chronic respiratory failure with hypoxia.    Pt is currently NPO: Rapid response this a.m. w/ pt moved to ICU, emergently intubated, cortrak placed. Levo @ 10, bumex, lasix, precedex, insulin gtt. Pt w/ unstageable wound to buttocks, partial thickness to legs. Glu 433, BUN 55, GFR 39, alb 3.1, Na 133, 24-hr POC glu 343-507.    Recommendations/Plan:  1. Recommend daily vitamin therapy to support wound healing: Multivitamin with minerals and 500 mg Vitamin C BID. Communicated recommendations to MD via Voalte message.  2.  RD following for nutrition POC.    RD following.     Physical Therapy Daily Treatment      Visit Count: 10  (visits) to 4/11/17 (date)  Progress Note: due 3/16/17 or 16th visit  Authorization Status: approved  Next Referring Provider Visit: not approved    Initial Evaluation Date: 1/17/2017  Referred by: Dr. Cesar Sanchez MD  Medical Diagnosis (from order): 724.2, 338.29 (ICD-9-CM) - M54.5, G89.29 (ICD-10-CM) - Chronic midline low back pain without sciatica  Treatment Diagnosis: Back Symptoms with Pain, Impaired Posture, Impaired Joint Mobility, Impaired Range of Motion, Impaired Motor Function/Muscle Performance, Radiating Pain, Impaired Gait/Locomotion Deficits, Impaired Mobility, Impaired Balance and Impaired Motor Function and Sensory Integration  Primary Insurance: MEDICARE  Secondary Insurance: [employer]  Requirements: Medicare guidelines, combined physical and speech therapy cap of $1980/$3700 per calendar year, $0 used at the time of evaluation, receiving speech and physical therapy: no     Date of Onset: acute exacerbation; pt has had back pain on and off for 20+ years  Diagnosis Precautions: none  Relevant co-morbidities and medications: degenerative disc disease  Relevant Tests: Relevant diagnostic tests: plain film radiograph      Medical/surgical history, prior to admission medications and relevant tests have been reviewed.    SUBJECTIVE   Patient reports no significant changes after dry needling last session.    Current Pain: 5-6/10.    Functional Change: Has been able to do a little more without as much pain.  Has not had any flare up since Sunday    OBJECTIVE   None today    Treatment   Modalities:  Patient has been made aware of potential contraindications and possible risks associated with the use of the following modalities and has agreed.  Moist Heat (30262):  Location: lumbar spine; Position: prone; Duration: 10 minutes Temperature: 160° F  Un-attended Electrical Stimulation (02949/): IFC   Location: lumbar spine;  Position: prone; Duration: 10 minutes; 2.0 x 3.5 in (5.0 x 9.0cm) Rectangle  electrodes placed upper and lower lumbar paraspinals bilaterally.   Parameters: Pulse rate:  Hz; Intensity: patient tolerance    Modality treatment resulted in decreased pain.  Patient reports no adverse reaction to treatment    Therapeutic Exercise:  Sci fit recumbent bike level 4.0, 7 minutes   Abdominal braicng 1 x 10  Abdominal bracing with marches 1 x 10  Dead bug 1 x 10   Bridges 1 x 10     HELD   Treadmill walking 2.5 MPH, 7 minutes, 1% incline   Hamstring stretch at stair 20 seconds x 2 bilateral  psoas stretch at stair 20 seconds x 2 bilateral  Standing psoas stretch 15 seconds  Abdominal braicng with stabilizer 1 x 10  Abdominal bracing with marches with stabilizer alternating- cues for breathing, and proper correnction  4 point leg extension 1 x 8 bilateral- cues for keeping abdominals contracted and back flat  Seated on Swiss ball    -marches 1 x 10 bilateral   - kick outs 1 x 10 bilateral   - ball toss (5lb) x 20    Current Home Program (not performed this date except as noted above):   Abdominal bracing  Lumbar stretching knees to chest  Hamstring stretch    ASSESSMENT   Patient tolerated session well today.  Mild reduction in pain levels with IFC.  May benefit from TENS unit for home.  Continue to develop home program and consider referral to pain management.     Pain after treatment: 2/10  Result of above outlined education: Needs reinforcement    Goals:  1. See most recent progress note    PLAN   Prepare for discharge with home program.  Provide info on TENS units and pain management.      THERAPY DAILY BILLING   Primary Insurance: MEDICARE  Secondary Insurance: [employer]    Evaluation Procedures:  No evaluation codes were used on this date of service    Timed Procedures:  Therapeutic Exercise, 20 minutes     Untimed Procedures:  Electrostimulation Unattended (Medicare/Medicaid)    Total  Treatment Time: 30 minutes    Physician Signature on file.

## 2022-02-19 NOTE — ASSESSMENT & PLAN NOTE
- cellulitis and decub ulcer as well as diarrhea  - blood cx + for gram + cocci in anaerobic bottle  - decub ulcer unstageable and cultures sent 2/20  - wound care consulted

## 2022-02-19 NOTE — THERAPY
Occupational Therapy   Initial Evaluation     Patient Name: Gabrielle Olmstead  Age:  76 y.o., Sex:  female  Medical Record #: [id number]  Today's Date: 2/18/2022     Precautions: (P) Fall Risk  Comments: (P) desats with activity    Assessment  Patient is 76 y.o. female admitted with dyspnea, diarrhea. Hx of COPD, HTN, diabetes, home O2@2L. A&Ox4,pleasant and cooperative. O2@3.5L NC. Difficulty holding O2 sats >88%. ModA with Sup to sit. EOB ADL's with CGA/Nicholas, LB dressing with MaxA. STS with Nicholas,4ww. Side-steps x3 with CGA. Fatigues quickly. BTB with Nicholas. Pt lives alone, has meals on wheels and caregiver visits 1x/wk. Friends assist when needed. Pt had been indep with ADL's and is not at her baseline; OT will follow while in house.          Plan  Recommend Occupational Therapy 3 times per week until therapy goals are met for the following treatments:  Neuro Re-Education / Balance, Self Care/Activities of Daily Living, and Therapeutic Activities.    DC Equipment Recommendations: Unable to determine at this time  Discharge Recommendations: Recommend post-acute placement for additional occupational therapy services prior to discharge home      02/18/22 1442   Prior Living Situation   Prior Services Meals on Wheels   Housing / Facility 1 Story Apartment / Condo   Steps Into Home 1   Steps In Home 0   Bathroom Set up Walk In Shower   Equipment Owned 4-Wheel Walker;Tub / Shower Seat;Oxygen   Lives with - Patient's Self Care Capacity Alone and Able to Care For Self   Comments Pt has caregiver visit 1x/week to help with IADL's. Friends also help when needed. Sleeps in her recliner chair. Limited household mobility.   Prior Level of ADL Function   Self Feeding Independent   Grooming / Hygiene Independent   Bathing Requires Assist   Dressing Independent   Toileting Independent   Prior Level of IADL Function   Medication Management Independent   Laundry Requires Assist   Kitchen Mobility Independent   Finances Unable To Determine  At This Time   Home Management Independent   Shopping Requires Assist   Prior Level Of Mobility Independent With Device in Home   Driving / Transportation Unable To Determine At This Time   Occupation (Pre-Hospital Vocational) Retired Due To Age   Leisure Interests Hobbies   Precautions   Precautions Fall Risk   Comments desats with activity   Vitals   O2 (LPM) 3.5   O2 Delivery Device Silicone Nasal Cannula   Pain 0 - 10 Group   Location Generalized   Therapist Pain Assessment Post Activity Pain Same as Prior to Activity;Nurse Notified   Cognition    Cognition / Consciousness WDL   Level of Consciousness Alert   Passive ROM Upper Body   Passive ROM Upper Body WDL   Active ROM Upper Body   Active ROM Upper Body  X   Dominant Hand Right   Comments limited to 90 B shoulder flex   Strength Upper Body   Upper Body Strength  X   Comments generalized weakness   Sensation Upper Body   Upper Extremity Sensation  Not Tested   Upper Body Muscle Tone   Upper Body Muscle Tone  WDL   Coordination Upper Body   Coordination WDL   Balance Assessment   Sitting Balance (Static) Good   Sitting Balance (Dynamic) Fair   Standing Balance (Static) Fair -   Standing Balance (Dynamic) Fair -   Weight Shift Sitting Fair   Weight Shift Standing Fair   Bed Mobility    Supine to Sit Moderate Assist   Sit to Supine Minimal Assist   Scooting Contact Guard Assist   ADL Assessment   Grooming Contact Guard Assist;Seated   Upper Body Dressing Minimal Assist   Lower Body Dressing Maximal Assist   Toileting Maximal Assist   How much help from another person does the patient currently need...   Putting on and taking off regular lower body clothing? 2   Bathing (including washing, rinsing, and drying)? 2   Toileting, which includes using a toilet, bedpan, or urinal? 2   Putting on and taking off regular upper body clothing? 2   Taking care of personal grooming such as brushing teeth? 3   Eating meals? 4   6 Clicks Daily Activity Score 15   Functional  "Mobility   Sit to Stand Minimal Assist   Bed, Chair, Wheelchair Transfer Minimal Assist   Visual Perception   Visual Perception  WDL   Activity Tolerance   Sitting Edge of Bed 22   Standing 2x2   Patient / Family Goals   Patient / Family Goal #1 home   Short Term Goals   Short Term Goal # 1 pt will perform toileting in br with Sup within 5 days   Short Term Goal # 2 pt will perform grooming at sink, 5-7\", with Sup within 5 days   Short Term Goal # 3 pt will perform FB dressing with CGA within 5 days   Education Group   Role of Occupational Therapist Patient Response Patient;Acceptance;Verbal Demonstration   Problem List   Problem List Decreased Active Daily Living Skills;Decreased Upper Extremity Strength Right;Decreased Upper Extremity Strength Left;Decreased Activity Tolerance;Decreased Functional Mobility;Impaired Postural Control / Balance   Anticipated Discharge Equipment and Recommendations   DC Equipment Recommendations Unable to determine at this time   Discharge Recommendations Recommend post-acute placement for additional occupational therapy services prior to discharge home   Interdisciplinary Plan of Care Collaboration   IDT Collaboration with  Nursing   Patient Position at End of Therapy In Bed;Bed Alarm On;Call Light within Reach;Tray Table within Reach;Phone within Reach   Collaboration Comments Results     "

## 2022-02-19 NOTE — PROGRESS NOTES
Pulmonary Progress Note    Date of admission  2/16/2022    Chief Complaint  76 y.o. female admitted 2/16/2022 with SOB    Hospital Course  76 y.o. female who presented 2/16/2022 with leg swelling b/l with cellulitis of right lower extremity, Sob and diarrhea. Rx for cellulitis and heart failure. Blood cxs negative   Hx of chronic obstructive pulmonary disease (no PFTs and no CT chest ), chronic hypoxemic respiratory failure on 2 L of oxygen at baseline, hypertension, diabetes, dyslipidemia and hypothyroidism and moderate aortic stenosis     Her Fio2 requirement went up from 2 l to 5 l and was wheezing and dyspneac on day2 of hospitalization  started on rx for COPD with steroids. She improved with Fio2 requirement back to baseline    Developed acute respiratory distress and AMS on 2/19/22 a.m- hypercapneic with ph 7.19/71/296 on 100% NRB  Pt Altered and not following commands  Troponin increased to 131 and ekg initially done concerning for ST depression and with AMS and paco2 dropped to 69 after 45 mins of BIPAP decision made to intubate.  Wbc increased to 16.7, creatinine worsening to 1.25, probnp 3459 and trop 131 with lactic 3.8,      D/w cardiology DR. Esquivel  re: NSTEMI  At this time resp distress from pulm cause hypercapneic resp failure  No ST elevation  Trend troponin if markedly in 1000  Level to reassess  Heparin, Asa, lipitor    Interval Problem Update  Reviewed last 24 hour events:  As above    Review of Systems  Review of Systems   Unable to perform ROS: Acuity of condition        Vital Signs for last 24 hours   Temp:  [36.5 °C (97.7 °F)-37.8 °C (100 °F)] 37.8 °C (100 °F)  Pulse:  [] 67  Resp:  [] 21  BP: ()/() 113/51  SpO2:  [83 %-100 %] 98 %         Respiratory Information for the last 24 hours  Vent Mode: APVCMV  Rate (breaths/min): 20  Vt Target (mL): 320  PEEP/CPAP: 8  MAP: 10  Control VTE (exp VT): 435    Physical Exam   Physical Exam  Constitutional:       General: She  is in acute distress.      Appearance: She is ill-appearing.      Comments: unkempt   HENT:      Head: Normocephalic and atraumatic.   Eyes:      Extraocular Movements: Extraocular movements intact.      Pupils: Pupils are equal, round, and reactive to light.   Cardiovascular:      Rate and Rhythm: Normal rate.      Heart sounds: Murmur heard.   Pulmonary:      Comments: Diminished BS b/l  Abdominal:      General: Bowel sounds are normal. There is distension.      Palpations: Abdomen is soft.   Musculoskeletal:      Right lower leg: Edema present.      Left lower leg: Edema present.   Skin:     Comments: erythema lower extremities   Neurological:      Comments: Moving all extremities not following commands   Psychiatric:      Comments: Unable to assess         Medications  Current Facility-Administered Medications   Medication Dose Route Frequency Provider Last Rate Last Admin   • prochlorperazine (COMPAZINE) injection 10 mg  10 mg Intravenous Q6HRS PRN Alley Longo M.D.   10 mg at 02/19/22 0013   • furosemide (LASIX) injection 40 mg  40 mg Intravenous BID DIURETIC Alley Longo M.D.   40 mg at 02/19/22 1721   • heparin infusion 25,000 units in 500 mL 0.45% NACL  0-30 Units/kg/hr (Adjusted) Intravenous Continuous Ashia Bertrand M.D. 26.3 mL/hr at 02/19/22 1555 18 Units/kg/hr at 02/19/22 1555   • heparin injection 2,900 Units  40 Units/kg (Adjusted) Intravenous PRN Ashia Bertrand M.D.       • aspirin (ASA) tablet 325 mg  325 mg Enteral Tube DAILY Ashia Bertrand M.D.       • Respiratory Therapy Consult   Nebulization Continuous RT Ashia Bertrand M.D.       • famotidine (PEPCID) tablet 20 mg  20 mg Enteral Tube DAILY Ashia Bertrand M.D.        Or   • famotidine (PEPCID) injection 20 mg  20 mg Intravenous DAILY Ashia Bertrand M.D.   20 mg at 02/19/22 0908   • senna-docusate (PERICOLACE or SENOKOT S) 8.6-50 MG per tablet 2 Tablet  2 Tablet Enteral Tube BID  Ashia Bertrand M.D.   2 Tablet at 02/19/22 1724    And   • polyethylene glycol/lytes (MIRALAX) PACKET 1 Packet  1 Packet Enteral Tube QDAY PRN Ashia Bertrand M.D.        And   • magnesium hydroxide (MILK OF MAGNESIA) suspension 30 mL  30 mL Enteral Tube QDAY PRN Ashia Bertrand M.D.        And   • bisacodyl (DULCOLAX) suppository 10 mg  10 mg Rectal QDAY PRN Ashia Bertrand M.D.       • MD Alert...ICU Electrolyte Replacement per Pharmacy   Other PHARMACY TO DOSE Ashia Bertrand M.D.       • lidocaine (XYLOCAINE) 1 % injection 2 mL  2 mL Tracheal Tube Q30 MIN PRN Ashia Bertrand M.D.       • fentaNYL (SUBLIMAZE) 50 mcg/mL in 50mL   Intravenous Continuous Ashia Bertrand M.D. 1 mL/hr at 02/19/22 1250 50 mcg/hr at 02/19/22 1250   • dexmedetomidine (Precedex) 400 mcg in  mL infusion  0-1.5 mcg/kg/hr Intravenous Continuous Ashia Bertrand M.D. 11.1 mL/hr at 02/19/22 1315 0.4 mcg/kg/hr at 02/19/22 1315   • insulin regular (HumuLIN R) 100 Units in  mL Infusion  0.1 Units/kg/hr Intravenous Continuous Ashia Bertrand M.D. 9 mL/hr at 02/19/22 1751 9 Units/hr at 02/19/22 1751   • dextrose 50% (D50W) injection 25-50 mL  25-50 mL Intravenous PRN Ashia Bertrand M.D.       • metroNIDAZOLE (FLAGYL) IVPB 500 mg  500 mg Intravenous Q8HRS Ashia Bertrand M.D.   Stopped at 02/19/22 1156   • norepinephrine (Levophed) 8 mg in 250 mL NS infusion (premix)  0-30 mcg/min Intravenous Continuous Ashia Bertrand M.D. 13.1 mL/hr at 02/19/22 1655 7 mcg/min at 02/19/22 1655   • dextrose 50% (D50W) injection 50 mL  50 mL Intravenous Q15 MIN PRN Thomas Stroud M.D.       • ondansetron (ZOFRAN) syringe/vial injection 4 mg  4 mg Intravenous Q4HRS PRN Alley Longo M.D.   4 mg at 02/18/22 2250   • Respiratory Therapy Consult   Nebulization Continuous RT Allyson Ulloa D.CALLY       • ipratropium-albuterol (DUONEB) nebulizer solution  3 mL Nebulization  Q2HRS PRN (RT) Allyson Ulloa D.O.       • bumetanide (BUMEX) tablet 1 mg  1 mg Oral Q DAY Allyson Ulloa D.O.   1 mg at 02/19/22 0544   • nystatin (MYCOSTATIN) powder   Topical BID Allyson Ulloa D.O.   Given at 02/19/22 1722   • atorvastatin (LIPITOR) tablet 40 mg  40 mg Oral DAILY Thomas Stroud M.D.   40 mg at 02/19/22 0544   • levothyroxine (SYNTHROID) tablet 25 mcg  25 mcg Oral DAILY Thomas Stroud M.D.   25 mcg at 02/19/22 0543   • sertraline (Zoloft) tablet 50 mg  50 mg Oral DAILY Thomas Stroud M.D.   50 mg at 02/19/22 0544   • Pharmacy Consult Request  1 Each Other PHARMACY TO DOSE Thomas Stroud M.D.       • [Held by provider] benazepril (LOTENSIN) tablet 20 mg  20 mg Oral Q DAY Thomas Stroud M.D.       • acetaminophen (Tylenol) tablet 650 mg  650 mg Oral Q6HRS PRN Thomas Stroud M.D.       • lactated ringers infusion (BOLUS)  500 mL Intravenous Once PRN Thomas Stroud M.D.       • lactated ringers infusion (BOLUS): BMI greater than 30  30 mL/kg (Ideal) Intravenous Once PRN Thomas Stroud M.D.           Fluids    Intake/Output Summary (Last 24 hours) at 2/19/2022 1756  Last data filed at 2/19/2022 1600  Gross per 24 hour   Intake 434.3 ml   Output 447 ml   Net -12.7 ml       Laboratory  Recent Labs     02/19/22  0707 02/19/22  0753 02/19/22  1054   ISTATAPH 7.199* 7.205* 7.248*   ISTATAPCO2 71.7* 69.1* 65.3*   ISTATAPO2 296* 63* 89*   ISTATATCO2 30 29 30   LPIRYOU9FYS 100* 85* 95   ISTATARTHCO3 27.9* 27.3* 28.5*   ISTATARTBE -2 -2 0   ISTATTEMP see below see below 99.3 F   ISTATFIO2 90 70 50   ISTATSPEC Arterial Arterial Arterial   ISTATAPHTC  --   --  7.243*   [id number]  --   --  91*         Recent Labs     02/17/22  0155 02/18/22  0347 02/19/22  0317 02/19/22  1100 02/19/22  1450   SODIUM 134* 133* 135 133* 133*   POTASSIUM 4.0 4.5 4.2 4.1 5.0   CHLORIDE 99 99 97 96 98   CO2 22 22 28 24 23   BUN 27* 43* 54* 55* 58*   CREATININE 0.95 1.20 1.25 1.33 1.44*   MAGNESIUM 1.7  --   --    --   --    PHOSPHORUS  --  4.0  --   --   --    CALCIUM 9.1 9.3 9.8 9.6 10.0     Recent Labs     02/17/22 0155 02/18/22 0347 02/19/22 0317 02/19/22  1100 02/19/22  1450   ALTSGPT 36  --   --  52*  --    ASTSGOT 41  --   --  34  --    ALKPHOSPHAT 164*  --   --  237*  --    TBILIRUBIN 0.6  --   --  0.3  --    GLUCOSE 158*   < > 375* 433* 329*    < > = values in this interval not displayed.     Recent Labs     02/17/22 0155 02/18/22 0347 02/19/22 0317 02/19/22  1100   WBC 16.1* 16.7* 16.7*  --    NEUTSPOLYS 82.80* 89.10*  --   --    LYMPHOCYTES 9.10* 5.90*  --   --    MONOCYTES 6.30 3.70  --   --    EOSINOPHILS 0.50 0.00  --   --    BASOPHILS 0.20 0.10  --   --    ASTSGOT 41  --   --  34   ALTSGPT 36  --   --  52*   ALKPHOSPHAT 164*  --   --  237*   TBILIRUBIN 0.6  --   --  0.3     Recent Labs     02/17/22 0155 02/18/22 0347 02/19/22 0317 02/19/22  0646   RBC 2.79* 2.96* 3.05*  --    HEMOGLOBIN 8.8* 9.4* 9.5*  --    HEMATOCRIT 29.1* 30.2* 31.7*  --    PLATELETCT 276 303 370  --    PROTHROMBTM  --   --   --  14.3   APTT  --   --   --  27.2   INR  --   --   --  1.20*       Imaging  CXR: diffuse reticular marking suggestive of pulm edema  ET tube in good position  Feeding tube below the diaphragm   Air notes in the stomach     Assessment/Plan  Acute on chronic respiratory failure with hypoxia (HCC)- (present on admission)  Assessment & Plan  COPD (no PFTs or CT chest), SONIA, OHS and heart failure  Hypercapnea seems to be the predominant feature and may be due to respiratory fatigue  Emergently intubated  Already on steroids    Bacteremia  Assessment & Plan  - cellulitis and decub ulcer as well as diarrhea  - blood cx + for gram + cocci in anaerobic bottle    NSTEMI (non-ST elevated myocardial infarction) ([facility])  Assessment & Plan  - may be supply and demand  - trend troponin  - d/w cardiology see body of text  - Heparin, ASA and lipitor  - Hyperdynamic last ECHO repeat today  - continue diuretics keep negative  fluid balance    COPD with acute exacerbation ([facility])  Assessment & Plan  At this time unclear if COPD exacerbation  No wheezing currently so will not continue steroids    Decubitus ulcer- (present on admission)  Assessment & Plan  Unstageable   MVI daily and vitamin c bid   Wound care    Cellulitis of left lower extremity- (present on admission)  Assessment & Plan  Gram + cocci in anaerobic bottle  Change augmentin to metronidazole    DM (diabetes mellitus) ([facility])- (present on admission)  Assessment & Plan  Insulin drip  Trying to keep FS between 110-180       VTE:  Lovenox  Ulcer: H2 Antagonist  Lines: Nye Catheter  Ongoing indication addressed    I have performed a physical exam and reviewed and updated ROS and Plan today (2/19/2022). In review of yesterday's note (2/18/2022), there are no changes except as documented above.     Discussed patient condition and risk of morbidity and/or mortality with Charge nurse / hot rounds  The patient remains critically ill.  Critical care time = 90 minutes in directly providing and coordinating critical care and extensive data review.  No time overlap and excludes procedures.

## 2022-02-19 NOTE — PROGRESS NOTES
Cortrak Placement    Tube Team verified patient name and medical record number prior to tube placement.  Cortrak tube (43 inches, 10 Czech) placed at 80 cm in left nare.  Per Cortrak picture, tube appears to be in the stomach.  Nursing Instructions: Awaiting KUB to confirm placement before use for medications or feeding. Once placement confirmed, flush tube with 30 ml of water, and then remove and save stylet, in patient medication drawer.

## 2022-02-19 NOTE — PROGRESS NOTES
This RN noticed a drastic change in patient condition at approx. 0600. Patient on 4-6L nc throughout night. O2 demands changed to 15 L oxymask to maintain adequate oxygenation. Patient with resp. rate at 30 and extremely labored breathing. RT contacted to come evaluate patient at bedside. Monitor tech also contacted this RN that patient was tachycardic in the 130-150's. Patient was not tachycardic all night up until now. Rapid response initiated at 0630. Patient transferred to ICU.

## 2022-02-19 NOTE — FLOWSHEET NOTE
02/19/22 0615   Events/Summary/Plan   Events/Summary/Plan severe resp distress   Vital Signs   Pulse (!) 130   Respiration (!) 36   Pulse Oximetry 93 %   $ Pulse Oximetry (Spot Check) Yes   Chest Exam   Work Of Breathing / Effort Accessory Muscle Use;Increased Work of Breathing;Severe   Breath Sounds   RUL Breath Sounds Transmitted Upper Airway Sound;Expiratory Wheezes   MOSHE Breath Sounds Transmitted Upper Airway Sound;Expiratory Wheezes   Secretions   Cough Congested   Oxygen   O2 (LPM) 15   O2 Delivery Device Oxymask

## 2022-02-19 NOTE — PROCEDURES
Intubation    Date/Time: 2/19/2022 9:26 AM  Performed by: Ashia Bertrand M.D.  Authorized by: Ashia Bertrand M.D.     Consent:     Consent obtained:  Emergent situation    Alternatives discussed:  No treatment and delayed treatment  Pre-procedure details:     Patient status:  Altered mental status    Mallampati score:  III    Pretreatment meds: etomidate 40 mg.  Procedure details:     Preoxygenation:  Bag valve mask    CPR in progress: no      Intubation method:  Oral    Oral intubation technique:  Video-assisted    Laryngoscope type:  GlideScope    Laryngoscope blade:  Mac 3    Tube size (mm):  7.5    Tube type:  Cuffed    Number of attempts:  1    Ventilation between attempts: no      Cricoid pressure: no      Tube visualized through cords: yes    Placement assessment:     Tube secured with:  Adhesive tape and ETT quezada    Breath sounds:  Equal    Placement verification: chest rise, equal breath sounds and ETCO2 detector    Post-procedure details:     Patient tolerance of procedure:  Tolerated well, no immediate complications

## 2022-02-19 NOTE — ASSESSMENT & PLAN NOTE
Gram + cocci in anaerobic bottle  Changed augmentin to metronidazole  Wbc is more elevated so adding cefepime today to metronidazole till cxs back

## 2022-02-19 NOTE — PROGRESS NOTES
07:10 blood sugar 507 on POC test.   07:30 Dr. Rivera notified, states she will place orders for insulin drip after patient is stabilized.     07:40 Troponin 131. Dr. Rivera notified.      12:30 Troponin 220- Dr. Rivera notified.

## 2022-02-20 ENCOUNTER — APPOINTMENT (OUTPATIENT)
Dept: RADIOLOGY | Facility: MEDICAL CENTER | Age: 77
DRG: 870 | End: 2022-02-20
Attending: INTERNAL MEDICINE
Payer: MEDICARE

## 2022-02-20 ENCOUNTER — APPOINTMENT (OUTPATIENT)
Dept: RADIOLOGY | Facility: MEDICAL CENTER | Age: 77
DRG: 870 | End: 2022-02-20
Attending: HOSPITALIST
Payer: MEDICARE

## 2022-02-20 PROBLEM — N17.9 AKI (ACUTE KIDNEY INJURY) (HCC): Status: ACTIVE | Noted: 2022-02-20

## 2022-02-20 LAB
ANION GAP SERPL CALC-SCNC: 10 MMOL/L (ref 7–16)
BASE EXCESS BLDA CALC-SCNC: 0 MMOL/L (ref -4–3)
BASOPHILS # BLD AUTO: 0.2 % (ref 0–1.8)
BASOPHILS # BLD: 0.03 K/UL (ref 0–0.12)
BODY TEMPERATURE: ABNORMAL DEGREES
BREATHS SETTING VENT: 20
BUN SERPL-MCNC: 60 MG/DL (ref 8–22)
CALCIUM SERPL-MCNC: 10.1 MG/DL (ref 8.4–10.2)
CHLORIDE SERPL-SCNC: 104 MMOL/L (ref 96–112)
CO2 BLDA-SCNC: 29 MMOL/L (ref 20–33)
CO2 SERPL-SCNC: 24 MMOL/L (ref 20–33)
CREAT SERPL-MCNC: 1.42 MG/DL (ref 0.5–1.4)
DELSYS IDSYS: ABNORMAL
EOSINOPHIL # BLD AUTO: 0.04 K/UL (ref 0–0.51)
EOSINOPHIL NFR BLD: 0.2 % (ref 0–6.9)
ERYTHROCYTE [DISTWIDTH] IN BLOOD BY AUTOMATED COUNT: 51 FL (ref 35.9–50)
GLUCOSE BLD-MCNC: 107 MG/DL (ref 65–99)
GLUCOSE BLD-MCNC: 120 MG/DL (ref 65–99)
GLUCOSE BLD-MCNC: 122 MG/DL (ref 65–99)
GLUCOSE BLD-MCNC: 124 MG/DL (ref 65–99)
GLUCOSE BLD-MCNC: 126 MG/DL (ref 65–99)
GLUCOSE BLD-MCNC: 127 MG/DL (ref 65–99)
GLUCOSE BLD-MCNC: 127 MG/DL (ref 65–99)
GLUCOSE BLD-MCNC: 130 MG/DL (ref 65–99)
GLUCOSE BLD-MCNC: 131 MG/DL (ref 65–99)
GLUCOSE BLD-MCNC: 132 MG/DL (ref 65–99)
GLUCOSE BLD-MCNC: 133 MG/DL (ref 65–99)
GLUCOSE BLD-MCNC: 135 MG/DL (ref 65–99)
GLUCOSE BLD-MCNC: 137 MG/DL (ref 65–99)
GLUCOSE BLD-MCNC: 137 MG/DL (ref 65–99)
GLUCOSE BLD-MCNC: 138 MG/DL (ref 65–99)
GLUCOSE BLD-MCNC: 142 MG/DL (ref 65–99)
GLUCOSE BLD-MCNC: 154 MG/DL (ref 65–99)
GLUCOSE BLD-MCNC: 155 MG/DL (ref 65–99)
GLUCOSE BLD-MCNC: 183 MG/DL (ref 65–99)
GLUCOSE BLD-MCNC: 332 MG/DL (ref 65–99)
GLUCOSE SERPL-MCNC: 136 MG/DL (ref 65–99)
HCO3 BLDA-SCNC: 27.2 MMOL/L (ref 17–25)
HCT VFR BLD AUTO: 32.4 % (ref 37–47)
HGB BLD-MCNC: 9.8 G/DL (ref 12–16)
HOROWITZ INDEX BLDA+IHG-RTO: 255 MM[HG]
IMM GRANULOCYTES # BLD AUTO: 0.54 K/UL (ref 0–0.11)
IMM GRANULOCYTES NFR BLD AUTO: 2.8 % (ref 0–0.9)
LYMPHOCYTES # BLD AUTO: 2.56 K/UL (ref 1–4.8)
LYMPHOCYTES NFR BLD: 13.4 % (ref 22–41)
MAGNESIUM SERPL-MCNC: 2.1 MG/DL (ref 1.5–2.5)
MCH RBC QN AUTO: 31.3 PG (ref 27–33)
MCHC RBC AUTO-ENTMCNC: 30.2 G/DL (ref 33.6–35)
MCV RBC AUTO: 103.5 FL (ref 81.4–97.8)
MODE IMODE: ABNORMAL
MONOCYTES # BLD AUTO: 1.31 K/UL (ref 0–0.85)
MONOCYTES NFR BLD AUTO: 6.9 % (ref 0–13.4)
NEUTROPHILS # BLD AUTO: 14.59 K/UL (ref 2–7.15)
NEUTROPHILS NFR BLD: 76.5 % (ref 44–72)
NRBC # BLD AUTO: 0.02 K/UL
NRBC BLD-RTO: 0.1 /100 WBC
O2/TOTAL GAS SETTING VFR VENT: 40 %
PCO2 BLDA: 59.1 MMHG (ref 26–37)
PEEP END EXPIRATORY PRESSURE IPEEP: 8 CMH20
PH BLDA: 7.27 [PH] (ref 7.4–7.5)
PHOSPHATE SERPL-MCNC: 3.4 MG/DL (ref 2.5–4.5)
PLATELET # BLD AUTO: 490 K/UL (ref 164–446)
PMV BLD AUTO: 10.9 FL (ref 9–12.9)
PO2 BLDA: 102 MMHG (ref 64–87)
POTASSIUM SERPL-SCNC: 4.9 MMOL/L (ref 3.6–5.5)
RBC # BLD AUTO: 3.13 M/UL (ref 4.2–5.4)
SAO2 % BLDA: 97 % (ref 93–99)
SODIUM SERPL-SCNC: 138 MMOL/L (ref 135–145)
SPECIMEN DRAWN FROM PATIENT: ABNORMAL
TIDAL VOLUME IVT: 320 ML
TRIGL SERPL-MCNC: 177 MG/DL (ref 0–149)
TROPONIN T SERPL-MCNC: 232 NG/L (ref 6–19)
TROPONIN T SERPL-MCNC: 341 NG/L (ref 6–19)
UFH PPP CHRO-ACNC: 0.39 IU/ML
WBC # BLD AUTO: 19.1 K/UL (ref 4.8–10.8)

## 2022-02-20 PROCEDURE — 84478 ASSAY OF TRIGLYCERIDES: CPT

## 2022-02-20 PROCEDURE — 700101 HCHG RX REV CODE 250: Performed by: INTERNAL MEDICINE

## 2022-02-20 PROCEDURE — 94799 UNLISTED PULMONARY SVC/PX: CPT

## 2022-02-20 PROCEDURE — 71045 X-RAY EXAM CHEST 1 VIEW: CPT

## 2022-02-20 PROCEDURE — 85025 COMPLETE CBC W/AUTO DIFF WBC: CPT

## 2022-02-20 PROCEDURE — 87205 SMEAR GRAM STAIN: CPT

## 2022-02-20 PROCEDURE — 700111 HCHG RX REV CODE 636 W/ 250 OVERRIDE (IP)

## 2022-02-20 PROCEDURE — 700102 HCHG RX REV CODE 250 W/ 637 OVERRIDE(OP): Performed by: INTERNAL MEDICINE

## 2022-02-20 PROCEDURE — A9270 NON-COVERED ITEM OR SERVICE: HCPCS | Performed by: INTERNAL MEDICINE

## 2022-02-20 PROCEDURE — 700102 HCHG RX REV CODE 250 W/ 637 OVERRIDE(OP): Performed by: HOSPITALIST

## 2022-02-20 PROCEDURE — 94003 VENT MGMT INPAT SUBQ DAY: CPT

## 2022-02-20 PROCEDURE — 85520 HEPARIN ASSAY: CPT

## 2022-02-20 PROCEDURE — 700111 HCHG RX REV CODE 636 W/ 250 OVERRIDE (IP): Performed by: HOSPITALIST

## 2022-02-20 PROCEDURE — 84484 ASSAY OF TROPONIN QUANT: CPT | Mod: 91

## 2022-02-20 PROCEDURE — 99291 CRITICAL CARE FIRST HOUR: CPT | Performed by: INTERNAL MEDICINE

## 2022-02-20 PROCEDURE — 700105 HCHG RX REV CODE 258: Performed by: INTERNAL MEDICINE

## 2022-02-20 PROCEDURE — 83735 ASSAY OF MAGNESIUM: CPT

## 2022-02-20 PROCEDURE — A9270 NON-COVERED ITEM OR SERVICE: HCPCS | Performed by: HOSPITALIST

## 2022-02-20 PROCEDURE — 770022 HCHG ROOM/CARE - ICU (200)

## 2022-02-20 PROCEDURE — 84100 ASSAY OF PHOSPHORUS: CPT

## 2022-02-20 PROCEDURE — 87070 CULTURE OTHR SPECIMN AEROBIC: CPT

## 2022-02-20 PROCEDURE — 82962 GLUCOSE BLOOD TEST: CPT | Mod: 91

## 2022-02-20 PROCEDURE — 700111 HCHG RX REV CODE 636 W/ 250 OVERRIDE (IP): Performed by: INTERNAL MEDICINE

## 2022-02-20 PROCEDURE — 94760 N-INVAS EAR/PLS OXIMETRY 1: CPT

## 2022-02-20 PROCEDURE — 82803 BLOOD GASES ANY COMBINATION: CPT

## 2022-02-20 PROCEDURE — 80048 BASIC METABOLIC PNL TOTAL CA: CPT

## 2022-02-20 RX ORDER — SODIUM HYPOCHLORITE 1.25 MG/ML
SOLUTION TOPICAL 2 TIMES DAILY
Status: DISCONTINUED | OUTPATIENT
Start: 2022-02-20 | End: 2022-03-02 | Stop reason: HOSPADM

## 2022-02-20 RX ORDER — SODIUM CHLORIDE 9 MG/ML
INJECTION, SOLUTION INTRAVENOUS CONTINUOUS
Status: DISCONTINUED | OUTPATIENT
Start: 2022-02-20 | End: 2022-02-20

## 2022-02-20 RX ORDER — VECURONIUM BROMIDE 1 MG/ML
INJECTION, POWDER, LYOPHILIZED, FOR SOLUTION INTRAVENOUS
Status: COMPLETED
Start: 2022-02-20 | End: 2022-02-20

## 2022-02-20 RX ORDER — ACETAMINOPHEN 325 MG/1
650 TABLET ORAL EVERY 6 HOURS PRN
Status: DISCONTINUED | OUTPATIENT
Start: 2022-02-20 | End: 2022-03-02 | Stop reason: HOSPADM

## 2022-02-20 RX ORDER — DEXTROSE MONOHYDRATE 25 G/50ML
50 INJECTION, SOLUTION INTRAVENOUS
Status: DISCONTINUED | OUTPATIENT
Start: 2022-02-20 | End: 2022-02-23

## 2022-02-20 RX ORDER — VECURONIUM BROMIDE 1 MG/ML
20 INJECTION, POWDER, LYOPHILIZED, FOR SOLUTION INTRAVENOUS ONCE
Status: COMPLETED | OUTPATIENT
Start: 2022-02-20 | End: 2022-02-20

## 2022-02-20 RX ADMIN — SODIUM HYPOCHLORITE 40 ML: 1.25 SOLUTION TOPICAL at 18:07

## 2022-02-20 RX ADMIN — VECURONIUM BROMIDE 20 MG: 10 INJECTION, POWDER, LYOPHILIZED, FOR SOLUTION INTRAVENOUS at 09:30

## 2022-02-20 RX ADMIN — Medication 150 MCG/HR: at 16:20

## 2022-02-20 RX ADMIN — PROPOFOL 15 MCG/KG/MIN: 10 INJECTION, EMULSION INTRAVENOUS at 18:08

## 2022-02-20 RX ADMIN — DEXMEDETOMIDINE HYDROCHLORIDE 0.5 MCG/KG/HR: 100 INJECTION, SOLUTION, CONCENTRATE INTRAVENOUS at 02:31

## 2022-02-20 RX ADMIN — PROPOFOL 15 MCG/KG/MIN: 10 INJECTION, EMULSION INTRAVENOUS at 09:32

## 2022-02-20 RX ADMIN — SENNOSIDES AND DOCUSATE SODIUM 2 TABLET: 50; 8.6 TABLET ORAL at 17:02

## 2022-02-20 RX ADMIN — FUROSEMIDE 40 MG: 10 INJECTION, SOLUTION INTRAVENOUS at 05:18

## 2022-02-20 RX ADMIN — SODIUM CHLORIDE: 9 INJECTION, SOLUTION INTRAVENOUS at 10:19

## 2022-02-20 RX ADMIN — NYSTATIN: 100000 POWDER TOPICAL at 05:18

## 2022-02-20 RX ADMIN — OXYCODONE HYDROCHLORIDE AND ACETAMINOPHEN 500 MG: 500 TABLET ORAL at 05:19

## 2022-02-20 RX ADMIN — ASPIRIN 325 MG ORAL TABLET 325 MG: 325 PILL ORAL at 05:18

## 2022-02-20 RX ADMIN — FAMOTIDINE 20 MG: 20 TABLET ORAL at 05:19

## 2022-02-20 RX ADMIN — OXYCODONE HYDROCHLORIDE AND ACETAMINOPHEN 500 MG: 500 TABLET ORAL at 17:02

## 2022-02-20 RX ADMIN — METRONIDAZOLE 500 MG: 500 INJECTION, SOLUTION INTRAVENOUS at 14:17

## 2022-02-20 RX ADMIN — SERTRALINE HYDROCHLORIDE 50 MG: 50 TABLET ORAL at 05:19

## 2022-02-20 RX ADMIN — HEPARIN SODIUM 18 UNITS/KG/HR: 5000 INJECTION, SOLUTION INTRAVENOUS at 20:58

## 2022-02-20 RX ADMIN — ATORVASTATIN CALCIUM 40 MG: 40 TABLET, FILM COATED ORAL at 05:18

## 2022-02-20 RX ADMIN — LEVOTHYROXINE SODIUM 25 MCG: 0.03 TABLET ORAL at 05:18

## 2022-02-20 RX ADMIN — CEFEPIME 2 G: 2 INJECTION, POWDER, FOR SOLUTION INTRAVENOUS at 12:53

## 2022-02-20 RX ADMIN — INSULIN GLARGINE 5 UNITS: 100 INJECTION, SOLUTION SUBCUTANEOUS at 09:49

## 2022-02-20 RX ADMIN — INSULIN GLARGINE 5 UNITS: 100 INJECTION, SOLUTION SUBCUTANEOUS at 17:11

## 2022-02-20 RX ADMIN — HEPARIN SODIUM 18 UNITS/KG/HR: 5000 INJECTION, SOLUTION INTRAVENOUS at 02:27

## 2022-02-20 RX ADMIN — INSULIN HUMAN 1 UNITS: 100 INJECTION, SOLUTION PARENTERAL at 17:10

## 2022-02-20 RX ADMIN — NYSTATIN: 100000 POWDER TOPICAL at 17:02

## 2022-02-20 RX ADMIN — METRONIDAZOLE 500 MG: 500 INJECTION, SOLUTION INTRAVENOUS at 22:31

## 2022-02-20 RX ADMIN — SENNOSIDES AND DOCUSATE SODIUM 2 TABLET: 50; 8.6 TABLET ORAL at 05:19

## 2022-02-20 RX ADMIN — BUMETANIDE 1 MG: 1 TABLET ORAL at 05:19

## 2022-02-20 RX ADMIN — Medication 150 MCG/HR: at 03:42

## 2022-02-20 RX ADMIN — METRONIDAZOLE 500 MG: 500 INJECTION, SOLUTION INTRAVENOUS at 05:19

## 2022-02-20 RX ADMIN — MULTIPLE VITAMINS W/ MINERALS TAB 1 TABLET: TAB at 05:19

## 2022-02-20 RX ADMIN — VECURONIUM BROMIDE 20 MG: 1 INJECTION, POWDER, LYOPHILIZED, FOR SOLUTION INTRAVENOUS at 09:30

## 2022-02-20 ASSESSMENT — PAIN DESCRIPTION - PAIN TYPE
TYPE: ACUTE PAIN

## 2022-02-20 ASSESSMENT — FIBROSIS 4 INDEX: FIB4 SCORE: 0.73

## 2022-02-20 NOTE — RESPIRATORY CARE
Adult Ventilation Update    Total Vent Days: 2    Patient Lines/Drains/Airways Status     Active Airway     Name Placement date Placement time Site Days    Airway ETT Oral 7.5 02/19/22  0830  Oral  1                     Static Compliance (ml / cm H2O): 46     Patient failed trials because of          Mobility    Unable to mobilize   Events/Summary/Plan: Patient asynchronous with the ventilator at times.  Sedation changed per MD.  No vent weaning performed.

## 2022-02-20 NOTE — CARE PLAN
Problem: Respiratory  Goal: Patient will achieve/maintain optimum respiratory ventilation and gas exchange  Description: Target End Date:  Prior to discharge or change in level of care    Document on Assessment flowsheet    1.  Assess and monitor rate, rhythm, depth and effort of respiration  2.  Breath sounds assessed qshift and/or as needed  3.  Assess O2 saturation, administer/titrate oxygen as ordered  4.  Position patient for maximum ventilatory efficiency  5.  Turn, cough, and deep breath with splinting to improve effectiveness  6.  Collaborate with RT to administer medication/treatments per order  7.  Encourage use of incentive spirometer and encourage patient to cough after use and utilize splinting techniques if applicable  8.  Airway suctioning  9.  Monitor sputum production for changes in color, consistency and frequency  10. Perform frequent oral hygiene  11. Alternate physical activity with rest periods  Outcome: Not Progressing

## 2022-02-20 NOTE — CARE PLAN
The patient is Watcher - Medium risk of patient condition declining or worsening    Shift Goals  Clinical Goals: Maintain SPO2 greater than 90%, decrease levophed, maintain MAP greater than 65  Patient Goals: JACKY  Family Goals: JACKY    Progress made toward(s) clinical / shift goals:    Problem: Hemodynamics  Goal: Patient's hemodynamics, fluid balance and neurologic status will be stable or improve  Outcome: Progressing  Note: Turned off levophed at 1050am today and maintaining SBP greater than 90 and MAP greater than 65.      Problem: Urinary - Renal Perfusion  Goal: Ability to achieve and maintain adequate renal perfusion and functioning will improve  Outcome: Progressing  Note: Between 8888-1521 decreased urine output to 30ml but the urine output increased after starting NS at 75ml/hr.      Problem: Respiratory  Goal: Patient will achieve/maintain optimum respiratory ventilation and gas exchange  Outcome: Progressing  Note: After changing sedation to propofol, patient is more compliant with vent.        Patient is not progressing towards the following goals:      Problem: Self Care  Goal: Patient will have the ability to perform ADLs independently or with assistance (bathe, groom, dress, toilet and feed)  Outcome: Not Progressing  Note: Changed dressings on wounds, completed wound consult for left buttock, and completed wound culture.

## 2022-02-20 NOTE — PROGRESS NOTES
Report received from ADDIE Owens. Patient intubated and sedated with bilateral soft wrist restraints. No movement to pain in all four extremities but patient will initiate breath with tidal volumes around 450 to pain. Patient's right pupil is 3 mm and left pupil is 2 mm. Both pupils are brisk. Dr. Stroud aware and plan to continue to monitor. Per Dr. Stroud, notify MD if pupils are more than 3 mm different in size. All lines and gtts verified. Will continue with plan of care for the night.

## 2022-02-20 NOTE — WOUND TEAM
Discussed drsg care with primary RN. Wound now has an opening with undermining. Drainage purulent. Dakin's Solution® Quarter Strength is a broad-spectrum antimicrobial cleanser that is gentle to the skin. Effective against MRSA, VRE, other bacteria, viruses, molds, fungi and yeast. Also used for odor control. It helps debride chemically and mechanically with drsg removal. Nursing to perform care until wound team able to assess wound.

## 2022-02-20 NOTE — PROGRESS NOTES
"0859 MD at bedside, updates provided: EKG change prolonged QT, bloated stomach, occasional SPO2 desat to 85% with no secretions through ETT, and BG 120s for the last few hours. MD completed echo at bedside- \"patient dry\". Orders to d/c precedex gtt and transition to propofol, d/c insulin and transition to SubQ, give 20mg of vec IV now and place OG for stomach decompression x4 hours, trend troponin x3, and get baseline triglyceride. Patient had positive blood cultures - updated MD of draining wound on left buttock that was noticed yesterday by this RN during Q2H turns (pictures completed yesterday 02/19/2022)- okay to get cultures from wound today.   1014 MD aware of decreased urine output 30ml from 0324-8990. MD will start IV fluid.    "

## 2022-02-20 NOTE — PROGRESS NOTES
Assumed care and completed bedside report with Jessica REAL. Verified gtts per MAR. Morning assessment completed: left eye brisk, right eye sluggish, patient will attempt to close eyes while checking pupils, patient was turned toward the left side prior to shift change and shortly after patient's heart rate decreased to 40-45, removed pillow with night nurse and patient's heart rate increased to 50-55, patient will move toes when tickling bottom of foot, positive cough and gag, stomach appears inflated, will get EKG for t wave change (widening on monitor). Plan of care for this shift reviewed. Will continue to monitor.

## 2022-02-20 NOTE — PROGRESS NOTES
"Pulmonary Progress Note    Date of admission  2/16/2022    Chief Complaint  76 y.o. female admitted 2/16/2022 with SOB    Hospital Course  76 y.o. female who presented 2/16/2022 with leg swelling b/l with cellulitis of right lower extremity, Sob and diarrhea. Rx for cellulitis and heart failure. Blood cxs negative   Hx of chronic obstructive pulmonary disease (no PFTs and no CT chest ), chronic hypoxemic respiratory failure on 2 L of oxygen at baseline, hypertension, diabetes, dyslipidemia and hypothyroidism and moderate aortic stenosis     Her Fio2 requirement went up from 2 l to 5 l and was wheezing and dyspneac on day2 of hospitalization  started on rx for COPD with steroids. She improved with Fio2 requirement back to baseline    2/19: Developed acute respiratory distress and AMS on 2/19/22 a.m- hypercapneic with ph 7.19/71/296 on 100% NRB  Pt Altered and not following commands  Troponin increased to 131 and ekg initially done concerning for ST depression and with AMS and paco2 dropped to 69 after 45 mins of BIPAP decision made to intubate.  Wbc increased to 16.7, creatinine worsening to 1.25, probnp 3459 and trop 131 with lactic 3.8,      D/w cardiology DR. Esquivel  re: NSTEMI  At this time resp distress from pulm cause hypercapneic resp failure  No ST elevation  Trend troponin if markedly in 1000  Level to reassess  Heparin, Asa, lipitor    Interval Problem Update  Reviewed last 24 hour events:  Chart review from the past 24 hours includes imaging, laboratory studies, vital signs and notes available.  Pertinent data for today    Cardiac:  Bradycardic on precedex = > changing her to propofol, 2 mcg of levophed  ECHO 2/19  \"Limited echo to evaluate wall motion.   Prior echo on 2/17/2022.   Mildly reduced left ventricular systolic function.  The left ventricular ejection fraction is visually estimated to be 40%.  Hypokinesis of the apical inferior wall and apical seputm.\"  Pulm:  Asynchronous with ventilator - " one dose of vec reassess and change to propofol in addition to fentanyl  Plateau 23 with peep 8 -- driving pressure 15, Fio2 55%  Neuro:  Wakes up off sedation and follows  Heme:  NSTEMI, repeat EKG shows bradycardia and inverted T waves  Troponin still rising but also creatinine is elevated  Heparin therapeutic   I/O:  -219 (output 830 overnight)  Creatinine is elevated  Holding bumex and lasix as IVC only 0.7 cm    ID:  Blood cx: anerobic bottle + cocci still waiting on speciation  Decub ulcer unstageable - with purulent material - sent for cx -- wound care reconsulted  GI/endo:  Change insulin drip to glargine 6 units bid  Labs/Imaging:  Reviewed, CXR diffuse reticular markings c/w edema. ET tube and RIJ and cotrak in good position  Lines:  2/19 right IJ  Mobility:  bedrest  Symptoms: unable to assess    Review of Systems  Review of Systems   Unable to perform ROS: Acuity of condition        Vital Signs for last 24 hours   Temp:  [36.3 °C (97.3 °F)-38.3 °C (100.9 °F)] 36.3 °C (97.3 °F)  Pulse:  [28-80] 62  Resp:  [9-62] 20  BP: ()/(37-69) 107/48  SpO2:  [86 %-99 %] 92 %         Respiratory Information for the last 24 hours  Vent Mode: APVCMV  Rate (breaths/min): 20  Vt Target (mL): 320  PEEP/CPAP: 8  MAP: 15  Control VTE (exp VT): 325    Physical Exam   Physical Exam  Constitutional:       Appearance: She is obese.      Comments: Unkempt, intubated and sedated  Off sedation following commands but asynchronous with ventilator   HENT:      Head: Normocephalic and atraumatic.      Mouth/Throat:      Mouth: Mucous membranes are dry.   Eyes:      Extraocular Movements: Extraocular movements intact.      Pupils: Pupils are equal, round, and reactive to light.   Cardiovascular:      Rate and Rhythm: Bradycardia present.      Heart sounds: Murmur heard.   Pulmonary:      Comments: Diminished BS b/l  Abdominal:      General: Bowel sounds are normal. There is distension.      Palpations: Abdomen is soft.    Musculoskeletal:      Cervical back: Neck supple.      Right lower leg: Edema present.      Left lower leg: Edema present.   Skin:     General: Skin is warm and dry.      Comments: erythema lower extremities   Neurological:      Comments: Unable to assess  sedated   Psychiatric:      Comments: Unable to assess  Sedated and intubated         Medications  Current Facility-Administered Medications   Medication Dose Route Frequency Provider Last Rate Last Admin   • acetaminophen (Tylenol) tablet 650 mg  650 mg Enteral Tube Q6HRS PRN Ashia Bertrand M.D.       • insulin regular (HumuLIN R,NovoLIN R) injection  1-6 Units Subcutaneous 4X/DAY ACHS Ashia Bertrand M.D.        And   • dextrose 50% (D50W) injection 50 mL  50 mL Intravenous Q15 MIN PRN Ashia Bertrand M.D.       • insulin GLARGINE (Lantus,Semglee) injection  5 Units Subcutaneous BID Ashia Bertrand M.D.   5 Units at 02/20/22 0949   • propofol (DIPRIVAN) injection  0-80 mcg/kg/min Intravenous Continuous Ashia Bertrand M.D. 6.6 mL/hr at 02/20/22 1222 10 mcg/kg/min at 02/20/22 1222   • NS infusion   Intravenous Continuous Ashia Bertrand M.D. 75 mL/hr at 02/20/22 1019 New Bag at 02/20/22 1019   • cefepime (Maxipime) 2 g in  mL IVPB  2 g Intravenous Q12HRS Ashia Bertrand M.D.       • prochlorperazine (COMPAZINE) injection 10 mg  10 mg Intravenous Q6HRS PRN Alley Longo M.D.   10 mg at 02/19/22 0013   • [Held by provider] furosemide (LASIX) injection 40 mg  40 mg Intravenous BID DIURETIC Alley Longo M.D.   40 mg at 02/20/22 0518   • heparin infusion 25,000 units in 500 mL 0.45% NACL  0-30 Units/kg/hr (Adjusted) Intravenous Continuous Ashia Bertrand M.D. 26.3 mL/hr at 02/20/22 0227 18 Units/kg/hr at 02/20/22 0227   • heparin injection 2,900 Units  40 Units/kg (Adjusted) Intravenous PRN Ashia Bertrand M.D.       • aspirin (ASA) tablet 325 mg  325 mg Enteral Tube DAILY Ashia  CHEPE Bertrand   325 mg at 02/20/22 0518   • Respiratory Therapy Consult   Nebulization Continuous RT Ashia Bertrand M.D.       • famotidine (PEPCID) tablet 20 mg  20 mg Enteral Tube DAILY Ashia Bertrand M.D.   20 mg at 02/20/22 0519    Or   • famotidine (PEPCID) injection 20 mg  20 mg Intravenous DAILY Ashia Bertrand M.D.   20 mg at 02/19/22 0908   • senna-docusate (PERICOLACE or SENOKOT S) 8.6-50 MG per tablet 2 Tablet  2 Tablet Enteral Tube BID Ashia Bertrand M.D.   2 Tablet at 02/20/22 0519    And   • polyethylene glycol/lytes (MIRALAX) PACKET 1 Packet  1 Packet Enteral Tube QDAY PRN Ashia Bertrand M.D.        And   • magnesium hydroxide (MILK OF MAGNESIA) suspension 30 mL  30 mL Enteral Tube QDAY PRN Ashia Bertrand M.D.        And   • bisacodyl (DULCOLAX) suppository 10 mg  10 mg Rectal QDAY PRN Ashia Bertrand M.D.       • MD Alert...ICU Electrolyte Replacement per Pharmacy   Other PHARMACY TO DOSE Ashia Bertrand M.D.       • lidocaine (XYLOCAINE) 1 % injection 2 mL  2 mL Tracheal Tube Q30 MIN PRN Ashia Bertrand M.D.       • fentaNYL (SUBLIMAZE) 50 mcg/mL in 50mL   Intravenous Continuous Ashia Bertrand M.D. 3 mL/hr at 02/20/22 0342 150 mcg/hr at 02/20/22 0342   • metroNIDAZOLE (FLAGYL) IVPB 500 mg  500 mg Intravenous Q8HRS Ashia Bertrand M.D.   Stopped at 02/20/22 0619   • norepinephrine (Levophed) 8 mg in 250 mL NS infusion (premix)  0-30 mcg/min Intravenous Continuous Ashia Bertrand M.D.   Paused at 02/20/22 1051   • ascorbic acid (Vitamin C) tablet 500 mg  500 mg Enteral Tube BID Ashia Bertrand M.D.   500 mg at 02/20/22 0519   • therapeutic multivitamin-minerals (THERAGRAN-M) tablet 1 Tablet  1 Tablet Enteral Tube DAILY Thomas Stroud M.D.   1 Tablet at 02/20/22 0519   • atorvastatin (LIPITOR) tablet 40 mg  40 mg Enteral Tube DAILY Thomas Stroud M.D.   40 mg at 02/20/22 0518   • [Held by provider] bumetanide  (BUMEX) tablet 1 mg  1 mg Enteral Tube Q DAY Thomas Stroud M.D.   1 mg at 02/20/22 0519   • levothyroxine (SYNTHROID) tablet 25 mcg  25 mcg Enteral Tube DAILY Thomas Stroud M.D.   25 mcg at 02/20/22 0518   • sertraline (Zoloft) tablet 50 mg  50 mg Enteral Tube DAILY Thomas Stroud M.D.   50 mg at 02/20/22 0519   • ondansetron (ZOFRAN) syringe/vial injection 4 mg  4 mg Intravenous Q4HRS PRN Alley Longo M.D.   4 mg at 02/18/22 2250   • Respiratory Therapy Consult   Nebulization Continuous RT Allyson Ulloa D.O.       • ipratropium-albuterol (DUONEB) nebulizer solution  3 mL Nebulization Q2HRS PRN (RT) Allyson Ulloa D.O.       • nystatin (MYCOSTATIN) powder   Topical BID Allyson Ulloa D.O.   Given at 02/20/22 0518   • Pharmacy Consult Request  1 Each Other PHARMACY TO DOSE Thomas Stroud M.D.       • [Held by provider] benazepril (LOTENSIN) tablet 20 mg  20 mg Oral Q DAY Thomas Stroud M.D.       • lactated ringers infusion (BOLUS)  500 mL Intravenous Once PRN Thomas Stroud M.D.       • lactated ringers infusion (BOLUS): BMI greater than 30  30 mL/kg (Ideal) Intravenous Once PRN Thomas Stroud M.D.           Fluids    Intake/Output Summary (Last 24 hours) at 2/20/2022 1241  Last data filed at 2/20/2022 1200  Gross per 24 hour   Intake 1487.64 ml   Output 1297 ml   Net 190.64 ml       Laboratory  Recent Labs     02/19/22  0753 02/19/22  1054 02/20/22  0504   ISTATAPH 7.205* 7.248* 7.272*   ISTATAPCO2 69.1* 65.3* 59.1*   ISTATAPO2 63* 89* 102*   ISTATATCO2 29 30 29   [id number] 85* 95 97   ISTATARTHCO3 27.3* 28.5* 27.2*   ISTATARTBE -2 0 0   ISTATTEMP see below 99.3 F see below   ISTATFIO2 70 50 40   ISTATSPEC Arterial Arterial Arterial   ISTATAPHTC  --  7.243*  --    [id number]  --  91*  --          Recent Labs     02/18/22  0347 02/19/22  0317 02/19/22  1100 02/19/22  1450 02/20/22  0345   SODIUM 133*   < > 133* 133* 138   POTASSIUM 4.5   < > 4.1 5.0 4.9   CHLORIDE 99   < > 96 98  104   CO2 22   < > 24 23 24   BUN 43*   < > 55* 58* 60*   CREATININE 1.20   < > 1.33 1.44* 1.42*   MAGNESIUM  --   --   --   --  2.1   PHOSPHORUS 4.0  --   --   --  3.4   CALCIUM 9.3   < > 9.6 10.0 10.1    < > = values in this interval not displayed.     Recent Labs     02/19/22  1100 02/19/22  1450 02/20/22  0345   ALTSGPT 52*  --   --    ASTSGOT 34  --   --    ALKPHOSPHAT 237*  --   --    TBILIRUBIN 0.3  --   --    GLUCOSE 433* 329* 136*     Recent Labs     02/18/22  0347 02/19/22  0317 02/19/22  1100 02/20/22  0345   WBC 16.7* 16.7*  --  19.1*   NEUTSPOLYS 89.10*  --   --  76.50*   LYMPHOCYTES 5.90*  --   --  13.40*   MONOCYTES 3.70  --   --  6.90   EOSINOPHILS 0.00  --   --  0.20   BASOPHILS 0.10  --   --  0.20   ASTSGOT  --   --  34  --    ALTSGPT  --   --  52*  --    ALKPHOSPHAT  --   --  237*  --    TBILIRUBIN  --   --  0.3  --      Recent Labs     02/18/22  0347 02/19/22 0317 02/19/22  0646 02/20/22  0345   RBC 2.96* 3.05*  --  3.13*   HEMOGLOBIN 9.4* 9.5*  --  9.8*   HEMATOCRIT 30.2* 31.7*  --  32.4*   PLATELETCT 303 370  --  490*   PROTHROMBTM  --   --  14.3  --    APTT  --   --  27.2  --    INR  --   --  1.20*  --        Imaging  CXR: diffuse reticular marking suggestive of pulm edema  ET tube in good position  Feeding tube below the diaphragm     Assessment/Plan  Acute on chronic respiratory failure with hypoxia (HCC)- (present on admission)  Assessment & Plan  COPD (no PFTs or CT chest), SONIA, OHS and heart failure  Hypercapnea was predominant feature that resulted in intubation 2/19  ? COPD and heart failure and NSTEMI combination  Not clear  Not wheezing  CXR suggests heart failure  Driving pressure not high but asynchronous with ventilator and so extending expiration time closer to 3 to allow for better asynchrony        SRIRAM (acute kidney injury) (HCC)  Assessment & Plan  - likely due to intravascular depletion  - holding diuretics  - avoid nephrotoxins and NSAIDs    Bacteremia  Assessment & Plan  -  cellulitis and decub ulcer as well as diarrhea  - blood cx + for gram + cocci in anaerobic bottle  - decub ulcer unstageable and cultures sent 2/20  - wound care consulted    NSTEMI (non-ST elevated myocardial infarction) ([facility])  Assessment & Plan  - may be supply and demand  - continue to trend troponin  - d/w cardiology see body of text on 2/19  - Heparin, ASA and lipitor  - Hyperdynamic last ECHO repeat 2/19 showed EF now 40%  - IVC less than a cm with SRIRAM suggesting intravascular volume depletion -- hold diuretics    COPD with acute exacerbation ([facility])  Assessment & Plan  Continue prednisone until we can assess synchrony with the ventilator and reassess if still needs steroids  No secretions  Duonebs prn    Decubitus ulcer- (present on admission)  Assessment & Plan  Unstageable   MVI daily and vitamin c bid   Wound care reconsulted    Cellulitis of left lower extremity- (present on admission)  Assessment & Plan  Gram + cocci in anaerobic bottle  Changed augmentin to metronidazole  Wbc is more elevated so adding cefepime today to metronidazole till cxs back    DM (diabetes mellitus) ([facility])- (present on admission)  Assessment & Plan  Lantus 6 units bid  Trying to keep FS between 110-180       VTE:  Lovenox  Ulcer: H2 Antagonist  Lines: Nye Catheter  Ongoing indication addressed    I have performed a physical exam and reviewed and updated ROS and Plan today (2/20/2022). In review of yesterday's note (2/19/2022), there are no changes except as documented above.     Discussed patient condition and risk of morbidity and/or mortality with Charge nurse / hot rounds   MAIAGASTON is her friend Rhonda Hassan [phone] and has been updated (She lives in [place] but in [place] currently)  The patient remains critically ill.  Critical care time = 40 minutes in directly providing and coordinating critical care and extensive data review.  No time overlap and excludes procedures.

## 2022-02-20 NOTE — CARE PLAN
The patient is Watcher - Medium risk of patient condition declining or worsening    Shift Goals  Clinical Goals: tolerate ventilator/stabilize blood sugar  Patient Goals: JACKY  Family Goals: JACKY    Progress made toward(s) clinical / shift goals:    Problem: Knowledge Deficit - Standard  Goal: Patient and family/care givers will demonstrate understanding of plan of care, disease process/condition, diagnostic tests and medications  Outcome: Not Progressing  Note: Pt intubated and sedated.      Problem: Urinary - Renal Perfusion  Goal: Ability to achieve and maintain adequate renal perfusion and functioning will improve  Outcome: Progressing  Note: Pt producing 50-75 of UO every hour.      Problem: Mechanical Ventilation  Goal: Safe management of artificial airway and ventilation  Outcome: Progressing  Note: Patient saturating high 90's on ventilator.      Problem: Fall Risk  Goal: Patient will remain free from falls  Outcome: Progressing     Problem: Ineffective Airway Clearance  Goal: Patient will maintain patent airway with clear/clearing breath sounds  Outcome: Progressing     Problem: Pain - Standard  Goal: Alleviation of pain or a reduction in pain to the patient’s comfort goal  Outcome: Progressing  Note: Pt on fentanyl gtt with CPOT's scoring 0-1.       Patient is not progressing towards the following goals:      Problem: Knowledge Deficit - Standard  Goal: Patient and family/care givers will demonstrate understanding of plan of care, disease process/condition, diagnostic tests and medications  Outcome: Not Progressing  Note: Pt intubated and sedated.

## 2022-02-20 NOTE — CARE PLAN
Adult Ventilation Update    Total Vent Days: 1    Patient Lines/Drains/Airways Status       Active Airway       Name Placement date Placement time Site Days    Airway ETT Oral 7.5 02/19/22  0830  Oral  less than 1                  Pt intubated post rapid response    Static Compliance (ml / cm H2O): 86.3 (02/19/22 1019)      Sputum/Suction:  Cough: Congested (02/19/22 0615)               Events/Summary/Plan: Continue full support per Dr Rivera

## 2022-02-21 ENCOUNTER — HOSPITAL ENCOUNTER (INPATIENT)
Facility: REHABILITATION | Age: 77
End: 2022-02-21
Attending: PHYSICAL MEDICINE & REHABILITATION | Admitting: PHYSICAL MEDICINE & REHABILITATION
Payer: MEDICARE

## 2022-02-21 ENCOUNTER — APPOINTMENT (OUTPATIENT)
Dept: RADIOLOGY | Facility: MEDICAL CENTER | Age: 77
DRG: 870 | End: 2022-02-21
Attending: INTERNAL MEDICINE
Payer: MEDICARE

## 2022-02-21 LAB
ALBUMIN SERPL BCP-MCNC: 2.8 G/DL (ref 3.2–4.9)
ALBUMIN/GLOB SERPL: 0.8 G/DL
ALP SERPL-CCNC: 177 U/L (ref 30–99)
ALT SERPL-CCNC: 30 U/L (ref 2–50)
ANION GAP SERPL CALC-SCNC: 14 MMOL/L (ref 7–16)
AST SERPL-CCNC: 16 U/L (ref 12–45)
BACTERIA BLD CULT: NORMAL
BASOPHILS # BLD AUTO: 0.3 % (ref 0–1.8)
BASOPHILS # BLD: 0.05 K/UL (ref 0–0.12)
BILIRUB SERPL-MCNC: <0.2 MG/DL (ref 0.1–1.5)
BUN SERPL-MCNC: 73 MG/DL (ref 8–22)
CALCIUM SERPL-MCNC: 9.6 MG/DL (ref 8.4–10.2)
CHLORIDE SERPL-SCNC: 103 MMOL/L (ref 96–112)
CO2 SERPL-SCNC: 22 MMOL/L (ref 20–33)
CREAT SERPL-MCNC: 1.59 MG/DL (ref 0.5–1.4)
CRP SERPL HS-MCNC: 4.58 MG/DL (ref 0–0.75)
EOSINOPHIL # BLD AUTO: 0.38 K/UL (ref 0–0.51)
EOSINOPHIL NFR BLD: 2.3 % (ref 0–6.9)
ERYTHROCYTE [DISTWIDTH] IN BLOOD BY AUTOMATED COUNT: 53.7 FL (ref 35.9–50)
GLOBULIN SER CALC-MCNC: 3.4 G/DL (ref 1.9–3.5)
GLUCOSE BLD-MCNC: 174 MG/DL (ref 65–99)
GLUCOSE BLD-MCNC: 181 MG/DL (ref 65–99)
GLUCOSE BLD-MCNC: 296 MG/DL (ref 65–99)
GLUCOSE BLD-MCNC: 300 MG/DL (ref 65–99)
GLUCOSE SERPL-MCNC: 158 MG/DL (ref 65–99)
GRAM STN SPEC: NORMAL
HCT VFR BLD AUTO: 31.3 % (ref 37–47)
HGB BLD-MCNC: 9.1 G/DL (ref 12–16)
IMM GRANULOCYTES # BLD AUTO: 0.61 K/UL (ref 0–0.11)
IMM GRANULOCYTES NFR BLD AUTO: 3.6 % (ref 0–0.9)
LYMPHOCYTES # BLD AUTO: 2.36 K/UL (ref 1–4.8)
LYMPHOCYTES NFR BLD: 14.1 % (ref 22–41)
MAGNESIUM SERPL-MCNC: 2.2 MG/DL (ref 1.5–2.5)
MCH RBC QN AUTO: 31.3 PG (ref 27–33)
MCHC RBC AUTO-ENTMCNC: 29.1 G/DL (ref 33.6–35)
MCV RBC AUTO: 107.6 FL (ref 81.4–97.8)
MONOCYTES # BLD AUTO: 0.85 K/UL (ref 0–0.85)
MONOCYTES NFR BLD AUTO: 5.1 % (ref 0–13.4)
NEUTROPHILS # BLD AUTO: 12.48 K/UL (ref 2–7.15)
NEUTROPHILS NFR BLD: 74.6 % (ref 44–72)
NRBC # BLD AUTO: 0 K/UL
NRBC BLD-RTO: 0 /100 WBC
PHOSPHATE SERPL-MCNC: 5 MG/DL (ref 2.5–4.5)
PLATELET # BLD AUTO: 421 K/UL (ref 164–446)
PMV BLD AUTO: 10.8 FL (ref 9–12.9)
POTASSIUM SERPL-SCNC: 4.8 MMOL/L (ref 3.6–5.5)
PREALB SERPL-MCNC: 17.1 MG/DL (ref 18–38)
PROT SERPL-MCNC: 6.2 G/DL (ref 6–8.2)
RBC # BLD AUTO: 2.91 M/UL (ref 4.2–5.4)
SIGNIFICANT IND 70042: NORMAL
SIGNIFICANT IND 70042: NORMAL
SITE SITE: NORMAL
SITE SITE: NORMAL
SODIUM SERPL-SCNC: 139 MMOL/L (ref 135–145)
SOURCE SOURCE: NORMAL
SOURCE SOURCE: NORMAL
TROPONIN T SERPL-MCNC: 240 NG/L (ref 6–19)
UFH PPP CHRO-ACNC: 0.21 IU/ML
WBC # BLD AUTO: 16.7 K/UL (ref 4.8–10.8)

## 2022-02-21 PROCEDURE — 84100 ASSAY OF PHOSPHORUS: CPT

## 2022-02-21 PROCEDURE — 97597 DBRDMT OPN WND 1ST 20 CM/<: CPT

## 2022-02-21 PROCEDURE — 94799 UNLISTED PULMONARY SVC/PX: CPT

## 2022-02-21 PROCEDURE — 80053 COMPREHEN METABOLIC PANEL: CPT

## 2022-02-21 PROCEDURE — 85520 HEPARIN ASSAY: CPT

## 2022-02-21 PROCEDURE — 700102 HCHG RX REV CODE 250 W/ 637 OVERRIDE(OP): Performed by: HOSPITALIST

## 2022-02-21 PROCEDURE — 36600 WITHDRAWAL OF ARTERIAL BLOOD: CPT

## 2022-02-21 PROCEDURE — 84134 ASSAY OF PREALBUMIN: CPT

## 2022-02-21 PROCEDURE — 700111 HCHG RX REV CODE 636 W/ 250 OVERRIDE (IP): Performed by: STUDENT IN AN ORGANIZED HEALTH CARE EDUCATION/TRAINING PROGRAM

## 2022-02-21 PROCEDURE — 85025 COMPLETE CBC W/AUTO DIFF WBC: CPT

## 2022-02-21 PROCEDURE — 700111 HCHG RX REV CODE 636 W/ 250 OVERRIDE (IP): Performed by: INTERNAL MEDICINE

## 2022-02-21 PROCEDURE — A9270 NON-COVERED ITEM OR SERVICE: HCPCS | Performed by: STUDENT IN AN ORGANIZED HEALTH CARE EDUCATION/TRAINING PROGRAM

## 2022-02-21 PROCEDURE — A9270 NON-COVERED ITEM OR SERVICE: HCPCS | Performed by: HOSPITALIST

## 2022-02-21 PROCEDURE — 700101 HCHG RX REV CODE 250: Performed by: INTERNAL MEDICINE

## 2022-02-21 PROCEDURE — 82962 GLUCOSE BLOOD TEST: CPT | Mod: 91

## 2022-02-21 PROCEDURE — 83735 ASSAY OF MAGNESIUM: CPT

## 2022-02-21 PROCEDURE — 700102 HCHG RX REV CODE 250 W/ 637 OVERRIDE(OP): Performed by: INTERNAL MEDICINE

## 2022-02-21 PROCEDURE — 94003 VENT MGMT INPAT SUBQ DAY: CPT

## 2022-02-21 PROCEDURE — A9270 NON-COVERED ITEM OR SERVICE: HCPCS | Performed by: INTERNAL MEDICINE

## 2022-02-21 PROCEDURE — 700105 HCHG RX REV CODE 258: Performed by: INTERNAL MEDICINE

## 2022-02-21 PROCEDURE — 94760 N-INVAS EAR/PLS OXIMETRY 1: CPT

## 2022-02-21 PROCEDURE — 770022 HCHG ROOM/CARE - ICU (200)

## 2022-02-21 PROCEDURE — 86140 C-REACTIVE PROTEIN: CPT

## 2022-02-21 PROCEDURE — 700102 HCHG RX REV CODE 250 W/ 637 OVERRIDE(OP): Performed by: STUDENT IN AN ORGANIZED HEALTH CARE EDUCATION/TRAINING PROGRAM

## 2022-02-21 PROCEDURE — 71045 X-RAY EXAM CHEST 1 VIEW: CPT

## 2022-02-21 PROCEDURE — 99291 CRITICAL CARE FIRST HOUR: CPT | Performed by: STUDENT IN AN ORGANIZED HEALTH CARE EDUCATION/TRAINING PROGRAM

## 2022-02-21 RX ORDER — FUROSEMIDE 10 MG/ML
40 INJECTION INTRAMUSCULAR; INTRAVENOUS
Status: DISCONTINUED | OUTPATIENT
Start: 2022-02-21 | End: 2022-02-25

## 2022-02-21 RX ORDER — METRONIDAZOLE 500 MG/1
500 TABLET ORAL EVERY 8 HOURS
Status: DISCONTINUED | OUTPATIENT
Start: 2022-02-21 | End: 2022-02-23

## 2022-02-21 RX ADMIN — SENNOSIDES AND DOCUSATE SODIUM 2 TABLET: 50; 8.6 TABLET ORAL at 05:26

## 2022-02-21 RX ADMIN — FUROSEMIDE 40 MG: 40 INJECTION, SOLUTION INTRAMUSCULAR; INTRAVENOUS at 18:05

## 2022-02-21 RX ADMIN — PROPOFOL 30 MCG/KG/MIN: 10 INJECTION, EMULSION INTRAVENOUS at 05:01

## 2022-02-21 RX ADMIN — LEVOTHYROXINE SODIUM 25 MCG: 0.03 TABLET ORAL at 05:25

## 2022-02-21 RX ADMIN — CEFEPIME 2 G: 2 INJECTION, POWDER, FOR SOLUTION INTRAVENOUS at 18:03

## 2022-02-21 RX ADMIN — ATORVASTATIN CALCIUM 40 MG: 40 TABLET, FILM COATED ORAL at 05:26

## 2022-02-21 RX ADMIN — Medication 150 MCG/HR: at 07:10

## 2022-02-21 RX ADMIN — OXYCODONE HYDROCHLORIDE AND ACETAMINOPHEN 500 MG: 500 TABLET ORAL at 18:02

## 2022-02-21 RX ADMIN — METRONIDAZOLE 500 MG: 500 TABLET ORAL at 14:53

## 2022-02-21 RX ADMIN — SERTRALINE HYDROCHLORIDE 50 MG: 50 TABLET ORAL at 05:26

## 2022-02-21 RX ADMIN — SENNOSIDES AND DOCUSATE SODIUM 2 TABLET: 50; 8.6 TABLET ORAL at 18:02

## 2022-02-21 RX ADMIN — NYSTATIN: 100000 POWDER TOPICAL at 18:02

## 2022-02-21 RX ADMIN — INSULIN HUMAN 3 UNITS: 100 INJECTION, SOLUTION PARENTERAL at 21:02

## 2022-02-21 RX ADMIN — MULTIPLE VITAMINS W/ MINERALS TAB 1 TABLET: TAB at 05:25

## 2022-02-21 RX ADMIN — INSULIN HUMAN 1 UNITS: 100 INJECTION, SOLUTION PARENTERAL at 11:01

## 2022-02-21 RX ADMIN — FAMOTIDINE 20 MG: 20 TABLET ORAL at 05:26

## 2022-02-21 RX ADMIN — INSULIN GLARGINE 5 UNITS: 100 INJECTION, SOLUTION SUBCUTANEOUS at 05:53

## 2022-02-21 RX ADMIN — PROPOFOL 30 MCG/KG/MIN: 10 INJECTION, EMULSION INTRAVENOUS at 00:56

## 2022-02-21 RX ADMIN — INSULIN HUMAN 1 UNITS: 100 INJECTION, SOLUTION PARENTERAL at 05:58

## 2022-02-21 RX ADMIN — HEPARIN SODIUM 18 UNITS/KG/HR: 5000 INJECTION, SOLUTION INTRAVENOUS at 20:16

## 2022-02-21 RX ADMIN — METRONIDAZOLE 500 MG: 500 INJECTION, SOLUTION INTRAVENOUS at 05:26

## 2022-02-21 RX ADMIN — INSULIN GLARGINE 5 UNITS: 100 INJECTION, SOLUTION SUBCUTANEOUS at 18:07

## 2022-02-21 RX ADMIN — PROPOFOL 5 MCG/KG/MIN: 10 INJECTION, EMULSION INTRAVENOUS at 18:02

## 2022-02-21 RX ADMIN — INSULIN HUMAN 3 UNITS: 100 INJECTION, SOLUTION PARENTERAL at 18:08

## 2022-02-21 RX ADMIN — OXYCODONE HYDROCHLORIDE AND ACETAMINOPHEN 500 MG: 500 TABLET ORAL at 05:26

## 2022-02-21 RX ADMIN — NYSTATIN: 100000 POWDER TOPICAL at 05:34

## 2022-02-21 RX ADMIN — FUROSEMIDE 40 MG: 40 INJECTION, SOLUTION INTRAMUSCULAR; INTRAVENOUS at 11:01

## 2022-02-21 RX ADMIN — CEFEPIME 2 G: 2 INJECTION, POWDER, FOR SOLUTION INTRAVENOUS at 05:01

## 2022-02-21 RX ADMIN — ASPIRIN 325 MG ORAL TABLET 325 MG: 325 PILL ORAL at 05:26

## 2022-02-21 RX ADMIN — METRONIDAZOLE 500 MG: 500 TABLET ORAL at 20:53

## 2022-02-21 RX ADMIN — PROPOFOL 30 MCG/KG/MIN: 10 INJECTION, EMULSION INTRAVENOUS at 23:03

## 2022-02-21 ASSESSMENT — PAIN DESCRIPTION - PAIN TYPE
TYPE: ACUTE PAIN

## 2022-02-21 ASSESSMENT — FIBROSIS 4 INDEX: FIB4 SCORE: 0.53

## 2022-02-21 NOTE — PROGRESS NOTES
Multidisciplinary rounds completed; discussed recent CXR, decreased UOP with increased creatinine and BUN; discussed heparin drip and that drip is therapeutic at this time - labs due at 2100; reported fluids were stopped overnight due to increased concern for fluid overload; received orders for BID lasix, complete a SAT and if appropriate, SBT, wean peep to 5.     Fentanyl drip off at 1055 and propofol drip off at 1108 for SAT.

## 2022-02-21 NOTE — CARE PLAN
Problem: Nutritional:  Goal: Nutrition support tolerated and meeting greater than 85% of estimated needs  Outcome: Not Met   See RD note.

## 2022-02-21 NOTE — PROGRESS NOTES
1646 KANCHAN Schumacher at bedside, updates provided.   1724 Dr. Rivera discussing care with KANCHAN. Winsome stated that patient is unable to take care of herself at home and she was also refusing to go to a home. Winsome stated she had a small falling out due to the patient not wanting to take care of herself. Patient has a sister in [place] that she speaks to occasionally. Decided to wait on consulting surgeon for wound on left buttock. Decided to change code status to DNR.  1736 Winsome left the ICU and stated she will call for updates.

## 2022-02-21 NOTE — FLOWSHEET NOTE
02/20/22 1850   Events/Summary/Plan   Events/Summary/Plan vent check   Skin Inspection Respiratory Device Intact   CO2 Monitoring   ETCO2 (mmHg) 50   $ ETCO2 Monitoring Yes   Respiratory Assessment   Level of Consciousness Responds to voice   Chest Exam   Work Of Breathing / Effort Vented   Breath Sounds   RUL Breath Sounds Diminished;Fine Crackles   RML Breath Sounds Diminished   RLL Breath Sounds Diminished   MOSHE Breath Sounds Crackles   LLL Breath Sounds Diminished   Secretions   Cough Productive   How Sputum Obtained Oropharyngeal;Endotracheal   Sputum Amount Moderate   Sputum Color Tan   Sputum Consistency Thick;Thin   Airway ETT Oral 7.5   Placement Date/Time: 02/19/22 0830   Airway Placement: Central  Airway Type: ETT  Brand: Darion  Style: Cuffed  Airway Location: Oral  Airway Size: 7.5  Inserted In: Unit  Inserted by: (c) MD   Site Assessment Clean;Dry;Intact   Airway Tube Secured Commercial securing device   Secured At  (cm) 23   Tube Repositioned Left   Cuffless No   Oxygen   FiO2% 40 %   O2 Delivery Device Ventilator

## 2022-02-21 NOTE — CARE PLAN
The patient is Stable - Low risk of patient condition declining or worsening    Shift Goals  Clinical Goals: Decrease oxygen needs; increase mobility; increase awareness  Patient Goals: JACKY  Family Goals: JACKY    Progress made toward(s) clinical / shift goals:  Progressing    Patient is progressing towards the following goals:      Problem: Hemodynamics  Goal: Patient's hemodynamics, fluid balance and neurologic status will be stable or improve  Outcome: Progressing  Patient received lasix today; increase in urine output.   SAT - neurologic status improved. Patient now opens eyes and follows commands.   BP stable - off levophed since 1230.      Problem: Urinary - Renal Perfusion  Goal: Ability to achieve and maintain adequate renal perfusion and functioning will improve  Outcome: Progressing   Patient received lasix today; increase in urine output.     Problem: Mechanical Ventilation  Goal: Successful weaning off mechanical ventilator, spontaneously maintains adequate gas exchange  Outcome: Progressing  Patient had a successful SAT today; was unable to perform SBT. However, vent settings were able to be weaned.      Problem: Fall Risk  Goal: Patient will remain free from falls  Outcome: Progressing   Patient has remained free from falls this admission.

## 2022-02-21 NOTE — PROGRESS NOTES
Assumed care from Jessica. Verified current drips and their rates (fentanyl, heparin, and propofol). Discussed plan for today. Discussed needs. Discussed patient decision maker and POA. Discussed patient morning chest x-ray. Discussed patient reaction to precedex and need to avoid drug.

## 2022-02-21 NOTE — PROGRESS NOTES
"Pulmonary Progress Note    Date of admission  2/16/2022    Chief Complaint  76 y.o. female admitted 2/16/2022 with SOB    Hospital Course  76 y.o. female who presented 2/16/2022 with leg swelling b/l with cellulitis of right lower extremity, Sob and diarrhea. Rx for cellulitis and heart failure. Blood cxs negative   Hx of chronic obstructive pulmonary disease (no PFTs and no CT chest ), chronic hypoxemic respiratory failure on 2 L of oxygen at baseline, hypertension, diabetes, dyslipidemia and hypothyroidism and moderate aortic stenosis     Her Fio2 requirement went up from 2 l to 5 l and was wheezing and dyspneac on day2 of hospitalization  started on rx for COPD with steroids. She improved with Fio2 requirement back to baseline    2/19: Developed acute respiratory distress and AMS on 2/19/22 a.m- hypercapneic with ph 7.19/71/296 on 100% NRB  Pt Altered and not following commands  Troponin increased to 131 and ekg initially done concerning for ST depression and with AMS and paco2 dropped to 69 after 45 mins of BIPAP decision made to intubate.  Wbc increased to 16.7, creatinine worsening to 1.25, probnp 3459 and trop 131 with lactic 3.8,      D/w cardiology DR. Esquivel  re: NSTEMI  At this time resp distress from pulm cause hypercapneic resp failure  No ST elevation  Trend troponin if markedly in 1000  Level to reassess  Heparin, Asa, lipitor    Interval Problem Update  Reviewed last 24 hour events:  Chart review from the past 24 hours includes imaging, laboratory studies, vital signs and notes available.  Pertinent data for today    Cardiac:  Bradycardic on precedex, now on prop/fent  ECHO 2/19  \"Limited echo to evaluate wall motion.   Prior echo on 2/17/2022.   Mildly reduced left ventricular systolic function.  The left ventricular ejection fraction is visually estimated to be 40%.  Hypokinesis of the apical inferior wall and apical seputm.\"  Pulm: 40%/peep 6  Neuro:  Wakes up off sedation and intermittently " follows  Heme:  NSTEMI, repeat EKG shows bradycardia and inverted T waves  Heparin therapeutic   I/O: +1.7L and cr slowly rising - bumex was held due to possible overdiuresis but suspect patient is becoming overloaded again   ID:  Blood cx: anerobic bottle + cocci still waiting on speciation  Decub ulcer unstageable - with purulent material - sent for cx -- wound care reconsulted  GI/endo:  Change insulin drip to glargine 6 units bid  Labs/Imaging:  Reviewed  Lines:  2/19 right IJ  Mobility:  bedrest  Symptoms: unable to assess    Review of Systems  Review of Systems   Unable to perform ROS: Acuity of condition        Vital Signs for last 24 hours   Temp:  [36.6 °C (97.9 °F)-37.2 °C (99 °F)] 36.7 °C (98.1 °F)  Pulse:  [49-78] 54  Resp:  [9-26] 18  BP: ()/(39-82) 138/52  SpO2:  [87 %-97 %] 94 %         Respiratory Information for the last 24 hours  Vent Mode: APVCMV  Rate (breaths/min): 20  Vt Target (mL): 320  PEEP/CPAP: 5  MAP: 12  Length of Weaning Trial (Hours): 30 min  Control VTE (exp VT): 410    Physical Exam   Physical Exam  Constitutional:       Appearance: She is obese.      Comments: Unkempt, intubated and sedated  Off sedation following commands but asynchronous with ventilator   HENT:      Head: Normocephalic and atraumatic.      Mouth/Throat:      Mouth: Mucous membranes are dry.   Eyes:      Extraocular Movements: Extraocular movements intact.      Pupils: Pupils are equal, round, and reactive to light.   Cardiovascular:      Rate and Rhythm: Bradycardia present.      Heart sounds: Murmur heard.   Pulmonary:      Comments: Diminished BS b/l  Abdominal:      General: Bowel sounds are normal. There is distension.      Palpations: Abdomen is soft.   Musculoskeletal:      Cervical back: Neck supple.      Right lower leg: Edema present.      Left lower leg: Edema present.   Skin:     General: Skin is warm and dry.      Comments: erythema lower extremities   Neurological:      Comments: Unable to  assess  sedated   Psychiatric:      Comments: Unable to assess  Sedated and intubated         Medications  Current Facility-Administered Medications   Medication Dose Route Frequency Provider Last Rate Last Admin   • furosemide (LASIX) injection 40 mg  40 mg Intravenous BID DIURETIC Lien Lee M.D.   40 mg at 02/21/22 1101   • metroNIDAZOLE (FLAGYL) tablet 500 mg  500 mg Enteral Tube Q8HRS Lien Lee M.D.   500 mg at 02/21/22 1453   • acetaminophen (Tylenol) tablet 650 mg  650 mg Enteral Tube Q6HRS PRN Ashia Bertrand M.D.       • insulin regular (HumuLIN R,NovoLIN R) injection  1-6 Units Subcutaneous 4X/DAY ACHS Ashia Bertrand M.D.   1 Units at 02/21/22 1101    And   • dextrose 50% (D50W) injection 50 mL  50 mL Intravenous Q15 MIN PRN Ashia Bertrand M.D.       • insulin GLARGINE (Lantus,Semglee) injection  5 Units Subcutaneous BID Ashia Bertarnd M.D.   5 Units at 02/21/22 0553   • propofol (DIPRIVAN) injection  0-80 mcg/kg/min Intravenous Continuous Ashia Bertrand M.D.   Paused at 02/21/22 1108   • cefepime (Maxipime) 2 g in  mL IVPB  2 g Intravenous Q12HRS Ashia Bertrand M.D.   Stopped at 02/21/22 0531   • dakins 0.125% (1/4 strength) topical soln   Topical BID Ashia Bertrand M.D.   40 mL at 02/20/22 1807   • prochlorperazine (COMPAZINE) injection 10 mg  10 mg Intravenous Q6HRS PRN Alley Longo M.D.   10 mg at 02/19/22 0013   • heparin infusion 25,000 units in 500 mL 0.45% NACL  0-30 Units/kg/hr (Adjusted) Intravenous Continuous Ashia Bertrand M.D. 26.3 mL/hr at 02/21/22 0709 18 Units/kg/hr at 02/21/22 0709   • heparin injection 2,900 Units  40 Units/kg (Adjusted) Intravenous PRN Ashia Bertrand M.D.       • aspirin (ASA) tablet 325 mg  325 mg Enteral Tube DAILY Ashia Bertrand M.D.   325 mg at 02/21/22 0526   • Respiratory Therapy Consult   Nebulization Continuous RT Ashia Bertrand M.D.       • famotidine  (PEPCID) tablet 20 mg  20 mg Enteral Tube DAILY Ashia Bertrand M.D.   20 mg at 02/21/22 0526    Or   • famotidine (PEPCID) injection 20 mg  20 mg Intravenous DAILY Ashia Bertrand M.D.   20 mg at 02/19/22 0908   • senna-docusate (PERICOLACE or SENOKOT S) 8.6-50 MG per tablet 2 Tablet  2 Tablet Enteral Tube BID Ashia Bertrand M.D.   2 Tablet at 02/21/22 0526    And   • polyethylene glycol/lytes (MIRALAX) PACKET 1 Packet  1 Packet Enteral Tube QDAY PRN Ashia Bertrand M.D.        And   • magnesium hydroxide (MILK OF MAGNESIA) suspension 30 mL  30 mL Enteral Tube QDAY PRN Ashia Bertrand M.D.        And   • bisacodyl (DULCOLAX) suppository 10 mg  10 mg Rectal QDAY PRN Ashia Bertrand M.D.       • MD Alert...ICU Electrolyte Replacement per Pharmacy   Other PHARMACY TO DOSE Ashia Bertrand M.D.       • lidocaine (XYLOCAINE) 1 % injection 2 mL  2 mL Tracheal Tube Q30 MIN PRN Ashia Bertrand M.D.       • fentaNYL (SUBLIMAZE) 50 mcg/mL in 50mL   Intravenous Continuous Ashia Bertrand M.D.   Paused at 02/21/22 1055   • norepinephrine (Levophed) 8 mg in 250 mL NS infusion (premix)  0-30 mcg/min Intravenous Continuous Ashia Bertrand M.D.   Paused at 02/21/22 1230   • ascorbic acid (Vitamin C) tablet 500 mg  500 mg Enteral Tube BID Ashia Bertrand M.D.   500 mg at 02/21/22 0526   • therapeutic multivitamin-minerals (THERAGRAN-M) tablet 1 Tablet  1 Tablet Enteral Tube DAILY Thomas Stroud M.D.   1 Tablet at 02/21/22 0525   • atorvastatin (LIPITOR) tablet 40 mg  40 mg Enteral Tube DAILY Thomas Stroud M.D.   40 mg at 02/21/22 0526   • [Held by provider] bumetanide (BUMEX) tablet 1 mg  1 mg Enteral Tube Q DAY Thomas Stroud M.D.   1 mg at 02/20/22 0519   • levothyroxine (SYNTHROID) tablet 25 mcg  25 mcg Enteral Tube DAILY Thomas Stroud M.D.   25 mcg at 02/21/22 0525   • sertraline (Zoloft) tablet 50 mg  50 mg Enteral Tube DAILY Thomas Stroud M.D.   50  mg at 02/21/22 0526   • ondansetron (ZOFRAN) syringe/vial injection 4 mg  4 mg Intravenous Q4HRS PRN Alley Longo M.D.   4 mg at 02/18/22 2250   • Respiratory Therapy Consult   Nebulization Continuous RT Allyson Ulloa D.O.       • ipratropium-albuterol (DUONEB) nebulizer solution  3 mL Nebulization Q2HRS PRN (RT) Allyson Ulloa D.O.       • nystatin (MYCOSTATIN) powder   Topical BID Allyson Ulloa D.O.   Given at 02/21/22 0534   • Pharmacy Consult Request  1 Each Other PHARMACY TO DOSE Thomas Stroud M.D.       • [Held by provider] benazepril (LOTENSIN) tablet 20 mg  20 mg Oral Q DAY Thomas Stroud M.D.       • lactated ringers infusion (BOLUS)  500 mL Intravenous Once PRN Thomas Stroud M.D.       • lactated ringers infusion (BOLUS): BMI greater than 30  30 mL/kg (Ideal) Intravenous Once PRN Thomas Stroud M.D.           Fluids    Intake/Output Summary (Last 24 hours) at 2/21/2022 1502  Last data filed at 2/21/2022 1400  Gross per 24 hour   Intake 2513.6 ml   Output 1302 ml   Net 1211.6 ml       Laboratory  Recent Labs     02/19/22  0753 02/19/22  1054 02/20/22  0504   ISTATAPH 7.205* 7.248* 7.272*   ISTATAPCO2 69.1* 65.3* 59.1*   ISTATAPO2 63* 89* 102*   ISTATATCO2 29 30 29   [id number] 85* 95 97   ISTATARTHCO3 27.3* 28.5* 27.2*   ISTATARTBE -2 0 0   ISTATTEMP see below 99.3 F see below   ISTATFIO2 70 50 40   ISTATSPEC Arterial Arterial Arterial   ISTATAPHTC  --  7.243*  --    [id number]  --  91*  --          Recent Labs     02/19/22  1450 02/20/22  0345 02/21/22  0315   SODIUM 133* 138 139   POTASSIUM 5.0 4.9 4.8   CHLORIDE 98 104 103   CO2 23 24 22   BUN 58* 60* 73*   CREATININE 1.44* 1.42* 1.59*   MAGNESIUM  --  2.1 2.2   PHOSPHORUS  --  3.4 5.0*   CALCIUM 10.0 10.1 9.6     Recent Labs     02/19/22  1100 02/19/22  1450 02/20/22  0345 02/21/22  0315   ALTSGPT 52*  --   --  30   ASTSGOT 34  --   --  16   ALKPHOSPHAT 237*  --   --  177*   TBILIRUBIN 0.3  --   --  <0.2   GLUCOSE 433*  329* 136* 158*     Recent Labs     02/19/22 0317 02/19/22  1100 02/20/22  0345 02/21/22  0315   WBC 16.7*  --  19.1* 16.7*   NEUTSPOLYS  --   --  76.50* 74.60*   LYMPHOCYTES  --   --  13.40* 14.10*   MONOCYTES  --   --  6.90 5.10   EOSINOPHILS  --   --  0.20 2.30   BASOPHILS  --   --  0.20 0.30   ASTSGOT  --  34  --  16   ALTSGPT  --  52*  --  30   ALKPHOSPHAT  --  237*  --  177*   TBILIRUBIN  --  0.3  --  <0.2     Recent Labs     02/19/22 0317 02/19/22  0646 02/20/22  0345 02/21/22 0315   RBC 3.05*  --  3.13* 2.91*   HEMOGLOBIN 9.5*  --  9.8* 9.1*   HEMATOCRIT 31.7*  --  32.4* 31.3*   PLATELETCT 370  --  490* 421   PROTHROMBTM  --  14.3  --   --    APTT  --  27.2  --   --    INR  --  1.20*  --   --        Imaging  CXR: diffuse reticular marking suggestive of pulm edema  ET tube in good position  Feeding tube below the diaphragm     Assessment/Plan  SRIRAM (acute kidney injury) (HCC)  Assessment & Plan  - initially thought to be 2/2 overdiuresis but now Cr trending up again likely from some fluid overload  - lasix 40mg  - avoid nephrotoxins and NSAIDs    Bacteremia  Assessment & Plan  - cellulitis and decub ulcer as well as diarrhea  - blood cx + for gram + cocci in anaerobic bottle  - decub ulcer unstageable and cultures sent 2/20  - wound care consulted    NSTEMI (non-ST elevated myocardial infarction) ([facility])  Assessment & Plan  - may be supply and demand  - continue to trend troponin  - d/w cardiology see body of text on 2/19  - Heparin, ASA and lipitor  - Hyperdynamic last ECHO repeat 2/19 showed EF now 40%    COPD with acute exacerbation ([facility])  Assessment & Plan  Continue prednisone until we can assess synchrony with the ventilator and reassess if still needs steroids  No secretions  Duonebs prn    Decubitus ulcer- (present on admission)  Assessment & Plan  Unstageable   MVI daily and vitamin c bid   Wound care reconsulted    Cellulitis of left lower extremity- (present on admission)  Assessment & Plan  Gram +  cocci in anaerobic bottle  Continue flagyl and cefepime     Acute on chronic respiratory failure with hypoxia (HCC)- (present on admission)  Assessment & Plan  COPD (no PFTs or CT chest), SONIA, OHS and heart failure  Hypercapnea was predominant feature that resulted in intubation 2/19  ? COPD and heart failure and NSTEMI combination  Not clear  Not wheezing  CXR suggests heart failure  Driving pressure not high but asynchronous with ventilator and so extending expiration time closer to 3 to allow for better asynchrony        DM (diabetes mellitus) (HCC)- (present on admission)  Assessment & Plan  Lantus 6 units bid  Trying to keep FS between 110-180       VTE:  Lovenox  Ulcer: H2 Antagonist  Lines: Nye Catheter  Ongoing indication addressed    I have performed a physical exam and reviewed and updated ROS and Plan today (2/21/2022). In review of yesterday's note (2/20/2022), there are no changes except as documented above.     Discussed patient condition and risk of morbidity and/or mortality with Charge nurse / hot rounds   KANCHAN is her friend Rhonda Hassan [phone] and has been updated (She lives in [place] but in [place] currently)  The patient remains critically ill.  Critical care time = 45 minutes in directly providing and coordinating critical care and extensive data review.  No time overlap and excludes procedures.        Lien Lee MD  Pulmonary and Critical Care Medicine  [facility]

## 2022-02-21 NOTE — THERAPY
Missed Therapy     Patient Name: Gabrielle Olmstead  Age:  76 y.o., Sex:  female  Medical Record #: [id number]  Today's Date: 2/21/2022    Discussed missed therapy with RN       02/21/22 0808   Interdisciplinary Plan of Care Collaboration   Collaboration Comments Pt with medical decline requiring intubation.  Not medically appropriate for OT intervention at this time.  Please re-order therapy when/if pt becomes medically stable and able to participate in therapeutic activity.  Thank you.

## 2022-02-21 NOTE — PROGRESS NOTES
Dr. Shankar notified of new onset of crackles in lungs, O2 sats decreasing from 94-95% at start of shift to 89-92%, and decreased UO. Discussed pausing continuous IV fluids. Dr. Shankar ordered CXR and discontinued continuous IV fluids. IV fluids stopped at 2313 and patient's FiO2 on vent titrated up to 45% per respiratory.

## 2022-02-22 LAB
ANION GAP SERPL CALC-SCNC: 11 MMOL/L (ref 7–16)
BACTERIA WND AEROBE CULT: NORMAL
BASOPHILS # BLD AUTO: 0.3 % (ref 0–1.8)
BASOPHILS # BLD: 0.04 K/UL (ref 0–0.12)
BUN SERPL-MCNC: 73 MG/DL (ref 8–22)
CALCIUM SERPL-MCNC: 9.2 MG/DL (ref 8.4–10.2)
CHLORIDE SERPL-SCNC: 100 MMOL/L (ref 96–112)
CO2 SERPL-SCNC: 25 MMOL/L (ref 20–33)
CREAT SERPL-MCNC: 1.34 MG/DL (ref 0.5–1.4)
CRP SERPL HS-MCNC: 4.76 MG/DL (ref 0–0.75)
EKG IMPRESSION: NORMAL
EOSINOPHIL # BLD AUTO: 0.54 K/UL (ref 0–0.51)
EOSINOPHIL NFR BLD: 3.5 % (ref 0–6.9)
ERYTHROCYTE [DISTWIDTH] IN BLOOD BY AUTOMATED COUNT: 52.5 FL (ref 35.9–50)
GLUCOSE BLD STRIP.AUTO-MCNC: 257 MG/DL (ref 65–99)
GLUCOSE BLD STRIP.AUTO-MCNC: 353 MG/DL (ref 65–99)
GLUCOSE BLD-MCNC: 185 MG/DL (ref 65–99)
GLUCOSE SERPL-MCNC: 211 MG/DL (ref 65–99)
GRAM STN SPEC: NORMAL
HCT VFR BLD AUTO: 30.4 % (ref 37–47)
HGB BLD-MCNC: 9.1 G/DL (ref 12–16)
IMM GRANULOCYTES # BLD AUTO: 0.98 K/UL (ref 0–0.11)
IMM GRANULOCYTES NFR BLD AUTO: 6.4 % (ref 0–0.9)
LYMPHOCYTES # BLD AUTO: 1.92 K/UL (ref 1–4.8)
LYMPHOCYTES NFR BLD: 12.5 % (ref 22–41)
MAGNESIUM SERPL-MCNC: 2.1 MG/DL (ref 1.5–2.5)
MCH RBC QN AUTO: 31.5 PG (ref 27–33)
MCHC RBC AUTO-ENTMCNC: 29.9 G/DL (ref 33.6–35)
MCV RBC AUTO: 105.2 FL (ref 81.4–97.8)
MONOCYTES # BLD AUTO: 0.68 K/UL (ref 0–0.85)
MONOCYTES NFR BLD AUTO: 4.4 % (ref 0–13.4)
NEUTROPHILS # BLD AUTO: 11.19 K/UL (ref 2–7.15)
NEUTROPHILS NFR BLD: 72.9 % (ref 44–72)
NRBC # BLD AUTO: 0.04 K/UL
NRBC BLD-RTO: 0.3 /100 WBC
PHOSPHATE SERPL-MCNC: 4.1 MG/DL (ref 2.5–4.5)
PLATELET # BLD AUTO: 397 K/UL (ref 164–446)
PMV BLD AUTO: 10.9 FL (ref 9–12.9)
POTASSIUM SERPL-SCNC: 4.1 MMOL/L (ref 3.6–5.5)
PREALB SERPL-MCNC: 19.1 MG/DL (ref 18–38)
RBC # BLD AUTO: 2.89 M/UL (ref 4.2–5.4)
SIGNIFICANT IND 70042: NORMAL
SITE SITE: NORMAL
SODIUM SERPL-SCNC: 136 MMOL/L (ref 135–145)
SOURCE SOURCE: NORMAL
TRIGL SERPL-MCNC: 225 MG/DL (ref 0–149)
UFH PPP CHRO-ACNC: 0.31 IU/ML
UFH PPP CHRO-ACNC: 0.49 IU/ML
WBC # BLD AUTO: 15.4 K/UL (ref 4.8–10.8)

## 2022-02-22 PROCEDURE — 85025 COMPLETE CBC W/AUTO DIFF WBC: CPT

## 2022-02-22 PROCEDURE — 700111 HCHG RX REV CODE 636 W/ 250 OVERRIDE (IP): Performed by: INTERNAL MEDICINE

## 2022-02-22 PROCEDURE — 94003 VENT MGMT INPAT SUBQ DAY: CPT

## 2022-02-22 PROCEDURE — 82962 GLUCOSE BLOOD TEST: CPT | Mod: 91

## 2022-02-22 PROCEDURE — 700102 HCHG RX REV CODE 250 W/ 637 OVERRIDE(OP): Performed by: STUDENT IN AN ORGANIZED HEALTH CARE EDUCATION/TRAINING PROGRAM

## 2022-02-22 PROCEDURE — 700111 HCHG RX REV CODE 636 W/ 250 OVERRIDE (IP)

## 2022-02-22 PROCEDURE — 85520 HEPARIN ASSAY: CPT | Mod: 91

## 2022-02-22 PROCEDURE — 99291 CRITICAL CARE FIRST HOUR: CPT | Performed by: STUDENT IN AN ORGANIZED HEALTH CARE EDUCATION/TRAINING PROGRAM

## 2022-02-22 PROCEDURE — 700102 HCHG RX REV CODE 250 W/ 637 OVERRIDE(OP): Performed by: INTERNAL MEDICINE

## 2022-02-22 PROCEDURE — 700102 HCHG RX REV CODE 250 W/ 637 OVERRIDE(OP): Performed by: HOSPITALIST

## 2022-02-22 PROCEDURE — A9270 NON-COVERED ITEM OR SERVICE: HCPCS | Performed by: INTERNAL MEDICINE

## 2022-02-22 PROCEDURE — 700105 HCHG RX REV CODE 258: Performed by: INTERNAL MEDICINE

## 2022-02-22 PROCEDURE — 94799 UNLISTED PULMONARY SVC/PX: CPT

## 2022-02-22 PROCEDURE — A9270 NON-COVERED ITEM OR SERVICE: HCPCS | Performed by: STUDENT IN AN ORGANIZED HEALTH CARE EDUCATION/TRAINING PROGRAM

## 2022-02-22 PROCEDURE — 84134 ASSAY OF PREALBUMIN: CPT

## 2022-02-22 PROCEDURE — 84100 ASSAY OF PHOSPHORUS: CPT

## 2022-02-22 PROCEDURE — 700111 HCHG RX REV CODE 636 W/ 250 OVERRIDE (IP): Performed by: STUDENT IN AN ORGANIZED HEALTH CARE EDUCATION/TRAINING PROGRAM

## 2022-02-22 PROCEDURE — A9270 NON-COVERED ITEM OR SERVICE: HCPCS | Performed by: HOSPITALIST

## 2022-02-22 PROCEDURE — 83735 ASSAY OF MAGNESIUM: CPT

## 2022-02-22 PROCEDURE — 84478 ASSAY OF TRIGLYCERIDES: CPT

## 2022-02-22 PROCEDURE — 80048 BASIC METABOLIC PNL TOTAL CA: CPT

## 2022-02-22 PROCEDURE — 86140 C-REACTIVE PROTEIN: CPT

## 2022-02-22 PROCEDURE — 93010 ELECTROCARDIOGRAM REPORT: CPT | Performed by: INTERNAL MEDICINE

## 2022-02-22 PROCEDURE — 94760 N-INVAS EAR/PLS OXIMETRY 1: CPT

## 2022-02-22 PROCEDURE — 770022 HCHG ROOM/CARE - ICU (200)

## 2022-02-22 RX ORDER — HEPARIN SODIUM 1000 [USP'U]/ML
INJECTION, SOLUTION INTRAVENOUS; SUBCUTANEOUS
Status: COMPLETED
Start: 2022-02-22 | End: 2022-02-22

## 2022-02-22 RX ADMIN — HEPARIN SODIUM 20 UNITS/KG/HR: 5000 INJECTION, SOLUTION INTRAVENOUS at 12:42

## 2022-02-22 RX ADMIN — LEVOTHYROXINE SODIUM 25 MCG: 0.03 TABLET ORAL at 05:34

## 2022-02-22 RX ADMIN — METRONIDAZOLE 500 MG: 500 TABLET ORAL at 21:03

## 2022-02-22 RX ADMIN — SODIUM HYPOCHLORITE 20 ML: 1.25 SOLUTION TOPICAL at 05:51

## 2022-02-22 RX ADMIN — HEPARIN SODIUM 20 UNITS/KG/HR: 5000 INJECTION, SOLUTION INTRAVENOUS at 01:10

## 2022-02-22 RX ADMIN — OXYCODONE HYDROCHLORIDE AND ACETAMINOPHEN 500 MG: 500 TABLET ORAL at 05:32

## 2022-02-22 RX ADMIN — ATORVASTATIN CALCIUM 40 MG: 40 TABLET, FILM COATED ORAL at 05:32

## 2022-02-22 RX ADMIN — FAMOTIDINE 20 MG: 20 TABLET ORAL at 05:33

## 2022-02-22 RX ADMIN — FUROSEMIDE 40 MG: 40 INJECTION, SOLUTION INTRAMUSCULAR; INTRAVENOUS at 05:33

## 2022-02-22 RX ADMIN — INSULIN HUMAN 5 UNITS: 100 INJECTION, SOLUTION PARENTERAL at 21:07

## 2022-02-22 RX ADMIN — SENNOSIDES AND DOCUSATE SODIUM 2 TABLET: 50; 8.6 TABLET ORAL at 17:22

## 2022-02-22 RX ADMIN — HEPARIN SODIUM 2900 UNITS: 1000 INJECTION, SOLUTION INTRAVENOUS; SUBCUTANEOUS at 01:15

## 2022-02-22 RX ADMIN — METRONIDAZOLE 500 MG: 500 TABLET ORAL at 05:34

## 2022-02-22 RX ADMIN — FUROSEMIDE 40 MG: 40 INJECTION, SOLUTION INTRAMUSCULAR; INTRAVENOUS at 15:16

## 2022-02-22 RX ADMIN — OXYCODONE HYDROCHLORIDE AND ACETAMINOPHEN 500 MG: 500 TABLET ORAL at 17:22

## 2022-02-22 RX ADMIN — NYSTATIN: 100000 POWDER TOPICAL at 05:33

## 2022-02-22 RX ADMIN — MULTIPLE VITAMINS W/ MINERALS TAB 1 TABLET: TAB at 05:33

## 2022-02-22 RX ADMIN — CEFEPIME 2 G: 2 INJECTION, POWDER, FOR SOLUTION INTRAVENOUS at 05:34

## 2022-02-22 RX ADMIN — CEFEPIME 2 G: 2 INJECTION, POWDER, FOR SOLUTION INTRAVENOUS at 17:22

## 2022-02-22 RX ADMIN — SERTRALINE HYDROCHLORIDE 50 MG: 50 TABLET ORAL at 05:34

## 2022-02-22 RX ADMIN — PROPOFOL 30 MCG/KG/MIN: 10 INJECTION, EMULSION INTRAVENOUS at 16:02

## 2022-02-22 RX ADMIN — PROPOFOL 30 MCG/KG/MIN: 10 INJECTION, EMULSION INTRAVENOUS at 04:42

## 2022-02-22 RX ADMIN — INSULIN HUMAN 4 UNITS: 100 INJECTION, SOLUTION PARENTERAL at 17:24

## 2022-02-22 RX ADMIN — INSULIN HUMAN 3 UNITS: 100 INJECTION, SOLUTION PARENTERAL at 10:57

## 2022-02-22 RX ADMIN — SENNOSIDES AND DOCUSATE SODIUM 2 TABLET: 50; 8.6 TABLET ORAL at 05:34

## 2022-02-22 RX ADMIN — PROPOFOL 40 MCG/KG/MIN: 10 INJECTION, EMULSION INTRAVENOUS at 21:21

## 2022-02-22 RX ADMIN — INSULIN GLARGINE 5 UNITS: 100 INJECTION, SOLUTION SUBCUTANEOUS at 17:24

## 2022-02-22 RX ADMIN — INSULIN HUMAN 1 UNITS: 100 INJECTION, SOLUTION PARENTERAL at 06:20

## 2022-02-22 RX ADMIN — PROPOFOL 30 MCG/KG/MIN: 10 INJECTION, EMULSION INTRAVENOUS at 10:18

## 2022-02-22 RX ADMIN — INSULIN GLARGINE 5 UNITS: 100 INJECTION, SOLUTION SUBCUTANEOUS at 06:22

## 2022-02-22 RX ADMIN — METRONIDAZOLE 500 MG: 500 TABLET ORAL at 15:16

## 2022-02-22 RX ADMIN — ASPIRIN 325 MG ORAL TABLET 325 MG: 325 PILL ORAL at 05:32

## 2022-02-22 ASSESSMENT — FIBROSIS 4 INDEX: FIB4 SCORE: 0.56

## 2022-02-22 ASSESSMENT — PAIN DESCRIPTION - PAIN TYPE
TYPE: ACUTE PAIN
TYPE: ACUTE PAIN

## 2022-02-22 NOTE — DISCHARGE PLANNING
Anticipated Discharge Disposition: Anticipate placement      Action: Discussed pt during IDT rounds. Pt has six clicks score of 16. Pt currently on vent, anticipate PT/OT eval orders once medically appropriate.     Emergency contact listed as Javier, Friend, [phone].    Barriers to Discharge: Medical Clearance     Plan: Case management to follow up with anticipated medical needs.

## 2022-02-22 NOTE — PROGRESS NOTES
Gave report to Sania; Discussed changed of status; patient HR increase at shift change, desat, restless; EKG obtained - nightshift to follow up per nurse request

## 2022-02-22 NOTE — CARE PLAN
Problem: Respiratory  Goal: Patient will achieve/maintain optimum respiratory ventilation and gas exchange  Description: Target End Date:  Prior to discharge or change in level of care    Document on Assessment flowsheet    1.  Assess and monitor rate, rhythm, depth and effort of respiration  2.  Breath sounds assessed qshift and/or as needed  3.  Assess O2 saturation, administer/titrate oxygen as ordered  4.  Position patient for maximum ventilatory efficiency  5.  Turn, cough, and deep breath with splinting to improve effectiveness  6.  Collaborate with RT to administer medication/treatments per order  7.  Encourage use of incentive spirometer and encourage patient to cough after use and utilize splinting techniques if applicable  8.  Airway suctioning  9.  Monitor sputum production for changes in color, consistency and frequency  10. Perform frequent oral hygiene  11. Alternate physical activity with rest periods  Outcome: Progressing     Problem: Mechanical Ventilation  Goal: Safe management of artificial airway and ventilation  Description: Target End Date:  when vent discontinued    Document on VAP flowsheet and Airway LDA    1.  Daily awakening trials per provider order/policy  2.  Suctioning and care of ET/Trach tube (document on LDA)  3.  Collaborate with RT to administer medications/treatments  4.  Ambu bag at bedside and available for transport  5.  Trach patient - replacement trach at bedside  6.  Provide communication tools if applicable  Outcome: Progressing  Goal: Successful weaning off mechanical ventilator, spontaneously maintains adequate gas exchange  Description: Target End Date:  when vent discontinued    1.  Follow universal weaning protocol for patients on mechanical ventilation per order  2.  Review contraindication list.  Obtain provider order to wean in presence of contraindication.  3.  Obtain Nallely Sedation-Agitation Score  4.  Nallely Score 1-2:  sedation vacation  5.  Nallely Score 3-4:   Collaborate with provider and/or RT to determine readiness for trial  6.  Begin 2 hour trial of weaning following protocol  7.  Evaluate for fatigue parameters per protocol  8.  Fatigue parameters triggered:  Stop wean and return to previous ASV% setting or increase % minute volume to offset work or breathing  Outcome: Progressing  Goal: Patient will be able to express needs and understand communication  Description: Target End Date:  when vent discontinued    1.  Assess ability to communicate and understand  2.  Provide communication tools  3.  Collaborate with Speech Therapy for [facility]  Outcome: Progressing     Problem: Bronchoconstriction  Goal: Improve in air movement and diminished wheezing  Description: Target End Date:  2 to 3 days    1.  Implement inhaled treatments  2.  Evaluate and manage medication effects  Outcome: Progressing     Problem: Ventilation  Goal: Ability to achieve and maintain unassisted ventilation or tolerate decreased levels of ventilator support  Description: Target End Date:  4 days     Document on Vent flowsheet    1.  Support and monitor invasive and noninvasive mechanical ventilation  2.  Monitor ventilator weaning response  3.  Perform ventilator associated pneumonia prevention interventions  4.  Manage ventilation therapy by monitoring diagnostic test results  Outcome: Progressing

## 2022-02-22 NOTE — THERAPY
Physical Therapy     Patient Name: Gabrielle Olmstead  Age:  76 y.o., Sex:  female  Medical Record #: [id number]  Today's Date: 2/22/2022 02/22/22 0843   Treatment Variance   Reason For Missed Therapy Medical - Patient Is Not Medically Stable   Interdisciplinary Plan of Care Collaboration   Collaboration Comments Pt was transfered to ICU and is now intubated and is not approp for PT at this time. Please re-order as pt is able to participate, DC PT.

## 2022-02-22 NOTE — CONSULTS
PMR Consult    Patient currently not appropriate for full rehab consult due to her being in the ICU, intubated, on multiple drips.    TCCs will follow along and when medically stable, will do full consult.    Will also need new therapy evaluations.    Thank you for allowing me to participate in the care of this patient.    Rhonda Glez M.D.  Physical Medicine and Rehabilitation

## 2022-02-22 NOTE — WOUND TEAM
[facility]  Inpatient Services  Wound and Skin Care Progress note    Admission Date: 2/16/2022     Last order of IP CONSULT TO WOUND CARE was found on 2/16/2022 from Hospital Encounter on 2/16/2022     HPI, PMH, SH: Reviewed    Past Surgical History:   Procedure Laterality Date   • HYSTERECTOMY ROBOTIC  11/28/2011    Performed by REKHA BYERS at SURGERY [facility] ORS   • LESION EXCISION GENERAL  11/28/2011    Performed by REKHA BYERS at SURGERY [facility] ORS   • CYST EXCISION  8/6/2010    Performed by ANA PAVON at SURGERY SAME DAY [facility] ORS     Social History     Tobacco Use   • Smoking status: Former Smoker     Packs/day: 2.00     Years: 40.00     Pack years: 80.00     Types: Cigarettes   • Smokeless tobacco: Never Used   • Tobacco comment: 45 YEARS  11/2 PPD   Substance Use Topics   • Alcohol use: No     Comment: OCCASSIONAL     Chief Complaint   Patient presents with   • Shortness of Breath     Diagnosis: Sepsis (HCC) [A41.9]  Acute on chronic respiratory failure with hypoxia (HCC) [J96.21]    Unit where seen by Wound Team: 3321/00     WOUND CONSULT/FOLLOW UP RELATED TO:  Left buttock, BLE  2/21-L buttock    WOUND HISTORY:  Patient admitted for worsening edema to BLE. She was admitted for IV abx for her left leg cellulitis. Patient has a history of a decubitus ulcer to left buttock that she developed in June 2021. L buttock wound opened yesterday.    WOUND ASSESSMENT/LDA    Wound 06/23/21 Pressure Injury Buttocks Left stage 2 POA; seen 2/18 unstageable POA (Active)        Close -up 02/21/22 1500   Site Assessment Drainage;Tan;Red    Periwound Assessment Induration;Purple;Red    Margins Defined edges    Closure Secondary intention    Drainage Amount Moderate    Drainage Description Tiwari;Serosanguineous    Treatments Cleansed    Wound Cleansing Dakin's Solution    Periwound Protectant Barrier Paste    Dressing Cleansing/Solutions 1/4 Strength Dakin's Solution    Dressing Options Moist  Roll Gauze;Silicone Adhesive Foam    Dressing Changed Changed    Dressing Status Intact    Dressing Change/Treatment Frequency Every Shift, and As Needed    NEXT Dressing Change/Treatment Date 02/21/22    NEXT Weekly Photo (Inpatient Only) 02/28/22    Pressure Injury Stage U 02/21/22 1500   Non-staged Wound Description Not applicable 02/18/22 1400   Wound Length (cm) 0.5 cm 02/21/22 1500   Wound Width (cm) 1.7 cm 02/21/22 1500   Wound Depth (cm) 2 cm 02/21/22 1500   Wound Surface Area (cm^2) 0.85 cm^2 02/21/22 1500   Wound Volume (cm^3) 1.7 cm^3 02/21/22 1500   Wound Healing % -1033 02/21/22 1500   Shape oval    Wound Odor Strong    Exposed Structures JACKY           Vascular:    NEL:   No results found.    Lab Values:    Lab Results   Component Value Date/Time    WBC 16.7 (H) 02/21/2022 03:15 AM    RBC 2.91 (L) 02/21/2022 03:15 AM    HEMOGLOBIN 9.1 (L) 02/21/2022 03:15 AM    HEMATOCRIT 31.3 (L) 02/21/2022 03:15 AM    CREACTPROT 4.58 (H) 02/21/2022 10:50 AM    HBA1C 9.0 (H) 06/24/2021 12:08 AM        Culture Results show:  No results found for this or any previous visit (from the past 720 hour(s)).    Pain Level/Medicated:  Flinching when care performed     INTERVENTIONS BY WOUND TEAM:  Chart and images reviewed. Discussed with bedside RN. All areas of concern (based on picture review, LDA review and discussion with bedside RN) have been thoroughly assessed. Documentation of areas based on significant findings. This RN in to assess patient. Performed standard wound care which includes appropriate positioning, dressing removal and non-selective debridement. Pictures and measurements obtained weekly if/when required.    LEFT BUTTOCK, LLE   Preparation for Dressing removal: drsg saturated easily removed  Non-selectively Debrided with: Dakins moistened gauze and swab into wound bed then removed  Sharp debridement: using forceps and scissors removed <20 cm^2 of tan, yellow and brown loose tissue from wound bed. No bleeding  present, more drainage noted.   Cammie wound: Cleansed with foam cleanser and washcloth, Prepped with barrier paste  Primary Dressing: Opened roll gauze moistened with 1/4 strength Dakins  Secondary (Outer) Dressing: adhesive foam     Interdisciplinary consultation: Patient, Bedside RN    EVALUATION / RATIONALE FOR TREATMENT:  Most Recent Date:  2/21: Pt's L buttock wound has opened. Has strong odor and tan/grey with some serosanguinous drainage. There is tunneling at 0300-3.2 cm, 0600 2.5 cm and 1200- 3 cm. Unable to assess tissue color or qualiy r/t small opening. Periwound skin is purplish with induration. Dakin's Solution® Quarter Strength is a broad-spectrum antimicrobial cleanser that is gentle to the skin. Effective against MRSA, VRE, other bacteria, viruses, molds, fungi and yeast. Also used for odor control. It helps debride chemically and mechanically with drsg removal.    Edema has decreased BLE and white plaque to RLE unchanged. LLE scabbing is decreasing. There are wrinkles in BLE from decreased edema. Continuing POC. She has callus to plantar both feet R-3rd MTH and L- 3-4 MTH.     2/18/22: BLE edematous and with moderate amount of xerosis. Partial thickness skin breakdown greater along left LE and erythema also present to this extremity. Viscopatch zinc impregnated gauze applied to encourage re-epithelialization of superficial 100% viable wound bed, and to provide a non-stick wound contact layer. Unable to place compression dressing at this time due to cellulitis to LE.     Left buttock with a documented stage II pressure injury that patient obtained in June 2021. Upon assessment, small wound to left buttock obscured with tan/yellow slough. Patient is incontinent of urine. Barrier paste applied for now, however recommend viscopaste.      Goals: Steady decrease in wound area and depth weekly.    WOUND TEAM PLAN OF CARE ([X] for frequency of wound follow up,):   Nursing to follow orders written for wound  care. Contact wound team if area fails to progress, deteriorates or with any questions/concerns  Dressing changes by wound team:                   Follow up 3 times weekly:                NPWT change 3 times weekly:     Follow up 1-2 times weekly:      Follow up Bi-Monthly:                   Follow up as needed:   X  Other (explain):     NURSING PLAN OF CARE ORDERS (X):  Dressing changes: See Dressing Care orders: X  Skin care: See Skin Care orders: X  RN Prevention Protocol: X  Rectal tube care: See Rectal Tube Care orders:   Other orders:    RSKIN:   CURRENTLY IN PLACE (X), APPLIED THIS VISIT (A), ORDERED (O):   Q shift Isrrael:  X  Q shift pressure point assessments:  X    Surface/Positioning   Pressure redistribution mattress            Low Airloss    x      Bariatric foam      Bariatric PAYTON     Waffle cushion        Waffle Overlay         Reposition q 2 hours  x  TAPs Turning system     Z Ravin Pillow     Offloading/Redistribution   Sacral Mepilex (Silicone dressing)   x  Heel Mepilex (Silicone dressing)         Heel float boots (Prevalon boot)             Float Heels off Bed with Pillows           Respiratory   Silicone O2 tubing         Gray Foam Ear protectors     Cannula fixation Device (Tender )          High flow offloading Clip    Elastic head band offloading device      Anchorfast   x                                                      Trach with Optifoam split foam             Containment/Moisture Prevention  Rectal tube or BMS    Purwick/Condom Cath        Nye Catheter x   Barrier wipes           Barrier paste       Antifungal tx      Interdry        Mobilization  Not mobilizing @ this time  Up to chair        Ambulate      PT/OT      Nutrition   Dietician        Diabetes Education      PO    TF  x   TPN     NPO   # days     Other        Anticipated discharge plans: TBD  LTACH:        SNF/Rehab:                  Home Health Care:           Outpatient Wound Center:            Self/Family Care:         Other:                  Vac Discharge Needs:   Not Applicable Pt not on a wound vac:     X  Regular Vac while inpatient, alternative dressing at DC:        Regular Vac in use and continued at DC:            Reg. Vac w/ Skin Sub/Biologic in use. Will need to be changed 2x wkly:      Veraflo Vac while inpatient, ok to transition to Regular Vac on Discharge:           Veraflo Vac while inpatient, will need to remain on Veraflo Vac upon discharge:

## 2022-02-22 NOTE — PROGRESS NOTES
Multidisciplinary rounds completed; discussed patient having pink, frothy, blood tinged sputum; increased UOP, failed SAT (increased RR, HR, BP, and desat to the 70s). Received order to attempt SAT by titrating propofol very slowly in attempt to extubate since patient is on minimal vent settings.

## 2022-02-22 NOTE — DISCHARGE PLANNING
Gabrielle is not appropriate for Acute Rehab @ this time.  [facility] will no longer follow.  Please reach out to myself once Gabrielle is off the vent and has participated with TX.

## 2022-02-22 NOTE — CARE PLAN
The patient is Watcher - Medium risk of patient condition declining or worsening    Shift Goals  Clinical Goals: Tolerate SBT, extuabte, maintain adequate BP  Patient Goals: JACKY  Family Goals: JACKY    Progress made toward(s) clinical / shift goals:  Not progressing    Patient is not progressing towards the following goals:      Problem: Respiratory  Goal: Patient will achieve/maintain optimum respiratory ventilation and gas exchange  Outcome: Not Progressing   Patient unable to tolerate SAT. SBT not performed. MD aware.     _______________________________________________________________  PROGRESSING:  Problem: Hemodynamics  Goal: Patient's hemodynamics, fluid balance and neurologic status will be stable or improve  Outcome: Progressing   Increased UOP. BP maintained. Neuro status not appropriately able to be tested due to sedation.  Problem: Urinary - Renal Perfusion  Goal: Ability to achieve and maintain adequate renal perfusion and functioning will improve  Outcome: Progressing   Increased UOP. Kidney function improving.

## 2022-02-23 LAB
ANION GAP SERPL CALC-SCNC: 9 MMOL/L (ref 7–16)
BACTERIA BLD CULT: ABNORMAL
BACTERIA BLD CULT: ABNORMAL
BASOPHILS # BLD AUTO: 0.5 % (ref 0–1.8)
BASOPHILS # BLD: 0.08 K/UL (ref 0–0.12)
BUN SERPL-MCNC: 55 MG/DL (ref 8–22)
CALCIUM SERPL-MCNC: 9.3 MG/DL (ref 8.4–10.2)
CHLORIDE SERPL-SCNC: 103 MMOL/L (ref 96–112)
CO2 SERPL-SCNC: 29 MMOL/L (ref 20–33)
CREAT SERPL-MCNC: 0.86 MG/DL (ref 0.5–1.4)
EOSINOPHIL # BLD AUTO: 0.55 K/UL (ref 0–0.51)
EOSINOPHIL NFR BLD: 3.4 % (ref 0–6.9)
ERYTHROCYTE [DISTWIDTH] IN BLOOD BY AUTOMATED COUNT: 50.5 FL (ref 35.9–50)
GLUCOSE BLD STRIP.AUTO-MCNC: 272 MG/DL (ref 65–99)
GLUCOSE BLD STRIP.AUTO-MCNC: 298 MG/DL (ref 65–99)
GLUCOSE BLD STRIP.AUTO-MCNC: 362 MG/DL (ref 65–99)
GLUCOSE BLD STRIP.AUTO-MCNC: 366 MG/DL (ref 65–99)
GLUCOSE BLD STRIP.AUTO-MCNC: 382 MG/DL (ref 65–99)
GLUCOSE SERPL-MCNC: 310 MG/DL (ref 65–99)
HCT VFR BLD AUTO: 31.1 % (ref 37–47)
HGB BLD-MCNC: 9.5 G/DL (ref 12–16)
IMM GRANULOCYTES # BLD AUTO: 1.85 K/UL (ref 0–0.11)
IMM GRANULOCYTES NFR BLD AUTO: 11.3 % (ref 0–0.9)
LYMPHOCYTES # BLD AUTO: 2.25 K/UL (ref 1–4.8)
LYMPHOCYTES NFR BLD: 13.8 % (ref 22–41)
MCH RBC QN AUTO: 31.6 PG (ref 27–33)
MCHC RBC AUTO-ENTMCNC: 30.5 G/DL (ref 33.6–35)
MCV RBC AUTO: 103.3 FL (ref 81.4–97.8)
MONOCYTES # BLD AUTO: 0.72 K/UL (ref 0–0.85)
MONOCYTES NFR BLD AUTO: 4.4 % (ref 0–13.4)
NEUTROPHILS # BLD AUTO: 10.88 K/UL (ref 2–7.15)
NEUTROPHILS NFR BLD: 66.6 % (ref 44–72)
NRBC # BLD AUTO: 0.02 K/UL
NRBC BLD-RTO: 0.1 /100 WBC
PLATELET # BLD AUTO: 415 K/UL (ref 164–446)
PMV BLD AUTO: 10.8 FL (ref 9–12.9)
POTASSIUM SERPL-SCNC: 3.9 MMOL/L (ref 3.6–5.5)
RBC # BLD AUTO: 3.01 M/UL (ref 4.2–5.4)
SIGNIFICANT IND 70042: ABNORMAL
SITE SITE: ABNORMAL
SODIUM SERPL-SCNC: 141 MMOL/L (ref 135–145)
SOURCE SOURCE: ABNORMAL
UFH PPP CHRO-ACNC: 0.27 IU/ML
WBC # BLD AUTO: 16.3 K/UL (ref 4.8–10.8)

## 2022-02-23 PROCEDURE — 80048 BASIC METABOLIC PNL TOTAL CA: CPT

## 2022-02-23 PROCEDURE — 85520 HEPARIN ASSAY: CPT

## 2022-02-23 PROCEDURE — A9270 NON-COVERED ITEM OR SERVICE: HCPCS | Performed by: STUDENT IN AN ORGANIZED HEALTH CARE EDUCATION/TRAINING PROGRAM

## 2022-02-23 PROCEDURE — 82962 GLUCOSE BLOOD TEST: CPT | Mod: 91

## 2022-02-23 PROCEDURE — 770022 HCHG ROOM/CARE - ICU (200)

## 2022-02-23 PROCEDURE — 99291 CRITICAL CARE FIRST HOUR: CPT | Performed by: STUDENT IN AN ORGANIZED HEALTH CARE EDUCATION/TRAINING PROGRAM

## 2022-02-23 PROCEDURE — 94003 VENT MGMT INPAT SUBQ DAY: CPT

## 2022-02-23 PROCEDURE — 94760 N-INVAS EAR/PLS OXIMETRY 1: CPT

## 2022-02-23 PROCEDURE — 85025 COMPLETE CBC W/AUTO DIFF WBC: CPT

## 2022-02-23 PROCEDURE — A9270 NON-COVERED ITEM OR SERVICE: HCPCS | Performed by: INTERNAL MEDICINE

## 2022-02-23 PROCEDURE — A9270 NON-COVERED ITEM OR SERVICE: HCPCS | Performed by: HOSPITALIST

## 2022-02-23 PROCEDURE — 94799 UNLISTED PULMONARY SVC/PX: CPT

## 2022-02-23 PROCEDURE — 700111 HCHG RX REV CODE 636 W/ 250 OVERRIDE (IP): Performed by: INTERNAL MEDICINE

## 2022-02-23 PROCEDURE — 700102 HCHG RX REV CODE 250 W/ 637 OVERRIDE(OP): Performed by: STUDENT IN AN ORGANIZED HEALTH CARE EDUCATION/TRAINING PROGRAM

## 2022-02-23 PROCEDURE — 700105 HCHG RX REV CODE 258: Performed by: INTERNAL MEDICINE

## 2022-02-23 PROCEDURE — 700102 HCHG RX REV CODE 250 W/ 637 OVERRIDE(OP): Performed by: INTERNAL MEDICINE

## 2022-02-23 PROCEDURE — 700111 HCHG RX REV CODE 636 W/ 250 OVERRIDE (IP): Performed by: STUDENT IN AN ORGANIZED HEALTH CARE EDUCATION/TRAINING PROGRAM

## 2022-02-23 PROCEDURE — 700102 HCHG RX REV CODE 250 W/ 637 OVERRIDE(OP): Performed by: HOSPITALIST

## 2022-02-23 RX ORDER — LABETALOL HYDROCHLORIDE 5 MG/ML
10 INJECTION, SOLUTION INTRAVENOUS ONCE
Status: DISCONTINUED | OUTPATIENT
Start: 2022-02-23 | End: 2022-02-23

## 2022-02-23 RX ORDER — LORAZEPAM 2 MG/ML
2 INJECTION INTRAMUSCULAR
Status: DISCONTINUED | OUTPATIENT
Start: 2022-02-23 | End: 2022-02-24

## 2022-02-23 RX ORDER — LORAZEPAM 2 MG/ML
2 INJECTION INTRAMUSCULAR
Status: DISCONTINUED | OUTPATIENT
Start: 2022-02-23 | End: 2022-02-23

## 2022-02-23 RX ORDER — METRONIDAZOLE 500 MG/1
500 TABLET ORAL EVERY 8 HOURS
Status: COMPLETED | OUTPATIENT
Start: 2022-02-23 | End: 2022-02-24

## 2022-02-23 RX ORDER — HALOPERIDOL 5 MG/ML
5 INJECTION INTRAMUSCULAR
Status: DISCONTINUED | OUTPATIENT
Start: 2022-02-23 | End: 2022-02-23

## 2022-02-23 RX ORDER — DEXTROSE MONOHYDRATE 25 G/50ML
50 INJECTION, SOLUTION INTRAVENOUS
Status: DISCONTINUED | OUTPATIENT
Start: 2022-02-23 | End: 2022-02-25

## 2022-02-23 RX ADMIN — FUROSEMIDE 40 MG: 40 INJECTION, SOLUTION INTRAMUSCULAR; INTRAVENOUS at 15:14

## 2022-02-23 RX ADMIN — LEVOTHYROXINE SODIUM 25 MCG: 0.03 TABLET ORAL at 05:14

## 2022-02-23 RX ADMIN — PROPOFOL 40 MCG/KG/MIN: 10 INJECTION, EMULSION INTRAVENOUS at 05:14

## 2022-02-23 RX ADMIN — PROPOFOL 5 MCG/KG/MIN: 10 INJECTION, EMULSION INTRAVENOUS at 14:45

## 2022-02-23 RX ADMIN — FUROSEMIDE 40 MG: 40 INJECTION, SOLUTION INTRAMUSCULAR; INTRAVENOUS at 05:13

## 2022-02-23 RX ADMIN — ATORVASTATIN CALCIUM 40 MG: 40 TABLET, FILM COATED ORAL at 05:13

## 2022-02-23 RX ADMIN — SENNOSIDES AND DOCUSATE SODIUM 2 TABLET: 50; 8.6 TABLET ORAL at 17:29

## 2022-02-23 RX ADMIN — METRONIDAZOLE 500 MG: 500 TABLET ORAL at 15:14

## 2022-02-23 RX ADMIN — SENNOSIDES AND DOCUSATE SODIUM 2 TABLET: 50; 8.6 TABLET ORAL at 05:13

## 2022-02-23 RX ADMIN — FAMOTIDINE 20 MG: 20 TABLET ORAL at 05:13

## 2022-02-23 RX ADMIN — HEPARIN SODIUM 22 UNITS/KG/HR: 5000 INJECTION, SOLUTION INTRAVENOUS at 05:14

## 2022-02-23 RX ADMIN — OXYCODONE HYDROCHLORIDE AND ACETAMINOPHEN 500 MG: 500 TABLET ORAL at 05:13

## 2022-02-23 RX ADMIN — LORAZEPAM 2 MG: 2 INJECTION INTRAMUSCULAR; INTRAVENOUS at 11:27

## 2022-02-23 RX ADMIN — CEFEPIME 2 G: 2 INJECTION, POWDER, FOR SOLUTION INTRAVENOUS at 05:13

## 2022-02-23 RX ADMIN — SERTRALINE HYDROCHLORIDE 50 MG: 50 TABLET ORAL at 05:14

## 2022-02-23 RX ADMIN — MAGNESIUM HYDROXIDE 30 ML: 400 SUSPENSION ORAL at 14:47

## 2022-02-23 RX ADMIN — HEPARIN SODIUM 2900 UNITS: 1000 INJECTION, SOLUTION INTRAVENOUS; SUBCUTANEOUS at 05:01

## 2022-02-23 RX ADMIN — INSULIN HUMAN 3 UNITS: 100 INJECTION, SOLUTION PARENTERAL at 06:13

## 2022-02-23 RX ADMIN — ASPIRIN 325 MG ORAL TABLET 325 MG: 325 PILL ORAL at 05:13

## 2022-02-23 RX ADMIN — CEFEPIME 2 G: 2 INJECTION, POWDER, FOR SOLUTION INTRAVENOUS at 17:29

## 2022-02-23 RX ADMIN — SODIUM HYPOCHLORITE 1 ML: 1.25 SOLUTION TOPICAL at 17:30

## 2022-02-23 RX ADMIN — METRONIDAZOLE 500 MG: 500 TABLET ORAL at 05:13

## 2022-02-23 RX ADMIN — POLYETHYLENE GLYCOL 3350 1 PACKET: 17 POWDER, FOR SOLUTION ORAL at 14:47

## 2022-02-23 RX ADMIN — PROPOFOL 30 MCG/KG/MIN: 10 INJECTION, EMULSION INTRAVENOUS at 01:06

## 2022-02-23 RX ADMIN — LORAZEPAM 2 MG: 2 INJECTION INTRAMUSCULAR; INTRAVENOUS at 14:03

## 2022-02-23 RX ADMIN — INSULIN GLARGINE 5 UNITS: 100 INJECTION, SOLUTION SUBCUTANEOUS at 06:14

## 2022-02-23 RX ADMIN — POTASSIUM BICARBONATE 25 MEQ: 978 TABLET, EFFERVESCENT ORAL at 08:24

## 2022-02-23 RX ADMIN — OXYCODONE HYDROCHLORIDE AND ACETAMINOPHEN 500 MG: 500 TABLET ORAL at 17:29

## 2022-02-23 RX ADMIN — LORAZEPAM 2 MG: 2 INJECTION INTRAMUSCULAR; INTRAVENOUS at 08:22

## 2022-02-23 RX ADMIN — MULTIPLE VITAMINS W/ MINERALS TAB 1 TABLET: TAB at 05:13

## 2022-02-23 RX ADMIN — SODIUM HYPOCHLORITE 10 ML: 1.25 SOLUTION TOPICAL at 06:00

## 2022-02-23 RX ADMIN — METRONIDAZOLE 500 MG: 500 TABLET ORAL at 21:42

## 2022-02-23 ASSESSMENT — FIBROSIS 4 INDEX: FIB4 SCORE: 0.53

## 2022-02-23 ASSESSMENT — PAIN DESCRIPTION - PAIN TYPE
TYPE: ACUTE PAIN

## 2022-02-23 NOTE — PROGRESS NOTES
Pulmonary Progress Note    Date of admission  2/16/2022    Chief Complaint  76 y.o. female admitted 2/16/2022 with SOB    Hospital Course  76 y.o. female who presented 2/16/2022 with leg swelling b/l with cellulitis of right lower extremity, Sob and diarrhea. Rx for cellulitis and heart failure. Blood cxs negative   Hx of chronic obstructive pulmonary disease (no PFTs and no CT chest ), chronic hypoxemic respiratory failure on 2 L of oxygen at baseline, hypertension, diabetes, dyslipidemia and hypothyroidism and moderate aortic stenosis     Her Fio2 requirement went up from 2 l to 5 l and was wheezing and dyspneac on day2 of hospitalization  started on rx for COPD with steroids. She improved with Fio2 requirement back to baseline    2/19: Developed acute respiratory distress and AMS on 2/19/22 a.m  Troponin increased to 131 and ekg initially done concerning for ST depression and with AMS and paco2 dropped to 69 after 45 mins of BIPAP decision made to intubate.  Wbc increased to 16.7, creatinine worsening to 1.25, probnp 3459 and trop 131 with lactic 3.8,      D/w cardiology DR. Esquivel  re: NSTEMI  At this time resp distress from pulm cause hypercapneic resp failure  No ST elevation  Trend troponin if markedly in 1000  Level to reassess  Heparin, Asa, lipitor    Interval Problem Update  Chart review from the past 24 hours includes imaging, laboratory studies, vital signs and notes available.  Pertinent data for today's visit includes      Cardiac: VSS - becomes very hypertensive and tachy when titrating off prop  Pulm: 30%/8  Heme: stable  /renal: decrease lasix to 40mg IV daily given rising BUN but Cr stable (it improved with diuresis initially)  ID:  +GPCs in Bcx likely coag neg staph, repeat cx neg. Neg MRSA - cefepime and flagyl  I/O: -2.7L with lasix 40 IV BID  GI/endo: TFs      Review of Systems  Review of Systems   Unable to perform ROS: Acuity of condition        Vital Signs for last 24 hours   Temp:   [36.6 °C (97.9 °F)-36.8 °C (98.2 °F)] 36.6 °C (97.9 °F)  Pulse:  [] 70  Resp:  [15-42] 21  BP: (107-203)/(35-80) 134/46  SpO2:  [88 %-100 %] 95 %         Respiratory Information for the last 24 hours  Vent Mode: APVCMV  Rate (breaths/min): 23  Vt Target (mL): 320  PEEP/CPAP: 5  MAP: 6.5  Control VTE (exp VT): 398    Physical Exam   Physical Exam  Constitutional:       Appearance: She is obese.      Comments: intubated and sedated  Off sedation agitated and tachy and hypertensive   HENT:      Head: Normocephalic and atraumatic.      Mouth/Throat:      Mouth: Mucous membranes are dry.   Eyes:      Extraocular Movements: Extraocular movements intact.      Pupils: Pupils are equal, round, and reactive to light.   Cardiovascular:      Heart sounds: Murmur heard.   Pulmonary:      Comments: Diminished BS b/l  Abdominal:      General: Bowel sounds are normal. There is distension.      Palpations: Abdomen is soft.   Musculoskeletal:      Cervical back: Neck supple.      Right lower leg: Edema present.      Left lower leg: Edema present.   Skin:     General: Skin is warm and dry.      Comments: erythema lower extremities   Neurological:      Comments: Unable to assess  sedated   Psychiatric:      Comments: Unable to assess  Sedated and intubated         Medications  Current Facility-Administered Medications   Medication Dose Route Frequency Provider Last Rate Last Admin   • LORazepam (ATIVAN) injection 2 mg  2 mg Intravenous Q HOUR PRN Lien Lee M.D.       • furosemide (LASIX) injection 40 mg  40 mg Intravenous BID DIURETIC Lien Lee M.D.   40 mg at 02/23/22 0513   • metroNIDAZOLE (FLAGYL) tablet 500 mg  500 mg Enteral Tube Q8HRS Lien Lee M.D.   500 mg at 02/23/22 0513   • acetaminophen (Tylenol) tablet 650 mg  650 mg Enteral Tube Q6HRS PRN Ashia Bertrand M.D.       • insulin regular (HumuLIN R,NovoLIN R) injection  1-6 Units Subcutaneous 4X/DAY JAY Bertrand M.D.   3  Units at 02/23/22 0613    And   • dextrose 50% (D50W) injection 50 mL  50 mL Intravenous Q15 MIN PRN Ashia Bertrand M.D.       • insulin GLARGINE (Lantus,Semglee) injection  5 Units Subcutaneous BID Ashia Bertrand M.D.   5 Units at 02/23/22 0614   • propofol (DIPRIVAN) injection  0-80 mcg/kg/min Intravenous Continuous Ashia Bertrand M.D.   Paused at 02/23/22 0824   • cefepime (Maxipime) 2 g in  mL IVPB  2 g Intravenous Q12HRS Ashia Bertrand M.D.   Stopped at 02/23/22 0543   • dakins 0.125% (1/4 strength) topical soln   Topical BID Ashia Bertrand M.D.   10 mL at 02/23/22 0600   • prochlorperazine (COMPAZINE) injection 10 mg  10 mg Intravenous Q6HRS PRN Alley Longo M.D.   10 mg at 02/19/22 0013   • heparin infusion 25,000 units in 500 mL 0.45% NACL  0-30 Units/kg/hr (Adjusted) Intravenous Continuous Ashia Bertrand M.D. 32.2 mL/hr at 02/23/22 0514 22 Units/kg/hr at 02/23/22 0514   • heparin injection 2,900 Units  40 Units/kg (Adjusted) Intravenous PRN Ashia Bertrand M.D.   2,900 Units at 02/23/22 0501   • aspirin (ASA) tablet 325 mg  325 mg Enteral Tube DAILY Ashia Bertrand M.D.   325 mg at 02/23/22 0513   • Respiratory Therapy Consult   Nebulization Continuous RT Ashia Bertrand M.D.       • famotidine (PEPCID) tablet 20 mg  20 mg Enteral Tube DAILY Ashia Bertrand M.D.   20 mg at 02/23/22 0513    Or   • famotidine (PEPCID) injection 20 mg  20 mg Intravenous DAILY Ashia Bertrand M.D.   20 mg at 02/19/22 0908   • senna-docusate (PERICOLACE or SENOKOT S) 8.6-50 MG per tablet 2 Tablet  2 Tablet Enteral Tube BID Ashia Bertrand M.D.   2 Tablet at 02/23/22 0513    And   • polyethylene glycol/lytes (MIRALAX) PACKET 1 Packet  1 Packet Enteral Tube QDAY PRKYLIE Bertrand M.D.        And   • magnesium hydroxide (MILK OF MAGNESIA) suspension 30 mL  30 mL Enteral Tube QDAY PRKYLIE Bertrand M.D.        And   •  bisacodyl (DULCOLAX) suppository 10 mg  10 mg Rectal QDAY PRN Ashia Bertrand M.D.       • MD Alert...ICU Electrolyte Replacement per Pharmacy   Other PHARMACY TO DOSE Ashia Bertrand M.D.       • lidocaine (XYLOCAINE) 1 % injection 2 mL  2 mL Tracheal Tube Q30 MIN PRN Ashia Bertrand M.D.       • fentaNYL (SUBLIMAZE) 50 mcg/mL in 50mL   Intravenous Continuous Ashia Bertrand M.D.   Paused at 02/21/22 1055   • norepinephrine (Levophed) 8 mg in 250 mL NS infusion (premix)  0-30 mcg/min Intravenous Continuous Ashia Bertrand M.D.   Paused at 02/21/22 1230   • ascorbic acid (Vitamin C) tablet 500 mg  500 mg Enteral Tube BID Ashia Bertrand M.D.   500 mg at 02/23/22 0513   • therapeutic multivitamin-minerals (THERAGRAN-M) tablet 1 Tablet  1 Tablet Enteral Tube DAILY Asejuan miguel Stroud M.D.   1 Tablet at 02/23/22 0513   • atorvastatin (LIPITOR) tablet 40 mg  40 mg Enteral Tube DAILY Asejuan miguel Stroud M.D.   40 mg at 02/23/22 0513   • [Held by provider] bumetanide (BUMEX) tablet 1 mg  1 mg Enteral Tube Q DAY Asejuan miguel Stroud M.D.   1 mg at 02/20/22 0519   • levothyroxine (SYNTHROID) tablet 25 mcg  25 mcg Enteral Tube DAILY Asejuan miguel Stroud M.D.   25 mcg at 02/23/22 0514   • sertraline (Zoloft) tablet 50 mg  50 mg Enteral Tube DAILY Asejuan miguel Stroud M.D.   50 mg at 02/23/22 0514   • ondansetron (ZOFRAN) syringe/vial injection 4 mg  4 mg Intravenous Q4HRS PRN Alley Longo M.D.   4 mg at 02/18/22 2250   • Respiratory Therapy Consult   Nebulization Continuous RT Allyson Ulloa D.O.       • ipratropium-albuterol (DUONEB) nebulizer solution  3 mL Nebulization Q2HRS PRN (RT) Allyson Ulloa D.O.       • Pharmacy Consult Request  1 Each Other PHARMACY TO DOSE Thomas Stroud M.D.       • [Held by provider] benazepril (LOTENSIN) tablet 20 mg  20 mg Oral Q DAY Thomas Stroud M.D.       • lactated ringers infusion (BOLUS)  500 mL Intravenous Once PRN Thomas Stroud M.D.       •  lactated ringers infusion (BOLUS): BMI greater than 30  30 mL/kg (Ideal) Intravenous Once PRN Thomas Stroud M.D.           Fluids    Intake/Output Summary (Last 24 hours) at 2/23/2022 0849  Last data filed at 2/23/2022 0600  Gross per 24 hour   Intake 1866.72 ml   Output 4095 ml   Net -2228.28 ml       Laboratory          Recent Labs     02/21/22 0315 02/22/22  0357 02/23/22  0300   SODIUM 139 136 141   POTASSIUM 4.8 4.1 3.9   CHLORIDE 103 100 103   CO2 22 25 29   BUN 73* 73* 55*   CREATININE 1.59* 1.34 0.86   MAGNESIUM 2.2 2.1  --    PHOSPHORUS 5.0* 4.1  --    CALCIUM 9.6 9.2 9.3     Recent Labs     02/21/22 0315 02/21/22  1050 02/22/22  0357 02/23/22  0300   ALTSGPT 30  --   --   --    ASTSGOT 16  --   --   --    ALKPHOSPHAT 177*  --   --   --    TBILIRUBIN <0.2  --   --   --    PREALBUMIN  --  17.1* 19.1  --    GLUCOSE 158*  --  211* 310*     Recent Labs     02/21/22 0315 02/22/22 0357 02/23/22  0300   WBC 16.7* 15.4* 16.3*   NEUTSPOLYS 74.60* 72.90* 66.60   LYMPHOCYTES 14.10* 12.50* 13.80*   MONOCYTES 5.10 4.40 4.40   EOSINOPHILS 2.30 3.50 3.40   BASOPHILS 0.30 0.30 0.50   ASTSGOT 16  --   --    ALTSGPT 30  --   --    ALKPHOSPHAT 177*  --   --    TBILIRUBIN <0.2  --   --      Recent Labs     02/21/22 0315 02/22/22  0357 02/23/22  0300   RBC 2.91* 2.89* 3.01*   HEMOGLOBIN 9.1* 9.1* 9.5*   HEMATOCRIT 31.3* 30.4* 31.1*   PLATELETCT 421 397 415       Imaging  CXR: diffuse reticular marking suggestive of pulm edema  ET tube in good position  Feeding tube below the diaphragm     Assessment/Plan  SRIRAM (acute kidney injury) (HCC)  Assessment & Plan  - initially thought to be 2/2 overdiuresis but now Cr trending up again likely from some fluid overload - improving now with lasix  - decrease lasix 40mg IV daily due to rising BUN  - avoid nephrotoxins and NSAIDs    Bacteremia  Assessment & Plan  - Bcx with likely coag neg staph, all other cultures neg  -  Plan for 5 days of cefepime and flagyl (was on various abx  regimens since 2/16)  - wound care consulted and following    NSTEMI (non-ST elevated myocardial infarction) ([facility])  Assessment & Plan  - may be supply and demand  - continue to trend troponin  - d/w cardiology see body of text on 2/19  - Heparin, ASA and lipitor  - Hyperdynamic last ECHO repeat 2/19 showed EF now 40%    COPD with acute exacerbation ([facility])  Assessment & Plan  resolved  No secretions  Duonebs prn    Decubitus ulcer- (present on admission)  Assessment & Plan  Unstageable   MVI daily and vitamin c bid   Wound care reconsulted    Cellulitis of left lower extremity- (present on admission)  Assessment & Plan  Gram + cocci in anaerobic bottle  Continue flagyl and cefepime     Acute on chronic respiratory failure with hypoxia ([facility])- (present on admission)  Assessment & Plan  COPD (no PFTs or CT chest), SONIA, OHS and heart failure  Hypercapnea was predominant feature that resulted in intubation 2/19  ? COPD and heart failure and NSTEMI combination  Not clear  Not wheezing  CXR suggests heart failure    DM (diabetes mellitus) ([facility])- (present on admission)  Assessment & Plan  Lantus bid  Trying to keep BG <180       VTE:  Lovenox  Ulcer: H2 Antagonist  Lines: Nye Catheter  Ongoing indication addressed    I have performed a physical exam and reviewed and updated ROS and Plan today (2/23/2022). In review of yesterday's note (2/22/2022), there are no changes except as documented above.     Discussed patient condition and risk of morbidity and/or mortality with Charge nurse / hot rounds   KANCHAN is her friend Rhonda Hassan [phone] and has been updated (She lives in [place] but in [place] currently)  The patient remains critically ill.  Critical care time = 60 minutes in directly providing and coordinating critical care and extensive data review.  No time overlap and excludes procedures.        Lien Lee MD  Pulmonary and Critical Care Medicine  [facility]

## 2022-02-23 NOTE — CARE PLAN
Problem: Mechanical Ventilation  Goal: Safe management of artificial airway and ventilation  Description: Target End Date:  when vent discontinued    Document on VAP flowsheet and Airway LDA    1.  Daily awakening trials per provider order/policy  2.  Suctioning and care of ET/Trach tube (document on LDA)  3.  Collaborate with RT to administer medications/treatments  4.  Ambu bag at bedside and available for transport  5.  Trach patient - replacement trach at bedside  6.  Provide communication tools if applicable  Outcome: Not Met  Goal: Successful weaning off mechanical ventilator, spontaneously maintains adequate gas exchange  Description: Target End Date:  when vent discontinued    1.  Follow universal weaning protocol for patients on mechanical ventilation per order  2.  Review contraindication list.  Obtain provider order to wean in presence of contraindication.  3.  Obtain Nallely Sedation-Agitation Score  4.  Nallely Score 1-2:  sedation vacation  5.  Nallely Score 3-4:  Collaborate with provider and/or RT to determine readiness for trial  6.  Begin 2 hour trial of weaning following protocol  7.  Evaluate for fatigue parameters per protocol  8.  Fatigue parameters triggered:  Stop wean and return to previous ASV% setting or increase % minute volume to offset work or breathing  Outcome: Not Met  Goal: Patient will be able to express needs and understand communication  Description: Target End Date:  when vent discontinued    1.  Assess ability to communicate and understand  2.  Provide communication tools  3.  Collaborate with Speech Therapy for [facility]  Outcome: Not Met     Problem: Ventilation  Goal: Ability to achieve and maintain unassisted ventilation or tolerate decreased levels of ventilator support  Description: Target End Date:  4 days     Document on Vent flowsheet    1.  Support and monitor invasive and noninvasive mechanical ventilation  2.  Monitor ventilator weaning response  3.  Perform ventilator  associated pneumonia prevention interventions  4.  Manage ventilation therapy by monitoring diagnostic test results  Outcome: Not Met

## 2022-02-23 NOTE — FLOWSHEET NOTE
02/23/22 0943   Spontaneous Breathing Trial (SBT)   Weaning Trial Initiated Yes  (Started at 0850)   Safety screen spontaneous breathing trial (SBT) Proceed with SBT - no exclusion criteria met

## 2022-02-23 NOTE — DOCUMENTATION QUERY
Formerly [facility]                                                                       Query Response Note      PATIENT:               MICHEAL VALENCIA  ACCT #:                  [id number]  MRN:                     [id number]  :                      1945  ADMIT DATE:       2022 2:37 PM  DISCH DATE:          RESPONDING  PROVIDER #:        [id number]           QUERY TEXT:    Please clarify in documentation the relationship, if any, between acute respiratory failure and or SRIRAM and sepsis    The patient's Clinical Indicators include:  - Findings:  Sepsis POA per query response 22.  Patient also has acute respiratory failure as well as SRIRAM    - Treatments:  antibiotics, IVF, vent, pulmonary consult, repeat labs, wound team     - Risk factors:  sepsis, SRIRAM, acute on chronic respiratory failure, NSTEMI, cellulitis, decubitus ulcer    Thank You,  Evy Hernandez RN  Clinical Documentation   [email or web address]  Connect via [email or web address]  Options provided:   -- Acute respiratory failure and or SRIRAM is/are due to or associated with sepsis   -- Acute respiratory failure and or SRIRAM is/are not due to or associated with sepsis   -- Unable to determine      Query created by: Evy Hernandez on 2022 11:45 AM    RESPONSE TEXT:    Unable to determine       QUERY TEXT:    Patient admitted with mental status documented as alert and oriented to person place and time.  Patient developed altered mental status after admission.  Patient was intubated and treated with antibiotics.    Please provide further specification as to the type of altered mental status being treated and monitored     NOTE:  If the appropriate response is not listed below, please respond with a new note.    The patient's Clinical Indicators include:  - Findings:  PN 22:  Developed acute respiratory distress and AMS on 22 a.m- hypercapneic with ph 7.19/71/296 on  100% NRB.  Troponin increased to 131 and ekg initially done concerning for ST depression and with AMS and paco2 dropped to 69 after 45 mins of BIPAP decision made to intubate.    - Treatment:  antibiotics, intubation, 02, EKG     - Risk factors:  Sepsis (per query response), acute respiratory failure, SRIRAM, cellulitis, decubitus ulcer, COPD, NSTEMI     Thank You,  Evy Hernandez RN  Clinical Documentation   [email or web address]  Connect via [email or web address]  Options provided:   -- Acute metabolic encephalopathy   -- Septic encephalopathy   -- Acute encephalopathy due to other medical condition, (please specify other medical condition)   -- Other   -- Unable to determine      Query created by: Evy Hernandez on 2/23/2022 12:27 PM    RESPONSE TEXT:    Acute metabolic encephalopathy          Electronically signed by:  CUBA CHAVEZ MD 2/23/2022 2:59 PM

## 2022-02-23 NOTE — FLOWSHEET NOTE
02/23/22 1401   Spontaneous Breathing Trial (SBT)   Weaning Trial Initiated Yes  (Pt continue to spont. SPONT STARTED AT 0850)     Pt placed on full support after 6 Hrs SBT> RR 40 Pt in distress

## 2022-02-23 NOTE — CARE PLAN
Problem: Nutritional:  Goal: Nutrition support tolerated and meeting greater than 85% of estimated needs  Outcome: Met    TF @ goal rate 60 ml/hr per flowsheets.    RD continues to follow.

## 2022-02-23 NOTE — CARE PLAN
The patient is Watcher - Medium risk of patient condition declining or worsening    Shift Goals  Clinical Goals: Tolerate ventilator, wound care  Patient Goals: JACKY  Family Goals: JACKY    Progress made toward(s) clinical / shift goals:    Problem: Hemodynamics  Goal: Patient's hemodynamics, fluid balance and neurologic status will be stable or improve  Outcome: Progressing     Problem: Urinary - Renal Perfusion  Goal: Ability to achieve and maintain adequate renal perfusion and functioning will improve  Outcome: Progressing     Problem: Respiratory  Goal: Patient will achieve/maintain optimum respiratory ventilation and gas exchange  Outcome: Progressing       Patient is not progressing towards the following goals:

## 2022-02-24 ENCOUNTER — APPOINTMENT (OUTPATIENT)
Dept: RADIOLOGY | Facility: MEDICAL CENTER | Age: 77
DRG: 870 | End: 2022-02-24
Attending: STUDENT IN AN ORGANIZED HEALTH CARE EDUCATION/TRAINING PROGRAM
Payer: MEDICARE

## 2022-02-24 LAB
ANION GAP SERPL CALC-SCNC: 6 MMOL/L (ref 7–16)
ANISOCYTOSIS BLD QL SMEAR: ABNORMAL
BASE EXCESS BLDA CALC-SCNC: 15 MMOL/L (ref -4–3)
BASOPHILS # BLD AUTO: 0 % (ref 0–1.8)
BASOPHILS # BLD: 0 K/UL (ref 0–0.12)
BODY TEMPERATURE: ABNORMAL DEGREES
BUN SERPL-MCNC: 36 MG/DL (ref 8–22)
CALCIUM SERPL-MCNC: 9.6 MG/DL (ref 8.4–10.2)
CHLORIDE SERPL-SCNC: 104 MMOL/L (ref 96–112)
CO2 BLDA-SCNC: 42 MMOL/L (ref 20–33)
CO2 SERPL-SCNC: 35 MMOL/L (ref 20–33)
CREAT SERPL-MCNC: 0.65 MG/DL (ref 0.5–1.4)
EOSINOPHIL # BLD AUTO: 0.47 K/UL (ref 0–0.51)
EOSINOPHIL NFR BLD: 3 % (ref 0–6.9)
ERYTHROCYTE [DISTWIDTH] IN BLOOD BY AUTOMATED COUNT: 49.8 FL (ref 35.9–50)
GLUCOSE BLD STRIP.AUTO-MCNC: 281 MG/DL (ref 65–99)
GLUCOSE BLD STRIP.AUTO-MCNC: 328 MG/DL (ref 65–99)
GLUCOSE BLD STRIP.AUTO-MCNC: 415 MG/DL (ref 65–99)
GLUCOSE SERPL-MCNC: 251 MG/DL (ref 65–99)
HCO3 BLDA-SCNC: 40.1 MMOL/L (ref 17–25)
HCT VFR BLD AUTO: 33.1 % (ref 37–47)
HGB BLD-MCNC: 10 G/DL (ref 12–16)
LYMPHOCYTES # BLD AUTO: 2.53 K/UL (ref 1–4.8)
LYMPHOCYTES NFR BLD: 16 % (ref 22–41)
MACROCYTES BLD QL SMEAR: ABNORMAL
MANUAL DIFF BLD: NORMAL
MCH RBC QN AUTO: 31.2 PG (ref 27–33)
MCHC RBC AUTO-ENTMCNC: 30.2 G/DL (ref 33.6–35)
MCV RBC AUTO: 103.1 FL (ref 81.4–97.8)
MONOCYTES # BLD AUTO: 1.11 K/UL (ref 0–0.85)
MONOCYTES NFR BLD AUTO: 7 % (ref 0–13.4)
NEUTROPHILS # BLD AUTO: 11.69 K/UL (ref 2–7.15)
NEUTROPHILS NFR BLD: 68 % (ref 44–72)
NEUTS BAND NFR BLD MANUAL: 6 % (ref 0–10)
NRBC # BLD AUTO: 0 K/UL
NRBC BLD-RTO: 0 /100 WBC
PCO2 BLDA: 51.9 MMHG (ref 26–37)
PH BLDA: 7.5 [PH] (ref 7.4–7.5)
PH TEMP ADJ BLDA: 7.5 [PH] (ref 7.4–7.5)
PLATELET # BLD AUTO: 440 K/UL (ref 164–446)
PLATELET BLD QL SMEAR: NORMAL
PMV BLD AUTO: 10.4 FL (ref 9–12.9)
PO2 BLDA: 79 MMHG (ref 64–87)
PO2 TEMP ADJ BLDA: 79 MMHG (ref 64–87)
POTASSIUM SERPL-SCNC: 4 MMOL/L (ref 3.6–5.5)
RBC # BLD AUTO: 3.21 M/UL (ref 4.2–5.4)
RBC BLD AUTO: PRESENT
SAO2 % BLDA: 96 % (ref 93–99)
SODIUM SERPL-SCNC: 145 MMOL/L (ref 135–145)
SPECIMEN DRAWN FROM PATIENT: ABNORMAL
WBC # BLD AUTO: 15.8 K/UL (ref 4.8–10.8)

## 2022-02-24 PROCEDURE — 94760 N-INVAS EAR/PLS OXIMETRY 1: CPT

## 2022-02-24 PROCEDURE — 85027 COMPLETE CBC AUTOMATED: CPT

## 2022-02-24 PROCEDURE — 87150 DNA/RNA AMPLIFIED PROBE: CPT

## 2022-02-24 PROCEDURE — A9270 NON-COVERED ITEM OR SERVICE: HCPCS | Performed by: STUDENT IN AN ORGANIZED HEALTH CARE EDUCATION/TRAINING PROGRAM

## 2022-02-24 PROCEDURE — A9270 NON-COVERED ITEM OR SERVICE: HCPCS | Performed by: INTERNAL MEDICINE

## 2022-02-24 PROCEDURE — 700102 HCHG RX REV CODE 250 W/ 637 OVERRIDE(OP): Performed by: STUDENT IN AN ORGANIZED HEALTH CARE EDUCATION/TRAINING PROGRAM

## 2022-02-24 PROCEDURE — 700102 HCHG RX REV CODE 250 W/ 637 OVERRIDE(OP): Performed by: INTERNAL MEDICINE

## 2022-02-24 PROCEDURE — A9270 NON-COVERED ITEM OR SERVICE: HCPCS | Performed by: HOSPITALIST

## 2022-02-24 PROCEDURE — 700105 HCHG RX REV CODE 258: Performed by: STUDENT IN AN ORGANIZED HEALTH CARE EDUCATION/TRAINING PROGRAM

## 2022-02-24 PROCEDURE — 94640 AIRWAY INHALATION TREATMENT: CPT

## 2022-02-24 PROCEDURE — 94150 VITAL CAPACITY TEST: CPT

## 2022-02-24 PROCEDURE — 700111 HCHG RX REV CODE 636 W/ 250 OVERRIDE (IP): Performed by: STUDENT IN AN ORGANIZED HEALTH CARE EDUCATION/TRAINING PROGRAM

## 2022-02-24 PROCEDURE — 700111 HCHG RX REV CODE 636 W/ 250 OVERRIDE (IP): Performed by: INTERNAL MEDICINE

## 2022-02-24 PROCEDURE — 82803 BLOOD GASES ANY COMBINATION: CPT

## 2022-02-24 PROCEDURE — 770022 HCHG ROOM/CARE - ICU (200)

## 2022-02-24 PROCEDURE — 94799 UNLISTED PULMONARY SVC/PX: CPT

## 2022-02-24 PROCEDURE — 82962 GLUCOSE BLOOD TEST: CPT | Mod: 91

## 2022-02-24 PROCEDURE — 36600 WITHDRAWAL OF ARTERIAL BLOOD: CPT

## 2022-02-24 PROCEDURE — 87040 BLOOD CULTURE FOR BACTERIA: CPT

## 2022-02-24 PROCEDURE — 80048 BASIC METABOLIC PNL TOTAL CA: CPT

## 2022-02-24 PROCEDURE — 700102 HCHG RX REV CODE 250 W/ 637 OVERRIDE(OP): Performed by: HOSPITALIST

## 2022-02-24 PROCEDURE — 700105 HCHG RX REV CODE 258: Performed by: INTERNAL MEDICINE

## 2022-02-24 PROCEDURE — 700101 HCHG RX REV CODE 250: Performed by: STUDENT IN AN ORGANIZED HEALTH CARE EDUCATION/TRAINING PROGRAM

## 2022-02-24 PROCEDURE — 85007 BL SMEAR W/DIFF WBC COUNT: CPT

## 2022-02-24 PROCEDURE — 99291 CRITICAL CARE FIRST HOUR: CPT | Performed by: STUDENT IN AN ORGANIZED HEALTH CARE EDUCATION/TRAINING PROGRAM

## 2022-02-24 PROCEDURE — 94003 VENT MGMT INPAT SUBQ DAY: CPT

## 2022-02-24 PROCEDURE — 87077 CULTURE AEROBIC IDENTIFY: CPT

## 2022-02-24 PROCEDURE — 700111 HCHG RX REV CODE 636 W/ 250 OVERRIDE (IP): Performed by: HOSPITALIST

## 2022-02-24 PROCEDURE — 76937 US GUIDE VASCULAR ACCESS: CPT

## 2022-02-24 RX ORDER — IPRATROPIUM BROMIDE AND ALBUTEROL SULFATE 2.5; .5 MG/3ML; MG/3ML
3 SOLUTION RESPIRATORY (INHALATION)
Status: DISCONTINUED | OUTPATIENT
Start: 2022-02-24 | End: 2022-02-28

## 2022-02-24 RX ORDER — HYDRALAZINE HYDROCHLORIDE 20 MG/ML
10 INJECTION INTRAMUSCULAR; INTRAVENOUS ONCE
Status: COMPLETED | OUTPATIENT
Start: 2022-02-24 | End: 2022-02-24

## 2022-02-24 RX ORDER — AMLODIPINE BESYLATE 5 MG/1
5 TABLET ORAL
Status: DISCONTINUED | OUTPATIENT
Start: 2022-02-26 | End: 2022-02-24

## 2022-02-24 RX ORDER — BENAZEPRIL HYDROCHLORIDE 10 MG/1
20 TABLET ORAL
Status: DISCONTINUED | OUTPATIENT
Start: 2022-02-26 | End: 2022-03-02 | Stop reason: HOSPADM

## 2022-02-24 RX ORDER — LABETALOL HYDROCHLORIDE 5 MG/ML
10 INJECTION, SOLUTION INTRAVENOUS EVERY 6 HOURS PRN
Status: DISCONTINUED | OUTPATIENT
Start: 2022-02-24 | End: 2022-03-02 | Stop reason: HOSPADM

## 2022-02-24 RX ORDER — BENAZEPRIL HYDROCHLORIDE 10 MG/1
20 TABLET ORAL
Status: DISCONTINUED | OUTPATIENT
Start: 2022-02-24 | End: 2022-02-24

## 2022-02-24 RX ORDER — HYDROCODONE BITARTRATE AND ACETAMINOPHEN 5; 325 MG/1; MG/1
1 TABLET ORAL EVERY 6 HOURS PRN
Status: DISCONTINUED | OUTPATIENT
Start: 2022-02-24 | End: 2022-03-02 | Stop reason: HOSPADM

## 2022-02-24 RX ORDER — LABETALOL HYDROCHLORIDE 5 MG/ML
10 INJECTION, SOLUTION INTRAVENOUS ONCE
Status: COMPLETED | OUTPATIENT
Start: 2022-02-24 | End: 2022-02-24

## 2022-02-24 RX ORDER — AMLODIPINE BESYLATE 5 MG/1
10 TABLET ORAL
Status: DISCONTINUED | OUTPATIENT
Start: 2022-02-26 | End: 2022-03-02 | Stop reason: HOSPADM

## 2022-02-24 RX ORDER — MORPHINE SULFATE 4 MG/ML
2 INJECTION INTRAVENOUS ONCE
Status: COMPLETED | OUTPATIENT
Start: 2022-02-24 | End: 2022-02-24

## 2022-02-24 RX ORDER — AMLODIPINE BESYLATE 5 MG/1
5 TABLET ORAL
Status: DISCONTINUED | OUTPATIENT
Start: 2022-02-24 | End: 2022-02-24

## 2022-02-24 RX ORDER — HYDRALAZINE HYDROCHLORIDE 20 MG/ML
10 INJECTION INTRAMUSCULAR; INTRAVENOUS EVERY 6 HOURS PRN
Status: DISCONTINUED | OUTPATIENT
Start: 2022-02-24 | End: 2022-03-02 | Stop reason: HOSPADM

## 2022-02-24 RX ADMIN — IPRATROPIUM BROMIDE AND ALBUTEROL SULFATE 3 ML: .5; 2.5 SOLUTION RESPIRATORY (INHALATION) at 16:04

## 2022-02-24 RX ADMIN — SERTRALINE HYDROCHLORIDE 50 MG: 50 TABLET ORAL at 05:17

## 2022-02-24 RX ADMIN — DEXMEDETOMIDINE 0.2 MCG/KG/HR: 200 INJECTION, SOLUTION INTRAVENOUS at 10:09

## 2022-02-24 RX ADMIN — HYDRALAZINE HYDROCHLORIDE 10 MG: 20 INJECTION INTRAMUSCULAR; INTRAVENOUS at 22:34

## 2022-02-24 RX ADMIN — SODIUM HYPOCHLORITE 1 ML: 1.25 SOLUTION TOPICAL at 19:08

## 2022-02-24 RX ADMIN — PROPOFOL 15 MCG/KG/MIN: 10 INJECTION, EMULSION INTRAVENOUS at 00:11

## 2022-02-24 RX ADMIN — CEFEPIME 2 G: 2 INJECTION, POWDER, FOR SOLUTION INTRAVENOUS at 05:17

## 2022-02-24 RX ADMIN — FAMOTIDINE 20 MG: 20 TABLET ORAL at 05:17

## 2022-02-24 RX ADMIN — CEFEPIME 2 G: 2 INJECTION, POWDER, FOR SOLUTION INTRAVENOUS at 17:30

## 2022-02-24 RX ADMIN — ATORVASTATIN CALCIUM 40 MG: 40 TABLET, FILM COATED ORAL at 05:17

## 2022-02-24 RX ADMIN — LEVOTHYROXINE SODIUM 25 MCG: 0.03 TABLET ORAL at 05:17

## 2022-02-24 RX ADMIN — PROPOFOL 20 MCG/KG/MIN: 10 INJECTION, EMULSION INTRAVENOUS at 07:23

## 2022-02-24 RX ADMIN — IPRATROPIUM BROMIDE AND ALBUTEROL SULFATE 3 ML: .5; 2.5 SOLUTION RESPIRATORY (INHALATION) at 20:18

## 2022-02-24 RX ADMIN — FUROSEMIDE 40 MG: 40 INJECTION, SOLUTION INTRAMUSCULAR; INTRAVENOUS at 16:36

## 2022-02-24 RX ADMIN — BENAZEPRIL HYDROCHLORIDE 20 MG: 10 TABLET ORAL at 18:40

## 2022-02-24 RX ADMIN — METRONIDAZOLE 500 MG: 500 TABLET ORAL at 21:15

## 2022-02-24 RX ADMIN — MULTIPLE VITAMINS W/ MINERALS TAB 1 TABLET: TAB at 05:17

## 2022-02-24 RX ADMIN — SENNOSIDES AND DOCUSATE SODIUM 2 TABLET: 50; 8.6 TABLET ORAL at 05:18

## 2022-02-24 RX ADMIN — AMLODIPINE BESYLATE 5 MG: 5 TABLET ORAL at 18:40

## 2022-02-24 RX ADMIN — HYDRALAZINE HYDROCHLORIDE 10 MG: 20 INJECTION INTRAMUSCULAR; INTRAVENOUS at 20:02

## 2022-02-24 RX ADMIN — MORPHINE SULFATE 2 MG: 4 INJECTION INTRAVENOUS at 18:42

## 2022-02-24 RX ADMIN — FUROSEMIDE 40 MG: 40 INJECTION, SOLUTION INTRAMUSCULAR; INTRAVENOUS at 05:17

## 2022-02-24 RX ADMIN — METRONIDAZOLE 500 MG: 500 TABLET ORAL at 14:47

## 2022-02-24 RX ADMIN — OXYCODONE HYDROCHLORIDE AND ACETAMINOPHEN 500 MG: 500 TABLET ORAL at 05:17

## 2022-02-24 RX ADMIN — METRONIDAZOLE 500 MG: 500 TABLET ORAL at 05:17

## 2022-02-24 RX ADMIN — ASPIRIN 325 MG ORAL TABLET 325 MG: 325 PILL ORAL at 05:17

## 2022-02-24 RX ADMIN — LABETALOL HYDROCHLORIDE 10 MG: 5 INJECTION, SOLUTION INTRAVENOUS at 17:39

## 2022-02-24 RX ADMIN — HYDROCODONE BITARTRATE AND ACETAMINOPHEN 1 TABLET: 5; 325 TABLET ORAL at 14:47

## 2022-02-24 RX ADMIN — SODIUM HYPOCHLORITE 10 ML: 1.25 SOLUTION TOPICAL at 05:31

## 2022-02-24 ASSESSMENT — PAIN DESCRIPTION - PAIN TYPE
TYPE: ACUTE PAIN

## 2022-02-24 ASSESSMENT — COPD QUESTIONNAIRES
COPD SCREENING SCORE: 5
HAVE YOU SMOKED AT LEAST 100 CIGARETTES IN YOUR ENTIRE LIFE: YES
DURING THE PAST 4 WEEKS HOW MUCH DID YOU FEEL SHORT OF BREATH: SOME OF THE TIME
DO YOU EVER COUGH UP ANY MUCUS OR PHLEGM?: NO/ONLY WITH OCCASIONAL COLDS OR INFECTIONS

## 2022-02-24 ASSESSMENT — PULMONARY FUNCTION TESTS: FVC: 0.72

## 2022-02-24 NOTE — FLOWSHEET NOTE
02/24/22 0710   Events/Summary/Plan   Events/Summary/Plan Vent Check   Skin Inspection Respiratory Device Intact   Vital Signs   Pulse 72   Respiration 20   Pulse Oximetry 100 %   $ Pulse Oximetry (Spot Check) Yes   CO2 Monitoring   ETCO2 (mmHg) 51   $ ETCO2 Monitoring Yes   Respiratory Assessment   Respiratory Pattern Within Normal Limits   Level of Consciousness Unresponsive   Chest Exam   Work Of Breathing / Effort Vented   Secretions   Cough Productive   How Sputum Obtained Endotracheal;Suction   Sputum Amount Small   Sputum Color Yellow   Sputum Consistency Thin   Airway ETT Oral 7.5   Placement Date/Time: 02/19/22 0830   Airway Placement: Central  Airway Type: ETT  Brand: ImageBrief  Style: Cuffed  Airway Location: Oral  Airway Size: 7.5  Inserted In: Unit  Inserted by: (c) MD   Site Assessment Clean;Dry;Intact   Airway Tube Secured Commercial securing device   Secured At  (cm) 23   Difficult Intubation? Yes   Tube Repositioned Right   Cuffless No   Suctioning Device Inline Catheter Replaced   Next Closed Suction Device Change Due  02/26/22   Oxygen   FiO2% 40 %   O2 Delivery Device Ventilator   Resuscitation Device at Bedside Self Inflating Bag

## 2022-02-24 NOTE — PROGRESS NOTES
Report received from Sven REAL. Patient intubated and sedated, in bilateral wrist restraints. Safety precautions in place, CPOT 0, RASS -2. Per MD, plan today to perform SAT/SBT, will wean off of propofol, switch to precedex for agitation as needed.

## 2022-02-24 NOTE — CARE PLAN
The patient is Watcher - Medium risk of patient condition declining or worsening    Shift Goals  Clinical Goals: Tolerate SAT and SBT  Patient Goals: JACKY  Family Goals: JACKY    Progress made toward(s) clinical / shift goals:  Not progressing    Patient is not progressing towards the following goals:      Problem: Mechanical Ventilation  Goal: Successful weaning off mechanical ventilator, spontaneously maintains adequate gas exchange  Outcome: Not Progressing   Patient was able to tolerate SBT but was unable to follow commands, so weaning parameters were unable to be obtained. The patient ended up becoming too tired to tolerate spontaneous breathing, so the decision was made to restart sedation and vent support.   _________________________________________________________________        Problem: Hemodynamics  Goal: Patient's hemodynamics, fluid balance and neurologic status will be stable or improve  Outcome: Progressing     Problem: Physical Regulation  Goal: Diagnostic test results will improve  Outcome: Progressing   Good UOP; labs improving.

## 2022-02-24 NOTE — CARE PLAN
The patient is Watcher - Medium risk of patient condition declining or worsening    Shift Goals  Clinical Goals: Tolerate SAT/SBT, extubate  Patient Goals: Decrease anxiety/agitation  Family Goals: JACKY    Progress made toward(s) clinical / shift goals:    Problem: Hemodynamics  Goal: Patient's hemodynamics, fluid balance and neurologic status will be stable or improve  Outcome: Progressing   Patient now A/O x4 after SAT/SBT/extubation.      Problem: Mechanical Ventilation  Goal: Successful weaning off mechanical ventilator, spontaneously maintains adequate gas exchange  Outcome: Progressing  Goal: Patient will be able to express needs and understand communication  Outcome: Progressing   Patient successful SBT, extubated.    Problem: Safety - Medical Restraint  Goal: Free from restraint(s) (Restraint for Interference with Medical Device)  Outcome: Met  Patient verbalized understanding to not pull at cortrak and lines, removed bilateral wrist restraints.      Patient is not progressing towards the following goals:    Problem: Respiratory  Goal: Patient will achieve/maintain optimum respiratory ventilation and gas exchange  2/24/2022 1455 by Kemi Dumont, RMichaelN.  Outcome: Not Progressing  Plan to advance goal:  Patient still on 5L NC O2, has mild increased work of breathing, O2 sat 95-97%, will continue to encourage coughing and deep breathing, patient on lasix BID, will continue to try to wean O2, PT/OT consult placed to help with mobility.

## 2022-02-24 NOTE — PROGRESS NOTES
Pulmonary Progress Note    Date of admission  2/16/2022    Chief Complaint  76 y.o. female admitted 2/16/2022 with SOB    Hospital Course  76 y.o. female who presented 2/16/2022 with leg swelling b/l with cellulitis of right lower extremity, Sob and diarrhea. Rx for cellulitis and heart failure. Blood cxs negative   Hx of chronic obstructive pulmonary disease (no PFTs and no CT chest ), chronic hypoxemic respiratory failure on 2 L of oxygen at baseline, hypertension, diabetes, dyslipidemia and hypothyroidism and moderate aortic stenosis     Her Fio2 requirement went up from 2 l to 5 l and was wheezing and dyspneac on day2 of hospitalization  started on rx for COPD with steroids. She improved with Fio2 requirement back to baseline    2/19: Developed acute respiratory distress and AMS on 2/19/22 a.m  Troponin increased to 131 and ekg initially done concerning for ST depression and with AMS and paco2 dropped to 69 after 45 mins of BIPAP decision made to intubate.  Wbc increased to 16.7, creatinine worsening to 1.25, probnp 3459 and trop 131 with lactic 3.8,      D/w cardiology DR. Esquivel  re: NSTEMI  At this time resp distress from pulm cause hypercapneic resp failure  No ST elevation  Trend troponin if markedly in 1000  Level to reassess  Heparin, Asa, lipitor    2/24/22: difficult to wean off sedation for past few days -became very agitated or completely somnolent and would get very hypertensive to SBP 200s and fail SBTs. Today, we were able to slowly wean off sedation and try a little precedex to keep her calm - she was more alert and following commands. Passed SBT so she was extubated.     Interval Problem Update  Chart review from the past 24 hours includes imaging, laboratory studies, vital signs and notes available.  Pertinent data for today's visit includes      Cardiac: VSS  Pulm: extubated 2/23   Heme: stable  /renal: decreased lasix to 40mg IV daily given rising BUN but Cr stable (it improved with  diuresis initially) 2/23 - now Cr/BUN improving with this  ID:  +GPCs in Bcx w/peptoniphilus - pending ID recs on this. Repeat cx neg. Neg MRSA - cefepime and flagyl  I/O: -2.8L with lasix 40 IV daily  GI/endo: TFs        Review of Systems  Review of Systems   Unable to perform ROS: Acuity of condition        Vital Signs for last 24 hours   Temp:  [36.1 °C (96.9 °F)-36.1 °C (97 °F)] 36.1 °C (97 °F)  Pulse:  [70-94] 90  Resp:  [14-57] 29  BP: (113-192)/(44-88) 169/51  SpO2:  [91 %-100 %] 96 %         Respiratory Information for the last 24 hours  Vent Mode: Spont  Rate (breaths/min): 19  Vt Target (mL): 320  PEEP/CPAP: 8  P Support: 5  MAP: 10  Length of Weaning Trial (Hours): 2  Control VTE (exp VT): 431    Physical Exam   Physical Exam  Constitutional:       Appearance: She is obese.      Comments: intubated and sedated  Off sedation agitated and tachy and hypertensive   HENT:      Head: Normocephalic and atraumatic.      Mouth/Throat:      Mouth: Mucous membranes are dry.   Eyes:      Extraocular Movements: Extraocular movements intact.      Pupils: Pupils are equal, round, and reactive to light.   Cardiovascular:      Heart sounds: Murmur heard.   Pulmonary:      Comments: Diminished BS b/l  Abdominal:      General: Bowel sounds are normal. There is distension.      Palpations: Abdomen is soft.   Musculoskeletal:      Cervical back: Neck supple.      Right lower leg: Edema present.      Left lower leg: Edema present.   Skin:     General: Skin is warm and dry.      Comments: erythema lower extremities   Neurological:      Comments: Unable to assess  sedated   Psychiatric:      Comments: Unable to assess  Sedated and intubated         Medications  Current Facility-Administered Medications   Medication Dose Route Frequency Provider Last Rate Last Admin   • propofol (DIPRIVAN) injection  0-80 mcg/kg/min Intravenous Continuous Lien Lee M.D.   Stopped at 02/24/22 0930   • dexmedetomidine (Precedex) 400 mcg  in  mL infusion  0.1-1.5 mcg/kg/hr Intravenous Continuous Lien Lee M.D.   Stopped at 02/24/22 1210   • insulin GLARGINE (Lantus,Semglee) injection  20 Units Subcutaneous BID Lien Lee M.D.       • HYDROcodone-acetaminophen (NORCO) 5-325 MG per tablet 1 Tablet  1 Tablet Enteral Tube Q6HRS PRN Lien Lee M.D.       • LORazepam (ATIVAN) injection 2 mg  2 mg Intravenous Q HOUR PRN Lien Lee M.D.   2 mg at 02/23/22 1403   • metroNIDAZOLE (FLAGYL) tablet 500 mg  500 mg Enteral Tube Q8HRS Lien Lee M.D.   500 mg at 02/24/22 0517   • insulin regular (HumuLIN R,NovoLIN R) injection  3-14 Units Subcutaneous 4X/DAY ACHS Lien Lee M.D.   10 Units at 02/24/22 1050    And   • dextrose 50% (D50W) injection 50 mL  50 mL Intravenous Q15 MIN PRN Lien Lee M.D.       • furosemide (LASIX) injection 40 mg  40 mg Intravenous BID DIURETIC Lien Lee M.D.   40 mg at 02/24/22 0517   • acetaminophen (Tylenol) tablet 650 mg  650 mg Enteral Tube Q6HRS PRN Ashia Bertrand M.D.       • cefepime (Maxipime) 2 g in  mL IVPB  2 g Intravenous Q12HRS Ashia Bertrand M.D.   Stopped at 02/24/22 0547   • dakins 0.125% (1/4 strength) topical soln   Topical BID Ashia Bertrand M.D.   10 mL at 02/24/22 0531   • prochlorperazine (COMPAZINE) injection 10 mg  10 mg Intravenous Q6HRS PRN Alley Longo M.D.   10 mg at 02/19/22 0013   • aspirin (ASA) tablet 325 mg  325 mg Enteral Tube DAILY Ashia Bertrand M.D.   325 mg at 02/24/22 0517   • Respiratory Therapy Consult   Nebulization Continuous RT Ashia Bertrand M.D.       • famotidine (PEPCID) tablet 20 mg  20 mg Enteral Tube DAILY Ashia Bertrand M.D.   20 mg at 02/24/22 0517    Or   • famotidine (PEPCID) injection 20 mg  20 mg Intravenous DAILY Ashia Bertrand M.D.   20 mg at 02/19/22 0908   • senna-docusate (PERICOLACE or SENOKOT S) 8.6-50 MG per tablet 2 Tablet  2  Tablet Enteral Tube BID Ashia Bertrand M.D.   2 Tablet at 02/24/22 0518    And   • polyethylene glycol/lytes (MIRALAX) PACKET 1 Packet  1 Packet Enteral Tube QDAY PRN Ashia Bertrand M.D.   1 Packet at 02/23/22 1447    And   • magnesium hydroxide (MILK OF MAGNESIA) suspension 30 mL  30 mL Enteral Tube QDAY PRN Ashia Bertrand M.D.   30 mL at 02/23/22 1447    And   • bisacodyl (DULCOLAX) suppository 10 mg  10 mg Rectal QDAY PRN Ashia Bertrand M.D.       • MD Alert...ICU Electrolyte Replacement per Pharmacy   Other PHARMACY TO DOSE Ashia Bertrand M.D.       • lidocaine (XYLOCAINE) 1 % injection 2 mL  2 mL Tracheal Tube Q30 MIN PRN Ashia Bertrand M.D.       • ascorbic acid (Vitamin C) tablet 500 mg  500 mg Enteral Tube BID Ashia Bertrand M.D.   500 mg at 02/24/22 0517   • therapeutic multivitamin-minerals (THERAGRAN-M) tablet 1 Tablet  1 Tablet Enteral Tube DAILY Asejuan miguel Stroud M.D.   1 Tablet at 02/24/22 0517   • atorvastatin (LIPITOR) tablet 40 mg  40 mg Enteral Tube DAILY Thomas Stroud M.D.   40 mg at 02/24/22 0517   • [Held by provider] bumetanide (BUMEX) tablet 1 mg  1 mg Enteral Tube Q DAY Thomas Stroud M.D.   1 mg at 02/20/22 0519   • levothyroxine (SYNTHROID) tablet 25 mcg  25 mcg Enteral Tube DAILY Asejuan miguel Stroud M.D.   25 mcg at 02/24/22 0517   • sertraline (Zoloft) tablet 50 mg  50 mg Enteral Tube DAILY Asejuan miguel Stroud M.D.   50 mg at 02/24/22 0517   • ondansetron (ZOFRAN) syringe/vial injection 4 mg  4 mg Intravenous Q4HRS PRN Alley Longo M.D.   4 mg at 02/18/22 2250   • Respiratory Therapy Consult   Nebulization Continuous RT Allyson A Artemio, D.O.       • ipratropium-albuterol (DUONEB) nebulizer solution  3 mL Nebulization Q2HRS PRN (RT) Allyson Ulloa D.O.       • [Held by provider] benazepril (LOTENSIN) tablet 20 mg  20 mg Oral Q DAY Thomas Stroud M.D.       • lactated ringers infusion (BOLUS)  500 mL Intravenous Once PRN Thomas FELDMAN  CHEPE Stroud       • lactated ringers infusion (BOLUS): BMI greater than 30  30 mL/kg (Ideal) Intravenous Once PRN Thomas Stroud M.D.           Fluids    Intake/Output Summary (Last 24 hours) at 2/24/2022 1433  Last data filed at 2/24/2022 1200  Gross per 24 hour   Intake 1641.82 ml   Output 4808 ml   Net -3166.18 ml       Laboratory  Recent Labs     02/24/22  1149   ISTATAPH 7.496   ISTATAPCO2 51.9*   ISTATAPO2 79   ISTATATCO2 42*   [id number] 96   ISTATARTHCO3 40.1*   ISTATARTBE 15*   ISTATTEMP 98.6 F   ISTATSPEC Arterial   ISTATAPHTC 7.496   [id number] 79         Recent Labs     02/22/22  0357 02/23/22  0300 02/24/22  0400   SODIUM 136 141 145   POTASSIUM 4.1 3.9 4.0   CHLORIDE 100 103 104   CO2 25 29 35*   BUN 73* 55* 36*   CREATININE 1.34 0.86 0.65   MAGNESIUM 2.1  --   --    PHOSPHORUS 4.1  --   --    CALCIUM 9.2 9.3 9.6     Recent Labs     02/22/22  0357 02/23/22  0300 02/24/22  0400   PREALBUMIN 19.1  --   --    GLUCOSE 211* 310* 251*     Recent Labs     02/22/22  0357 02/23/22  0300 02/24/22  0400   WBC 15.4* 16.3* 15.8*   NEUTSPOLYS 72.90* 66.60 68.00   LYMPHOCYTES 12.50* 13.80* 16.00*   MONOCYTES 4.40 4.40 7.00   EOSINOPHILS 3.50 3.40 3.00   BASOPHILS 0.30 0.50 0.00     Recent Labs     02/22/22  0357 02/23/22  0300 02/24/22  0400   RBC 2.89* 3.01* 3.21*   HEMOGLOBIN 9.1* 9.5* 10.0*   HEMATOCRIT 30.4* 31.1* 33.1*   PLATELETCT 397 415 440       Imaging  CXR: diffuse reticular marking suggestive of pulm edema  ET tube in good position  Feeding tube below the diaphragm     Assessment/Plan  SRIRAM (acute kidney injury) (HCC)  Assessment & Plan  - initially thought to be 2/2 overdiuresis but now Cr trending up again likely from some fluid overload - improving now with lasix  - decreased lasix 40mg IV daily due to rising BUN  - avoid nephrotoxins and NSAIDs    Bacteremia  Assessment & Plan  - Bcx with likely coag neg staph, all other cultures neg  -  Plan for 5 days of cefepime and flagyl to finish on  2/24(was on various abx regimens since 2/16)  - wound care consulted and following    NSTEMI (non-ST elevated myocardial infarction) ([facility])  Assessment & Plan  - may be supply and demand  - continue to trend troponin  - d/w cardiology see body of text on 2/19  - Heparin gtt finished, on ASA and lipitor  - Hyperdynamic last ECHO repeat 2/19 showed EF now 40%    COPD with acute exacerbation ([facility])  Assessment & Plan  resolved  No secretions  Duonebs prn    Decubitus ulcer- (present on admission)  Assessment & Plan  Unstageable   MVI daily and vitamin c bid   Wound care reconsulted    Cellulitis of left lower extremity- (present on admission)  Assessment & Plan  Finish course of abx    Acute on chronic respiratory failure with hypoxia ([facility])- (present on admission)  Assessment & Plan  COPD (no PFTs or CT chest), SONIA, OHS and heart failure  Hypercapnea was predominant feature that resulted in intubation 2/19  ? COPD and heart failure and NSTEMI combination  Not clear  Not wheezing  CXR suggests heart failure and she was diuresed adequately     DM (diabetes mellitus) ([facility])- (present on admission)  Assessment & Plan  Lantus bid  Goal BG <180       VTE:  Lovenox  Ulcer: H2 Antagonist  Lines: Nye Catheter  Ongoing indication addressed    I have performed a physical exam and reviewed and updated ROS and Plan today (2/24/2022). In review of yesterday's note (2/23/2022), there are no changes except as documented above.     Discussed patient condition and risk of morbidity and/or mortality with Charge nurse / hot rounds   KANCHAN is her friend Rhonda Mccluree [phone] and has been updated (She lives in [place] but in [place] currently)  The patient remains critically ill.  Critical care time = 60 minutes in directly providing and coordinating critical care and extensive data review.  No time overlap and excludes procedures.        Lien Lee MD  Pulmonary and Critical Care Medicine  [facility]

## 2022-02-24 NOTE — PROGRESS NOTES
Patient extubated, able to follow some commands but still trying to pull at cortrak, will continue bilateral soft wrist restraints until more alert. Patient had large BM, cleaned up and redressed pressure injury per wound protocol.

## 2022-02-24 NOTE — CARE PLAN
The patient is Watcher - Medium risk of patient condition declining or worsening    Shift Goals  Clinical Goals: Wean vent support  Patient Goals: JACKY  Family Goals: JACKY    Progress made toward(s) clinical / shift goals:    Problem: Urinary - Renal Perfusion  Goal: Ability to achieve and maintain adequate renal perfusion and functioning will improve  Outcome: Progressing  Problem: Mechanical Ventilation  Goal: Safe management of artificial airway and ventilation  Outcome: Progressing      Patient is not progressing towards the following goals:  Problem: Hemodynamics  Goal: Patient's hemodynamics, fluid balance and neurologic status will be stable or improve  Outcome: Not Progressing  Note: Neuro status has not changed. Patient does not open eyes, but will respond to voice or even light touch. Patient has not followed any commands.      Problem: Respiratory  Goal: Patient will achieve/maintain optimum respiratory ventilation and gas exchange  Outcome: Not Progressing

## 2022-02-25 LAB
ANION GAP SERPL CALC-SCNC: 9 MMOL/L (ref 7–16)
ANISOCYTOSIS BLD QL SMEAR: ABNORMAL
BASOPHILS # BLD AUTO: 0 % (ref 0–1.8)
BASOPHILS # BLD: 0 K/UL (ref 0–0.12)
BUN SERPL-MCNC: 32 MG/DL (ref 8–22)
CA-I SERPL-SCNC: 1.28 MMOL/L (ref 1.1–1.3)
CALCIUM SERPL-MCNC: 10.3 MG/DL (ref 8.4–10.2)
CHLORIDE SERPL-SCNC: 99 MMOL/L (ref 96–112)
CO2 SERPL-SCNC: 36 MMOL/L (ref 20–33)
CREAT SERPL-MCNC: 0.7 MG/DL (ref 0.5–1.4)
EOSINOPHIL # BLD AUTO: 0.62 K/UL (ref 0–0.51)
EOSINOPHIL NFR BLD: 3 % (ref 0–6.9)
ERYTHROCYTE [DISTWIDTH] IN BLOOD BY AUTOMATED COUNT: 48.7 FL (ref 35.9–50)
GLUCOSE BLD STRIP.AUTO-MCNC: 395 MG/DL (ref 65–99)
GLUCOSE BLD STRIP.AUTO-MCNC: 472 MG/DL (ref 65–99)
GLUCOSE BLD STRIP.AUTO-MCNC: 490 MG/DL (ref 65–99)
GLUCOSE BLD STRIP.AUTO-MCNC: 499 MG/DL (ref 65–99)
GLUCOSE SERPL-MCNC: 496 MG/DL (ref 65–99)
HCT VFR BLD AUTO: 38.9 % (ref 37–47)
HGB BLD-MCNC: 11.9 G/DL (ref 12–16)
LYMPHOCYTES # BLD AUTO: 1.86 K/UL (ref 1–4.8)
LYMPHOCYTES NFR BLD: 9 % (ref 22–41)
MACROCYTES BLD QL SMEAR: ABNORMAL
MAGNESIUM SERPL-MCNC: 2.2 MG/DL (ref 1.5–2.5)
MANUAL DIFF BLD: NORMAL
MCH RBC QN AUTO: 31.3 PG (ref 27–33)
MCHC RBC AUTO-ENTMCNC: 30.6 G/DL (ref 33.6–35)
MCV RBC AUTO: 102.4 FL (ref 81.4–97.8)
MONOCYTES # BLD AUTO: 1.24 K/UL (ref 0–0.85)
MONOCYTES NFR BLD AUTO: 6 % (ref 0–13.4)
NEUTROPHILS # BLD AUTO: 16.97 K/UL (ref 2–7.15)
NEUTROPHILS NFR BLD: 76 % (ref 44–72)
NEUTS BAND NFR BLD MANUAL: 6 % (ref 0–10)
NRBC # BLD AUTO: 0.02 K/UL
NRBC BLD-RTO: 0.1 /100 WBC
PHOSPHATE SERPL-MCNC: 2 MG/DL (ref 2.5–4.5)
PLATELET # BLD AUTO: 485 K/UL (ref 164–446)
PLATELET BLD QL SMEAR: NORMAL
PMV BLD AUTO: 10.2 FL (ref 9–12.9)
POTASSIUM SERPL-SCNC: 4 MMOL/L (ref 3.6–5.5)
RBC # BLD AUTO: 3.8 M/UL (ref 4.2–5.4)
RBC BLD AUTO: PRESENT
SODIUM SERPL-SCNC: 144 MMOL/L (ref 135–145)
WBC # BLD AUTO: 20.7 K/UL (ref 4.8–10.8)

## 2022-02-25 PROCEDURE — 700101 HCHG RX REV CODE 250: Performed by: HOSPITALIST

## 2022-02-25 PROCEDURE — 700102 HCHG RX REV CODE 250 W/ 637 OVERRIDE(OP): Performed by: INTERNAL MEDICINE

## 2022-02-25 PROCEDURE — A9270 NON-COVERED ITEM OR SERVICE: HCPCS | Performed by: INTERNAL MEDICINE

## 2022-02-25 PROCEDURE — 84100 ASSAY OF PHOSPHORUS: CPT

## 2022-02-25 PROCEDURE — 94760 N-INVAS EAR/PLS OXIMETRY 1: CPT

## 2022-02-25 PROCEDURE — 700101 HCHG RX REV CODE 250: Performed by: STUDENT IN AN ORGANIZED HEALTH CARE EDUCATION/TRAINING PROGRAM

## 2022-02-25 PROCEDURE — 82330 ASSAY OF CALCIUM: CPT

## 2022-02-25 PROCEDURE — 700105 HCHG RX REV CODE 258: Performed by: STUDENT IN AN ORGANIZED HEALTH CARE EDUCATION/TRAINING PROGRAM

## 2022-02-25 PROCEDURE — 82962 GLUCOSE BLOOD TEST: CPT

## 2022-02-25 PROCEDURE — 80048 BASIC METABOLIC PNL TOTAL CA: CPT

## 2022-02-25 PROCEDURE — 700102 HCHG RX REV CODE 250 W/ 637 OVERRIDE(OP): Performed by: HOSPITALIST

## 2022-02-25 PROCEDURE — 94640 AIRWAY INHALATION TREATMENT: CPT

## 2022-02-25 PROCEDURE — 99291 CRITICAL CARE FIRST HOUR: CPT | Performed by: STUDENT IN AN ORGANIZED HEALTH CARE EDUCATION/TRAINING PROGRAM

## 2022-02-25 PROCEDURE — 83735 ASSAY OF MAGNESIUM: CPT

## 2022-02-25 PROCEDURE — 85027 COMPLETE CBC AUTOMATED: CPT

## 2022-02-25 PROCEDURE — A9270 NON-COVERED ITEM OR SERVICE: HCPCS | Performed by: HOSPITALIST

## 2022-02-25 PROCEDURE — 700111 HCHG RX REV CODE 636 W/ 250 OVERRIDE (IP): Performed by: INTERNAL MEDICINE

## 2022-02-25 PROCEDURE — 770020 HCHG ROOM/CARE - TELE (206)

## 2022-02-25 PROCEDURE — 700105 HCHG RX REV CODE 258: Performed by: INTERNAL MEDICINE

## 2022-02-25 PROCEDURE — 700102 HCHG RX REV CODE 250 W/ 637 OVERRIDE(OP): Performed by: STUDENT IN AN ORGANIZED HEALTH CARE EDUCATION/TRAINING PROGRAM

## 2022-02-25 PROCEDURE — 700111 HCHG RX REV CODE 636 W/ 250 OVERRIDE (IP): Performed by: STUDENT IN AN ORGANIZED HEALTH CARE EDUCATION/TRAINING PROGRAM

## 2022-02-25 PROCEDURE — 85007 BL SMEAR W/DIFF WBC COUNT: CPT

## 2022-02-25 RX ORDER — DEXTROSE MONOHYDRATE 25 G/50ML
50 INJECTION, SOLUTION INTRAVENOUS
Status: DISCONTINUED | OUTPATIENT
Start: 2022-02-25 | End: 2022-03-02 | Stop reason: HOSPADM

## 2022-02-25 RX ORDER — FUROSEMIDE 10 MG/ML
40 INJECTION INTRAMUSCULAR; INTRAVENOUS
Status: DISCONTINUED | OUTPATIENT
Start: 2022-02-26 | End: 2022-03-02

## 2022-02-25 RX ORDER — DIGOXIN 0.25 MG/ML
250 INJECTION INTRAMUSCULAR; INTRAVENOUS DAILY
Status: DISCONTINUED | OUTPATIENT
Start: 2022-02-26 | End: 2022-02-25

## 2022-02-25 RX ORDER — DEXTROSE MONOHYDRATE 25 G/50ML
50 INJECTION, SOLUTION INTRAVENOUS
Status: DISCONTINUED | OUTPATIENT
Start: 2022-02-25 | End: 2022-02-25

## 2022-02-25 RX ORDER — DIGOXIN 0.25 MG/ML
250 INJECTION INTRAMUSCULAR; INTRAVENOUS EVERY 8 HOURS
Status: DISCONTINUED | OUTPATIENT
Start: 2022-02-25 | End: 2022-02-25

## 2022-02-25 RX ORDER — BUDESONIDE AND FORMOTEROL FUMARATE DIHYDRATE 160; 4.5 UG/1; UG/1
2 AEROSOL RESPIRATORY (INHALATION)
Status: DISCONTINUED | OUTPATIENT
Start: 2022-02-25 | End: 2022-03-02 | Stop reason: HOSPADM

## 2022-02-25 RX ORDER — DIGOXIN 0.25 MG/ML
500 INJECTION INTRAMUSCULAR; INTRAVENOUS ONCE
Status: DISCONTINUED | OUTPATIENT
Start: 2022-02-25 | End: 2022-02-25

## 2022-02-25 RX ADMIN — ASPIRIN 325 MG ORAL TABLET 325 MG: 325 PILL ORAL at 05:50

## 2022-02-25 RX ADMIN — LEVOTHYROXINE SODIUM 25 MCG: 0.03 TABLET ORAL at 05:50

## 2022-02-25 RX ADMIN — OXYCODONE HYDROCHLORIDE AND ACETAMINOPHEN 500 MG: 500 TABLET ORAL at 05:50

## 2022-02-25 RX ADMIN — POTASSIUM PHOSPHATE, MONOBASIC AND POTASSIUM PHOSPHATE, DIBASIC 15 MMOL: 224; 236 INJECTION, SOLUTION, CONCENTRATE INTRAVENOUS at 10:25

## 2022-02-25 RX ADMIN — ENOXAPARIN SODIUM 40 MG: 40 INJECTION SUBCUTANEOUS at 12:21

## 2022-02-25 RX ADMIN — INSULIN HUMAN 14 UNITS: 100 INJECTION, SOLUTION PARENTERAL at 23:28

## 2022-02-25 RX ADMIN — ATORVASTATIN CALCIUM 40 MG: 40 TABLET, FILM COATED ORAL at 05:50

## 2022-02-25 RX ADMIN — BUDESONIDE AND FORMOTEROL FUMARATE DIHYDRATE 2 PUFF: 160; 4.5 AEROSOL RESPIRATORY (INHALATION) at 19:43

## 2022-02-25 RX ADMIN — SODIUM HYPOCHLORITE 1 ML: 1.25 SOLUTION TOPICAL at 17:31

## 2022-02-25 RX ADMIN — IPRATROPIUM BROMIDE AND ALBUTEROL SULFATE 3 ML: .5; 2.5 SOLUTION RESPIRATORY (INHALATION) at 19:43

## 2022-02-25 RX ADMIN — LABETALOL HYDROCHLORIDE 10 MG: 5 INJECTION, SOLUTION INTRAVENOUS at 02:19

## 2022-02-25 RX ADMIN — IPRATROPIUM BROMIDE AND ALBUTEROL SULFATE 3 ML: .5; 2.5 SOLUTION RESPIRATORY (INHALATION) at 10:57

## 2022-02-25 RX ADMIN — CEFEPIME 2 G: 2 INJECTION, POWDER, FOR SOLUTION INTRAVENOUS at 05:39

## 2022-02-25 RX ADMIN — MULTIPLE VITAMINS W/ MINERALS TAB 1 TABLET: TAB at 05:50

## 2022-02-25 RX ADMIN — SERTRALINE HYDROCHLORIDE 50 MG: 50 TABLET ORAL at 05:50

## 2022-02-25 RX ADMIN — IPRATROPIUM BROMIDE AND ALBUTEROL SULFATE 3 ML: .5; 2.5 SOLUTION RESPIRATORY (INHALATION) at 15:15

## 2022-02-25 RX ADMIN — IPRATROPIUM BROMIDE AND ALBUTEROL SULFATE 3 ML: .5; 2.5 SOLUTION RESPIRATORY (INHALATION) at 07:27

## 2022-02-25 RX ADMIN — FUROSEMIDE 40 MG: 40 INJECTION, SOLUTION INTRAMUSCULAR; INTRAVENOUS at 06:01

## 2022-02-25 RX ADMIN — HYDRALAZINE HYDROCHLORIDE 10 MG: 20 INJECTION INTRAMUSCULAR; INTRAVENOUS at 13:57

## 2022-02-25 RX ADMIN — INSULIN GLARGINE 30 UNITS: 100 INJECTION, SOLUTION SUBCUTANEOUS at 17:36

## 2022-02-25 RX ADMIN — SODIUM HYPOCHLORITE 10 ML: 1.25 SOLUTION TOPICAL at 05:36

## 2022-02-25 RX ADMIN — OXYCODONE HYDROCHLORIDE AND ACETAMINOPHEN 500 MG: 500 TABLET ORAL at 17:42

## 2022-02-25 RX ADMIN — BUDESONIDE AND FORMOTEROL FUMARATE DIHYDRATE 2 PUFF: 160; 4.5 AEROSOL RESPIRATORY (INHALATION) at 07:27

## 2022-02-25 RX ADMIN — FAMOTIDINE 20 MG: 20 TABLET ORAL at 05:50

## 2022-02-25 ASSESSMENT — PAIN DESCRIPTION - PAIN TYPE
TYPE: ACUTE PAIN

## 2022-02-25 NOTE — FLOWSHEET NOTE
02/25/22 0700   Events/Summary/Plan   Events/Summary/Plan O2 to 4 lpm   Vital Signs   Pulse 90   Respiration (!) 24   Pulse Oximetry 97 %   $ Pulse Oximetry (Spot Check) Yes   Chest Exam   Work Of Breathing / Effort Mild;Increased Work of Breathing   Breath Sounds   RUL Breath Sounds Clear   RML Breath Sounds Diminished;Fine Crackles   RLL Breath Sounds Diminished   MOSHE Breath Sounds Clear   LLL Breath Sounds Diminished;Fine Crackles   Secretions   Cough Non Productive   Oxygen   O2 (LPM) 4   O2 Delivery Device Nasal Cannula

## 2022-02-25 NOTE — PROGRESS NOTES
Pulmonary Progress Note    Date of admission  2/16/2022    Chief Complaint  76 y.o. female admitted 2/16/2022 with SOB    Hospital Course  76 y.o. female who presented 2/16/2022 with leg swelling b/l with cellulitis of right lower extremity, Sob and diarrhea. Rx for cellulitis and heart failure. Blood cxs negative   Hx of chronic obstructive pulmonary disease (no PFTs and no CT chest ), chronic hypoxemic respiratory failure on 2 L of oxygen at baseline, hypertension, diabetes, dyslipidemia and hypothyroidism and moderate aortic stenosis     Her Fio2 requirement went up from 2 l to 5 l and was wheezing and dyspneac on day2 of hospitalization  started on rx for COPD with steroids. She improved with Fio2 requirement back to baseline    2/19: Developed acute respiratory distress and AMS on 2/19/22 a.m  Troponin increased to 131 and ekg initially done concerning for ST depression and with AMS and paco2 dropped to 69 after 45 mins of BIPAP decision made to intubate.  Wbc increased to 16.7, creatinine worsening to 1.25, probnp 3459 and trop 131 with lactic 3.8,      D/w cardiology DR. Esquivel  re: NSTEMI  At this time resp distress from pulm cause hypercapneic resp failure  No ST elevation  Trend troponin if markedly in 1000  Level to reassess  Heparin, Asa, lipitor    2/24/22: difficult to wean off sedation for past few days -became very agitated or completely somnolent and would get very hypertensive to SBP 200s and fail SBTs. Today, we were able to slowly wean off sedation and try a little precedex to keep her calm - she was more alert and following commands. Passed SBT so she was extubated.     Interval Problem Update  Chart review from the past 24 hours includes imaging, laboratory studies, vital signs and notes available.  Pertinent data for today's visit includes      Cardiac: VSS  Pulm: extubated 2/24   Heme: stable  /renal: decreased lasix to 40mg IV daily given rising BUN but Cr stable (it improved with  diuresis initially) 2/23 - now Cr/BUN improving with this  ID:  +GPCs in Bcx w/peptoniphilus - NTD per ID. Repeat cx neg. Neg MRSA - finished cefepime and flagyl course on 2/24 with elevated WBC on 2/25 but no clinical e/o sepsis/infection   I/O: -2.8L with lasix 40 IV daily  GI/endo: Tfs - poorly controlled BG so increased lantus to 30u BID and increased sliding scale dose    Review of Systems  Review of Systems   Unable to perform ROS: Acuity of condition        Vital Signs for last 24 hours   Temp:  [36.1 °C (97 °F)-37.8 °C (100 °F)] 37.2 °C (99 °F)  Pulse:  [] 91  Resp:  [17-74] 25  BP: (120-197)/(45-87) 120/51  SpO2:  [85 %-98 %] 96 %         Respiratory Information for the last 24 hours  Length of Weaning Trial (Hours): 2    Physical Exam   Physical Exam  Constitutional:       General: She is not in acute distress.     Appearance: She is obese. She is not toxic-appearing.   HENT:      Head: Normocephalic and atraumatic.      Mouth/Throat:      Mouth: Mucous membranes are dry.   Eyes:      Extraocular Movements: Extraocular movements intact.      Pupils: Pupils are equal, round, and reactive to light.   Cardiovascular:      Heart sounds: Murmur heard.   Pulmonary:      Comments: Diminished BS b/l  Abdominal:      General: Bowel sounds are normal. There is distension.      Palpations: Abdomen is soft.   Musculoskeletal:      Cervical back: Neck supple.      Right lower leg: Edema present.      Left lower leg: Edema present.   Skin:     General: Skin is warm and dry.      Comments: erythema lower extremities   Neurological:      Comments: Unable to assess  sedated   Psychiatric:      Comments: Unable to assess  Sedated and intubated         Medications  Current Facility-Administered Medications   Medication Dose Route Frequency Provider Last Rate Last Admin   • potassium phosphate 15 mmol in dextrose 5% 250 mL ivpb  15 mmol Intravenous Once Lien Lee M.D. 83.3 mL/hr at 02/25/22 1025 15 mmol at  02/25/22 1025   • budesonide-formoterol (SYMBICORT) 160-4.5 MCG/ACT inhaler 2 Puff  2 Puff Inhalation BID (RT) Lien Lee M.D.   2 Puff at 02/25/22 0727   • insulin GLARGINE (Lantus,Semglee) injection  30 Units Subcutaneous BID Lien Lee M.D.       • insulin regular (HumuLIN R,NovoLIN R) injection  3-14 Units Subcutaneous 4X/DAY ACHS Lien Lee M.D.        And   • dextrose 50% (D50W) injection 50 mL  50 mL Intravenous Q15 MIN PRN Lien Lee M.D.       • HYDROcodone-acetaminophen (NORCO) 5-325 MG per tablet 1 Tablet  1 Tablet Enteral Tube Q6HRS PRN Lien Lee M.D.   1 Tablet at 02/24/22 1447   • ipratropium-albuterol (DUONEB) nebulizer solution  3 mL Nebulization 4X/DAY (RT) Lien Lee M.D.   3 mL at 02/25/22 1057   • hydrALAZINE (APRESOLINE) injection 10 mg  10 mg Intravenous Q6HRS PRN Lien Lee M.D.   10 mg at 02/24/22 2002   • [START ON 2/26/2022] benazepril (LOTENSIN) tablet 20 mg  20 mg Enteral Tube Q DAY Lien Lee M.D.       • [START ON 2/26/2022] amLODIPine (NORVASC) tablet 10 mg  10 mg Enteral Tube Q DAY Alley Longo M.D.       • labetalol (NORMODYNE/TRANDATE) injection 10 mg  10 mg Intravenous Q6HRS PRN Alely Longo M.D.   10 mg at 02/25/22 0219   • furosemide (LASIX) injection 40 mg  40 mg Intravenous BID DIURETIC Lien Lee M.D.   40 mg at 02/25/22 0601   • acetaminophen (Tylenol) tablet 650 mg  650 mg Enteral Tube Q6HRS PRN Ashia Bertrand M.D.       • dakins 0.125% (1/4 strength) topical soln   Topical BID Ashia Bertrand M.D.   10 mL at 02/25/22 0536   • prochlorperazine (COMPAZINE) injection 10 mg  10 mg Intravenous Q6HRS PRN Alley Longo M.D.   10 mg at 02/19/22 0013   • aspirin (ASA) tablet 325 mg  325 mg Enteral Tube DAILY Ashia Bertrand M.D.   325 mg at 02/25/22 0550   • famotidine (PEPCID) tablet 20 mg  20 mg Enteral Tube DAILY Ashia Bertrand M.D.   20 mg  at 02/25/22 0550    Or   • famotidine (PEPCID) injection 20 mg  20 mg Intravenous DAILY Ashia Bertrand M.D.   20 mg at 02/19/22 0908   • senna-docusate (PERICOLACE or SENOKOT S) 8.6-50 MG per tablet 2 Tablet  2 Tablet Enteral Tube BID Ashia Bertrand M.D.   2 Tablet at 02/24/22 0518    And   • polyethylene glycol/lytes (MIRALAX) PACKET 1 Packet  1 Packet Enteral Tube QDAY PRN Ashia Bertrand M.D.   1 Packet at 02/23/22 1447    And   • magnesium hydroxide (MILK OF MAGNESIA) suspension 30 mL  30 mL Enteral Tube QDAY PRN Ashia Bertrand M.D.   30 mL at 02/23/22 1447    And   • bisacodyl (DULCOLAX) suppository 10 mg  10 mg Rectal QDAY PRN Ashia Bertrand M.D.       • MD Alert...ICU Electrolyte Replacement per Pharmacy   Other PHARMACY TO DOSE Ashia Bertrand M.D.       • ascorbic acid (Vitamin C) tablet 500 mg  500 mg Enteral Tube BID Ashia Bertrand M.D.   500 mg at 02/25/22 0550   • therapeutic multivitamin-minerals (THERAGRAN-M) tablet 1 Tablet  1 Tablet Enteral Tube DAILY Thomas Stroud M.D.   1 Tablet at 02/25/22 0550   • atorvastatin (LIPITOR) tablet 40 mg  40 mg Enteral Tube DAILY Thomas Stroud M.D.   40 mg at 02/25/22 0550   • levothyroxine (SYNTHROID) tablet 25 mcg  25 mcg Enteral Tube DAILY Thomas Stroud M.D.   25 mcg at 02/25/22 0550   • sertraline (Zoloft) tablet 50 mg  50 mg Enteral Tube DAILY Thomas Stroud M.D.   50 mg at 02/25/22 0550   • ondansetron (ZOFRAN) syringe/vial injection 4 mg  4 mg Intravenous Q4HRS PRN Alley Longo M.D.   4 mg at 02/18/22 2250   • Respiratory Therapy Consult   Nebulization Continuous RT Allyson Ulloa D.O.       • ipratropium-albuterol (DUONEB) nebulizer solution  3 mL Nebulization Q2HRS PRN (RT) Allyson Ulloa D.O.       • lactated ringers infusion (BOLUS)  500 mL Intravenous Once PRN Thomas Stroud M.D.       • lactated ringers infusion (BOLUS): BMI greater than 30  30 mL/kg (Ideal) Intravenous Once PRN  Thomas Stroud M.D.           Fluids    Intake/Output Summary (Last 24 hours) at 2/25/2022 1110  Last data filed at 2/25/2022 1000  Gross per 24 hour   Intake 1180.49 ml   Output 4935 ml   Net -3754.51 ml       Laboratory  Recent Labs     02/24/22  1149   ISTATAPH 7.496   ISTATAPCO2 51.9*   ISTATAPO2 79   ISTATATCO2 42*   OATGVVC1YQD 96   ISTATARTHCO3 40.1*   ISTATARTBE 15*   ISTATTEMP 98.6 F   ISTATSPEC Arterial   ISTATAPHTC 7.496   [id number] 79         Recent Labs     02/23/22  0300 02/24/22  0400 02/25/22  0400   SODIUM 141 145 144   POTASSIUM 3.9 4.0 4.0   CHLORIDE 103 104 99   CO2 29 35* 36*   BUN 55* 36* 32*   CREATININE 0.86 0.65 0.70   MAGNESIUM  --   --  2.2   PHOSPHORUS  --   --  2.0*   CALCIUM 9.3 9.6 10.3*     Recent Labs     02/23/22  0300 02/24/22  0400 02/25/22  0400   GLUCOSE 310* 251* 496*     Recent Labs     02/23/22  0300 02/24/22  0400 02/25/22  0400   WBC 16.3* 15.8* 20.7*   NEUTSPOLYS 66.60 68.00 76.00*   LYMPHOCYTES 13.80* 16.00* 9.00*   MONOCYTES 4.40 7.00 6.00   EOSINOPHILS 3.40 3.00 3.00   BASOPHILS 0.50 0.00 0.00     Recent Labs     02/23/22  0300 02/24/22  0400 02/25/22  0400   RBC 3.01* 3.21* 3.80*   HEMOGLOBIN 9.5* 10.0* 11.9*   HEMATOCRIT 31.1* 33.1* 38.9   PLATELETCT 415 440 485*       Imaging  CXR: diffuse reticular marking suggestive of pulm edema  ET tube in good position  Feeding tube below the diaphragm     Assessment/Plan  SRIRAM (acute kidney injury) (HCC)  Assessment & Plan  - initially thought to be 2/2 overdiuresis but now Cr trending up again likely from some fluid overload - improving now with lasix  - decreased lasix 40mg IV daily due to rising BUN  - avoid nephrotoxins and NSAIDs    Bacteremia  Assessment & Plan  - Bcx with likely coag neg staph, all other cultures neg  -  Plan for 5 days of cefepime and flagyl to finish on 2/24 (was on various abx regimens since 2/16)  - wound care consulted and following    NSTEMI (non-ST elevated myocardial infarction)  ([facility])  Assessment & Plan  - may be supply and demand  - d/w cardiology see body of text on 2/19  - Heparin gtt finished, on ASA and lipitor  - Hyperdynamic last ECHO repeat 2/19 showed EF now 40%    COPD with acute exacerbation ([facility])  Assessment & Plan  resolved  No secretions  Duonebs prn    Decubitus ulcer- (present on admission)  Assessment & Plan  Unstageable   MVI daily and vitamin c bid   Wound care reconsulted    Cellulitis of left lower extremity- (present on admission)  Assessment & Plan  Finish course of abx    Acute on chronic respiratory failure with hypoxia ([facility])- (present on admission)  Assessment & Plan  COPD (no PFTs or CT chest), SONIA, OHS and heart failure  Hypercapnea was predominant feature that resulted in intubation 2/19  ? COPD and heart failure and NSTEMI combination  Not clear  Not wheezing  CXR suggests heart failure and she was diuresed adequately     DM (diabetes mellitus) ([facility])- (present on admission)  Assessment & Plan  Lantus bid - dosing increased due to persistently elevated BG  Goal BG <180       VTE:  Lovenox  Ulcer: H2 Antagonist  Lines: Nye Catheter  Ongoing indication addressed    I have performed a physical exam and reviewed and updated ROS and Plan today (2/25/2022). In review of yesterday's note (2/24/2022), there are no changes except as documented above.     Discussed patient condition and risk of morbidity and/or mortality with Charge nurse / hot rounds   KANCHAN is her friend Rhonda Hassan [phone] and has been updated (She lives in [place] but in [place] currently)  The patient remains critically ill.  Critical care time = 45 minutes in directly providing and coordinating critical care and extensive data review.  No time overlap and excludes procedures.        Lien Lee MD  Pulmonary and Critical Care Medicine  [facility]

## 2022-02-25 NOTE — CARE PLAN
The patient is Watcher - Medium risk of patient condition declining or worsening    Shift Goals  Clinical Goals: BP management/pain control  Patient Goals: pain control/rest  Family Goals: JACKY    Progress made toward(s) clinical / shift goals:    Problem: Knowledge Deficit - Standard  Goal: Patient and family/care givers will demonstrate understanding of plan of care, disease process/condition, diagnostic tests and medications  Outcome: Not Progressing  Note: Pt needs constant reinforcement with teaching.      Problem: Hemodynamics  Goal: Patient's hemodynamics, fluid balance and neurologic status will be stable or improve  Outcome: Progressing     Problem: Urinary - Renal Perfusion  Goal: Ability to achieve and maintain adequate renal perfusion and functioning will improve  Outcome: Progressing  Note: Pt having consistent UO >250 q2h.      Problem: Self Care  Goal: Patient will have the ability to perform ADLs independently or with assistance (bathe, groom, dress, toilet and feed)  Outcome: Progressing     Problem: Pain - Standard  Goal: Alleviation of pain or a reduction in pain to the patient’s comfort goal  Outcome: Progressing       Patient is not progressing towards the following goals:      Problem: Knowledge Deficit - Standard  Goal: Patient and family/care givers will demonstrate understanding of plan of care, disease process/condition, diagnostic tests and medications  Outcome: Not Progressing  Note: Pt needs constant reinforcement with teaching.

## 2022-02-25 NOTE — CARE PLAN
Problem: Ventilation  Goal: Ability to achieve and maintain unassisted ventilation or tolerate decreased levels of ventilator support  Description: Target End Date:  4 days     Document on Vent flowsheet    1.  Support and monitor invasive and noninvasive mechanical ventilation  2.  Monitor ventilator weaning response  3.  Perform ventilator associated pneumonia prevention interventions  4.  Manage ventilation therapy by monitoring diagnostic test results  Outcome: Met     Problem: Bronchoconstriction  Goal: Improve in air movement and diminished wheezing  Description: Target End Date:  2 to 3 days    1.  Implement inhaled treatments  2.  Evaluate and manage medication effects  Outcome: Progressing   Patient does claim a benefit from bronchodilator therapy. There was a increase in aeration and a decrease in wheezes heard Patient is receiving Duoneb Qid and Symbicort BID.

## 2022-02-26 ENCOUNTER — APPOINTMENT (OUTPATIENT)
Dept: RADIOLOGY | Facility: MEDICAL CENTER | Age: 77
DRG: 870 | End: 2022-02-26
Attending: INTERNAL MEDICINE
Payer: MEDICARE

## 2022-02-26 LAB
ANION GAP SERPL CALC-SCNC: 8 MMOL/L (ref 7–16)
BACTERIA BLD CULT: ABNORMAL
BACTERIA BLD CULT: ABNORMAL
BASOPHILS # BLD AUTO: 0.3 % (ref 0–1.8)
BASOPHILS # BLD: 0.07 K/UL (ref 0–0.12)
BUN SERPL-MCNC: 42 MG/DL (ref 8–22)
CALCIUM SERPL-MCNC: 11 MG/DL (ref 8.4–10.2)
CHLORIDE SERPL-SCNC: 103 MMOL/L (ref 96–112)
CO2 SERPL-SCNC: 38 MMOL/L (ref 20–33)
CREAT SERPL-MCNC: 0.78 MG/DL (ref 0.5–1.4)
EOSINOPHIL # BLD AUTO: 0.2 K/UL (ref 0–0.51)
EOSINOPHIL NFR BLD: 1 % (ref 0–6.9)
ERYTHROCYTE [DISTWIDTH] IN BLOOD BY AUTOMATED COUNT: 51.4 FL (ref 35.9–50)
GLUCOSE BLD STRIP.AUTO-MCNC: 297 MG/DL (ref 65–99)
GLUCOSE BLD STRIP.AUTO-MCNC: 319 MG/DL (ref 65–99)
GLUCOSE BLD STRIP.AUTO-MCNC: 383 MG/DL (ref 65–99)
GLUCOSE SERPL-MCNC: 370 MG/DL (ref 65–99)
HCT VFR BLD AUTO: 39.3 % (ref 37–47)
HGB BLD-MCNC: 11.9 G/DL (ref 12–16)
IMM GRANULOCYTES # BLD AUTO: 0.7 K/UL (ref 0–0.11)
IMM GRANULOCYTES NFR BLD AUTO: 3.4 % (ref 0–0.9)
LIPASE SERPL-CCNC: 25 U/L (ref 7–58)
LYMPHOCYTES # BLD AUTO: 2.36 K/UL (ref 1–4.8)
LYMPHOCYTES NFR BLD: 11.6 % (ref 22–41)
MCH RBC QN AUTO: 31.4 PG (ref 27–33)
MCHC RBC AUTO-ENTMCNC: 30.3 G/DL (ref 33.6–35)
MCV RBC AUTO: 103.7 FL (ref 81.4–97.8)
MONOCYTES # BLD AUTO: 0.96 K/UL (ref 0–0.85)
MONOCYTES NFR BLD AUTO: 4.7 % (ref 0–13.4)
NEUTROPHILS # BLD AUTO: 16.06 K/UL (ref 2–7.15)
NEUTROPHILS NFR BLD: 79 % (ref 44–72)
NRBC # BLD AUTO: 0.02 K/UL
NRBC BLD-RTO: 0.1 /100 WBC
PLATELET # BLD AUTO: 489 K/UL (ref 164–446)
PMV BLD AUTO: 10.2 FL (ref 9–12.9)
POTASSIUM SERPL-SCNC: 4.1 MMOL/L (ref 3.6–5.5)
PROCALCITONIN SERPL-MCNC: 0.28 NG/ML
RBC # BLD AUTO: 3.79 M/UL (ref 4.2–5.4)
SIGNIFICANT IND 70042: ABNORMAL
SITE SITE: ABNORMAL
SODIUM SERPL-SCNC: 149 MMOL/L (ref 135–145)
SOURCE SOURCE: ABNORMAL
WBC # BLD AUTO: 20.4 K/UL (ref 4.8–10.8)

## 2022-02-26 PROCEDURE — 700111 HCHG RX REV CODE 636 W/ 250 OVERRIDE (IP): Performed by: INTERNAL MEDICINE

## 2022-02-26 PROCEDURE — 80048 BASIC METABOLIC PNL TOTAL CA: CPT

## 2022-02-26 PROCEDURE — 82962 GLUCOSE BLOOD TEST: CPT | Mod: 91

## 2022-02-26 PROCEDURE — 700102 HCHG RX REV CODE 250 W/ 637 OVERRIDE(OP): Performed by: INTERNAL MEDICINE

## 2022-02-26 PROCEDURE — 94640 AIRWAY INHALATION TREATMENT: CPT

## 2022-02-26 PROCEDURE — 84145 PROCALCITONIN (PCT): CPT

## 2022-02-26 PROCEDURE — 700105 HCHG RX REV CODE 258: Performed by: INTERNAL MEDICINE

## 2022-02-26 PROCEDURE — 770020 HCHG ROOM/CARE - TELE (206)

## 2022-02-26 PROCEDURE — 85025 COMPLETE CBC W/AUTO DIFF WBC: CPT

## 2022-02-26 PROCEDURE — 87040 BLOOD CULTURE FOR BACTERIA: CPT

## 2022-02-26 PROCEDURE — 99232 SBSQ HOSP IP/OBS MODERATE 35: CPT | Performed by: INTERNAL MEDICINE

## 2022-02-26 PROCEDURE — 99223 1ST HOSP IP/OBS HIGH 75: CPT | Performed by: INTERNAL MEDICINE

## 2022-02-26 PROCEDURE — A9270 NON-COVERED ITEM OR SERVICE: HCPCS | Performed by: INTERNAL MEDICINE

## 2022-02-26 PROCEDURE — 700111 HCHG RX REV CODE 636 W/ 250 OVERRIDE (IP): Performed by: STUDENT IN AN ORGANIZED HEALTH CARE EDUCATION/TRAINING PROGRAM

## 2022-02-26 PROCEDURE — 94760 N-INVAS EAR/PLS OXIMETRY 1: CPT

## 2022-02-26 PROCEDURE — 700101 HCHG RX REV CODE 250: Performed by: INTERNAL MEDICINE

## 2022-02-26 PROCEDURE — 700101 HCHG RX REV CODE 250: Performed by: STUDENT IN AN ORGANIZED HEALTH CARE EDUCATION/TRAINING PROGRAM

## 2022-02-26 PROCEDURE — 83690 ASSAY OF LIPASE: CPT

## 2022-02-26 PROCEDURE — 700102 HCHG RX REV CODE 250 W/ 637 OVERRIDE(OP): Performed by: STUDENT IN AN ORGANIZED HEALTH CARE EDUCATION/TRAINING PROGRAM

## 2022-02-26 PROCEDURE — 71045 X-RAY EXAM CHEST 1 VIEW: CPT

## 2022-02-26 RX ORDER — CEFAZOLIN SODIUM IN 0.9 % NACL 2 G/100 ML
2 PLASTIC BAG, INJECTION (ML) INTRAVENOUS EVERY 8 HOURS
Status: DISCONTINUED | OUTPATIENT
Start: 2022-02-26 | End: 2022-02-26

## 2022-02-26 RX ADMIN — INSULIN HUMAN 12 UNITS: 100 INJECTION, SOLUTION PARENTERAL at 12:42

## 2022-02-26 RX ADMIN — ENOXAPARIN SODIUM 40 MG: 40 INJECTION SUBCUTANEOUS at 05:32

## 2022-02-26 RX ADMIN — ASPIRIN 81 MG: 81 TABLET, COATED ORAL at 12:36

## 2022-02-26 RX ADMIN — INSULIN HUMAN 12 UNITS: 100 INJECTION, SOLUTION PARENTERAL at 05:32

## 2022-02-26 RX ADMIN — IPRATROPIUM BROMIDE AND ALBUTEROL SULFATE 3 ML: .5; 2.5 SOLUTION RESPIRATORY (INHALATION) at 10:06

## 2022-02-26 RX ADMIN — INSULIN HUMAN 14 UNITS: 100 INJECTION, SOLUTION PARENTERAL at 17:54

## 2022-02-26 RX ADMIN — FUROSEMIDE 40 MG: 10 INJECTION, SOLUTION INTRAVENOUS at 05:31

## 2022-02-26 RX ADMIN — IPRATROPIUM BROMIDE AND ALBUTEROL SULFATE 3 ML: .5; 2.5 SOLUTION RESPIRATORY (INHALATION) at 18:38

## 2022-02-26 RX ADMIN — SODIUM HYPOCHLORITE 3 ML: 1.25 SOLUTION TOPICAL at 18:00

## 2022-02-26 RX ADMIN — INSULIN GLARGINE 30 UNITS: 100 INJECTION, SOLUTION SUBCUTANEOUS at 17:57

## 2022-02-26 RX ADMIN — BUDESONIDE AND FORMOTEROL FUMARATE DIHYDRATE 2 PUFF: 160; 4.5 AEROSOL RESPIRATORY (INHALATION) at 18:39

## 2022-02-26 RX ADMIN — INSULIN GLARGINE 30 UNITS: 100 INJECTION, SOLUTION SUBCUTANEOUS at 05:33

## 2022-02-26 RX ADMIN — IPRATROPIUM BROMIDE AND ALBUTEROL SULFATE 3 ML: .5; 2.5 SOLUTION RESPIRATORY (INHALATION) at 06:15

## 2022-02-26 RX ADMIN — PIPERACILLIN AND TAZOBACTAM 3.38 G: 3; .375 INJECTION, POWDER, LYOPHILIZED, FOR SOLUTION INTRAVENOUS; PARENTERAL at 21:50

## 2022-02-26 RX ADMIN — BUDESONIDE AND FORMOTEROL FUMARATE DIHYDRATE 2 PUFF: 160; 4.5 AEROSOL RESPIRATORY (INHALATION) at 06:16

## 2022-02-26 RX ADMIN — SODIUM HYPOCHLORITE 1 ML: 1.25 SOLUTION TOPICAL at 05:45

## 2022-02-26 RX ADMIN — IPRATROPIUM BROMIDE AND ALBUTEROL SULFATE 3 ML: .5; 2.5 SOLUTION RESPIRATORY (INHALATION) at 14:19

## 2022-02-26 RX ADMIN — PIPERACILLIN AND TAZOBACTAM 3.38 G: 3; .375 INJECTION, POWDER, LYOPHILIZED, FOR SOLUTION INTRAVENOUS; PARENTERAL at 14:54

## 2022-02-26 ASSESSMENT — FIBROSIS 4 INDEX: FIB4 SCORE: 0.45

## 2022-02-26 ASSESSMENT — COGNITIVE AND FUNCTIONAL STATUS - GENERAL
MOVING FROM LYING ON BACK TO SITTING ON SIDE OF FLAT BED: UNABLE
MOVING TO AND FROM BED TO CHAIR: A LOT
TURNING FROM BACK TO SIDE WHILE IN FLAT BAD: A LOT
WALKING IN HOSPITAL ROOM: TOTAL
MOBILITY SCORE: 8
SUGGESTED CMS G CODE MODIFIER MOBILITY: CM
STANDING UP FROM CHAIR USING ARMS: TOTAL
CLIMB 3 TO 5 STEPS WITH RAILING: TOTAL

## 2022-02-26 ASSESSMENT — GAIT ASSESSMENTS: GAIT LEVEL OF ASSIST: UNABLE TO PARTICIPATE

## 2022-02-26 ASSESSMENT — PAIN DESCRIPTION - PAIN TYPE: TYPE: ACUTE PAIN

## 2022-02-26 NOTE — PROGRESS NOTES
Patient found to have pulled out cortrak. Dr. Shankar made aware. Per hospitalist okay to leave strict NPO until SLP consults.

## 2022-02-26 NOTE — FLOWSHEET NOTE
02/26/22 0616   Vital Signs   Pulse 90   Respiration (!) 24   Pulse Oximetry 94 %   $ Pulse Oximetry (Spot Check) Yes   Respiratory Assessment   Respiratory Pattern Within Normal Limits   Chest Exam   Work Of Breathing / Effort Mild   Breath Sounds   RUL Breath Sounds Diminished;Clear   RML Breath Sounds Diminished   RLL Breath Sounds Diminished   MOSHE Breath Sounds Diminished;Clear   Oxygen   O2 (LPM) 4.5   O2 Delivery Device Silicone Nasal Cannula

## 2022-02-26 NOTE — THERAPY
Physical Therapy   Daily Treatment     Patient Name: Gabrielle Olmstead  Age:  76 y.o., Sex:  female  Medical Record #: [id number]  Today's Date: 2/26/2022          Assessment    Pt has significant functional decline since we lat saw her now needs Max A with bed mobility and unable to sit edge of bed without support    Plan       02/26/22 1300   Total Time Spent   Total Time Spent (Mins) 25   Supine Lower Body Exercise   Supine Lower Body Exercises Yes   Sitting Lower Body Exercises   Sitting Lower Body Exercises Yes   Balance   Sitting Balance (Static) Poor   Sitting Balance (Dynamic) Poor   Standing Balance (Static) Dependent   Standing Balance (Dynamic) Dependent   Weight Shift Sitting Absent   Weight Shift Standing Absent   Gait Analysis   Gait Level Of Assist Unable to Participate   Bed Mobility    Supine to Sit Maximal Assist   Sit to Supine Maximal Assist   Scooting Maximal Assist   Functional Mobility   Sit to Stand Unable to Participate   Bed, Chair, Wheelchair Transfer Unable to Participate   How much difficulty does the patient currently have...   Turning over in bed (including adjusting bedclothes, sheets and blankets)? 2   Sitting down on and standing up from a chair with arms (e.g., wheelchair, bedside commode, etc.) 1   Moving from lying on back to sitting on the side of the bed? 2   How much help from another person does the patient currently need...   Moving to and from a bed to a chair (including a wheelchair)? 1   Need to walk in a hospital room? 1   Climbing 3-5 steps with a railing? 1   6 clicks Mobility Score 8   Activity Tolerance   Sitting Edge of Bed 5   Short Term Goals    Short Term Goal # 1 pt will go sit<>stand w/4ww w/spv in 6tx   Goal Outcome # 1 Progressing slower than expected   Short Term Goal # 2 pt will transfer bed<>chair w/4ww w/spv in 6tx for safe d/c home   Goal Outcome # 2 Progressing slower than expected   Short Term Goal # 3 pt will ambulate for 150ft w/4ww w/spv in 6tx for safe  d/c home   Goal Outcome # 3 Progressing slower than expected   Short Term Goal # 4 pt will go up/down 1 step w/spv in 6tx for safe d/c home   Goal Outcome # 4 Goal not met   Anticipated Discharge Equipment and Recommendations   DC Equipment Recommendations 4-Wheeled Walker   Discharge Recommendations Recommend post-acute placement for additional physical therapy services prior to discharge home   Interdisciplinary Plan of Care Collaboration   IDT Collaboration with  Nursing   Session Information   Date / Session Number  2/26-2 2/4 3/4   Continue current treatment plan.    DC Equipment Recommendations: (P) 4-Wheeled Walker  Discharge Recommendations: (P) Recommend post-acute placement for additional physical therapy services prior to discharge home

## 2022-02-26 NOTE — PROGRESS NOTES
Chhaya from lab called with a critical result of positive blood cultures (gram positive cocci). Critical lab result read back to Chhaya. Dr. Costello notified of critical lab result.

## 2022-02-26 NOTE — FLOWSHEET NOTE
02/26/22 1006   Vital Signs   Pulse 84   Respiration (!) 24   Pulse Oximetry 92 %   $ Pulse Oximetry (Spot Check) Yes   Chest Exam   Work Of Breathing / Effort Mild   Breath Sounds   RUL Breath Sounds Diminished;Clear   RML Breath Sounds Diminished   RLL Breath Sounds Diminished   MOSHE Breath Sounds Diminished;Clear   LLL Breath Sounds Diminished   Oxygen   O2 (LPM) 4.5   O2 Delivery Device Silicone Nasal Cannula

## 2022-02-26 NOTE — THERAPY
Missed Occupational Therapy     Patient Name: Gabrielle Olmstead  Age:  76 y.o., Sex:  female  Medical Record #: [id number]  Today's Date: 2/26/2022      OT orders received, chart reviewed however patient complained of being exhausted and lack of sleep and requested to rest. Pt was on 5 L O2 with 93% SpO2. BP was 166/63 at rest. Will follow up when appropriate.

## 2022-02-26 NOTE — CARE PLAN
The patient is Watcher - Medium risk of patient condition declining or worsening    Shift Goals  Clinical Goals: Swallow eval tomorrow, wound care, monitor resp. status  Patient Goals: Remove cortrak  Family Goals: JACKY    Progress made toward(s) clinical / shift goals:  Patient is resting and appears to be calm and in no distress. Patient is tachypneic, on 5 L nc, satting mid 90's. Tube feed running. HOB 30 degrees or greater. Peripheral IV obtained (22 g in right wrist).       Problem: Hemodynamics  Goal: Patient's hemodynamics, fluid balance and neurologic status will be stable or improve  Outcome: Progressing     Problem: Urinary - Renal Perfusion  Goal: Ability to achieve and maintain adequate renal perfusion and functioning will improve  Outcome: Progressing     Problem: Fall Risk  Goal: Patient will remain free from falls  Outcome: Progressing     Problem: Risk for Aspiration  Goal: Patient's risk for aspiration will be absent or decrease  Outcome: Progressing       Patient is not progressing towards the following goals:

## 2022-02-26 NOTE — PROGRESS NOTES
"Hospital Medicine Daily Progress Note    Date of Service  2/26/2022    Chief Complaint  Gabrielle Olmstead is a 76 y.o. female admitted 2/16/2022 with worsening shortness of breath    Hospital Course    Per HPI, \"Gabrielle Olmstead is a 76 y.o. female with a past medical history of chronic obstructive pulmonary disease, chronic hypoxemic respiratory failure on 2 L of oxygen at baseline, hypertension, diabetes, dyslipidemia and hypothyroidism who presented 2/16/2022 with generalized weakness shortness of breath and diarrhea.  Patient reports that she has not been feeling well over the past week.  She has progressively worsening generalized weakness and worsening shortness of breath, particularly over the past 2 days.  She also reports having frequent loose bowel movements.  She denies noticing any blood or mucus in stool.  She denies having nausea and vomiting.  She reports noticing worsening swelling in both lower extremities in addition to redness on the left leg.\"      Interval Problem Update    2/26/2022: The patient was admitted for further evaluation and management of worsening shortness of breath and weakness.  She was initially treated for left lower extremity cellulitis and COPD exacerbation.  On the second day of admission, patient was noted to have worsening supplemental oxygen needs and required transfer to the ICU for BiPAP support.  Ultimately, the patient was intubated on 2/19/2022 and extubated on 2/24/2022.  While in the ICU, she was noted to have increasing troponin levels and some EKG findings concerning for ST segment depression.  This was discussed between critical care doctor and cardiology who determined that the increase in troponins were likely secondary to demand ischemia in the setting of acute respiratory failure.  She was treated empirically with heparin drip and aspirin and statin therapy.  Her troponin since that time have continue to downtrend.  Patient never had any active chest pain.  Patient " noted to have positive blood cultures growing anaerobic staph species.  She was treated with cefepime and Flagyl from 2/20- 2/24 in the ICU.  Repeat blood cultures on 2/24/2022 was still positive for staph species but negative for MSSA/M RSA.  At this point, ID was consulted with recommendations to switch patient to Zosyn due to concerns for decubitus ulcer source of bacteremia.  We will continue wound culture.  Patient's repeat chest x-ray does not show any worsening consolidation or edema.  At this time, patient is having difficulty speaking due to recent extubation but she is able to follow simple commands and acknowledges our conversation.  No other overnight events reported.    I have personally seen and examined the patient at bedside. I discussed the plan of care with patient.    Consultants/Specialty  critical care   Cardiology  ID    Code Status  DNAR, I OK    Disposition  Patient is not medically cleared for discharge.   Anticipate discharge to to skilled nursing facility.  I have placed the appropriate orders for post-discharge needs.    Review of Systems  Review of Systems   Unable to perform ROS: Acuity of condition        Physical Exam  Temp:  [36.2 °C (97.2 °F)-37.2 °C (99 °F)] 36.2 °C (97.2 °F)  Pulse:  [] 98  Resp:  [23-36] 24  BP: (145-189)/(47-55) 171/47  SpO2:  [89 %-96 %] 94 %    Physical Exam  Vitals and nursing note reviewed.   Constitutional:       General: She is not in acute distress.     Appearance: Normal appearance. She is obese. She is toxic-appearing.   HENT:      Head: Normocephalic and atraumatic.      Mouth/Throat:      Mouth: Mucous membranes are moist.   Eyes:      Extraocular Movements: Extraocular movements intact.      Conjunctiva/sclera: Conjunctivae normal.      Pupils: Pupils are equal, round, and reactive to light.   Cardiovascular:      Rate and Rhythm: Normal rate and regular rhythm.      Heart sounds: No murmur heard.    No friction rub.   Pulmonary:      Effort:  Pulmonary effort is normal. No respiratory distress.      Breath sounds: Normal breath sounds. No wheezing.   Abdominal:      General: Abdomen is flat. Bowel sounds are normal.      Palpations: Abdomen is soft.      Tenderness: There is no abdominal tenderness. There is no guarding.   Musculoskeletal:         General: No swelling or tenderness. Normal range of motion.      Cervical back: Normal range of motion and neck supple.   Skin:     General: Skin is warm and dry.      Findings: No rash.      Comments: Patient with chronic venous stasis changes on lower extremities. Several non stageable decubitus ulcers with mild erythema.   Neurological:      General: No focal deficit present.      Mental Status: She is alert and oriented to person, place, and time.      Cranial Nerves: No cranial nerve deficit.      Motor: No weakness.      Comments: Difficulty speaking likely from recent extubation   Psychiatric:         Mood and Affect: Mood normal.         Behavior: Behavior normal.         Fluids    Intake/Output Summary (Last 24 hours) at 2/26/2022 1513  Last data filed at 2/26/2022 1504  Gross per 24 hour   Intake 30 ml   Output 2190 ml   Net -2160 ml       Laboratory  Recent Labs     02/24/22  0400 02/25/22  0400 02/26/22  0430   WBC 15.8* 20.7* 20.4*   RBC 3.21* 3.80* 3.79*   HEMOGLOBIN 10.0* 11.9* 11.9*   HEMATOCRIT 33.1* 38.9 39.3   .1* 102.4* 103.7*   MCH 31.2 31.3 31.4   MCHC 30.2* 30.6* 30.3*   RDW 49.8 48.7 51.4*   PLATELETCT 440 485* 489*   MPV 10.4 10.2 10.2     Recent Labs     02/24/22  0400 02/25/22  0400 02/26/22  0430   SODIUM 145 144 149*   POTASSIUM 4.0 4.0 4.1   CHLORIDE 104 99 103   CO2 35* 36* 38*   GLUCOSE 251* 496* 370*   BUN 36* 32* 42*   CREATININE 0.65 0.70 0.78   CALCIUM 9.6 10.3* 11.0*                   Imaging  DX-CHEST-LIMITED (1 VIEW)   Final Result      1.  Linear bibasilar atelectasis.      2.  Cardiomegaly.      IR-US GUIDED PIV   Final Result    Ultrasound-guided PERIPHERAL IV  INSERTION performed by    qualified nursing staff as above.      DX-CHEST-PORTABLE (1 VIEW)   Final Result         1.  Pulmonary edema and/or infiltrates are identified, which are stable since the prior exam.   2.  Cardiomegaly   3.  Atherosclerosis      DX-CHEST-PORTABLE (1 VIEW)   Final Result         1.  Pulmonary edema and/or infiltrates are identified, which appear somewhat increased since the prior exam.   2.  Cardiomegaly   3.  Atherosclerosis      DX-CHEST-PORTABLE (1 VIEW)   Final Result      No interval change.      DX-CHEST-PORTABLE (1 VIEW)   Final Result      Placement of right IJ central line without evidence of pneumothorax.      EC-ECHOCARDIOGRAM LTD W/O CONT   Final Result      DX-CHEST-PORTABLE (1 VIEW)   Final Result      1.  Satisfactory appearance of ET tube.   2.  There are changes of pulmonary edema/CHF.      [id number] VIEW   Final Result      Feeding tube tip projects over the distal stomach      DX-CHEST-PORTABLE (1 VIEW)   Final Result      Cardiomegaly with diffuse interstitial prominence, may be edema or infiltrate.      EC-ECHOCARDIOGRAM COMPLETE W/O CONT   Final Result      DX-CHEST-PORTABLE (1 VIEW)   Final Result      Stable cardiomegaly           Assessment/Plan  SRIRAM (acute kidney injury) (HCC)  Assessment & Plan  resolved    Bacteremia  Assessment & Plan  Blood culture on 2/16: anaerobic gram positive cocci/ peptoniphilus lacrimalis   Blood culture on 2/24: likely MSSA  Started on cefepime and flagyl from 2/20-2/24 in ICU  Patient with persistently positive blood cultures  Pending ID recommendations         NSTEMI (non-ST elevated myocardial infarction) ([facility])  Assessment & Plan  Initial concern for NSTEMI while in ICU  Case was discussed between critical care and cardiology who states that elevation in troponin was likely secondary to demand ischemia in the setting of acute respiratory failure.  She was empirically treated with heparin, aspirin, and statin. Troponin levels have  plateaued and started to down trend.  Patient without chest pain     COPD with acute exacerbation (HCC)  Assessment & Plan  Completed therapy for acute COPD exacerbation     IMPROVED    Decubitus ulcer- (present on admission)  Assessment & Plan  Continue wound care  Photos reviewed, no acute signs of active cellulitis   Has completed antibiotics for concern of left lower extremity cellulitis     Elevated troponin- (present on admission)  Assessment & Plan  In the indeterminate range  Patient without active chest pain  Case was discussed between Dr. Rivas (critical care) and Dr. Esquivel (cardiology) who states that its likely demand ischemia in the setting of respiratory failure. She was started on heparin drip which has since been discontinued.  Her troponins are downtrending at this time    Cellulitis of left lower extremity- (present on admission)  Assessment & Plan  RESOLVED    Diarrhea- (present on admission)  Assessment & Plan  Pt reports this is chronic   No BM since admission, dc c diff testing   Continue to monitor     Acute on chronic respiratory failure with hypoxia (HCC)- (present on admission)  Assessment & Plan  Multifactorial, related to CHF and COPD  Oxygen as needed, Respiratory protocol, Bronchodilators, Incentive spirometry   tx as above for copd/chf    Shortness of breath- (present on admission)  Assessment & Plan  COVID-19 negative   Appears volume overloaded, BNP elevated.  Oxygen as needed, Respiratory protocol, Bronchodilators, Incentive spirometry  Intravenous diuretics.  Daily strict input and output  Daily standing weights.  Daily BMP to watch renal function, electrolytes.  Replace electrolytes as needed.  Echo with EF of 40% and wall motion abnormalities   Completed treatment for COPD exacerbation, did require intubation from 2/19 to 2/24      Hypothyroidism- (present on admission)  Assessment & Plan  Resume home levothyroxine    DM (diabetes mellitus) (Former[facility])- (present on  admission)  Assessment & Plan  With hyperglycemia  Last glycated hemoglobin was 9 %  Increase to high ISS, glargine 30 U BID  Accu-Checks, hypoglycemia protocol   Pending evaluation by speech for diet  Titrate insulin as tolerated    Dyslipidemia- (present on admission)  Assessment & Plan  Resume home atorvastatin    Essential hypertension- (present on admission)  Assessment & Plan  Resume home amlodipine, Benzapril with hold parameters       VTE prophylaxis: enoxaparin ppx    I have performed a physical exam and reviewed and updated ROS and Plan today (2/26/2022). In review of yesterday's note (2/25/2022), there are no changes except as documented above.

## 2022-02-26 NOTE — CARE PLAN
Problem: Bronchoconstriction  Goal: Improve in air movement and diminished wheezing  Description: Target End Date:  2 to 3 days    1.  Implement inhaled treatments  2.  Evaluate and manage medication effects  Outcome: Progressing

## 2022-02-26 NOTE — PROGRESS NOTES
Telemetry Shift Summary     Rhythm: SR  Rate: 91-99  Measurements: 0.16/0.14/0.40  Ectopy (reported by Monitor Tech): f PVC, r Coup, f PAC      Normal Values  Rhythm: Sinus  HR:   Measurements: 0.12-0.20/0.06-0.10/0.30-0.52

## 2022-02-26 NOTE — ASSESSMENT & PLAN NOTE
Initial concern for NSTEMI while in ICU  Case was discussed between critical care and cardiology who states that elevation in troponin was likely secondary to demand ischemia in the setting of acute respiratory failure.  She was empirically treated with heparin, aspirin, and statin. Troponin levels have plateaued and started to down trend.  Patient without chest pain

## 2022-02-26 NOTE — ASSESSMENT & PLAN NOTE
Blood culture on 2/16: anaerobic gram positive cocci/ peptoniphilus lacrimalis   Blood culture on 2/24: likely MSSA  Started on cefepime and flagyl from 2/20-2/24 in ICU  Patient with persistently positive blood cultures  Pending ID recommendations for final antibiotics.  Patient currently on IV Unasyn.

## 2022-02-26 NOTE — FLOWSHEET NOTE
02/26/22 1419   Vital Signs   Pulse 92   Respiration (!) 24   Pulse Oximetry 92 %   $ Pulse Oximetry (Spot Check) Yes   Respiratory Assessment   Respiratory Pattern Within Normal Limits   Level of Consciousness Alert   Chest Exam   Work Of Breathing / Effort Mild   Breath Sounds   RUL Breath Sounds Diminished;Clear   RML Breath Sounds Diminished   RLL Breath Sounds Diminished   MOSHE Breath Sounds Diminished;Fine Crackles   LLL Breath Sounds Diminished   Oxygen   O2 (LPM) 4.5   O2 Delivery Device Silicone Nasal Cannula

## 2022-02-26 NOTE — CONSULTS
"INFECTIOUS DISEASES INPATIENT CONSULT NOTE     Date of Service: 2/26/2022    Consult Requested By: Henrique Paulino M.D.    Reason for Consultation: +Blood cultures, cellulitis    History of Present Illness:   Gabrielle Olmstead is a 76 y.o. diabetic female originally admitted 2/16/2022 due to weakness and dyspnea, and diarrhea  She has known COPD with chronic hypoxemic respiratory failure on 2 L of oxygen at baseline,   Symptoms started about one week PTA.  Progressively worsening Denied noticing any blood or mucus in stool. No fever, nausea, and vomiting. +worsening swelling in both lower extremities in addition to redness on the left leg. Initially admitted to the floor and started on treatment for presumed cellulitis  On 2/16 Blood cxs +CNS and peptoniphilus - \"NTD per ID\" (query no ID consult done). Repeat cx neg. Neg MRSA - started on cefepime and flagyl course   Her Fio2 requirement went up from 2 l to 5 l with wheezing and dyspnea on day2 of hospitalization Started on rx for COPD with steroids. She improved with Fio2 requirement back to baseline   On 2/19: \"Developed acute respiratory distress and AMS on 2/19/22 a.m  Troponin increased to 131 and ekg initially done concerning for ST depression and with AMS and paco2 dropped to 69 after 45 mins of BIPAP decision made to intubate.  Wbc increased to 16.7, creatinine worsening to 1.25, probnp 3459 and trop 131 with lactic 3.8,   +NSTEMI  On 2/24/22: extubated. Completed course cefepime and flagyl course on 2/24    Transferred out of ICU 2/26 and ID consulted for antibiotic recommendation and management  Due to persistent leukocytsosis  Pictures reviewed in media-dusky, erythema perineum with abscess  Seen by wound care 2/21-per note perineal buttock wound opened up on 2/20/2022  Wound 0.5 cmX 1.7 cm X 2cm +slough and tunneling    Review Of Systems:  Dry mouth  Perineal pain  Limited but other systems are reviewed and are negative     PMH:   Past Medical History: "   Diagnosis Date   • Arthritis    • ASTHMA    • Breath shortness    • Bronchitis    • Diabetes     ORAL   • EMPHYSEMA    • Hypertension    • Other specified disorder of intestines          PSH:  Past Surgical History:   Procedure Laterality Date   • HYSTERECTOMY ROBOTIC  11/28/2011    Performed by REKHA BYERS at SURGERY [facility] ORS   • LESION EXCISION GENERAL  11/28/2011    Performed by REKHA BYERS at SURGERY [facility] ORS   • CYST EXCISION  8/6/2010    Performed by ANA PAVON at SURGERY SAME DAY [facility] ORS       FAMILY HX:  Family History   Problem Relation Age of Onset   • Hypertension Mother        SOCIAL HX:  Social History     Socioeconomic History   • Marital status:      Spouse name: Not on file   • Number of children: Not on file   • Years of education: Not on file   • Highest education level: Not on file   Occupational History   • Not on file   Tobacco Use   • Smoking status: Former Smoker     Packs/day: 2.00     Years: 40.00     Pack years: 80.00     Types: Cigarettes   • Smokeless tobacco: Never Used   • Tobacco comment: 45 YEARS  11/2 PPD   Substance and Sexual Activity   • Alcohol use: No     Comment: OCCASSIONAL   • Drug use: No   • Sexual activity: Not on file   Other Topics Concern   • Not on file   Social History Narrative   • Not on file     Social Determinants of Health     Financial Resource Strain: Not on file   Food Insecurity: Not on file   Transportation Needs: Not on file   Physical Activity: Not on file   Stress: Not on file   Social Connections: Not on file   Intimate Partner Violence: Not on file   Housing Stability: Not on file     Social History     Tobacco Use   Smoking Status Former Smoker   • Packs/day: 2.00   • Years: 40.00   • Pack years: 80.00   • Types: Cigarettes   Smokeless Tobacco Never Used   Tobacco Comment    45 YEARS  11/2 PPD     Social History     Substance and Sexual Activity   Alcohol Use No    Comment: OCCASSIONAL        Allergies/Intolerances:  Allergies   Allergen Reactions   • Nkda [No Known Drug Allergy]          Other Current Medications:    Current Facility-Administered Medications:   •  aspirin EC (ECOTRIN) tablet 81 mg, 81 mg, Oral, DAILY, Henrique Paulino M.D., 81 mg at 02/26/22 1236  •  ceFAZolin (ANCEF) 2 g in  mL IVPB premix, 2 g, Intravenous, Q8HRS, Henrique Paulino M.D.  •  budesonide-formoterol (SYMBICORT) 160-4.5 MCG/ACT inhaler 2 Puff, 2 Puff, Inhalation, BID (RT), Lien Lee M.D., 2 Puff at 02/26/22 0616  •  insulin GLARGINE (Lantus,Semglee) injection, 30 Units, Subcutaneous, BID, Lien Lee M.D., 30 Units at 02/26/22 0533  •  enoxaparin (LOVENOX) inj 40 mg, 40 mg, Subcutaneous, DAILY, Lien Lee M.D., 40 mg at 02/26/22 0532  •  furosemide (LASIX) injection 40 mg, 40 mg, Intravenous, Q DAY, Lien Lee M.D., 40 mg at 02/26/22 0531  •  insulin regular (HumuLIN R,NovoLIN R) injection, 3-14 Units, Subcutaneous, Q6HRS, 12 Units at 02/26/22 1242 **AND** POC blood glucose manual result, , , Q AC AND BEDTIME(S) **AND** NOTIFY MD and PharmD, , , Once **AND** Administer 20 grams of glucose (approximately 8 ounces of fruit juice) every 15 minutes PRN FSBG less than 70 mg/dL, , , PRN **AND** dextrose 50% (D50W) injection 50 mL, 50 mL, Intravenous, Q15 MIN PRN, Lien Lee M.D.  •  HYDROcodone-acetaminophen (NORCO) 5-325 MG per tablet 1 Tablet, 1 Tablet, Enteral Tube, Q6HRS PRN, Lien Lee M.D., 1 Tablet at 02/24/22 1447  •  ipratropium-albuterol (DUONEB) nebulizer solution, 3 mL, Nebulization, 4X/DAY (RT), Lien Lee M.D., 3 mL at 02/26/22 1006  •  hydrALAZINE (APRESOLINE) injection 10 mg, 10 mg, Intravenous, Q6HRS PRN, Lien Lee M.D., 10 mg at 02/25/22 1357  •  benazepril (LOTENSIN) tablet 20 mg, 20 mg, Enteral Tube, Q DAY, Lien Lee M.D.  •  amLODIPine (NORVASC) tablet 10 mg, 10 mg, Enteral Tube, Q DAY, Alley Longo M.D.  •   labetalol (NORMODYNE/TRANDATE) injection 10 mg, 10 mg, Intravenous, Q6HRS PRN, Alley Longo M.D., 10 mg at 02/25/22 0219  •  acetaminophen (Tylenol) tablet 650 mg, 650 mg, Enteral Tube, Q6HRS PRN, Ashia Bertrand M.D.  •  dakins 0.125% (1/4 strength) topical soln, , Topical, BID, Ashia Bertrand M.D., 1 mL at 02/26/22 0545  •  prochlorperazine (COMPAZINE) injection 10 mg, 10 mg, Intravenous, Q6HRS PRN, Alley Longo M.D., 10 mg at 02/19/22 0013  •  famotidine (PEPCID) tablet 20 mg, 20 mg, Enteral Tube, DAILY, 20 mg at 02/25/22 0550 **OR** famotidine (PEPCID) injection 20 mg, 20 mg, Intravenous, DAILY, Ashia Bertrand M.D., 20 mg at 02/19/22 0908  •  senna-docusate (PERICOLACE or SENOKOT S) 8.6-50 MG per tablet 2 Tablet, 2 Tablet, Enteral Tube, BID, 2 Tablet at 02/24/22 0518 **AND** polyethylene glycol/lytes (MIRALAX) PACKET 1 Packet, 1 Packet, Enteral Tube, QDAY PRN, 1 Packet at 02/23/22 1447 **AND** magnesium hydroxide (MILK OF MAGNESIA) suspension 30 mL, 30 mL, Enteral Tube, QDAY PRN, 30 mL at 02/23/22 1447 **AND** bisacodyl (DULCOLAX) suppository 10 mg, 10 mg, Rectal, QDAY PRN, Ashia Bertrand M.D.  •  ascorbic acid (Vitamin C) tablet 500 mg, 500 mg, Enteral Tube, BID, Ashia Bertrand M.D., 500 mg at 02/25/22 1742  •  therapeutic multivitamin-minerals (THERAGRAN-M) tablet 1 Tablet, 1 Tablet, Enteral Tube, DAILY, Thomas Stroud M.D., 1 Tablet at 02/25/22 0550  •  atorvastatin (LIPITOR) tablet 40 mg, 40 mg, Enteral Tube, DAILY, Thomas Stroud M.D., 40 mg at 02/25/22 0550  •  levothyroxine (SYNTHROID) tablet 25 mcg, 25 mcg, Enteral Tube, DAILY, Thomas Stroud M.D., 25 mcg at 02/25/22 0550  •  sertraline (Zoloft) tablet 50 mg, 50 mg, Enteral Tube, DAILY, Thomas Stroud M.D., 50 mg at 02/25/22 0550  •  ondansetron (ZOFRAN) syringe/vial injection 4 mg, 4 mg, Intravenous, Q4HRS PRN, Alley Longo M.D., 4 mg at 02/18/22 2870  •  Respiratory Therapy  "Consult, , Nebulization, Continuous RT, Allyson Ulloa D.O.  •  ipratropium-albuterol (DUONEB) nebulizer solution, 3 mL, Nebulization, Q2HRS PRN (RT), Allyson Ulloa D.O.  •  lactated ringers infusion (BOLUS), 500 mL, Intravenous, Once PRN, Thomas Stroud M.D.  •  lactated ringers infusion (BOLUS): BMI greater than 30, 30 mL/kg (Ideal), Intravenous, Once PRN, Thomas Stroud M.D.      Most Recent Vital Signs:  BP (!) 189/54 Comment: rn notified  Pulse 89   Temp 36.8 °C (98.3 °F) (Oral)   Resp (!) 24   Ht 1.549 m (5' 0.98\")   Wt 104 kg (228 lb 2.8 oz)   SpO2 91%   BMI 43.14 kg/m²   Temp  Av °C (98.6 °F)  Min: 36.1 °C (96.9 °F)  Max: 38.3 °C (100.9 °F)    Physical Exam:  General:chronically ill-appearing, well nourished no acute distress  HEENT: NCAT, PERRLA, sclera anicteric, PERRL, EOMI,  no oral lesions Dry  Neck: supple, no lymphadenopathy  Chest: CTAB, unlabored.Decreased BS  Cardiac: RRR,  no m/r/g   Abdomen: + bowel sounds, soft, non-tender, non-distended   perineal wound packed decreased erythema and no longer purple +induration  Extremities: No cyanosis, clubbing. no edema,   Skin: no rashes xerosis skin with desquamtion BLE No erythema  Neuro: Alert and oriented times 3, Speech muted CN intact DEVLIN  Psych: Mood normal Affect normal    Pertinent Lab Results:  Recent Labs     22  0400 22  04022  043   WBC 15.8* 20.7* 20.4*      Recent Labs     22  0400 22  0400 22  0430   HEMOGLOBIN 10.0* 11.9* 11.9*   HEMATOCRIT 33.1* 38.9 39.3   .1* 102.4* 103.7*   MCH 31.2 31.3 31.4   MACROCYTOSIS 1+ 1+  --    ANISOCYTOSIS 1+ 1+  --    PLATELETCT 440 485* 489*         Recent Labs     22  0400 22  0400 22  0430   SODIUM 145 144 149*   POTASSIUM 4.0 4.0 4.1   CHLORIDE 104 99 103   CO2 35* 36* 38*   CREATININE 0.65 0.70 0.78        No results for input(s): ALBUMIN in the last 72 hours.    Invalid input(s): AST, ALT, ALKPHOS, BILITOT, " "TOTALBILIRUB, BILIRUBINTOT, BILIRUBINDIR, BILIRUBININD, ALKALINEPHOS     Pertinent Micro:  Results     Procedure Component Value Units Date/Time    BLOOD CULTURE [[id number]]     Order Status: Sent Specimen: Blood from Peripheral     BLOOD CULTURE [[id number]]     Order Status: Sent Specimen: Blood from Peripheral     BLOOD CULTURE [[id number]]  (Abnormal) Collected: 02/24/22 1300    Order Status: Completed Specimen: Blood from Peripheral Updated: 02/25/22 2310     Significant Indicator POS     Source BLD     Site PERIPHERAL     Culture Result Growth detected by Bactec instrument. 02/25/2022  20:24  Gram Stain: Gram positive cocci: Possible Staphylococcus sp.  Negative for Staphylococcus aureus and MRSA by PCR. Correlate  ongoing need for antibiotics with clinical condition.  Further report to follow.      Narrative:      CALL  Meléndez  MED tel. [phone],  CALLED  MED tel. [phone] 02/25/2022, 23:10, RB PERF. RESULTS CALLED TO: RN  [phone]  Per Hospital Policy: Only change Specimen Src: to \"Line\" if  specified by physician order.  Left AC    BLOOD CULTURE [[id number]] Collected: 02/24/22 1520    Order Status: Completed Specimen: Blood from Peripheral Updated: 02/25/22 0716     Significant Indicator NEG     Source BLD     Site PERIPHERAL     Culture Result No Growth  Note: Blood cultures are incubated for 5 days and  are monitored continuously.Positive blood cultures  are called to the RN and reported as soon as  they are identified.  Blood culture testing and Gram stain, if indicated, are  performed at [facility], [address] [place] [address].  Positive blood cultures are  sent to [facility], [address] [place] [address], for organism identification and  susceptibility testing.      Narrative:      Per Hospital Policy: Only change Specimen Src: to \"Line\" if  specified by physician order.  Right AC    BLOOD CULTURE [[id number]]  (Abnormal) Collected: 02/16/22 1530    " "Order Status: Completed Specimen: Blood from Peripheral Updated: 02/23/22 0850     Significant Indicator POS     Source BLD     Site PERIPHERAL     Culture Result Growth detected by Bactec instrument. 02/19/2022  09:06  Negative for Staphylococcus aureus and MRSA by PCR. Correlate  ongoing need for antibiotics with clinical condition.        Anaerobic Gram positive cocci  Peptoniphilus lacrimalis      Narrative:      CALL  Meléndez  ICU tel. ,  CALLED  ICU tel.  02/19/2022, 17:15, RB PERF. RESULTS CALLED TO: RN [phone]  (PCR)  Enhanced Droplet, Contact, and Eye Protection  Per Hospital Policy: Only change Specimen Src: to \"Line\" if  specified by physician order.  Right AC    CULTURE WOUND W/ GRAM STAIN [[id number]] Collected: 02/20/22 1204    Order Status: Completed Specimen: Wound from Buttock Updated: 02/22/22 0817     Significant Indicator NEG     Source WND     Site BUTTOCK     Culture Result Moderate growth mixed enteric raleigh.     Gram Stain Result Many WBCs.  Moderate Gram positive cocci.  Few Gram positive rods.  Rare Gram negative rods.      Narrative:      Collected By: [id number] LAWRENCE BREAUX  Collected By: [id number] LAWRENCE BREAUX    BLOOD CULTURE [[id number]] Collected: 02/16/22 1515    Order Status: Completed Specimen: Blood from Peripheral Updated: 02/21/22 1617     Significant Indicator NEG     Source BLD     Site PERIPHERAL     Culture Result No growth after 5 days of incubation.  Blood culture testing and Gram stain, if indicated, are  performed at [facility], [address] [place] [address].  Positive blood cultures are  sent to [facility], [address] [place] [address], for organism identification and  susceptibility testing.      Narrative:      Enhanced Droplet, Contact, and Eye Protection  Per Hospital Policy: Only change Specimen Src: to \"Line\" if  specified by physician order.  Right AC    GRAM STAIN [[id number]] Collected: 02/20/22 1204    Order " Status: Completed Specimen: Wound Updated: 02/21/22 1328     Significant Indicator .     Source WND     Site BUTTOCK     Gram Stain Result Many WBCs.  Moderate Gram positive cocci.  Few Gram positive rods.  Rare Gram negative rods.      Narrative:      Collected By: [id number] BREAUX  Collected By: [id number] LAWRENCE BREAUX        No results found for: BLOODCULTU, BLDCULT, BCHOLD     Studies:  CXR no infiltrate    IMPRESSION:   Perineal abscess with cellulitis  Thrombocytosis-acute phase reactant  Leukocytsosis  Diabetes  Recent intubation  Recent NSTEMI  +BCx anaerobe and skin raleigh    PLAN:   DC Ancef  CXR neg except CMG nd atelectasis-pulm toilet  NO pacemaker or prosthetic valve  Perineal abscess likely source and generally polymicrobial  MRSA screen neg  Start Zosyn  Wound care  High risk for Fourniers  Keep BS under 150 to help control current infection  Avoid QTC prolonging agents  Final antibiotic recommendations per culture results and clinical course        Plan of care discussed with LAUREANO Paulino M.D.. Will continue to follow    Jenna Lewis M.D.

## 2022-02-26 NOTE — PROGRESS NOTES
0715-  PT sleeping in bed with no s/s of pain or distress.  0830-  Pt assessed.  AOx1, barely verbal.  Stares b;lankely when questions are asked.  MD advised at bedside.

## 2022-02-27 LAB
ANION GAP SERPL CALC-SCNC: 8 MMOL/L (ref 7–16)
BASOPHILS # BLD AUTO: 0.3 % (ref 0–1.8)
BASOPHILS # BLD: 0.06 K/UL (ref 0–0.12)
BUN SERPL-MCNC: 44 MG/DL (ref 8–22)
CALCIUM SERPL-MCNC: 11.1 MG/DL (ref 8.4–10.2)
CHLORIDE SERPL-SCNC: 108 MMOL/L (ref 96–112)
CO2 SERPL-SCNC: 39 MMOL/L (ref 20–33)
CREAT SERPL-MCNC: 0.9 MG/DL (ref 0.5–1.4)
EOSINOPHIL # BLD AUTO: 0.24 K/UL (ref 0–0.51)
EOSINOPHIL NFR BLD: 1.2 % (ref 0–6.9)
ERYTHROCYTE [DISTWIDTH] IN BLOOD BY AUTOMATED COUNT: 53.6 FL (ref 35.9–50)
GLUCOSE BLD STRIP.AUTO-MCNC: 161 MG/DL (ref 65–99)
GLUCOSE BLD STRIP.AUTO-MCNC: 173 MG/DL (ref 65–99)
GLUCOSE BLD STRIP.AUTO-MCNC: 207 MG/DL (ref 65–99)
GLUCOSE BLD STRIP.AUTO-MCNC: 223 MG/DL (ref 65–99)
GLUCOSE BLD STRIP.AUTO-MCNC: 234 MG/DL (ref 65–99)
GLUCOSE SERPL-MCNC: 192 MG/DL (ref 65–99)
HCT VFR BLD AUTO: 41 % (ref 37–47)
HGB BLD-MCNC: 12.2 G/DL (ref 12–16)
IMM GRANULOCYTES # BLD AUTO: 0.38 K/UL (ref 0–0.11)
IMM GRANULOCYTES NFR BLD AUTO: 1.8 % (ref 0–0.9)
LYMPHOCYTES # BLD AUTO: 3.22 K/UL (ref 1–4.8)
LYMPHOCYTES NFR BLD: 15.6 % (ref 22–41)
MAGNESIUM SERPL-MCNC: 2.6 MG/DL (ref 1.5–2.5)
MCH RBC QN AUTO: 31.3 PG (ref 27–33)
MCHC RBC AUTO-ENTMCNC: 29.8 G/DL (ref 33.6–35)
MCV RBC AUTO: 105.1 FL (ref 81.4–97.8)
MONOCYTES # BLD AUTO: 1.1 K/UL (ref 0–0.85)
MONOCYTES NFR BLD AUTO: 5.3 % (ref 0–13.4)
NEUTROPHILS # BLD AUTO: 15.63 K/UL (ref 2–7.15)
NEUTROPHILS NFR BLD: 75.8 % (ref 44–72)
NRBC # BLD AUTO: 0 K/UL
NRBC BLD-RTO: 0 /100 WBC
PHOSPHATE SERPL-MCNC: 3.2 MG/DL (ref 2.5–4.5)
PLATELET # BLD AUTO: 486 K/UL (ref 164–446)
PMV BLD AUTO: 10.2 FL (ref 9–12.9)
POTASSIUM SERPL-SCNC: 3.6 MMOL/L (ref 3.6–5.5)
RBC # BLD AUTO: 3.9 M/UL (ref 4.2–5.4)
SODIUM SERPL-SCNC: 155 MMOL/L (ref 135–145)
WBC # BLD AUTO: 20.6 K/UL (ref 4.8–10.8)

## 2022-02-27 PROCEDURE — 94640 AIRWAY INHALATION TREATMENT: CPT

## 2022-02-27 PROCEDURE — 82962 GLUCOSE BLOOD TEST: CPT

## 2022-02-27 PROCEDURE — 92610 EVALUATE SWALLOWING FUNCTION: CPT

## 2022-02-27 PROCEDURE — 99232 SBSQ HOSP IP/OBS MODERATE 35: CPT | Performed by: INTERNAL MEDICINE

## 2022-02-27 PROCEDURE — 700101 HCHG RX REV CODE 250: Performed by: STUDENT IN AN ORGANIZED HEALTH CARE EDUCATION/TRAINING PROGRAM

## 2022-02-27 PROCEDURE — 84100 ASSAY OF PHOSPHORUS: CPT

## 2022-02-27 PROCEDURE — 83735 ASSAY OF MAGNESIUM: CPT

## 2022-02-27 PROCEDURE — 97530 THERAPEUTIC ACTIVITIES: CPT | Performed by: PHYSICAL THERAPIST

## 2022-02-27 PROCEDURE — 700111 HCHG RX REV CODE 636 W/ 250 OVERRIDE (IP): Performed by: INTERNAL MEDICINE

## 2022-02-27 PROCEDURE — 97110 THERAPEUTIC EXERCISES: CPT | Performed by: PHYSICAL THERAPIST

## 2022-02-27 PROCEDURE — 94760 N-INVAS EAR/PLS OXIMETRY 1: CPT

## 2022-02-27 PROCEDURE — 770020 HCHG ROOM/CARE - TELE (206)

## 2022-02-27 PROCEDURE — 85025 COMPLETE CBC W/AUTO DIFF WBC: CPT

## 2022-02-27 PROCEDURE — 80048 BASIC METABOLIC PNL TOTAL CA: CPT

## 2022-02-27 PROCEDURE — 700111 HCHG RX REV CODE 636 W/ 250 OVERRIDE (IP): Performed by: STUDENT IN AN ORGANIZED HEALTH CARE EDUCATION/TRAINING PROGRAM

## 2022-02-27 PROCEDURE — 97166 OT EVAL MOD COMPLEX 45 MIN: CPT

## 2022-02-27 PROCEDURE — 700105 HCHG RX REV CODE 258: Performed by: INTERNAL MEDICINE

## 2022-02-27 RX ORDER — ASPIRIN 81 MG/1
81 TABLET, CHEWABLE ORAL DAILY
Status: DISCONTINUED | OUTPATIENT
Start: 2022-02-28 | End: 2022-03-02 | Stop reason: HOSPADM

## 2022-02-27 RX ADMIN — BUDESONIDE AND FORMOTEROL FUMARATE DIHYDRATE 2 PUFF: 160; 4.5 AEROSOL RESPIRATORY (INHALATION) at 18:46

## 2022-02-27 RX ADMIN — IPRATROPIUM BROMIDE AND ALBUTEROL SULFATE 3 ML: .5; 2.5 SOLUTION RESPIRATORY (INHALATION) at 07:00

## 2022-02-27 RX ADMIN — FUROSEMIDE 40 MG: 10 INJECTION, SOLUTION INTRAVENOUS at 05:39

## 2022-02-27 RX ADMIN — PIPERACILLIN AND TAZOBACTAM 3.38 G: 3; .375 INJECTION, POWDER, LYOPHILIZED, FOR SOLUTION INTRAVENOUS; PARENTERAL at 22:48

## 2022-02-27 RX ADMIN — INSULIN HUMAN 8 UNITS: 100 INJECTION, SOLUTION PARENTERAL at 18:22

## 2022-02-27 RX ADMIN — IPRATROPIUM BROMIDE AND ALBUTEROL SULFATE 3 ML: .5; 2.5 SOLUTION RESPIRATORY (INHALATION) at 11:06

## 2022-02-27 RX ADMIN — FAMOTIDINE 20 MG: 10 INJECTION INTRAVENOUS at 05:42

## 2022-02-27 RX ADMIN — PIPERACILLIN AND TAZOBACTAM 3.38 G: 3; .375 INJECTION, POWDER, LYOPHILIZED, FOR SOLUTION INTRAVENOUS; PARENTERAL at 05:32

## 2022-02-27 RX ADMIN — SODIUM HYPOCHLORITE 1 ML: 1.25 SOLUTION TOPICAL at 05:39

## 2022-02-27 RX ADMIN — IPRATROPIUM BROMIDE AND ALBUTEROL SULFATE 3 ML: .5; 2.5 SOLUTION RESPIRATORY (INHALATION) at 18:46

## 2022-02-27 RX ADMIN — PIPERACILLIN AND TAZOBACTAM 3.38 G: 3; .375 INJECTION, POWDER, LYOPHILIZED, FOR SOLUTION INTRAVENOUS; PARENTERAL at 14:42

## 2022-02-27 RX ADMIN — BUDESONIDE AND FORMOTEROL FUMARATE DIHYDRATE 2 PUFF: 160; 4.5 AEROSOL RESPIRATORY (INHALATION) at 07:00

## 2022-02-27 RX ADMIN — INSULIN HUMAN 8 UNITS: 100 INJECTION, SOLUTION PARENTERAL at 12:00

## 2022-02-27 RX ADMIN — INSULIN HUMAN 8 UNITS: 100 INJECTION, SOLUTION PARENTERAL at 00:54

## 2022-02-27 RX ADMIN — IPRATROPIUM BROMIDE AND ALBUTEROL SULFATE 3 ML: .5; 2.5 SOLUTION RESPIRATORY (INHALATION) at 14:32

## 2022-02-27 RX ADMIN — INSULIN GLARGINE 30 UNITS: 100 INJECTION, SOLUTION SUBCUTANEOUS at 18:22

## 2022-02-27 RX ADMIN — ENOXAPARIN SODIUM 40 MG: 40 INJECTION SUBCUTANEOUS at 05:39

## 2022-02-27 ASSESSMENT — COGNITIVE AND FUNCTIONAL STATUS - GENERAL
DAILY ACTIVITIY SCORE: 15
PERSONAL GROOMING: A LITTLE
HELP NEEDED FOR BATHING: A LOT
SUGGESTED CMS G CODE MODIFIER DAILY ACTIVITY: CK
DRESSING REGULAR UPPER BODY CLOTHING: A LOT
TOILETING: A LOT
DRESSING REGULAR LOWER BODY CLOTHING: A LOT

## 2022-02-27 ASSESSMENT — GAIT ASSESSMENTS: GAIT LEVEL OF ASSIST: UNABLE TO PARTICIPATE

## 2022-02-27 ASSESSMENT — ACTIVITIES OF DAILY LIVING (ADL): TOILETING: INDEPENDENT

## 2022-02-27 ASSESSMENT — PAIN DESCRIPTION - PAIN TYPE: TYPE: ACUTE PAIN

## 2022-02-27 NOTE — THERAPY
"Occupational Therapy   Initial Evaluation     Patient Name: Gabrielle Olmstead  Age:  76 y.o., Sex:  female  Medical Record #: [id number]  Today's Date: 2/27/2022     Precautions  Precautions: (P) Fall Risk  Comments: (P) poor endurance    Assessment  Patient is 76 y.o. female with a diagnosis of sepsis.  Pt was intubated on 2/19 and extubated on 2/24. Past medical history includes COPD, chronic respiratory failure, HTN, DM, hypothyroidism, dyslipidemia, EF 40%. Pt was on 5 L O2 during evaluation. Upon assessment, pt was max A to dependent with LE dressing/ toilet hygiene and mod A x 1 / min A x 2 with FWW for sit to stand with FWW in front. Pt's limited endurance, strength and activity tolerance significantly affects progress in OT. Per chart, pt lives alone with a caregiver who comes 1x/week  to assist with IADLs. Pt stated that she was on 2 L O2 at home 24/7. Pt has a broken 4WW which she used at home and would benefit from using FWW due to balance issues. Monitor SpO2 during activities. Pt will benefit from skilled OT to maximize rehab function and increase independence with ADLs  using AE and functional mobility using FWW with good balance , endurance and overall strength.     Plan    Recommend Occupational Therapy 3 times per week until therapy goals are met for the following treatments:  Adaptive Equipment, Community Re-integration, Neuro Re-Education / Balance, Self Care/Activities of Daily Living, Therapeutic Activities, and Therapeutic Exercises.    DC Equipment Recommendations: (P) Front-Wheel Walker,Unable to determine at this time  Discharge Recommendations: (P) Recommend post-acute placement for additional occupational therapy services prior to discharge home     Subjective    \" I want to get better and go home\"      Objective       02/27/22 1010   Prior Living Situation   Prior Services Meals on Wheels   Housing / Facility 1 Story Apartment / Condo   Steps Into Home 1   Steps In Home 0   Bathroom Set up Walk " In Shower   Equipment Owned 4-Wheel Walker   Lives with - Patient's Self Care Capacity Alone and Able to Care For Self   Comments   (has  acaregiver who comes 1x/wk to assist with IADLs)   Prior Level of ADL Function   Self Feeding Independent   Grooming / Hygiene Independent   Bathing Requires Assist   Dressing Independent   Toileting Independent   Prior Level of IADL Function   Medication Management Independent   Laundry Requires Assist   Kitchen Mobility Independent   Finances Unable To Determine At This Time   Home Management Requires Assist   Shopping Requires Assist   Prior Level Of Mobility Independent With Device in Home   Driving / Transportation Unable To Determine At This Time   Occupation (Pre-Hospital Vocational) Retired Due To Age   Precautions   Precautions Fall Risk   Comments poor endurance   Vitals   Pulse Oximetry 90 %   O2 (LPM) 5   O2 Delivery Device Nasal Cannula   Pain   Intervention Declines   Pain 0 - 10 Group   Pain Rating Scale (NPRS) 0   Comfort Goal Comfort with Movement   Cognition    Cognition / Consciousness WDL   Level of Consciousness Alert   Comments slight delay in response   Active ROM Upper Body   Active ROM Upper Body  X   Dominant Hand Right   Rt Shoulder Flexion Degrees 70   Comments reported LOM on R shoulder flexion due to hx of fx   Strength Upper Body   Upper Body Strength  X   Rt Shoulder Flexion Strength 3- (F-)   Upper Body Muscle Tone   Upper Body Muscle Tone  WDL   Coordination Upper Body   Coordination WDL   Balance Assessment   Sitting Balance (Static) Fair   Sitting Balance (Dynamic) Fair -   Standing Balance (Static) Poor +   Standing Balance (Dynamic) Poor   Comments with FWW   Bed Mobility    Supine to Sit Moderate Assist   Sit to Supine Moderate Assist   Scooting Contact Guard Assist   Rolling Minimal Assist to Rt.;Minimum Assist to Lt.   ADL Assessment   Eating Supervision   Grooming Contact Guard Assist   Upper Body Dressing Minimal Assist   Lower Body  "Dressing Maximal Assist   Toileting Maximal Assist   Comments has a catheter   How much help from another person does the patient currently need...   Putting on and taking off regular lower body clothing? 2   Bathing (including washing, rinsing, and drying)? 2   Toileting, which includes using a toilet, bedpan, or urinal? 2   Putting on and taking off regular upper body clothing? 2   Taking care of personal grooming such as brushing teeth? 3   Eating meals? 4   6 Clicks Daily Activity Score 15   Functional Mobility   Sit to Stand Minimal Assist  (min A x 2 ; mod A x 1 with FWW)   Bed, Chair, Wheelchair Transfer Unable to Participate  (m)   Mobility bed.,supine to sit and vice versa, sit to stand with FWW   Activity Tolerance   Sitting Edge of Bed 10 mins   Standing 3x  (with FWW)   Comments poor endurance/ fatigue   Patient / Family Goals   Patient / Family Goal #1 \" I want to go home\"   Short Term Goals   Short Term Goal # 1 Pt will perform UE/LE dressing using AE prn supervised with good balance and endurance until discharge   Short Term Goal # 2 Pt will perform grooming task while standing with FWW in front of the sink supervised with good balance and endurance until discharge   Short Term Goal # 3 Pt will perform toilet hygiene supervised with good balance and endurance until discharge   Short Term Goal # 4 Pt will demonstrate safe functional transfer and mobility using FWW ( bed, commode, chair) SBA  with good balance and endurance until discharge   Education Group   Education Provided Role of Occupational Therapist   Role of Occupational Therapist Patient Response Patient;Acceptance;Verbal Demonstration   Problem List   Problem List Decreased Upper Extremity Strength Right;Decreased Upper Extremity AROM Right;Decreased Functional Mobility;Decreased Activity Tolerance;Safety Awareness Deficits / Cognition;Impaired Posture / Trunk Alignment;Impaired Postural Control / Balance;Decreased Active Daily Living Skills "   Anticipated Discharge Equipment and Recommendations   DC Equipment Recommendations Front-Wheel Walker;Unable to determine at this time   Discharge Recommendations Recommend post-acute placement for additional occupational therapy services prior to discharge home   Interdisciplinary Plan of Care Collaboration   IDT Collaboration with  Nursing   Patient Position at End of Therapy In Bed;Bed Alarm On;Tray Table within Reach;Call Light within Reach   Session Information   Date / Session Number  2/27 , #1 ( 1/3, 3/6)   Priority 2

## 2022-02-27 NOTE — DISCHARGE PLANNING
Anticipated Discharge Disposition: Anticipate SNF       Action: Discussed pt during IDT rounds. Pt pending final recommendations for ABX. Pt recommending post acute. Pt has orders for daily dressing changes on Left Buttock.     Barriers to Discharge: Medical Clearance, ABX recommendations, Placement     Plan: Case management to follow up with ID recommendations

## 2022-02-27 NOTE — FLOWSHEET NOTE
02/27/22 0703   Events/Summary/Plan   Events/Summary/Plan Cool aerosol started for extremely dry mouth.   Vital Signs   Pulse 84   Respiration (!) 24   Pulse Oximetry 94 %   $ Pulse Oximetry (Spot Check) Yes   Respiratory Assessment   Respiratory Pattern Within Normal Limits   Level of Consciousness Alert   Chest Exam   Work Of Breathing / Effort Mild;Tachypnea;Shallow   Breath Sounds   RUL Breath Sounds Diminished;Clear   RML Breath Sounds Diminished   RLL Breath Sounds Diminished   MOSHE Breath Sounds Diminished;Clear   LLL Breath Sounds Diminished   Oxygen   FiO2% 50 %   O2 Delivery Device Aerosol Mask   Aerosols   $ Aerosol Delivery Device Cool Mist Aerosol   Equipment Change Date 03/06/22

## 2022-02-27 NOTE — THERAPY
Speech Language Pathology   Clinical Swallow Evaluation     Patient Name: Gabrielle Olmstead  AGE:  76 y.o., SEX:  female  Medical Record #: [id number]  Today's Date: 2022     Precautions  Precautions: Fall Risk,Swallow Precautions ( See Comments)  Comments: poor endurance    Assessment    Patient is a 76 y.o. female who presented 22 with generalized weakness, shortness of breath, and diarrhea. She also reports worsening swelling in b/l LE and redness on L leg. PMHx includes COPD on 2L at baseline, HTN, DM, dyslipidemia, and hypothyroidism. Pt was initially admitted for cellulitis and COPD exacerbation, though developed worsening respiratory status leading to eventual transfer to ICU requiring intubation -. No history of SLP found in this EMR.    CXR, :   1.  Linear bibasilar atelectasis.  2.  Cardiomegaly.    CXR, :   1.  Pulmonary edema and/or infiltrates are identified, which are stable since the prior exam.  2.  Cardiomegaly  3.  Atherosclerosis    Level of Consciousness: Alert, Awake  Affect/Behavior: Appropriate, Calm, Cooperative  Follows Directives: Yes  Orientation: Self, , General place, Situation  Hearing: Functional hearing  Vision: Functional vision      Prior Living Situation & Level of Function:  Pt lives alone at baseline. She receives Meals on Wheels and stated she eats it for dinner. Suspect pt tends toward softer foods at baseline, though stated she also eats salads.       Oral Mechanism Evaluation  Facial Symmetry: Equal  Facial Sensation: Equal  Labial Observations: WFL  Lingual Observations: Midline, dry, significant xerostomia  Dentition: Edentulous  Comments: pt reported upper and lower dentures at baseline, though not present at hospital; requested pt arrange for them to be brought in, as she reported she rarely eats without dentures in place     Voice  Quality: Hoarse  Resonance: WFL  Intensity: Appropriate, became softer as pt fatigued   Cough: Perceptually  weak  Comments:      Current Method of Nutrition   NPO until cleared by speech pathology       Assessment  Positioning: Portillo's (60-90 degrees)  Bolus Administration: SLP and Patient  Oxygen Requirements: 4.5 L Nasal Cannula  Factor(s) Affecting Performance: increased shortness of breath, fatigue as session progressed    Swallowing Trials   Ice: WFL  Thin Liquid (TN0): Impaired  Mildly Thick Liquid (MT2): Not tested  Liquidised (LQ3): Impaired  Pureed (PU4): Impaired  Minced & Moist (MM5): Impaired  Soft & Bite Sized (SB6): Impaired  Easy to Chew (EC7): Not tested  Regular (RG7): Not tested    Comments:  RN transitioned pt from humidified oxymask to nasal cannula at start of session. Pt has been NPO but has received small sips of thin water and ice chips in hopes of providing comfort/relief from severely dry oral cavity. Pt was assessed with ice chips x 4, thin liquids x 10 oz, applesauce x 4 oz, pudding x 2 oz, crushed cracker in applesauce x 3 bites, and diced peaches x 2. Pt demonstrated impaired bolus manipulation/mastication (suspect due to edentulism without dentures in place) with prolonged mastication of solids resulting in multiple swallows/bolus and mild oral residue requiring liquid rinse to clear. Swallow initiation appeared fairly timely to start, though eventually became more delayed. Poor coordination of respiration-deglutition noted, particularly as session progressed. Pt noted with increased work of breathing toward end of session and became diaphoretic. Pt initially tolerated all PO trials free from overt s/sx aspiration, though at end of session, pt noted with cough following 100% of trials, despite careful feeding size/rate, concerning for possible aspiration/penetration. SpO2 remained 93-95% throughout session.         Clinical Impressions    Moderate oropharyngeal dysphagia, likely acute related to edentulism without dentures present, recent prolonged intubation, hx COPD with increased O2  requirement, and generalized weakness with poor endurance. Swallow safety is impaired; swallow efficiency is impaired. Pt appears to be at high risk for aspiration PNA, and high for malnutrition/dehydration. Patient appears to be a good candidate for behavioral swallow rehabilitation.       Recommendations  1.  NPO with allowance for ice chips and single sips of thin liquids with RN after oral care with SLP re-assessment tomorrow.   2.  Instrumentation: MBSS ordered; SLP to see patient and determine if appropriate to participate 2/28.  3.  Swallowing Instructions & Precautions:   Supervision: 1:1 feeding with constant supervision  Positioning: Fully upright and midline during oral intake  Medication: Non Oral   Oral Care: Q4h      Plan    Recommend Speech Therapy 4 times per week until therapy goals are met for the following treatments:  Dysphagia Training and Patient / Family / Caregiver Education.    Discharge Recommendations: Recommend post-acute placement for additional speech therapy services prior to discharge home    Objective     02/27/22 1438   Vitals   O2 (LPM) 4.5   O2 Delivery Device Silicone Nasal Cannula   Vitals Comments SpO2 93-95% throughout session   Prior Living Situation   Prior Services Meals on Wheels   Housing / Facility 1 Story Apartment / Condo   Lives with - Patient's Self Care Capacity Alone and Able to Care For Self   Prior Level Of Function   Communication Within Functional Limits   Swallow Within Functional Limits  (suspect softer solids at baseline)   Dentition Edentulous   Dentures   (upper and lower dentures not present; pt reports she always wears them to eat)   Hearing Within Functional Limits for Evaluation   Hearing Aid None   Vision Within Functional Limits for Evaluation   Patient's Primary Language English   Dysphagia Rating   Nutritional Liquid Intake Rating Scale Nothing by mouth  (ice chips and single sips of water with RN only)   Nutritional Food Intake Rating Scale Tube  "dependent with minimal attempts of oral intake   Patient / Family Goals   Patient / Family Goal #1 \"ice cold water, please\"   Short Term Goals   Short Term Goal # 1 Pt will safely participate in pre-feeding trials with SLP free from s/sx aspiration or respiratory compromise.     "

## 2022-02-27 NOTE — FLOWSHEET NOTE
02/27/22 1106   Vital Signs   Pulse 88   Respiration (!) 24   Pulse Oximetry 96 %   $ Pulse Oximetry (Spot Check) Yes   Respiratory Assessment   Respiratory Pattern Within Normal Limits   Level of Consciousness Alert   Chest Exam   Work Of Breathing / Effort Mild;Shallow   Breath Sounds   RUL Breath Sounds Diminished;Clear   RML Breath Sounds Diminished   RLL Breath Sounds Diminished   MOSHE Breath Sounds Diminished;Clear   LLL Breath Sounds Diminished   Oxygen   FiO2% 50 %   O2 Delivery Device Aerosol Mask

## 2022-02-27 NOTE — CARE PLAN
The patient is Stable - Low risk of patient condition declining or worsening    Shift Goals  Clinical Goals: SLP consult, IV ABX  Patient Goals: comfort, relieve dry mouth  Family Goals: JACKY    Progress made toward(s) clinical / shift goals:  Patient receiving IV ABX. SLP consult in. Patient right now is strict NPO. On 5 L nc satting in the 90's.       Problem: Knowledge Deficit - Standard  Goal: Patient and family/care givers will demonstrate understanding of plan of care, disease process/condition, diagnostic tests and medications  Outcome: Progressing     Problem: Hemodynamics  Goal: Patient's hemodynamics, fluid balance and neurologic status will be stable or improve  Outcome: Progressing     Problem: Fluid Volume  Goal: Fluid volume balance will be maintained  Outcome: Progressing     Problem: Urinary - Renal Perfusion  Goal: Ability to achieve and maintain adequate renal perfusion and functioning will improve  Outcome: Progressing     Problem: Respiratory  Goal: Patient will achieve/maintain optimum respiratory ventilation and gas exchange  Outcome: Progressing       Patient is not progressing towards the following goals:

## 2022-02-27 NOTE — PROGRESS NOTES
"Hospital Medicine Daily Progress Note    Date of Service  2/27/2022    Chief Complaint  Gabrielle Olmstead is a 76 y.o. female admitted 2/16/2022 with worsening shortness of breath    Hospital Course    Per HPI, \"Gabrielle Olmstead is a 76 y.o. female with a past medical history of chronic obstructive pulmonary disease, chronic hypoxemic respiratory failure on 2 L of oxygen at baseline, hypertension, diabetes, dyslipidemia and hypothyroidism who presented 2/16/2022 with generalized weakness shortness of breath and diarrhea.  Patient reports that she has not been feeling well over the past week.  She has progressively worsening generalized weakness and worsening shortness of breath, particularly over the past 2 days.  She also reports having frequent loose bowel movements.  She denies noticing any blood or mucus in stool.  She denies having nausea and vomiting.  She reports noticing worsening swelling in both lower extremities in addition to redness on the left leg.\"      Interval Problem Update    2/26/2022: The patient was admitted for further evaluation and management of worsening shortness of breath and weakness.  She was initially treated for left lower extremity cellulitis and COPD exacerbation.  On the second day of admission, patient was noted to have worsening supplemental oxygen needs and required transfer to the ICU for BiPAP support.  Ultimately, the patient was intubated on 2/19/2022 and extubated on 2/24/2022.  While in the ICU, she was noted to have increasing troponin levels and some EKG findings concerning for ST segment depression.  This was discussed between critical care doctor and cardiology who determined that the increase in troponins were likely secondary to demand ischemia in the setting of acute respiratory failure.  She was treated empirically with heparin drip and aspirin and statin therapy.  Her troponin since that time have continue to downtrend.  Patient never had any active chest pain.  Patient " noted to have positive blood cultures growing anaerobic staph species.  She was treated with cefepime and Flagyl from 2/20- 2/24 in the ICU.  Repeat blood cultures on 2/24/2022 was still positive for staph species but negative for MSSA/M RSA.  At this point, ID was consulted with recommendations to switch patient to Zosyn due to concerns for decubitus ulcer source of bacteremia.  We will continue wound culture.  Patient's repeat chest x-ray does not show any worsening consolidation or edema.  At this time, patient is having difficulty speaking due to recent extubation but she is able to follow simple commands and acknowledges our conversation.  No other overnight events reported.    2/27/2022: The patient was seen and evaluated at bedside and has improved with in her ability to speak.  She is primarily concerned about her oral intake and would like water.  However at this time we are still waiting for speech therapy to evaluate the patient and give us a recommended diet due to concerns for aspiration risk.  She denies any nausea/vomiting or abdominal pain.  No chest pains or palpitations.  We are pending IDs final recommendations regarding patient's bacteremia.  Patient will need skilled nursing facility arranged prior to discharge.  No no other overnight events reported.    I have personally seen and examined the patient at bedside. I discussed the plan of care with patient.    Consultants/Specialty  critical care   Cardiology  ID    Code Status  DNAR, I OK    Disposition  Patient is not medically cleared for discharge.   Anticipate discharge to to skilled nursing facility.  I have placed the appropriate orders for post-discharge needs.    Review of Systems  Review of Systems   Unable to perform ROS: Acuity of condition        Physical Exam  Temp:  [36.2 °C (97.2 °F)-36.8 °C (98.3 °F)] 36.8 °C (98.2 °F)  Pulse:  [84-98] 89  Resp:  [20-24] 22  BP: (125-171)/(34-50) 143/48  SpO2:  [90 %-100 %] 100 %    Physical  Exam  Vitals and nursing note reviewed.   Constitutional:       General: She is not in acute distress.     Appearance: Normal appearance. She is obese. She is toxic-appearing.   HENT:      Head: Normocephalic and atraumatic.      Mouth/Throat:      Mouth: Mucous membranes are moist.   Eyes:      Extraocular Movements: Extraocular movements intact.      Conjunctiva/sclera: Conjunctivae normal.      Pupils: Pupils are equal, round, and reactive to light.   Cardiovascular:      Rate and Rhythm: Normal rate and regular rhythm.      Pulses: Normal pulses.      Heart sounds: Normal heart sounds. No murmur heard.    No friction rub.   Pulmonary:      Effort: Pulmonary effort is normal. No respiratory distress.      Breath sounds: Normal breath sounds. No wheezing.      Comments: Decreased breath sounds bilaterally  Abdominal:      General: Abdomen is flat. Bowel sounds are normal.      Palpations: Abdomen is soft.   Musculoskeletal:         General: No swelling or tenderness. Normal range of motion.      Cervical back: Normal range of motion and neck supple.   Skin:     General: Skin is warm and dry.      Comments: Patient with chronic venous stasis changes on lower extremities. Several non stageable decubitus ulcers with mild erythema.   Neurological:      General: No focal deficit present.      Mental Status: She is alert and oriented to person, place, and time.      Comments: Difficulty speaking likely from recent extubation   Psychiatric:         Mood and Affect: Mood normal.         Behavior: Behavior normal.         Fluids    Intake/Output Summary (Last 24 hours) at 2/27/2022 1227  Last data filed at 2/27/2022 0430  Gross per 24 hour   Intake --   Output 1650 ml   Net -1650 ml       Laboratory  Recent Labs     02/25/22  0400 02/26/22  0430 02/27/22  0335   WBC 20.7* 20.4* 20.6*   RBC 3.80* 3.79* 3.90*   HEMOGLOBIN 11.9* 11.9* 12.2   HEMATOCRIT 38.9 39.3 41.0   .4* 103.7* 105.1*   MCH 31.3 31.4 31.3   MCHC  30.6* 30.3* 29.8*   RDW 48.7 51.4* 53.6*   PLATELETCT 485* 489* 486*   MPV 10.2 10.2 10.2     Recent Labs     02/25/22  0400 02/26/22  0430 02/27/22  0335   SODIUM 144 149* 155*   POTASSIUM 4.0 4.1 3.6   CHLORIDE 99 103 108   CO2 36* 38* 39*   GLUCOSE 496* 370* 192*   BUN 32* 42* 44*   CREATININE 0.70 0.78 0.90   CALCIUM 10.3* 11.0* 11.1*                   Imaging  DX-CHEST-LIMITED (1 VIEW)   Final Result      1.  Linear bibasilar atelectasis.      2.  Cardiomegaly.      IR-US GUIDED PIV   Final Result    Ultrasound-guided PERIPHERAL IV INSERTION performed by    qualified nursing staff as above.      DX-CHEST-PORTABLE (1 VIEW)   Final Result         1.  Pulmonary edema and/or infiltrates are identified, which are stable since the prior exam.   2.  Cardiomegaly   3.  Atherosclerosis      DX-CHEST-PORTABLE (1 VIEW)   Final Result         1.  Pulmonary edema and/or infiltrates are identified, which appear somewhat increased since the prior exam.   2.  Cardiomegaly   3.  Atherosclerosis      DX-CHEST-PORTABLE (1 VIEW)   Final Result      No interval change.      DX-CHEST-PORTABLE (1 VIEW)   Final Result      Placement of right IJ central line without evidence of pneumothorax.      EC-ECHOCARDIOGRAM LTD W/O CONT   Final Result      DX-CHEST-PORTABLE (1 VIEW)   Final Result      1.  Satisfactory appearance of ET tube.   2.  There are changes of pulmonary edema/CHF.      FT-EBEBZBR-1 VIEW   Final Result      Feeding tube tip projects over the distal stomach      DX-CHEST-PORTABLE (1 VIEW)   Final Result      Cardiomegaly with diffuse interstitial prominence, may be edema or infiltrate.      EC-ECHOCARDIOGRAM COMPLETE W/O CONT   Final Result      DX-CHEST-PORTABLE (1 VIEW)   Final Result      Stable cardiomegaly           Assessment/Plan  SRIRAM (acute kidney injury) (HCC)  Assessment & Plan  resolved    Bacteremia  Assessment & Plan  Blood culture on 2/16: anaerobic gram positive cocci/ peptoniphilus lacrimalis   Blood  culture on 2/24: likely MSSA  Started on cefepime and flagyl from 2/20-2/24 in ICU  Patient with persistently positive blood cultures  Pending ID recommendations         NSTEMI (non-ST elevated myocardial infarction) (HCC)  Assessment & Plan  Initial concern for NSTEMI while in ICU  Case was discussed between critical care and cardiology who states that elevation in troponin was likely secondary to demand ischemia in the setting of acute respiratory failure.  She was empirically treated with heparin, aspirin, and statin. Troponin levels have plateaued and started to down trend.  Patient without chest pain     COPD with acute exacerbation (HCC)  Assessment & Plan  Completed therapy for acute COPD exacerbation     IMPROVED    Decubitus ulcer- (present on admission)  Assessment & Plan  Continue wound care  Photos reviewed, no acute signs of active cellulitis   Has completed antibiotics for concern of left lower extremity cellulitis     Elevated troponin- (present on admission)  Assessment & Plan  In the indeterminate range  Patient without active chest pain  Case was discussed between Dr. Rivas (critical care) and Dr. Esquivel (cardiology) who states that its likely demand ischemia in the setting of respiratory failure. She was started on heparin drip which has since been discontinued.  Her troponins are downtrending at this time    Cellulitis of left lower extremity- (present on admission)  Assessment & Plan  RESOLVED    Diarrhea- (present on admission)  Assessment & Plan  Pt reports this is chronic   No BM since admission, dc c diff testing   Continue to monitor     Acute on chronic respiratory failure with hypoxia (HCC)- (present on admission)  Assessment & Plan  Multifactorial, related to CHF and COPD  Oxygen as needed, Respiratory protocol, Bronchodilators, Incentive spirometry   tx as above for copd/chf    Shortness of breath- (present on admission)  Assessment & Plan  COVID-19 negative   Appears volume  overloaded, BNP elevated.  Oxygen as needed, Respiratory protocol, Bronchodilators, Incentive spirometry  Intravenous diuretics.  Daily strict input and output  Daily standing weights.  Daily BMP to watch renal function, electrolytes.  Replace electrolytes as needed.  Echo with EF of 40% and wall motion abnormalities   Completed treatment for COPD exacerbation, did require intubation from 2/19 to 2/24      Hypothyroidism- (present on admission)  Assessment & Plan  Resume home levothyroxine    DM (diabetes mellitus) (HCC)- (present on admission)  Assessment & Plan  With hyperglycemia  Last glycated hemoglobin was 9 %  Increase to high ISS, glargine 30 U BID  Accu-Checks, hypoglycemia protocol   Pending evaluation by speech for diet  Titrate insulin as tolerated    Dyslipidemia- (present on admission)  Assessment & Plan  Resume home atorvastatin    Essential hypertension- (present on admission)  Assessment & Plan  Resume home amlodipine, Benzapril with hold parameters       VTE prophylaxis: enoxaparin ppx    I have performed a physical exam and reviewed and updated ROS and Plan today (2/27/2022). In review of yesterday's note (2/26/2022), there are no changes except as documented above.

## 2022-02-27 NOTE — FLOWSHEET NOTE
02/27/22 1432   Vital Signs   Pulse 90   Respiration (!) 24   Pulse Oximetry 94 %   $ Pulse Oximetry (Spot Check) Yes   Respiratory Assessment   Respiratory Pattern Within Normal Limits   Level of Consciousness Alert   Chest Exam   Work Of Breathing / Effort Mild;Shallow   Breath Sounds   RUL Breath Sounds Clear   RML Breath Sounds Diminished   RLL Breath Sounds Diminished   MOSHE Breath Sounds Clear   LLL Breath Sounds Diminished   Oxygen   O2 (LPM) 4.5   O2 Delivery Device Silicone Nasal Cannula

## 2022-02-27 NOTE — PROGRESS NOTES
Telemetry Shift Summary     Rhythm: SR  Rate: 81-92  Measurements: 0.16/0.12/0.36  Ectopy (reported by Monitor Tech): r-o PAC, r PVC      Normal Values  Rhythm: Sinus  HR:   Measurements: 0.12-0.20/0.06-0.10/0.30-0.52

## 2022-02-27 NOTE — THERAPY
"Physical Therapy   Daily Treatment     Patient Name: Gabrielle Olmstead  Age:  76 y.o., Sex:  female  Medical Record #: [id number]  Today's Date: 2/27/2022     Precautions  Precautions: Fall Risk  Comments: poor endurance    Assessment  Pt is a 77 y/o female who lives in a 1 floor condominium and she [facility]. Pt has poor overall endurance and requires Mod assist to get OOB as  well as with transfers using the FWW. Pt is only able to std short periods ( 30 \") before she needs to sit 2/2 LE weakness and fatigue. Pt is unwilling  to  attempt to ambulate at this time .         Plan    Continue current treatment plan.    DC Equipment Recommendations: 4-Wheeled Walker (pt states her 4WW is broken.)  Discharge Recommendations: Recommend post-acute placement for additional physical therapy services prior to discharge home      Subjective  Pt is a 77 y/o female who lives in a 1 floor condominium and she has a caregiver who helps [facility] cleaning/grocery shopping. Pt has a delayed response to some questions but is able to follow directions and work on her scooting and tranfers . Pt pleasant during Tx.  Objective       02/27/22 0930   Gait Analysis   Gait Level Of Assist Unable to Participate   Assistive Device   (pt too exhausted to attempt to take steps)   Bed Mobility    Supine to Sit Moderate Assist   Sit to Supine Moderate Assist   Scooting Standby Assist   Rolling Minimal Assist to Rt.   Comments HOB elevated and rails up   Functional Mobility   Sit to Stand Moderate Assist   Bed, Chair, Wheelchair Transfer Unable to Participate   Mobility EOB , sit<> std x 2, Supine   Comments pt needs cues for hand placement   Short Term Goals    Goal Outcome # 1 Progressing slower than expected   Goal Outcome # 2 Progressing slower than expected   Goal Outcome # 3 Progressing slower than expected   Goal Outcome # 4 Goal not met   Anticipated Discharge Equipment and Recommendations   DC Equipment " Recommendations 4-Wheeled Walker  (pt states her 4WW is broken.)   Discharge Recommendations Recommend post-acute placement for additional physical therapy services prior to discharge home   Session Information   Date / Session Number  2/27-3 3/4 3/4

## 2022-02-28 ENCOUNTER — APPOINTMENT (OUTPATIENT)
Dept: RADIOLOGY | Facility: MEDICAL CENTER | Age: 77
DRG: 870 | End: 2022-02-28
Attending: INTERNAL MEDICINE
Payer: MEDICARE

## 2022-02-28 PROBLEM — E86.0 DEHYDRATION WITH HYPERNATREMIA: Status: ACTIVE | Noted: 2022-02-28

## 2022-02-28 PROBLEM — E87.0 DEHYDRATION WITH HYPERNATREMIA: Status: ACTIVE | Noted: 2022-02-28

## 2022-02-28 LAB
ALBUMIN SERPL BCP-MCNC: 3.3 G/DL (ref 3.2–4.9)
ALBUMIN/GLOB SERPL: 1.1 G/DL
ALP SERPL-CCNC: 133 U/L (ref 30–99)
ALT SERPL-CCNC: 20 U/L (ref 2–50)
ANION GAP SERPL CALC-SCNC: 8 MMOL/L (ref 7–16)
AST SERPL-CCNC: 20 U/L (ref 12–45)
BASOPHILS # BLD AUTO: 0.3 % (ref 0–1.8)
BASOPHILS # BLD: 0.05 K/UL (ref 0–0.12)
BILIRUB SERPL-MCNC: 0.4 MG/DL (ref 0.1–1.5)
BUN SERPL-MCNC: 48 MG/DL (ref 8–22)
CALCIUM SERPL-MCNC: 10.5 MG/DL (ref 8.4–10.2)
CHLORIDE SERPL-SCNC: 107 MMOL/L (ref 96–112)
CO2 SERPL-SCNC: 38 MMOL/L (ref 20–33)
CREAT SERPL-MCNC: 0.94 MG/DL (ref 0.5–1.4)
CRP SERPL HS-MCNC: <0.3 MG/DL (ref 0–0.75)
EOSINOPHIL # BLD AUTO: 0.48 K/UL (ref 0–0.51)
EOSINOPHIL NFR BLD: 2.7 % (ref 0–6.9)
ERYTHROCYTE [DISTWIDTH] IN BLOOD BY AUTOMATED COUNT: 55.2 FL (ref 35.9–50)
GLOBULIN SER CALC-MCNC: 3 G/DL (ref 1.9–3.5)
GLUCOSE BLD STRIP.AUTO-MCNC: 152 MG/DL (ref 65–99)
GLUCOSE BLD STRIP.AUTO-MCNC: 193 MG/DL (ref 65–99)
GLUCOSE BLD STRIP.AUTO-MCNC: 292 MG/DL (ref 65–99)
GLUCOSE BLD STRIP.AUTO-MCNC: 293 MG/DL (ref 65–99)
GLUCOSE SERPL-MCNC: 155 MG/DL (ref 65–99)
HCT VFR BLD AUTO: 38.2 % (ref 37–47)
HGB BLD-MCNC: 11.1 G/DL (ref 12–16)
IMM GRANULOCYTES # BLD AUTO: 0.15 K/UL (ref 0–0.11)
IMM GRANULOCYTES NFR BLD AUTO: 0.8 % (ref 0–0.9)
LYMPHOCYTES # BLD AUTO: 3.05 K/UL (ref 1–4.8)
LYMPHOCYTES NFR BLD: 16.9 % (ref 22–41)
MAGNESIUM SERPL-MCNC: 2.5 MG/DL (ref 1.5–2.5)
MCH RBC QN AUTO: 31.3 PG (ref 27–33)
MCHC RBC AUTO-ENTMCNC: 29.1 G/DL (ref 33.6–35)
MCV RBC AUTO: 107.6 FL (ref 81.4–97.8)
MONOCYTES # BLD AUTO: 0.79 K/UL (ref 0–0.85)
MONOCYTES NFR BLD AUTO: 4.4 % (ref 0–13.4)
NEUTROPHILS # BLD AUTO: 13.53 K/UL (ref 2–7.15)
NEUTROPHILS NFR BLD: 74.9 % (ref 44–72)
NRBC # BLD AUTO: 0 K/UL
NRBC BLD-RTO: 0 /100 WBC
PHOSPHATE SERPL-MCNC: 3.9 MG/DL (ref 2.5–4.5)
PLATELET # BLD AUTO: 416 K/UL (ref 164–446)
PMV BLD AUTO: 10.6 FL (ref 9–12.9)
POTASSIUM SERPL-SCNC: 3.4 MMOL/L (ref 3.6–5.5)
PREALB SERPL-MCNC: 34.2 MG/DL (ref 18–38)
PROT SERPL-MCNC: 6.3 G/DL (ref 6–8.2)
RBC # BLD AUTO: 3.55 M/UL (ref 4.2–5.4)
SODIUM SERPL-SCNC: 145 MMOL/L (ref 135–145)
SODIUM SERPL-SCNC: 153 MMOL/L (ref 135–145)
WBC # BLD AUTO: 18.1 K/UL (ref 4.8–10.8)

## 2022-02-28 PROCEDURE — 74230 X-RAY XM SWLNG FUNCJ C+: CPT

## 2022-02-28 PROCEDURE — 700102 HCHG RX REV CODE 250 W/ 637 OVERRIDE(OP): Performed by: INTERNAL MEDICINE

## 2022-02-28 PROCEDURE — 700105 HCHG RX REV CODE 258: Performed by: INTERNAL MEDICINE

## 2022-02-28 PROCEDURE — 92526 ORAL FUNCTION THERAPY: CPT

## 2022-02-28 PROCEDURE — 83735 ASSAY OF MAGNESIUM: CPT

## 2022-02-28 PROCEDURE — 80053 COMPREHEN METABOLIC PANEL: CPT

## 2022-02-28 PROCEDURE — 86140 C-REACTIVE PROTEIN: CPT

## 2022-02-28 PROCEDURE — 700111 HCHG RX REV CODE 636 W/ 250 OVERRIDE (IP): Performed by: INTERNAL MEDICINE

## 2022-02-28 PROCEDURE — 97535 SELF CARE MNGMENT TRAINING: CPT

## 2022-02-28 PROCEDURE — 770020 HCHG ROOM/CARE - TELE (206)

## 2022-02-28 PROCEDURE — 84295 ASSAY OF SERUM SODIUM: CPT

## 2022-02-28 PROCEDURE — 99233 SBSQ HOSP IP/OBS HIGH 50: CPT | Performed by: INTERNAL MEDICINE

## 2022-02-28 PROCEDURE — 92611 MOTION FLUOROSCOPY/SWALLOW: CPT

## 2022-02-28 PROCEDURE — 700111 HCHG RX REV CODE 636 W/ 250 OVERRIDE (IP): Performed by: STUDENT IN AN ORGANIZED HEALTH CARE EDUCATION/TRAINING PROGRAM

## 2022-02-28 PROCEDURE — 97602 WOUND(S) CARE NON-SELECTIVE: CPT

## 2022-02-28 PROCEDURE — 82962 GLUCOSE BLOOD TEST: CPT | Mod: 91

## 2022-02-28 PROCEDURE — 84134 ASSAY OF PREALBUMIN: CPT

## 2022-02-28 PROCEDURE — 84100 ASSAY OF PHOSPHORUS: CPT

## 2022-02-28 PROCEDURE — 85025 COMPLETE CBC W/AUTO DIFF WBC: CPT

## 2022-02-28 PROCEDURE — 94760 N-INVAS EAR/PLS OXIMETRY 1: CPT

## 2022-02-28 PROCEDURE — 94640 AIRWAY INHALATION TREATMENT: CPT

## 2022-02-28 PROCEDURE — A9270 NON-COVERED ITEM OR SERVICE: HCPCS | Performed by: INTERNAL MEDICINE

## 2022-02-28 RX ORDER — DEXTROSE MONOHYDRATE 50 MG/ML
INJECTION, SOLUTION INTRAVENOUS CONTINUOUS
Status: DISCONTINUED | OUTPATIENT
Start: 2022-02-28 | End: 2022-02-28

## 2022-02-28 RX ADMIN — ENOXAPARIN SODIUM 40 MG: 40 INJECTION SUBCUTANEOUS at 05:23

## 2022-02-28 RX ADMIN — OXYCODONE HYDROCHLORIDE AND ACETAMINOPHEN 500 MG: 500 TABLET ORAL at 17:31

## 2022-02-28 RX ADMIN — SENNOSIDES AND DOCUSATE SODIUM 2 TABLET: 50; 8.6 TABLET ORAL at 17:31

## 2022-02-28 RX ADMIN — PIPERACILLIN AND TAZOBACTAM 3.38 G: 3; .375 INJECTION, POWDER, LYOPHILIZED, FOR SOLUTION INTRAVENOUS; PARENTERAL at 05:14

## 2022-02-28 RX ADMIN — FUROSEMIDE 40 MG: 10 INJECTION, SOLUTION INTRAVENOUS at 05:23

## 2022-02-28 RX ADMIN — SODIUM HYPOCHLORITE 1 ML: 1.25 SOLUTION TOPICAL at 05:24

## 2022-02-28 RX ADMIN — AMPICILLIN SODIUM AND SULBACTAM SODIUM 3 G: 2; 1 INJECTION, POWDER, FOR SOLUTION INTRAMUSCULAR; INTRAVENOUS at 18:34

## 2022-02-28 RX ADMIN — INSULIN HUMAN 12 UNITS: 100 INJECTION, SOLUTION PARENTERAL at 12:38

## 2022-02-28 RX ADMIN — INSULIN GLARGINE 30 UNITS: 100 INJECTION, SOLUTION SUBCUTANEOUS at 17:33

## 2022-02-28 RX ADMIN — INSULIN HUMAN 6 UNITS: 100 INJECTION, SOLUTION PARENTERAL at 17:32

## 2022-02-28 RX ADMIN — BUDESONIDE AND FORMOTEROL FUMARATE DIHYDRATE 2 PUFF: 160; 4.5 AEROSOL RESPIRATORY (INHALATION) at 06:48

## 2022-02-28 RX ADMIN — BUDESONIDE AND FORMOTEROL FUMARATE DIHYDRATE 2 PUFF: 160; 4.5 AEROSOL RESPIRATORY (INHALATION) at 21:15

## 2022-02-28 RX ADMIN — SODIUM HYPOCHLORITE 10 ML: 1.25 SOLUTION TOPICAL at 16:34

## 2022-02-28 RX ADMIN — PIPERACILLIN AND TAZOBACTAM 3.38 G: 3; .375 INJECTION, POWDER, LYOPHILIZED, FOR SOLUTION INTRAVENOUS; PARENTERAL at 14:57

## 2022-02-28 ASSESSMENT — ENCOUNTER SYMPTOMS
SORE THROAT: 1
ABDOMINAL PAIN: 0
COUGH: 0
NAUSEA: 0
DIARRHEA: 0
CONSTIPATION: 0
MYALGIAS: 0
VOMITING: 0
SHORTNESS OF BREATH: 0

## 2022-02-28 NOTE — PROGRESS NOTES
"Hospital Medicine Daily Progress Note    Date of Service  2/28/2022    Chief Complaint  Gabrielle Olmstead is a 76 y.o. female admitted 2/16/2022 with worsening shortness of breath    Hospital Course    Per HPI, \"Gabrielle Olmstead is a 76 y.o. female with a past medical history of chronic obstructive pulmonary disease, chronic hypoxemic respiratory failure on 2 L of oxygen at baseline, hypertension, diabetes, dyslipidemia and hypothyroidism who presented 2/16/2022 with generalized weakness shortness of breath and diarrhea.  Patient reports that she has not been feeling well over the past week.  She has progressively worsening generalized weakness and worsening shortness of breath, particularly over the past 2 days.  She also reports having frequent loose bowel movements.  She denies noticing any blood or mucus in stool.  She denies having nausea and vomiting.  She reports noticing worsening swelling in both lower extremities in addition to redness on the left leg.\"      Interval Problem Update    2/26/2022: The patient was admitted for further evaluation and management of worsening shortness of breath and weakness.  She was initially treated for left lower extremity cellulitis and COPD exacerbation.  On the second day of admission, patient was noted to have worsening supplemental oxygen needs and required transfer to the ICU for BiPAP support.  Ultimately, the patient was intubated on 2/19/2022 and extubated on 2/24/2022.  While in the ICU, she was noted to have increasing troponin levels and some EKG findings concerning for ST segment depression.  This was discussed between critical care doctor and cardiology who determined that the increase in troponins were likely secondary to demand ischemia in the setting of acute respiratory failure.  She was treated empirically with heparin drip and aspirin and statin therapy.  Her troponin since that time have continue to downtrend.  Patient never had any active chest pain.  Patient " noted to have positive blood cultures growing anaerobic staph species.  She was treated with cefepime and Flagyl from 2/20- 2/24 in the ICU.  Repeat blood cultures on 2/24/2022 was still positive for staph species but negative for MSSA/M RSA.  At this point, ID was consulted with recommendations to switch patient to Zosyn due to concerns for decubitus ulcer source of bacteremia.  We will continue wound culture.  Patient's repeat chest x-ray does not show any worsening consolidation or edema.  At this time, patient is having difficulty speaking due to recent extubation but she is able to follow simple commands and acknowledges our conversation.  No other overnight events reported.    2/27/2022: The patient was seen and evaluated at bedside and has improved with in her ability to speak.  She is primarily concerned about her oral intake and would like water.  However at this time we are still waiting for speech therapy to evaluate the patient and give us a recommended diet due to concerns for aspiration risk.  She denies any nausea/vomiting or abdominal pain.  No chest pains or palpitations.  We are pending IDs final recommendations regarding patient's bacteremia.  Patient will need skilled nursing facility arranged prior to discharge.  No no other overnight events reported.    2/28: Complaining of some sore throat and hoarse voice, otherwise no complaints.  Scheduled for speech therapy reevaluation/MBSS today.  Will likely need SNF placement, referral placed.    I have personally seen and examined the patient at bedside. I discussed the plan of care with patient.    Consultants/Specialty  critical care   Cardiology  ID    Code Status  DNAR, I OK    Disposition  Patient is not medically cleared for discharge.   Anticipate discharge to to skilled nursing facility.  I have placed the appropriate orders for post-discharge needs.    Review of Systems  Review of Systems   Constitutional: Positive for malaise/fatigue.    HENT: Positive for sore throat.    All other systems reviewed and are negative.       Physical Exam  Temp:  [36.4 °C (97.6 °F)-36.7 °C (98 °F)] 36.6 °C (97.9 °F)  Pulse:  [68-90] 73  Resp:  [18-24] 20  BP: (124-152)/(39-50) 135/39  SpO2:  [92 %-97 %] 96 %    Physical Exam  Vitals and nursing note reviewed.   Constitutional:       General: She is not in acute distress.     Appearance: Normal appearance. She is obese. She is not ill-appearing or toxic-appearing.   HENT:      Mouth/Throat:      Mouth: Mucous membranes are dry.   Cardiovascular:      Rate and Rhythm: Normal rate and regular rhythm.      Pulses: Normal pulses.      Heart sounds: Normal heart sounds. No murmur heard.    No friction rub.   Pulmonary:      Effort: Pulmonary effort is normal. No respiratory distress.      Breath sounds: Normal breath sounds. No wheezing.      Comments: Decreased breath sounds bilaterally  Abdominal:      General: Abdomen is flat. Bowel sounds are normal.      Palpations: Abdomen is soft.   Musculoskeletal:         General: No swelling or tenderness. Normal range of motion.      Cervical back: Normal range of motion and neck supple.      Right lower leg: No edema.      Left lower leg: No edema.   Skin:     General: Skin is warm and dry.      Comments: Patient with chronic venous stasis changes on lower extremities. Several non stageable decubitus ulcers with mild erythema.   Neurological:      General: No focal deficit present.      Mental Status: She is alert and oriented to person, place, and time. Mental status is at baseline.      Comments: Difficulty speaking likely from recent extubation   Psychiatric:         Mood and Affect: Mood normal.         Behavior: Behavior normal.         Fluids    Intake/Output Summary (Last 24 hours) at 2/28/2022 1330  Last data filed at 2/27/2022 1638  Gross per 24 hour   Intake --   Output 1100 ml   Net -1100 ml       Laboratory  Recent Labs     02/26/22  0430 02/27/22  8790  02/28/22  0340   WBC 20.4* 20.6* 18.1*   RBC 3.79* 3.90* 3.55*   HEMOGLOBIN 11.9* 12.2 11.1*   HEMATOCRIT 39.3 41.0 38.2   .7* 105.1* 107.6*   MCH 31.4 31.3 31.3   MCHC 30.3* 29.8* 29.1*   RDW 51.4* 53.6* 55.2*   PLATELETCT 489* 486* 416   MPV 10.2 10.2 10.6     Recent Labs     02/26/22  0430 02/27/22  0335 02/28/22  0340   SODIUM 149* 155* 153*   POTASSIUM 4.1 3.6 3.4*   CHLORIDE 103 108 107   CO2 38* 39* 38*   GLUCOSE 370* 192* 155*   BUN 42* 44* 48*   CREATININE 0.78 0.90 0.94   CALCIUM 11.0* 11.1* 10.5*                   Imaging  DX-ESOPHAGUS - [id number]         DX-CHEST-LIMITED (1 VIEW)   Final Result      1.  Linear bibasilar atelectasis.      2.  Cardiomegaly.      IR-US GUIDED PIV   Final Result    Ultrasound-guided PERIPHERAL IV INSERTION performed by    qualified nursing staff as above.      DX-CHEST-PORTABLE (1 VIEW)   Final Result         1.  Pulmonary edema and/or infiltrates are identified, which are stable since the prior exam.   2.  Cardiomegaly   3.  Atherosclerosis      DX-CHEST-PORTABLE (1 VIEW)   Final Result         1.  Pulmonary edema and/or infiltrates are identified, which appear somewhat increased since the prior exam.   2.  Cardiomegaly   3.  Atherosclerosis      DX-CHEST-PORTABLE (1 VIEW)   Final Result      No interval change.      DX-CHEST-PORTABLE (1 VIEW)   Final Result      Placement of right IJ central line without evidence of pneumothorax.      EC-ECHOCARDIOGRAM LTD W/O CONT   Final Result      DX-CHEST-PORTABLE (1 VIEW)   Final Result      1.  Satisfactory appearance of ET tube.   2.  There are changes of pulmonary edema/CHF.      GS-IBOLJNE-6 VIEW   Final Result      Feeding tube tip projects over the distal stomach      DX-CHEST-PORTABLE (1 VIEW)   Final Result      Cardiomegaly with diffuse interstitial prominence, may be edema or infiltrate.      EC-ECHOCARDIOGRAM COMPLETE W/O CONT   Final Result      DX-CHEST-PORTABLE (1 VIEW)   Final Result      Stable cardiomegaly            Assessment/Plan  Dehydration with hypernatremia  Assessment & Plan  Patient dehydrated, slightly elevated sodium of this morning.  Was able to tolerate diet with speech therapy, still pending free water.  Gentle IVF  Monitor a.m. labs    SRIRAM (acute kidney injury) ([facility])  Assessment & Plan  resolved    Bacteremia  Assessment & Plan  Blood culture on 2/16: anaerobic gram positive cocci/ peptoniphilus lacrimalis   Blood culture on 2/24: likely MSSA  Started on cefepime and flagyl from 2/20-2/24 in ICU  Patient with persistently positive blood cultures  Pending ID recommendations for final antibiotics        NSTEMI (non-ST elevated myocardial infarction) ([facility])  Assessment & Plan  Initial concern for NSTEMI while in ICU  Case was discussed between critical care and cardiology who states that elevation in troponin was likely secondary to demand ischemia in the setting of acute respiratory failure.  She was empirically treated with heparin, aspirin, and statin. Troponin levels have plateaued and started to down trend.  Patient without chest pain     COPD with acute exacerbation ([facility])  Assessment & Plan  Completed therapy for acute COPD exacerbation     IMPROVED    Decubitus ulcer- (present on admission)  Assessment & Plan  Continue wound care  Photos reviewed, no acute signs of active cellulitis   Has completed antibiotics for concern of left lower extremity cellulitis     Elevated troponin- (present on admission)  Assessment & Plan  In the indeterminate range  Patient without active chest pain  Case was discussed between Dr. Rivas (critical care) and Dr. Esquivel (cardiology) who states that its likely demand ischemia in the setting of respiratory failure. She was started on heparin drip which has since been discontinued.  Her troponins are downtrending at this time  Stable, repeat troponins for acute chest pain    Cellulitis of left lower extremity- (present on admission)  Assessment &  Plan  RESOLVED    Diarrhea- (present on admission)  Assessment & Plan  Pt reports this is chronic   Continue to monitor     Acute on chronic respiratory failure with hypoxia (HCC)- (present on admission)  Assessment & Plan  Multifactorial, related to CHF and COPD  Oxygen as needed, Respiratory protocol, Bronchodilators, Incentive spirometry   tx as above for copd/chf    Shortness of breath- (present on admission)  Assessment & Plan  COVID-19 negative   Appears volume overloaded, BNP elevated.  Oxygen as needed, Respiratory protocol, Bronchodilators, Incentive spirometry  Intravenous diuretics.  Daily strict input and output  Daily standing weights.  Daily BMP to watch renal function, electrolytes.  Replace electrolytes as needed.  Echo with EF of 40% and wall motion abnormalities   Completed treatment for COPD exacerbation, did require intubation from 2/19 to 2/24      Hypothyroidism- (present on admission)  Assessment & Plan  Resume home levothyroxine    DM (diabetes mellitus) (HCC)- (present on admission)  Assessment & Plan  With hyperglycemia  Last glycated hemoglobin was 9 %  Increase to high ISS, glargine 30 U BID  Accu-Checks, hypoglycemia protocol   Pending evaluation by speech for diet  Titrate insulin as tolerated    Dyslipidemia- (present on admission)  Assessment & Plan  Resume home atorvastatin    Essential hypertension- (present on admission)  Assessment & Plan  Resume home amlodipine, Benzapril with hold parameters       VTE prophylaxis: enoxaparin ppx    I have performed a physical exam and reviewed and updated ROS and Plan today (2/28/2022). In review of yesterday's note (2/27/2022), there are no changes except as documented above.

## 2022-02-28 NOTE — DISCHARGE PLANNING
Anticipated Discharge Disposition: SNF    Action: pt is A&Ox2. SW intern called KANCHAN Ellis ([phone]). POA asked for SNF choice sheet to be sent to her via email. ([email or web address]) SW intern sent the email and POA asked for time to review     1351: SW intern received an email from POA for choices [facility] and [facility].     SW intern faxed choice to DPA     Barriers to Discharge: SNF Choice    Plan: CM to continue to follow for discharge needs.

## 2022-02-28 NOTE — THERAPY
HPI:   Patient is a 76 y.o. female who presented 2/16/22 with generalized weakness, shortness of breath, and diarrhea. She also reports worsening swelling in b/l LE and redness on L leg. PMHx includes COPD on 2L at baseline, HTN, DM, dyslipidemia, and hypothyroidism. Pt was initially admitted for cellulitis and COPD exacerbation, though developed worsening respiratory status leading to eventual transfer to ICU requiring intubation 2/19-24. No history of SLP found in this EMR.        Current Method of Nutrition   NPO until cleared by speech pathology    Pertinent Information  Affect/Behavior: Appropriate, Cooperative  Oxygen Requirements:  4 L Nasal Cannula  Secretion Management: WNL  Cortrak: none  Dentition: Edentulous      Discussed with the risks, benefits, and alternatives of the MBSS procedure. Patient/family acknowledged and agreed to proceed.    Assessment  Videofluoroscopic Swallow Study was conducted in the lateral projection(s) to evaluate oropharyngeal swallow function. A radiology tech was present to assist with the procedure.       Positioning: seated upright in fluoroscopy chair  Anatomic View: WNL  Bolus Administration: SLP and Patient  PO barium contrast trials: Varibar thin liquid, Varibar nectar (mildly thick) liquid, liquidized mixed with Varibar powder, Varibar pudding, mixed consistency coated in Varibar powder      Consistency PAS Score Timing Comments   Thin Liquid 5 During swallow Contrast clears laryngeal vestibule with cued cough   Mildly Thick Liquid 3 During swallow Contrast clears laryngeal vestibule with cued cough   Liquidized 2 During swallow    Pudding 1 N/A    Soft & Bite Sized 2 During swallow    Solid N/A N/A    Mixed 2 During swallow        1     No contrast enters airway  2     Contrast enters the airway, remains above the vocal folds, and is ejected from the airway (not seen in the airway at the end of the swallow).  3     Contrast enters the airway, remains above the vocal  folds, and is not ejected from the airway (is seen in the airway after the swallow).  4     Contrast enters the airway, contacts the vocal folds, and is ejected from the airway.  5     Contrast enters the airway, contacts the vocal folds, and is not ejected from the airway  6     Contrast enters the airway, crosses the plane of the vocal folds, and is ejected from the airway.  7     Contrast enters the airway, crosses the plane of the vocal folds, and is not ejected from the airway despite effort.  8     Contrast enters the airway, crosses the plane of the vocal folds, is not ejected from the airway and there is no response to aspiration.        Oral phase:  There was premature spillage to pyriform sinuses with thin liquids and MTL and vallecular space with apple sauce, pudding, minced and moist, and soft solid texture. Consistent lingual residue after the swallow and there appeared to be a crevice where molar used to be given residue after the swallow. Prolonged mastication of minced and moist and soft solid textures, but pt denied difficulty. Of note, SLP note yesterday endorsed that pt wears dentures at baseline.      Pharyngeal phase:  Mid-high penetration with single sips of thin liquids and MTL. Deep penetration (to VF x1) with trials of consecutive cup sip and straw sips of thin liquids. Intermittent residue in laryngeal vestibule after the swallow with thin liquids and MTL which cleared with cued cough. Penetration during the swallow with apple sauce, but no laryngeal residue and with mixed consistencies there was penetration before and during the swallow. There was trace pharyngeal residue after the swallow through out trials. Intermittent cough happening irrespective of PO.      Esophageal phase:  Given pt positioning and body habitus, unable to assess.      Compensatory Strategies:  Cough after the swallow - cleared laryngeal vestibule residue  Reduce bolus size - eliminated penetration to VF  Eliminating  consecutive cup sip/straws - reduced frequency of deep penetration      Clinical Impressions  The pt presents with a mild-moderate oropharyngeal dysphagia, likely acute related to s/p intubation. Swallow safety is impaired; swallow efficiency is impaired. Risk for aspiration PNA is moderate. Risk for malnutrition/dehydration is moderate. A modified diet is indicated at this time.. Swallow prognosis is good given pt's ability to follow directives. Pt appears to be a good candidate for exercise-based and behavioral swallow rehabilitation.       Recommendations  1. Soft and bite size/thin liquid diet, NO STRAWS  2.  Swallowing Instructions & Precautions:   Supervision: Distant supervision - check on patient 2-3 times per meal, assist with tray set up/repositioning prior to meal times  Positioning: Fully upright and midline during oral intake  Medication: Whole with puree  Strategies: Small bites/sips, cough after sips of thin liquids  Oral Care: Q8h  3.  Please assist with tray set up and pt positioning for meals     02/28/22 1255   Vitals   O2 (LPM) 4   O2 Delivery Device Silicone Nasal Cannula   Pain 0 - 10 Group   Therapist Pain Assessment Post Activity Pain Same as Prior to Activity;Nurse Notified;0   Prior Living Situation   Lives with - Patient's Self Care Capacity Alone and Able to Care For Self   Prior Level Of Function   Communication Within Functional Limits   Swallow Within Functional Limits   Dentition Edentulous   Dentures Uppers;Lowers  (not at bedside)   Hearing Within Functional Limits for Evaluation   Hearing Aid None   Vision Within Functional Limits for Evaluation   Patient's Primary Language English   Occupation (Pre-Hospital Vocational) Retired Due To Age   History / Background Information   Prior Level of Function for Eating / Swallowing regular/thins with dentures donned   Diagnosis COPD/resp failure   Onset Date Of Dysphagia upon admit   Dysphagia Symptoms Warranting Video Swallow s/sx of  "aspiration, extubation   General Anatomy / Physiology WNLm, but appears to have crevice that collects PO where molar should be   Procedure   Patient Seated in  MBS chair   Seated at (Degrees) 90   Views Completed Lateral   Consistencies / Presentation Method   Consistencies / Presentation Method Tested   Dysphagia Rating   Nutritional Liquid Intake Rating Scale Non thickened beverages   Nutritional Food Intake Rating Scale Total oral diet with multiple consistencies but requiring special preparation or compensations   Patient / Family Goals   Patient / Family Goal #1 \"ice cold water, please\"   Goal #1 Outcome Progressing as expected   Short Term Goals   Short Term Goal # 1 Pt will safely participate in pre-feeding trials with SLP free from s/sx aspiration or respiratory compromise.   Goal Outcome # 1 Goal met, new goal added   Short Term Goal # 2 NEW 2/28: Pt will consume an SB6/TN0 diet with no overt s/sx of aspiration   Education Group   Education Provided Dysphagia   Dysphagia Patient Response Patient;Acceptance;Explanation;Verbal Demonstration;Reinforcement Needed   Anticipated Discharge Needs   Discharge Recommendations Recommend post-acute placement for additional speech therapy services prior to discharge home   Therapy Recommendations Upon DC Dysphagia Training;Community Re-Integration;Patient / Family / Caregiver Education   Interdisciplinary Plan of Care Collaboration   IDT Collaboration with  Nursing;Physician   Patient Position at End of Therapy Seated;In Bed;Call Light within Reach;Phone within Reach;Tray Table within Reach     "

## 2022-02-28 NOTE — DISCHARGE PLANNING
Received Choice form at [phone]  Agency/Facility Name: [facility]  Referral sent per Choice form @ [phone]

## 2022-02-28 NOTE — THERAPY
"Speech Language Pathology  Daily Treatment     Patient Name: Gabrielle Olmstead  Age:  76 y.o., Sex:  female  Medical Record #: [id number]  Today's Date: 2/28/2022     Precautions  Precautions: (P) Fall Risk,Swallow Precautions ( See Comments)  Comments: poor endurance    Assessment    Pt seen on this date for a repeat swallow evaluation. Voice is aphonic but pt is reporting that is baseline. She c/o pain in her mouth d/t xerostomia. Cough x2 noted with trials of thin liquids which is concerning for penetration/aspiration. No s/sx of aspiration with trials of ice chips. Upon palpation, laryngeal elevation palpated as incomplete. At this time, recommend NPO with MBS to objectively assess swallow function and determine safest diet. Pt consented to procedure and discussed recommendation for NPO pending MBS x2 and pt verbalized understanding, but may need ongoing education.     Plan    1) NPO pending MBS at 1100 today    Continue current treatment plan.    Discharge Recommendations: (P) Recommend post-acute placement for additional speech therapy services prior to discharge home       Objective       02/28/22 0813   Precautions   Precautions Fall Risk;Swallow Precautions ( See Comments)   Vitals   O2 (LPM) 4   O2 Delivery Device Silicone Nasal Cannula   Pain 0 - 10 Group   Therapist Pain Assessment Post Activity Pain Same as Prior to Activity;Nurse Notified;0   Dysphagia    Dysphagia X   Nutritional Liquid Intake Rating Scale Nothing by mouth   Patient / Family Goals   Patient / Family Goal #1 \"ice cold water, please\"   Goal #1 Outcome Progressing as expected   Short Term Goals   Short Term Goal # 1 Pt will safely participate in pre-feeding trials with SLP free from s/sx aspiration or respiratory compromise.   Goal Outcome # 1 Progressing as expected   Education Group   Education Provided Dysphagia   Dysphagia Patient Response Patient;Acceptance;Explanation;Verbal Demonstration;Reinforcement Needed   Anticipated Discharge Needs "   Discharge Recommendations Recommend post-acute placement for additional speech therapy services prior to discharge home   Therapy Recommendations Upon DC Dysphagia Training;Community Re-Integration;Patient / Family / Caregiver Education   Interdisciplinary Plan of Care Collaboration   IDT Collaboration with  Nursing   Patient Position at End of Therapy Seated;In Bed

## 2022-02-28 NOTE — ASSESSMENT & PLAN NOTE
Patient dehydrated, slightly elevated sodium of this morning.  Was able to tolerate diet with speech therapy, still pending free water.  Gentle IVF  Monitor a.m. labs

## 2022-02-28 NOTE — PROGRESS NOTES
Infectious Disease Progress Note    Author: Jessica Corey M.D. Date & Time of service: 2022  3:21 PM    Chief Complaint:  Bacteremia, decubitus ulcer, cellulitis     Interval History:    Review of Systems:  Review of Systems   Respiratory: Negative for cough and shortness of breath.    Gastrointestinal: Negative for abdominal pain, constipation, diarrhea, nausea and vomiting.   Genitourinary: Negative for dysuria.   Musculoskeletal: Negative for joint pain and myalgias.       Hemodynamics:  Temp (24hrs), Av.6 °C (97.8 °F), Min:36.4 °C (97.6 °F), Max:36.7 °C (98 °F)  Temperature: 36.6 °C (97.9 °F)  Pulse  Av.4  Min: 28  Max: 148   Blood Pressure : 135/39       Physical Exam:  Physical Exam  Constitutional:       Appearance: Normal appearance.   Cardiovascular:      Rate and Rhythm: Normal rate and regular rhythm.      Heart sounds: Murmur heard.   Pulmonary:      Effort: Pulmonary effort is normal.      Breath sounds: Normal breath sounds.   Abdominal:      General: Abdomen is flat. Bowel sounds are normal.      Palpations: Abdomen is soft.   Skin:     Comments: Buttock ulcer, perinial ulcer, reviewed photos   Neurological:      General: No focal deficit present.      Mental Status: She is alert.      Comments: Generally confused   Psychiatric:         Mood and Affect: Mood normal.         Behavior: Behavior normal.         Meds:    Current Facility-Administered Medications:   •  Pharmacy  •  aspirin  •  [COMPLETED] piperacillin-tazobactam **AND** piperacillin-tazobactam  •  budesonide-formoterol  •  insulin GLARGINE  •  enoxaparin (LOVENOX) injection  •  furosemide  •  insulin regular **AND** POC blood glucose manual result **AND** NOTIFY MD and PharmD **AND** Administer 20 grams of glucose (approximately 8 ounces of fruit juice) every 15 minutes PRN FSBG less than 70 mg/dL **AND** dextrose 50%  •  HYDROcodone-acetaminophen  •  hydrALAZINE  •  benazepril  •  amLODIPine  •  labetalol  •   acetaminophen  •  dakins 0.125% (1/4 strength)  •  prochlorperazine  •  senna-docusate **AND** polyethylene glycol/lytes **AND** magnesium hydroxide **AND** bisacodyl  •  ascorbic acid  •  therapeutic multivitamin-minerals  •  atorvastatin  •  levothyroxine  •  sertraline  •  ondansetron  •  Respiratory Therapy Consult  •  ipratropium-albuterol  •  LR  •  LR    Labs:  Recent Labs     02/26/22 0430 02/27/22 0335 02/28/22  0340   WBC 20.4* 20.6* 18.1*   RBC 3.79* 3.90* 3.55*   HEMOGLOBIN 11.9* 12.2 11.1*   HEMATOCRIT 39.3 41.0 38.2   .7* 105.1* 107.6*   MCH 31.4 31.3 31.3   RDW 51.4* 53.6* 55.2*   PLATELETCT 489* 486* 416   MPV 10.2 10.2 10.6   NEUTSPOLYS 79.00* 75.80* 74.90*   LYMPHOCYTES 11.60* 15.60* 16.90*   MONOCYTES 4.70 5.30 4.40   EOSINOPHILS 1.00 1.20 2.70   BASOPHILS 0.30 0.30 0.30     Recent Labs     02/26/22 0430 02/27/22 0335 02/28/22 0340   SODIUM 149* 155* 153*   POTASSIUM 4.1 3.6 3.4*   CHLORIDE 103 108 107   CO2 38* 39* 38*   GLUCOSE 370* 192* 155*   BUN 42* 44* 48*     Recent Labs     02/26/22 0430 02/27/22 0335 02/28/22  0340   ALBUMIN  --   --  3.3   TBILIRUBIN  --   --  0.4   ALKPHOSPHAT  --   --  133*   TOTPROTEIN  --   --  6.3   ALTSGPT  --   --  20   ASTSGOT  --   --  20   CREATININE 0.78 0.90 0.94       Imaging:  [id number] [id number]    Result Date: 2/19/2022 2/19/2022 9:53 AM HISTORY/REASON FOR EXAM:  Line/Tube Placement; Cor trak placement. TECHNIQUE/EXAM DESCRIPTION AND NUMBER OF VIEWS:  2 view(s) of the abdomen. COMPARISON: CT 11/21/2011 FINDINGS: Limited AP view of the upper abdomen demonstrates a feeding tube tip projecting over the distal stomach. There is air within the stomach.     Feeding tube tip projects over the distal stomach    DX-CHEST-LIMITED (1 VIEW)    Result Date: 2/26/2022 2/26/2022 12:49 PM HISTORY/REASON FOR EXAM: Sepsis TECHNIQUE/EXAM DESCRIPTION AND NUMBER OF VIEWS: Single AP view of the chest. COMPARISON: 2/21/2022 FINDINGS: There is linear bibasilar  atelectasis. The heart is enlarged. There is no pleural effusion. There is a right IJ central line.     1.  Linear bibasilar atelectasis. 2.  Cardiomegaly.    DX-CHEST-PORTABLE (1 VIEW)    Result Date: 2/21/2022 2/21/2022 3:43 AM HISTORY/REASON FOR EXAM: For indication of respiratory failure; For indication of respiratory failure TECHNIQUE/EXAM DESCRIPTION:  Single AP view of the chest. COMPARISON: Yesterday FINDINGS: Cardiomegaly is observed. Atherosclerotic calcification of the aorta is noted.  The central  pulmonary vasculature appears prominent and indistinct. Asymmetric elevation of the left hemidiaphragm is noted.  Diffuse scattered hazy pulmonary parenchymal opacities are seen. No significant pleural effusions are identified. The bony structures appear age-appropriate.     1.  Pulmonary edema and/or infiltrates are identified, which are stable since the prior exam. 2.  Cardiomegaly 3.  Atherosclerosis    DX-CHEST-PORTABLE (1 VIEW)    Result Date: 2/20/2022    2/20/2022 10:54 PM HISTORY/REASON FOR EXAM: Shortness of Breath TECHNIQUE/EXAM DESCRIPTION:  Single AP view of the chest. COMPARISON: Today at 0411 FINDINGS: Position of medical devices appears stable. Cardiomegaly is observed. Atherosclerotic calcification of the aorta is noted.  The central  pulmonary vasculature appears prominent and indistinct. The lungs appear well expanded bilaterally.  Diffuse scattered hazy pulmonary parenchymal opacities are seen. Blunting of bilateral costophrenic angles is seen, compatible with trace bilateral pleural effusions. The bony structures appear age-appropriate.     1.  Pulmonary edema and/or infiltrates are identified, which appear somewhat increased since the prior exam. 2.  Cardiomegaly 3.  Atherosclerosis    DX-CHEST-PORTABLE (1 VIEW)    Result Date: 2/20/2022 2/20/2022 4:00 AM HISTORY/REASON FOR EXAM: For indication of respiratory failure; For indication of respiratory failure. TECHNIQUE/EXAM DESCRIPTION AND  NUMBER OF VIEWS: Single AP view of the chest. COMPARISON: Yesterday FINDINGS: Hardware: Endotracheal tube is properly positioned. Right internal jugular central venous catheter tip terminates in the superior vena cava. Feeding tube courses past the diaphragm and medial Lungs: Evaluation of the lung bases is limited secondary to overlying soft tissues. The exam is unchanged from prior with opacity along bases which may be dependent edema, atelectasis or consolidation. Diffuse interstitial prominence is seen. Pleura:  No appreciable pneumothorax. No enlarging pleural fluid. Heart and mediastinum: The cardiomediastinal contours are enlarged.     No interval change.    DX-CHEST-PORTABLE (1 VIEW)    Result Date: 2/19/2022 2/19/2022 4:45 PM HISTORY/REASON FOR EXAM:  confirm central line placement. TECHNIQUE/EXAM DESCRIPTION AND NUMBER OF VIEWS: Single portable view of the chest. COMPARISON: Exam from 10:04 AM FINDINGS: There has been placement of a right IJ central line with tip in expected location of the superior vena cava. No pneumothorax identified. Endotracheal tube and feeding tube remain in place. The cardiac silhouette is enlarged. There is interstitial prominence. There are trace effusions. There is dependent edema or atelectasis.     Placement of right IJ central line without evidence of pneumothorax.    DX-CHEST-PORTABLE (1 VIEW)    Result Date: 2/19/2022 2/19/2022 9:52 AM HISTORY/REASON FOR EXAM:  Post intubation. TECHNIQUE/EXAM DESCRIPTION AND NUMBER OF VIEWS: Single portable view of the chest. COMPARISON: 2/19/2022 FINDINGS: ET tube projects over the tracheal air shadow above the julee in satisfactory position. Enteric tube extends into the left upper abdomen but is incompletely imaged. The mediastinal and cardiac silhouette is enlarged. The central vessels are engorged and ill-defined. There are perihilar and lower lobe linear interstitial opacities. There is no significant pleural effusion. There is no  visible pneumothorax. There are no acute bony abnormalities. There is an old right proximal  humerus fracture.     1.  Satisfactory appearance of ET tube. 2.  There are changes of pulmonary edema/CHF.    DX-CHEST-PORTABLE (1 VIEW)    Result Date: 2/19/2022 2/19/2022 6:45 AM HISTORY/REASON FOR EXAM: Shortness of Breath. TECHNIQUE/EXAM DESCRIPTION AND NUMBER OF VIEWS: Single AP view of the chest. COMPARISON: 2/16/2022 FINDINGS: Hardware: None. Lungs: Bilateral interstitial prominence. Pleura:  No appreciable pneumothorax. No enlarging pleural fluid. Heart and mediastinum: The cardiomediastinal contours are enlarged.     Cardiomegaly with diffuse interstitial prominence, may be edema or infiltrate.    DX-CHEST-PORTABLE (1 VIEW)    Result Date: 2/16/2022 2/16/2022 3:49 PM HISTORY/REASON FOR EXAM:  Shortness of Breath TECHNIQUE/EXAM DESCRIPTION AND NUMBER OF VIEWS: Single AP view of the chest. COMPARISON: 6/27/2021 FINDINGS: Heart: The cardiac silhouette is enlarged. Mediastinum: Stable contours. Lungs: No confluent opacity or pneumothorax. The interstitium is minimally prominent, similar to the prior exam. Pleura: No pleural fluid. Bones: Deformity in the proximal right humerus is consistent with a remote fracture, unchanged. Lines/tubes: None.     Stable cardiomegaly    DX-ESOPHAGUS - [id number]    Result Date: 2/28/2022 2/28/2022 11:15 AM HISTORY/REASON FOR EXAM:  Concern for aspiration TECHNIQUE/EXAM DESCRIPTION AND NUMBER OF VIEWS: Esophagus-CINE-Video-CD. The study was performed by the speech language pathologist who administered a variety of barium coated substances under direct fluoroscopic visualization. COMPARISON:  None. FINDINGS: The study was performed by the speech language pathologist who will issue a full report and used a total of 3 minutes of fluoroscopy time. INTERPRETING LOCATION: [address] [place] [address], [id number]    IR-US GUIDED PIV    Result Date: 2/24/2022  EXAMINATION:                                                                     HISTORY/REASON FOR EXAM:  Ultrasound Guided PIV.  TECHNIQUE/EXAM DESCRIPTION AND NUMBER OF VIEWS:  Peripheral IV insertion with ultrasound guidance.  The procedure was prepared using maximal sterile barrier technique including sterile gown, mask, cap, and donning of sterile gloves following appropriate hand hygiene and/or sterile scrub. Patient skin site was prepped with 2% Chlorhexidine solution.   FINDINGS: Peripheral IV insertion with Ultrasound Guidance was performed by qualified imaging nursing staff without the assistance of a Radiologist.      Ultrasound-guided PERIPHERAL IV INSERTION performed by qualified nursing staff as above.    EC-ECHOCARDIOGRAM COMPLETE W/O CONT    Result Date: 2022  Transthoracic Echo Report Echocardiography Laboratory CONCLUSIONS Compared to the images of the prior study 2020, there is progression of aortic stenosis, but still in moderate range. The left ventricular ejection fraction is visually estimated to be 70%. Moderate aortic valve stenosis. Vmax is 3.7 m/s. Right ventricular systolic pressure is estimated to be 58 mmHg. KARTHIK VALENCIAA Exam Date:         2022                    19:27 Exam Location:     Inpatient Priority:          Routine Ordering Physician:        LEE BROWN Referring Physician:       [id number] Blair Sonographer:               Amaris Connelly [facility] Age:    76     Gender:    F MRN:    [id number] :    1945 BSA:    2.04   Ht (in):    61     Wt (lb):    239 Exam Type:     Complete Indications:     Shortness of breath ICD Codes:       R06.02 CPT Codes:       41275 BP:   139    /   45     HR:   77 Technical Quality:       Fair MEASUREMENTS  (Male / Female) Normal Values 2D ECHO LV Diastolic Diameter PLAX        5.3 cm                4.2 - 5.9 / 3.9 - 5.3 cm LV Systolic Diameter PLAX         3.7 cm                2.1 - 4.0 cm IVS Diastolic Thickness           1.4 cm                LVPW Diastolic  Thickness          1.6 cm                LVOT Diameter                     2.1 cm                Estimated LV Ejection Fraction    70 %                  LV Ejection Fraction MOD BP       70.8 %                >= 55  % LV Ejection Fraction MOD 4C       68.9 %                LV Ejection Fraction MOD 2C       73.9 %                IVC Diameter                      2.2 cm                DOPPLER AV Peak Velocity                  3.6 m/s               AV Peak Gradient                  50.9 mmHg             AV Mean Gradient                  30.2 mmHg             LVOT Peak Velocity                1.1 m/s               AV Area Cont Eq vti               1.2 cm2               Mitral E Point Velocity           1.1 m/s               Mitral E to A Ratio               1.1                   MV Pressure Half Time             59.1 ms               MV Area PHT                       3.7 cm2               MV Deceleration Time              204 ms                TR Peak Velocity                  337 cm/s              TR Peak Gradient                  45.5 mmHg             PV Peak Velocity                  1.2 m/s               PV Peak Gradient                  5.8 mmHg              RVOT Peak Velocity                0.87 m/s              * Indicates values subject to auto-interpretation LV EF:  70    % FINDINGS Left Ventricle Normal left ventricular chamber size. Moderate concentric left ventricular hypertrophy. Normal left ventricular systolic function. The left ventricular ejection fraction is visually estimated to be 70%. Normal regional wall motion. Normal diastolic function. Right Ventricle The right ventricle is normal in size and systolic function. Right Atrium The right atrium is normal in size. Dilated inferior vena cava without inspiratory collapse. Left Atrium The left atrium is normal in size. Left atrial volume index is 31 mL/sq m. Mitral Valve Structurally normal mitral valve without significant stenosis. Mild mitral  regurgitation. Aortic Valve Moderate aortic valve stenosis. Vmax is 3.7 m/s. Transvalvular gradients are - Peak: 55 mmHg, Mean:  35 mmHg. No aortic insufficiency. Tricuspid Valve Structurally normal tricuspid valve without significant stenosis. Mild tricuspid regurgitation. Right ventricular systolic pressure is estimated to be 58 mmHg. Right atrial pressure is estimated to be 15 mmHg. Pulmonic Valve Structurally normal pulmonic valve without significant stenosis or regurgitation. Pericardium Normal pericardium without effusion. Aorta Normal aortic root for body surface area. The ascending aorta diameter is 2.7 cm. Darvin Chaudhry MD (Electronically Signed) Final Date:     2022                 02:49    EC-ECHOCARDIOGRAM LTD W/O CONT    Result Date: 2022  Transthoracic Echo Report Echocardiography Laboratory CONCLUSIONS Limited echo to evaluate wall motion. Prior echo on 2022. Mildly reduced left ventricular systolic function. The left ventricular ejection fraction is visually estimated to be 40%. Hypokinesis of the apical inferior wall and apical seputm. MICHEAL VALENCIA Exam Date:         2022                    09:45 Exam Location:     Inpatient Priority:          Routine Ordering Physician:        BONNY STORM Referring Physician: Sonographer:               Angelita GARCIA Age:    76     Gender:    F MRN:    [id number] :    1945 BSA:    2.06   Ht (in):    61     Wt (lb):    244 Exam Type: Indications:     Acute MI ICD Codes:       410.9 CPT Codes: BP:   113    /   49     HR:   108 Technical Quality:       Fair MEASUREMENTS  (Male / Female) Normal Values 2D ECHO LV Diastolic Diameter PLAX        5.3 cm                4.2 - 5.9 / 3.9 - 5.3 cm LV Systolic Diameter PLAX         3.8 cm                2.1 - 4.0 cm IVS Diastolic Thickness           1.4 cm                LVPW Diastolic Thickness          1.6 cm                Estimated LV Ejection  "Fraction    40 %                  LV Ejection Fraction MOD BP       40 %                  >= 55  % LV Ejection Fraction MOD 4C       53.3 %                LV Ejection Fraction MOD 2C       24.5 %                * Indicates values subject to auto-interpretation LV EF:  40    % FINDINGS Left Ventricle Normal left ventricular chamber size. Moderate concentric left ventricular hypertrophy. Mildly reduced left ventricular systolic function. The left ventricular ejection fraction is visually estimated to be 40%. Hypokinesis of the apical inferior wall and apical seputm. Abnormal septal motion. Right Ventricle Right Atrium Left Atrium Mitral Valve Aortic Valve Tricuspid Valve Pulmonic Valve Pericardium Aorta Jose Esquivel MD (Electronically Signed) Final Date:     19 February 2022                 11:34      Micro:  Results     Procedure Component Value Units Date/Time    BLOOD CULTURE [[id number]] Collected: 02/26/22 1432    Order Status: Completed Specimen: Blood from Peripheral Updated: 02/27/22 0712     Significant Indicator NEG     Source BLD     Site PERIPHERAL     Culture Result No Growth  Note: Blood cultures are incubated for 5 days and  are monitored continuously.Positive blood cultures  are called to the RN and reported as soon as  they are identified.  Blood culture testing and Gram stain, if indicated, are  performed at [facility], [address] [place] [address].  Positive blood cultures are  sent to [facility], [address] [place] [address], for organism identification and  susceptibility testing.      Narrative:      Per Hospital Policy: Only change Specimen Src: to \"Line\" if  specified by physician order.  Left Forearm/Arm    BLOOD CULTURE [[id number]] Collected: 02/26/22 1438    Order Status: Completed Specimen: Blood from Peripheral Updated: 02/27/22 0712     Significant Indicator NEG     Source BLD     Site PERIPHERAL     Culture Result No Growth  Note: " "Blood cultures are incubated for 5 days and  are monitored continuously.Positive blood cultures  are called to the RN and reported as soon as  they are identified.  Blood culture testing and Gram stain, if indicated, are  performed at [facility], [address] [place] [address].  Positive blood cultures are  sent to [facility], [address] [place] [address], for organism identification and  susceptibility testing.      Narrative:      Per Hospital Policy: Only change Specimen Src: to \"Line\" if  specified by physician order.  Right Forearm/Arm    BLOOD CULTURE [[id number]]  (Abnormal) Collected: 02/24/22 1300    Order Status: Completed Specimen: Blood from Peripheral Updated: 02/26/22 1328     Significant Indicator POS     Source BLD     Site PERIPHERAL     Culture Result Growth detected by Bactec instrument. 02/25/2022  20:24  Negative for Staphylococcus aureus and MRSA by PCR. Correlate  ongoing need for antibiotics with clinical condition.        Coagulase-negative Staphylococcus species  Possible Contaminant  Isolated from one set only, please correlate with clinical  condition. Contact the Microbiology department within 48 hr  if identification and susceptibility are needed.      Narrative:      CALL  Meléndez  MED tel. [phone],  CALLED  MED tel. [phone] 02/25/2022, 23:10, RB PERF. RESULTS CALLED TO: RN  [phone]  Per Hospital Policy: Only change Specimen Src: to \"Line\" if  specified by physician order.  Left AC    BLOOD CULTURE [[id number]] Collected: 02/24/22 1520    Order Status: Completed Specimen: Blood from Peripheral Updated: 02/25/22 0716     Significant Indicator NEG     Source BLD     Site PERIPHERAL     Culture Result No Growth  Note: Blood cultures are incubated for 5 days and  are monitored continuously.Positive blood cultures  are called to the RN and reported as soon as  they are identified.  Blood culture testing and Gram stain, if indicated, " "are  performed at [facility], [address] [place] [address].  Positive blood cultures are  sent to [facility], [address] [place] [address], for organism identification and  susceptibility testing.      Narrative:      Per Hospital Policy: Only change Specimen Src: to \"Line\" if  specified by physician order.  Right AC    BLOOD CULTURE [[id number]]  (Abnormal) Collected: 02/16/22 1530    Order Status: Completed Specimen: Blood from Peripheral Updated: 02/23/22 0850     Significant Indicator POS     Source BLD     Site PERIPHERAL     Culture Result Growth detected by Bactec instrument. 02/19/2022  09:06  Negative for Staphylococcus aureus and MRSA by PCR. Correlate  ongoing need for antibiotics with clinical condition.        Anaerobic Gram positive cocci  Peptoniphilus lacrimalis      Narrative:      CALL  Meléndez  ICU tel. ,  CALLED  ICU tel.  02/19/2022, 17:15, RB PERF. RESULTS CALLED TO: RN [phone]  (PCR)  Enhanced Droplet, Contact, and Eye Protection  Per Hospital Policy: Only change Specimen Src: to \"Line\" if  specified by physician order.  Right AC    CULTURE WOUND W/ GRAM STAIN [[id number]] Collected: 02/20/22 1204    Order Status: Completed Specimen: Wound from Buttock Updated: 02/22/22 0817     Significant Indicator NEG     Source WND     Site BUTTOCK     Culture Result Moderate growth mixed enteric r[place].     Gram Stain Result Many WBCs.  Moderate Gram positive cocci.  Few Gram positive rods.  Rare Gram negative rods.      Narrative:      Collected By: [id number] LAWRENCE BREAUX  Collected By: [id number] LAWRENCE BREAUX    BLOOD CULTURE [[id number]] Collected: 02/16/22 1515    Order Status: Completed Specimen: Blood from Peripheral Updated: 02/21/22 1617     Significant Indicator NEG     Source BLD     Site PERIPHERAL     Culture Result No growth after 5 days of incubation.  Blood culture testing and Gram stain, if indicated, are  performed at [facility] " "[facility], [address] [place] [address], [place], [place].  Positive blood cultures are  sent to [facility], [address] [place] [address], for organism identification and  susceptibility testing.      Narrative:      Enhanced Droplet, Contact, and Eye Protection  Per Hospital Policy: Only change Specimen Src: to \"Line\" if  specified by physician order.  Right AC          Assessment:  Active Hospital Problems    Diagnosis    • *Sepsis (Formerly [facility]) [A41.9]    • Dehydration with hypernatremia [E86.0, E87.0]    • SRIRAM (acute kidney injury) (Formerly [facility]) [N17.9]    • NSTEMI (non-ST elevated myocardial infarction) (Formerly [facility]) [I21.4]    • Bacteremia [R78.81]    • COPD with acute exacerbation (Formerly [facility]) [J44.1]    • Shortness of breath [R06.02]    • Acute on chronic respiratory failure with hypoxia (Formerly [facility]) [J96.21]    • Diarrhea [R19.7]    • Cellulitis of left lower extremity [L03.116]    • Elevated troponin [R77.8]    • Decubitus ulcer [L89.90]    • Hypothyroidism [E03.9]    • DM (diabetes mellitus) (Formerly [facility]) [E11.9]    • Essential hypertension [I10]    • Dyslipidemia [E78.5]      Interval 24 hours:      AF, O2 4 L nasal cannula  Labs reviewed  Imaging personally reviewed both images and reports  Micro reviewed    Patient denying any pain.  She is responsive but some mild confusion.  Patient continued on antibiotics as below.    Assessment:  76-year-old history of COPD on 2 L home oxygen, diabetes and hypothyroidism who presented for diarrhea, weakness and shortness of breath.  Initially diagnosed with left lower extremity cellulitis and COPD exacerbation.  She developed worsening respiratory status and was transferred to the ICU for BiPAP and was eventually intubated on 2/19 and x-rayed on 2/24.  ID is consulted for positive blood cultures and recommended Zosyn due to concern that her decubitus ulcer was the source of the bacteremia.  She has been treated with a variety of antibiotics since 2/18.     Leukocytosis, acute on chronic, " some improvement today   Bacteremia  -Blood cultures on 2/16 1/2  +Peptoniphilus lacrimalis  -Blood cultures on 2/24 1/2 + coag negative staph, likely contaminant  -Blood cultures on 2/26 are no growth to date  Decubitus pressure ulcer   -Wound cultures from 2/20 are no growth  Acute on chronic respiratory failure, required intubation earlier in course, now extubated  Cellulitis of left lower extremity  Chronic diarrhea    Plan:    --- Stopped Zosyn will transition to Unasyn.  Examined wound with wound care team.  Small opening had no obvious infection, does track several centimeters.  Much improved from last week per wound care team.   --- Will plan on a total antibiotic course of 2 weeks for the anaerobic bacteremia starting on 2/19 - end 3/5/21   --- Antibiotic course as above for the infected pressure ulcer   --- Will need ongoing wound care   --- Monitor labs  --- Per notes she has chronic diarrhea but still with elevated WBC, if any watery stools please obtain C. difficile testing  --- Plan is for eventual discharge to skilled facility      Discussed with internal medicinem Dr. Phelps and wound care team.  ID will sign off.

## 2022-02-28 NOTE — CARE PLAN
The patient is Watcher - Medium risk of patient condition declining or worsening    Shift Goals  Clinical Goals: swallow study, IV ABX, wound care  Patient Goals: comfort, relieve dry mouth  Family Goals: JACKY    Progress made toward(s) clinical / shift goals:        Problem: Urinary - Renal Perfusion  Goal: Ability to achieve and maintain adequate renal perfusion and functioning will improve  Outcome: Progressing     Problem: Respiratory  Goal: Patient will achieve/maintain optimum respiratory ventilation and gas exchange  Outcome: Progressing     Problem: Fall Risk  Goal: Patient will remain free from falls  Outcome: Progressing       Patient is not progressing towards the following goals:

## 2022-02-28 NOTE — DIETARY
Nutrition Services: Update   Day 11 of admit.  Gabrielle Olmstead is a 76 y.o. female with admitting DX of Sepsis (HCC) [A41.9]  Acute on chronic respiratory failure with hypoxia (HCC) [J96.21]    Cortrak removed and tube feed D/C'd. Pt passed Videofluoroscopic Swallow Study today and SLP ordered diet.    Pt is currently on consistent CHO (diabetic), Level 6-soft and bite sized diet with Level 0 - thin liquids. No documented PO intake yet. Diet ordered today.     Wt 2/26/22: 103.5 kg via unnamed scale - Weight down since admit but stable since 2/22.     Labs: 2/28/22: sodium=153 (H), potassium=3.4 (L), Bun=48 (H), phos=3.9, mag=2.5. glucose accu-cks (2/27 & 2/28): 152-293    Meds: insulin glargine, SSI, MVI+min, vitamin C.    IV fluids: dextrose 5% @ 75 mL/hour     Skin: wound on left buttock has opened with tunneling noted.     Malnutrition Risk: criteria not met    Recommendations/Plan:   1. Encourage intake of meals  2. Document intake of all meals as % taken in ADL's to provide interdisciplinary communication across all shifts.   3. Monitor weight.  4. Nutrition rep will continue to see patient for ongoing meal and snack preferences.  5. Obtain supplement order per RD as needed.    RD following

## 2022-03-01 PROBLEM — E87.6 HYPOKALEMIA: Status: ACTIVE | Noted: 2022-03-01

## 2022-03-01 LAB
ALBUMIN SERPL BCP-MCNC: 3.1 G/DL (ref 3.2–4.9)
ANION GAP SERPL CALC-SCNC: 9 MMOL/L (ref 7–16)
ANION GAP SERPL CALC-SCNC: 9 MMOL/L (ref 7–16)
BACTERIA BLD CULT: NORMAL
BASOPHILS # BLD AUTO: 0.3 % (ref 0–1.8)
BASOPHILS # BLD AUTO: 0.4 % (ref 0–1.8)
BASOPHILS # BLD: 0.05 K/UL (ref 0–0.12)
BASOPHILS # BLD: 0.06 K/UL (ref 0–0.12)
BUN SERPL-MCNC: 45 MG/DL (ref 8–22)
BUN SERPL-MCNC: 45 MG/DL (ref 8–22)
BUN SERPL-MCNC: 49 MG/DL (ref 8–22)
CALCIUM SERPL-MCNC: 9.3 MG/DL (ref 8.4–10.2)
CALCIUM SERPL-MCNC: 9.3 MG/DL (ref 8.4–10.2)
CALCIUM SERPL-MCNC: 9.5 MG/DL (ref 8.4–10.2)
CHLORIDE SERPL-SCNC: 101 MMOL/L (ref 96–112)
CO2 SERPL-SCNC: 31 MMOL/L (ref 20–33)
CO2 SERPL-SCNC: 33 MMOL/L (ref 20–33)
CO2 SERPL-SCNC: 33 MMOL/L (ref 20–33)
CREAT SERPL-MCNC: 0.88 MG/DL (ref 0.5–1.4)
CREAT SERPL-MCNC: 0.92 MG/DL (ref 0.5–1.4)
CREAT SERPL-MCNC: 1 MG/DL (ref 0.5–1.4)
EOSINOPHIL # BLD AUTO: 0.59 K/UL (ref 0–0.51)
EOSINOPHIL # BLD AUTO: 0.61 K/UL (ref 0–0.51)
EOSINOPHIL NFR BLD: 3.9 % (ref 0–6.9)
EOSINOPHIL NFR BLD: 4.1 % (ref 0–6.9)
ERYTHROCYTE [DISTWIDTH] IN BLOOD BY AUTOMATED COUNT: 51.4 FL (ref 35.9–50)
ERYTHROCYTE [DISTWIDTH] IN BLOOD BY AUTOMATED COUNT: 51.5 FL (ref 35.9–50)
GLUCOSE BLD STRIP.AUTO-MCNC: 126 MG/DL (ref 65–99)
GLUCOSE BLD STRIP.AUTO-MCNC: 164 MG/DL (ref 65–99)
GLUCOSE BLD STRIP.AUTO-MCNC: 178 MG/DL (ref 65–99)
GLUCOSE BLD STRIP.AUTO-MCNC: 181 MG/DL (ref 65–99)
GLUCOSE SERPL-MCNC: 128 MG/DL (ref 65–99)
GLUCOSE SERPL-MCNC: 138 MG/DL (ref 65–99)
GLUCOSE SERPL-MCNC: 194 MG/DL (ref 65–99)
HCT VFR BLD AUTO: 35.9 % (ref 37–47)
HCT VFR BLD AUTO: 37.3 % (ref 37–47)
HGB BLD-MCNC: 10.8 G/DL (ref 12–16)
HGB BLD-MCNC: 11.1 G/DL (ref 12–16)
IMM GRANULOCYTES # BLD AUTO: 0.08 K/UL (ref 0–0.11)
IMM GRANULOCYTES # BLD AUTO: 0.13 K/UL (ref 0–0.11)
IMM GRANULOCYTES NFR BLD AUTO: 0.5 % (ref 0–0.9)
IMM GRANULOCYTES NFR BLD AUTO: 0.9 % (ref 0–0.9)
LYMPHOCYTES # BLD AUTO: 2.5 K/UL (ref 1–4.8)
LYMPHOCYTES # BLD AUTO: 2.64 K/UL (ref 1–4.8)
LYMPHOCYTES NFR BLD: 16.7 % (ref 22–41)
LYMPHOCYTES NFR BLD: 17.3 % (ref 22–41)
MAGNESIUM SERPL-MCNC: 2.3 MG/DL (ref 1.5–2.5)
MCH RBC QN AUTO: 31.2 PG (ref 27–33)
MCH RBC QN AUTO: 31.7 PG (ref 27–33)
MCHC RBC AUTO-ENTMCNC: 29.8 G/DL (ref 33.6–35)
MCHC RBC AUTO-ENTMCNC: 30.1 G/DL (ref 33.6–35)
MCV RBC AUTO: 104.8 FL (ref 81.4–97.8)
MCV RBC AUTO: 105.3 FL (ref 81.4–97.8)
MONOCYTES # BLD AUTO: 0.6 K/UL (ref 0–0.85)
MONOCYTES # BLD AUTO: 0.62 K/UL (ref 0–0.85)
MONOCYTES NFR BLD AUTO: 3.9 % (ref 0–13.4)
MONOCYTES NFR BLD AUTO: 4.1 % (ref 0–13.4)
NEUTROPHILS # BLD AUTO: 11.03 K/UL (ref 2–7.15)
NEUTROPHILS # BLD AUTO: 11.33 K/UL (ref 2–7.15)
NEUTROPHILS NFR BLD: 73.8 % (ref 44–72)
NEUTROPHILS NFR BLD: 74.1 % (ref 44–72)
NRBC # BLD AUTO: 0 K/UL
NRBC # BLD AUTO: 0 K/UL
NRBC BLD-RTO: 0 /100 WBC
NRBC BLD-RTO: 0 /100 WBC
PHOSPHATE SERPL-MCNC: 3.5 MG/DL (ref 2.5–4.5)
PLATELET # BLD AUTO: 346 K/UL (ref 164–446)
PLATELET # BLD AUTO: 352 K/UL (ref 164–446)
PMV BLD AUTO: 10.6 FL (ref 9–12.9)
PMV BLD AUTO: 10.6 FL (ref 9–12.9)
POTASSIUM SERPL-SCNC: 2.9 MMOL/L (ref 3.6–5.5)
POTASSIUM SERPL-SCNC: 3.1 MMOL/L (ref 3.6–5.5)
POTASSIUM SERPL-SCNC: 3.3 MMOL/L (ref 3.6–5.5)
RBC # BLD AUTO: 3.41 M/UL (ref 4.2–5.4)
RBC # BLD AUTO: 3.56 M/UL (ref 4.2–5.4)
SIGNIFICANT IND 70042: NORMAL
SITE SITE: NORMAL
SODIUM SERPL-SCNC: 142 MMOL/L (ref 135–145)
SODIUM SERPL-SCNC: 143 MMOL/L (ref 135–145)
SODIUM SERPL-SCNC: 143 MMOL/L (ref 135–145)
SOURCE SOURCE: NORMAL
WBC # BLD AUTO: 15 K/UL (ref 4.8–10.8)
WBC # BLD AUTO: 15.3 K/UL (ref 4.8–10.8)

## 2022-03-01 PROCEDURE — 82962 GLUCOSE BLOOD TEST: CPT | Mod: 91

## 2022-03-01 PROCEDURE — 700102 HCHG RX REV CODE 250 W/ 637 OVERRIDE(OP): Performed by: INTERNAL MEDICINE

## 2022-03-01 PROCEDURE — 700105 HCHG RX REV CODE 258: Performed by: INTERNAL MEDICINE

## 2022-03-01 PROCEDURE — 80069 RENAL FUNCTION PANEL: CPT

## 2022-03-01 PROCEDURE — 92526 ORAL FUNCTION THERAPY: CPT

## 2022-03-01 PROCEDURE — 94640 AIRWAY INHALATION TREATMENT: CPT

## 2022-03-01 PROCEDURE — A9270 NON-COVERED ITEM OR SERVICE: HCPCS | Performed by: INTERNAL MEDICINE

## 2022-03-01 PROCEDURE — A9270 NON-COVERED ITEM OR SERVICE: HCPCS | Performed by: STUDENT IN AN ORGANIZED HEALTH CARE EDUCATION/TRAINING PROGRAM

## 2022-03-01 PROCEDURE — 85025 COMPLETE CBC W/AUTO DIFF WBC: CPT

## 2022-03-01 PROCEDURE — 700101 HCHG RX REV CODE 250: Performed by: INTERNAL MEDICINE

## 2022-03-01 PROCEDURE — 770020 HCHG ROOM/CARE - TELE (206)

## 2022-03-01 PROCEDURE — 700111 HCHG RX REV CODE 636 W/ 250 OVERRIDE (IP): Performed by: STUDENT IN AN ORGANIZED HEALTH CARE EDUCATION/TRAINING PROGRAM

## 2022-03-01 PROCEDURE — 700102 HCHG RX REV CODE 250 W/ 637 OVERRIDE(OP): Performed by: STUDENT IN AN ORGANIZED HEALTH CARE EDUCATION/TRAINING PROGRAM

## 2022-03-01 PROCEDURE — A9270 NON-COVERED ITEM OR SERVICE: HCPCS | Performed by: HOSPITALIST

## 2022-03-01 PROCEDURE — 700102 HCHG RX REV CODE 250 W/ 637 OVERRIDE(OP): Performed by: HOSPITALIST

## 2022-03-01 PROCEDURE — 94760 N-INVAS EAR/PLS OXIMETRY 1: CPT

## 2022-03-01 PROCEDURE — 83735 ASSAY OF MAGNESIUM: CPT

## 2022-03-01 PROCEDURE — 700111 HCHG RX REV CODE 636 W/ 250 OVERRIDE (IP): Performed by: INTERNAL MEDICINE

## 2022-03-01 PROCEDURE — 80048 BASIC METABOLIC PNL TOTAL CA: CPT

## 2022-03-01 PROCEDURE — 99233 SBSQ HOSP IP/OBS HIGH 50: CPT | Performed by: INTERNAL MEDICINE

## 2022-03-01 RX ORDER — POTASSIUM CHLORIDE 20 MEQ/1
20 TABLET, EXTENDED RELEASE ORAL ONCE
Status: COMPLETED | OUTPATIENT
Start: 2022-03-01 | End: 2022-03-01

## 2022-03-01 RX ORDER — POTASSIUM CHLORIDE 20 MEQ/1
40 TABLET, EXTENDED RELEASE ORAL ONCE
Status: COMPLETED | OUTPATIENT
Start: 2022-03-01 | End: 2022-03-01

## 2022-03-01 RX ADMIN — MULTIPLE VITAMINS W/ MINERALS TAB 1 TABLET: TAB at 06:00

## 2022-03-01 RX ADMIN — AMPICILLIN SODIUM AND SULBACTAM SODIUM 3 G: 2; 1 INJECTION, POWDER, FOR SOLUTION INTRAMUSCULAR; INTRAVENOUS at 00:03

## 2022-03-01 RX ADMIN — OXYCODONE HYDROCHLORIDE AND ACETAMINOPHEN 500 MG: 500 TABLET ORAL at 06:26

## 2022-03-01 RX ADMIN — FUROSEMIDE 40 MG: 10 INJECTION, SOLUTION INTRAVENOUS at 06:25

## 2022-03-01 RX ADMIN — ATORVASTATIN CALCIUM 40 MG: 40 TABLET, FILM COATED ORAL at 06:26

## 2022-03-01 RX ADMIN — ENOXAPARIN SODIUM 40 MG: 40 INJECTION SUBCUTANEOUS at 06:24

## 2022-03-01 RX ADMIN — INSULIN HUMAN 6 UNITS: 100 INJECTION, SOLUTION PARENTERAL at 14:39

## 2022-03-01 RX ADMIN — OXYCODONE HYDROCHLORIDE AND ACETAMINOPHEN 500 MG: 500 TABLET ORAL at 18:17

## 2022-03-01 RX ADMIN — BUDESONIDE AND FORMOTEROL FUMARATE DIHYDRATE 2 PUFF: 160; 4.5 AEROSOL RESPIRATORY (INHALATION) at 06:46

## 2022-03-01 RX ADMIN — IPRATROPIUM BROMIDE AND ALBUTEROL SULFATE 3 ML: .5; 2.5 SOLUTION RESPIRATORY (INHALATION) at 06:46

## 2022-03-01 RX ADMIN — INSULIN GLARGINE 30 UNITS: 100 INJECTION, SOLUTION SUBCUTANEOUS at 18:07

## 2022-03-01 RX ADMIN — BUDESONIDE AND FORMOTEROL FUMARATE DIHYDRATE 2 PUFF: 160; 4.5 AEROSOL RESPIRATORY (INHALATION) at 19:34

## 2022-03-01 RX ADMIN — POTASSIUM CHLORIDE 20 MEQ: 1500 TABLET, EXTENDED RELEASE ORAL at 22:36

## 2022-03-01 RX ADMIN — SODIUM HYPOCHLORITE 1 ML: 1.25 SOLUTION TOPICAL at 18:18

## 2022-03-01 RX ADMIN — ASPIRIN 81 MG CHEWABLE TABLET 81 MG: 81 TABLET CHEWABLE at 06:25

## 2022-03-01 RX ADMIN — INSULIN HUMAN 6 UNITS: 100 INJECTION, SOLUTION PARENTERAL at 00:04

## 2022-03-01 RX ADMIN — AMLODIPINE BESYLATE 10 MG: 5 TABLET ORAL at 06:26

## 2022-03-01 RX ADMIN — LEVOTHYROXINE SODIUM 25 MCG: 0.03 TABLET ORAL at 06:26

## 2022-03-01 RX ADMIN — SENNOSIDES AND DOCUSATE SODIUM 2 TABLET: 50; 8.6 TABLET ORAL at 06:25

## 2022-03-01 RX ADMIN — POTASSIUM CHLORIDE 40 MEQ: 1500 TABLET, EXTENDED RELEASE ORAL at 09:35

## 2022-03-01 RX ADMIN — AMPICILLIN SODIUM AND SULBACTAM SODIUM 3 G: 2; 1 INJECTION, POWDER, FOR SOLUTION INTRAMUSCULAR; INTRAVENOUS at 14:23

## 2022-03-01 RX ADMIN — AMPICILLIN SODIUM AND SULBACTAM SODIUM 3 G: 2; 1 INJECTION, POWDER, FOR SOLUTION INTRAMUSCULAR; INTRAVENOUS at 18:07

## 2022-03-01 RX ADMIN — INSULIN GLARGINE 30 UNITS: 100 INJECTION, SOLUTION SUBCUTANEOUS at 06:25

## 2022-03-01 RX ADMIN — SODIUM HYPOCHLORITE 1 ML: 1.25 SOLUTION TOPICAL at 06:00

## 2022-03-01 RX ADMIN — SERTRALINE HYDROCHLORIDE 50 MG: 50 TABLET ORAL at 06:26

## 2022-03-01 RX ADMIN — AMPICILLIN SODIUM AND SULBACTAM SODIUM 3 G: 2; 1 INJECTION, POWDER, FOR SOLUTION INTRAMUSCULAR; INTRAVENOUS at 06:24

## 2022-03-01 RX ADMIN — BENAZEPRIL HYDROCHLORIDE 20 MG: 10 TABLET, COATED ORAL at 06:26

## 2022-03-01 RX ADMIN — INSULIN HUMAN 6 UNITS: 100 INJECTION, SOLUTION PARENTERAL at 18:03

## 2022-03-01 ASSESSMENT — ENCOUNTER SYMPTOMS
BACK PAIN: 0
VOMITING: 0
NAUSEA: 0
DIZZINESS: 0
PALPITATIONS: 0
COUGH: 0
SHORTNESS OF BREATH: 1
ABDOMINAL PAIN: 0
HEADACHES: 0
CONSTIPATION: 0
DIARRHEA: 0
CHILLS: 0
WEAKNESS: 1
FEVER: 0

## 2022-03-01 NOTE — PROGRESS NOTES
Telemetry Shift Summary    Rhythm SR  HR Range 62-92  Ectopy O pac, rare pvc  Measurements 0.16/0.12/0.48          Normal Values  Rhythm SR  HR Range    Measurements 0.12-0.20 / 0.06-0.10  / 0.30-0.52

## 2022-03-01 NOTE — WOUND TEAM
[facility]  Inpatient Services  Wound and Skin Care Progress note    Admission Date: 2/16/2022     Last order of IP CONSULT TO WOUND CARE was found on 2/16/2022 from Hospital Encounter on 2/16/2022     HPI, PMH, SH: Reviewed    Past Surgical History:   Procedure Laterality Date   • HYSTERECTOMY ROBOTIC  11/28/2011    Performed by REKHA BYERS at SURGERY [facility] ORS   • LESION EXCISION GENERAL  11/28/2011    Performed by REKHA BYERS at SURGERY [facility] ORS   • CYST EXCISION  8/6/2010    Performed by ANA PAVON at SURGERY SAME DAY [facility] ORS     Social History     Tobacco Use   • Smoking status: Former Smoker     Packs/day: 2.00     Years: 40.00     Pack years: 80.00     Types: Cigarettes   • Smokeless tobacco: Never Used   • Tobacco comment: 45 YEARS  11/2 PPD   Substance Use Topics   • Alcohol use: No     Comment: OCCASSIONAL     Chief Complaint   Patient presents with   • Shortness of Breath     Diagnosis: Sepsis (HCC) [A41.9]  Acute on chronic respiratory failure with hypoxia (HCC) [J96.21]    Unit where seen by Wound Team: 3310/02     WOUND CONSULT/FOLLOW UP RELATED TO:  Left buttock, BLE  2/21-L buttock    WOUND HISTORY:  Patient admitted for worsening edema to BLE. She was admitted for IV abx for her left leg cellulitis. Patient has a history of a decubitus ulcer to left buttock that she developed in June 2021. L buttock wound opened yesterday.    WOUND ASSESSMENT/LDA      Wound 06/23/21 Pressure Injury Buttocks Left stage 2 POA; seen 2/18 unstageable POA St 4 2/28 (Active)     Close uo    02/28/22 1645   Site Assessment Drainage;Red    Periwound Assessment Purple;Red    Margins Defined edges    Closure Secondary intention    Drainage Amount Scant    Drainage Description Serosanguineous    Treatments Cleansed    Wound Cleansing Approved Wound Cleanser    Periwound Protectant Skin Protectant Wipes to Periwound    Dressing Cleansing/Solutions 1/4 Strength Dakin's Solution    Dressing  Options Plain Strip Packing;Silicone Adhesive Foam    Dressing Changed Changed    Dressing Status Intact    Dressing Change/Treatment Frequency Every Shift, and As Needed    NEXT Dressing Change/Treatment Date 02/28/22    NEXT Weekly Photo (Inpatient Only) 03/07/22    Pressure Injury Stage 4 02/28/22 1645   Non-staged Wound Description Not applicable 02/18/22 1400   Wound Length (cm) 1 cm 02/28/22 1645   Wound Width (cm) 1.7 cm 02/28/22 1645   Wound Depth (cm) 0.7 cm 02/28/22 1645   Wound Surface Area (cm^2) 1.7 cm^2 02/28/22 1645   Wound Volume (cm^3) 1.19 cm^3 02/28/22 1645   Wound Healing % -693 02/28/22 1645   Tunneling (cm) 4.5 cm 02/28/22 1645   Tunneling Clock Position of Wound 12 02/28/22 1645   Tunneling - 2nd Location (cm) 2.5 cm 02/28/22 1645   Tunneling Clock Position of Wound - 2nd Location 7 02/28/22 1645   Tunneling - 3rd Location (cm) 2 cm 02/28/22 1645   Tunneling Clock Position of Wound - 3rd Location 4 02/28/22 1645   Shape oval    Wound Odor None    Exposed Structures Muscle        Vascular:    NEL:   No results found.    Lab Values:    Lab Results   Component Value Date/Time    WBC 18.1 (H) 02/28/2022 03:40 AM    RBC 3.55 (L) 02/28/2022 03:40 AM    HEMOGLOBIN 11.1 (L) 02/28/2022 03:40 AM    HEMATOCRIT 38.2 02/28/2022 03:40 AM    CREACTPROT <0.30 02/28/2022 03:40 AM    HBA1C 9.0 (H) 06/24/2021 12:08 AM        Culture Results show:  Recent Results (from the past 720 hour(s))   CULTURE WOUND W/ GRAM STAIN    Collection Time: 02/20/22 12:04 PM    Specimen: Buttock; Wound   Result Value Ref Range    Significant Indicator NEG     Source WND     Site BUTTOCK     Culture Result Moderate growth mixed enteric raleigh.     Gram Stain Result       Many WBCs.  Moderate Gram positive cocci.  Few Gram positive rods.  Rare Gram negative rods.         Pain Level/Medicated:  Flinching when care performed     INTERVENTIONS BY WOUND TEAM:  Chart and images reviewed. Discussed with bedside RN. All areas of concern  "(based on picture review, LDA review and discussion with bedside RN) have been thoroughly assessed. Documentation of areas based on significant findings. This RN in to assess patient. Performed standard wound care which includes appropriate positioning, dressing removal and non-selective debridement. Pictures and measurements obtained weekly if/when required.    LEFT BUTTOCK, LLE   Preparation for Dressing removal: drsg saturated easily removed  Non-selectively Debrided with: wound cleanser moistened guaze placed with applicator then removed  Sharp debridement: NA   Cammie wound: Cleansed with wound cleanser, dried, Prepped with No Sting skin prep  Primary Dressing:  Gauze packing strip 1/2\" wide moistened with 1/4 strength Dakins  Secondary (Outer) Dressing: adhesive foam     Interdisciplinary consultation: Patient, Bedside RN, Dr Corey ID    EVALUATION / RATIONALE FOR TREATMENT:  Most Recent Date:  2/28: Pt's wound no longer has odor, wound bed with red tissue, muscle viewed. Has tunnels in 3 directions. There is a small partial thickness wound proximal lateral to ulcer. Induration significantly reduced. Continuing POC r/t wound has improved and location is subjected to possibility of stool exposure.   2/21: Pt's L buttock wound has opened. Has strong odor and tan/grey with some serosanguinous drainage. There is tunneling at 0300-3.2 cm, 0600 2.5 cm and 1200- 3 cm. Unable to assess tissue color or qualiy r/t small opening. Periwound skin is purplish with induration. Dakin's Solution® Quarter Strength is a broad-spectrum antimicrobial cleanser that is gentle to the skin. Effective against MRSA, VRE, other bacteria, viruses, molds, fungi and yeast. Also used for odor control. It helps debride chemically and mechanically with drsg removal.    Edema has decreased BLE and white plaque to RLE unchanged. LLE scabbing is decreasing. There are wrinkles in BLE from decreased edema. Continuing POC. She has callus to plantar " both feet R-3rd MTH and L- 3-4 MTH.     2/18/22: BLE edematous and with moderate amount of xerosis. Partial thickness skin breakdown greater along left LE and erythema also present to this extremity. Viscopatch zinc impregnated gauze applied to encourage re-epithelialization of superficial 100% viable wound bed, and to provide a non-stick wound contact layer. Unable to place compression dressing at this time due to cellulitis to LE.     Left buttock with a documented stage II pressure injury that patient obtained in June 2021. Upon assessment, small wound to left buttock obscured with tan/yellow slough. Patient is incontinent of urine. Barrier paste applied for now, however recommend viscopaste.      Goals: Steady decrease in wound area and depth weekly.    WOUND TEAM PLAN OF CARE ([X] for frequency of wound follow up,):   Nursing to follow orders written for wound care. Contact wound team if area fails to progress, deteriorates or with any questions/concerns  Dressing changes by wound team:                   Follow up 3 times weekly:                NPWT change 3 times weekly:     Follow up 1-2 times weekly:      Follow up Bi-Monthly:                   Follow up as needed:   X  Other (explain):     NURSING PLAN OF CARE ORDERS (X):  Dressing changes: See Dressing Care orders: X  Skin care: See Skin Care orders: X  RN Prevention Protocol: X  Rectal tube care: See Rectal Tube Care orders:   Other orders:    RSKIN:   CURRENTLY IN PLACE (X), APPLIED THIS VISIT (A), ORDERED (O):   Q shift Isrrael:  X  Q shift pressure point assessments:  X    Surface/Positioning   Pressure redistribution mattress   x         Low Airloss          Bariatric foam      Bariatric PAYTON     Waffle cushion        Waffle Overlay         Reposition q 2 hours  x  TAPs Turning system     Z Ravin Pillow     Offloading/Redistribution   Sacral Mepilex (Silicone dressing)   x  Heel Mepilex (Silicone dressing)         Heel float boots (Prevalon boot)              Float Heels off Bed with Pillows           Respiratory   Silicone O2 tubing   x      Gray Foam Ear protectors     Cannula fixation Device (Tender )          High flow offloading Clip    Elastic head band offloading device      Anchorfast                                                         Trach with Optifoam split foam             Containment/Moisture Prevention  Rectal tube or BMS    Purwick/Condom Cath        Nye Catheter x   Barrier wipes           Barrier paste       Antifungal tx      Interdry        Mobilization  Not assessed  Up to chair        Ambulate      PT/OT      Nutrition   Dietician        Diabetes Education      PO  x  TF    TPN     NPO   # days     Other        Anticipated discharge plans: TBD  LTACH:        SNF/Rehab:                  Home Health Care:           Outpatient Wound Center:            Self/Family Care:        Other:                  Vac Discharge Needs:   Not Applicable Pt not on a wound vac:     X  Regular Vac while inpatient, alternative dressing at DC:        Regular Vac in use and continued at DC:            Reg. Vac w/ Skin Sub/Biologic in use. Will need to be changed 2x wkly:      Veraflo Vac while inpatient, ok to transition to Regular Vac on Discharge:           Veraflo Vac while inpatient, will need to remain on Veraflo Vac upon discharge:

## 2022-03-01 NOTE — PROGRESS NOTES
[facility] Medicine Daily Progress Note    Date of Service  3/1/2022    Chief Complaint  Gabrielle Olmstead is a 76 y.o. female admitted 2/16/2022 with SOB    Hospital Course  Seventy 6F PMH COPD with chronic home oxygen 2 L nasal cannula baseline, hypertension, T2DM, dyslipidemia, hypothyroidism admitted on 2/16/2022 for worsening shortness of breath, increasing generalized weakness and diarrhea.  Patient was transferred to ICU on 02/19 requiring BiPAP, was intubated and later extubated.  Patient downgraded on 02/26.  ID has been following patient's case given patient had positive blood cultures.  Patient was on Zosyn and transition to Unasyn.  Patient was evaluated by speech therapy and passed the fiberoptic swallow study.      Interval Problem Update  Patient stated she was doing well but still feeling weak.  Speech evaluated and determined patient passed swallow study and recommending thin liquid with bite-size soft diet.  PT/OT recommending SNF.  Patient continued on IV Unasyn as per ID.    I have personally seen and examined the patient at bedside. I discussed the plan of care with patient, bedside RN, charge RN,  and pharmacy.    Consultants/Specialty  critical care and infectious disease     Code Status  DNAR, I OK    Disposition  Patient is not medically cleared for discharge.   Anticipate discharge to to skilled nursing facility.  I have placed the appropriate orders for post-discharge needs.    Review of Systems  Review of Systems   Constitutional: Positive for malaise/fatigue. Negative for chills and fever.   Respiratory: Positive for shortness of breath. Negative for cough.    Cardiovascular: Negative for chest pain and palpitations.   Gastrointestinal: Negative for abdominal pain, constipation, diarrhea, nausea and vomiting.   Musculoskeletal: Negative for back pain and joint pain.   Neurological: Positive for weakness. Negative for dizziness and headaches.   All other systems reviewed and are  negative.       Physical Exam  Temp:  [36.2 °C (97.2 °F)-37.2 °C (99 °F)] 36.7 °C (98.1 °F)  Pulse:  [64-80] 75  Resp:  [18-22] 18  BP: (122-148)/(5-46) 122/46  SpO2:  [93 %-100 %] 94 %    Physical Exam  Vitals and nursing note reviewed.   Constitutional:       General: She is not in acute distress.     Appearance: She is obese. She is ill-appearing.   HENT:      Head: Normocephalic and atraumatic.   Eyes:      General: No scleral icterus.     Extraocular Movements: Extraocular movements intact.   Cardiovascular:      Rate and Rhythm: Normal rate.      Pulses: Normal pulses.      Heart sounds: Normal heart sounds. No murmur heard.  Pulmonary:      Effort: No respiratory distress.      Breath sounds: Normal breath sounds.      Comments: On 3LNC  Abdominal:      General: Abdomen is flat. Bowel sounds are normal. There is no distension.      Palpations: Abdomen is soft.   Musculoskeletal:         General: No swelling or tenderness. Normal range of motion.      Cervical back: Normal range of motion and neck supple.   Skin:     General: Skin is warm.      Capillary Refill: Capillary refill takes less than 2 seconds.      Coloration: Skin is not jaundiced.      Findings: No erythema.   Neurological:      General: No focal deficit present.      Mental Status: She is alert and oriented to person, place, and time. Mental status is at baseline.      Motor: Weakness present.   Psychiatric:         Mood and Affect: Mood normal.         Behavior: Behavior normal.         Thought Content: Thought content normal.         Judgment: Judgment normal.         Fluids    Intake/Output Summary (Last 24 hours) at 3/1/2022 1005  Last data filed at 3/1/2022 0624  Gross per 24 hour   Intake 200 ml   Output 2800 ml   Net -2600 ml       Laboratory  Recent Labs     02/28/22  0340 03/01/22  0430 03/01/22  0525   WBC 18.1* 15.3* 15.0*   RBC 3.55* 3.41* 3.56*   HEMOGLOBIN 11.1* 10.8* 11.1*   HEMATOCRIT 38.2 35.9* 37.3   .6* 105.3* 104.8*    MCH 31.3 31.7 31.2   MCHC 29.1* 30.1* 29.8*   RDW 55.2* 51.5* 51.4*   PLATELETCT 416 352 346   MPV 10.6 10.6 10.6     Recent Labs     02/28/22  0340 02/28/22  1626 03/01/22  0430 03/01/22  0525   SODIUM 153* 145 143 143   POTASSIUM 3.4*  --  3.1* 2.9*   CHLORIDE 107  --  101 101   CO2 38*  --  33 33   GLUCOSE 155*  --  128* 138*   BUN 48*  --  45* 45*   CREATININE 0.94  --  0.88 0.92   CALCIUM 10.5*  --  9.3 9.5                   Imaging  DX-ESOPHAGUS - IJPH-TOIAL-IP   Final Result      DX-CHEST-LIMITED (1 VIEW)   Final Result      1.  Linear bibasilar atelectasis.      2.  Cardiomegaly.      IR-US GUIDED PIV   Final Result    Ultrasound-guided PERIPHERAL IV INSERTION performed by    qualified nursing staff as above.      DX-CHEST-PORTABLE (1 VIEW)   Final Result         1.  Pulmonary edema and/or infiltrates are identified, which are stable since the prior exam.   2.  Cardiomegaly   3.  Atherosclerosis      DX-CHEST-PORTABLE (1 VIEW)   Final Result         1.  Pulmonary edema and/or infiltrates are identified, which appear somewhat increased since the prior exam.   2.  Cardiomegaly   3.  Atherosclerosis      DX-CHEST-PORTABLE (1 VIEW)   Final Result      No interval change.      DX-CHEST-PORTABLE (1 VIEW)   Final Result      Placement of right IJ central line without evidence of pneumothorax.      EC-ECHOCARDIOGRAM LTD W/O CONT   Final Result      DX-CHEST-PORTABLE (1 VIEW)   Final Result      1.  Satisfactory appearance of ET tube.   2.  There are changes of pulmonary edema/CHF.      [id number] VIEW   Final Result      Feeding tube tip projects over the distal stomach      DX-CHEST-PORTABLE (1 VIEW)   Final Result      Cardiomegaly with diffuse interstitial prominence, may be edema or infiltrate.      EC-ECHOCARDIOGRAM COMPLETE W/O CONT   Final Result      DX-CHEST-PORTABLE (1 VIEW)   Final Result      Stable cardiomegaly           Assessment/Plan  * COPD with acute exacerbation (HCC)- (present on  admission)  Assessment & Plan  Completed therapy for acute COPD exacerbation  Pulmonology evaluated patient.    IMPROVED    Hypokalemia  Assessment & Plan  Patient potassium 2.9, replacing via p.o.    Dehydration with hypernatremia- (present on admission)  Assessment & Plan  Patient dehydrated, slightly elevated sodium of this morning.  Was able to tolerate diet with speech therapy, still pending free water.  Gentle IVF  Monitor a.m. labs    SRIRAM (acute kidney injury) ([facility])- (present on admission)  Assessment & Plan  resolved    Bacteremia- (present on admission)  Assessment & Plan  Blood culture on 2/16: anaerobic gram positive cocci/ peptoniphilus lacrimalis   Blood culture on 2/24: likely MSSA  Started on cefepime and flagyl from 2/20-2/24 in ICU  Patient with persistently positive blood cultures  Pending ID recommendations for final antibiotics.  Patient currently on IV Unasyn.    NSTEMI (non-ST elevated myocardial infarction) ([facility])  Assessment & Plan  Initial concern for NSTEMI while in ICU  Case was discussed between critical care and cardiology who states that elevation in troponin was likely secondary to demand ischemia in the setting of acute respiratory failure.  She was empirically treated with heparin, aspirin, and statin. Troponin levels have plateaued and started to down trend.  Patient without chest pain     Decubitus ulcer- (present on admission)  Assessment & Plan  Continue wound care  Photos reviewed, no acute signs of active cellulitis   Has completed antibiotics for concern of left lower extremity cellulitis     Elevated troponin- (present on admission)  Assessment & Plan  In the indeterminate range  Patient without active chest pain  Case was discussed between Dr. Rivas (critical care) and Dr. Esquivel (cardiology) who states that its likely demand ischemia in the setting of respiratory failure. She was started on heparin drip which has since been discontinued.  Her troponins are downtrending at  this time  Stable, repeat troponins for acute chest pain    Cellulitis of left lower extremity- (present on admission)  Assessment & Plan  RESOLVED    Diarrhea- (present on admission)  Assessment & Plan  Pt reports this is chronic   Continue to monitor     Acute on chronic respiratory failure with hypoxia (HCC)- (present on admission)  Assessment & Plan  Multifactorial, related to CHF and COPD  Oxygen as needed, Respiratory protocol, Bronchodilators, Incentive spirometry   tx as above for copd/chf    Shortness of breath- (present on admission)  Assessment & Plan  COVID-19 negative   Appears volume overloaded, BNP elevated.  Oxygen as needed, Respiratory protocol, Bronchodilators, Incentive spirometry  Intravenous diuretics.  Daily strict input and output  Daily standing weights.  Daily BMP to watch renal function, electrolytes.  Replace electrolytes as needed.  Echo with EF of 40% and wall motion abnormalities   Completed treatment for COPD exacerbation, did require intubation from 2/19 to 2/24      Hypothyroidism- (present on admission)  Assessment & Plan  Resume home levothyroxine    DM (diabetes mellitus) (HCC)- (present on admission)  Assessment & Plan  With hyperglycemia  Last glycated hemoglobin was 9 %  Increase to high ISS, glargine 30 U BID  Accu-Checks, hypoglycemia protocol   Pending evaluation by speech for diet  Titrate insulin as tolerated    Dyslipidemia- (present on admission)  Assessment & Plan  Resume home atorvastatin    Essential hypertension- (present on admission)  Assessment & Plan  Resume home amlodipine, Benzapril with hold parameters       VTE prophylaxis: enoxaparin ppx    I have performed a physical exam and reviewed and updated ROS and Plan today (3/1/2022). In review of yesterday's note (2/28/2022), there are no changes except as documented above.

## 2022-03-01 NOTE — RESPIRATORY CARE
"   COPD EDUCATION by COPD CLINICAL EDUCATOR  2/28/2022 at 5:19 PM by Carolina Salvador RRT     Patient reviewed by COPD education team. Patient does have a history or diagnosis of COPD and is a former smoker.  Patient is not interested in the COPD program. Patient pending SNF/ Rehab placement.       COPD Screen  COPD Risk Screening  Do you have a history of COPD?: Yes  Do you have a Pulmonologist?: No  COPD Population Screener  During the past 4 weeks, how much did you feel short of breath?: Some of the time  Do you ever cough up any mucus or phlegm?: No/only with occasional colds or infections  In the past 12 months, you do less than you used to because of your breathing problems: Disagree/unsure  Have you smoked at least 100 cigarettes in your entire life?: Yes  How old are you?: 60+  COPD Screening Score: 5    COPD Assessment  COPD Clinical Specialists ONLY  COPD Education Initiated: Yes--Short Intervention (Checked back in with patient to see if she may have any needs or questions, pt is not interested, will be going SN[facility] pending acceptance)  DME Company: TBD SNF acceptance  DME Equipment Type: TBD  Physician Name: Dr. Michelle GERBER  Pulmonologist Name: none pt does not want to see a Pulmonologist  Referrals Initiated:  (Pt declined, has a caregiver at home and is pending SNF)  Is this a COPD exacerbation patient?: Yes    PFT Results    No results found for: PFT    Meds to Beds        MY COPD ACTION PLAN     It is recommended that patients and physicians /healthcare providers complete this action plan together. This plan should be discussed at each physician visit and updated as needed.    The green, yellow and red zones show groups of symptoms of COPD. This list of symptoms is not comprehensive, and you may experience other symptoms. In the \"Actions\" column, your healthcare provider has recommended actions for you to take based on your symptoms.    Patient Name: Gabrielle Olmstead   Date of Birth: " "1945   Last Updated on: 2/28/2022  5:18 PM   Green Zone:  I am doing well today Actions   •  Usual activitiy and exercise level •  Take daily medications   •  Usual amounts of cough and phlegm/mucus •  Use oxygen as prescribed   •  Sleep well at night •  Continue regular exercise/diet plan   •  Appetite is good •  At all times avoid cigarette smoke, inhaled irritants     Daily Medications (these medications are taken every day):   Fluticosone/Salmeterol (Advair) 1 Puff Twice daily        Yellow Zone:  I am having a bad day or a COPD flare Actions   •  More breathless than usual •  Continue daily medications   •  I have less energy for my daily activities •  Use quick relief inhaler as ordered   •  Increased or thicker phlegm/mucus •  Use oxygen as prescribed   •  Using quick relief inhaler/nebulizer more often •  Get plenty of rest   •  Swelling of ankles more than usual •  Use pursed lip breathing   •  More coughing than usual •  At all times avoid cigarette smoke, inhaled irritants   •  I feel like I have a \"chest cold\"   •  Poor sleep and my symptoms woke me up   •  My appetite is not good   •  My medicine is not helping    •  Call provider immediately if symptoms don’t improve     Continue daily medications, add rescue medications:               Medications to be used during a flare up, (as Discussed with Provider):              Red Zone:  I need urgent medical care Actions   •  Severe shortness of breath even at rest •  Call 911 or seek medical care immediately   •  Not able to do any activity because of breathing    •  Fever or shaking chills    •  Feeling confused or very drowsy     •  Chest pains    •  Coughing up blood              "

## 2022-03-01 NOTE — FLOWSHEET NOTE
03/01/22 0647   Events/Summary/Plan   Events/Summary/Plan PRN TX given   Vital Signs   Pulse 66   Respiration (!) 22   Pulse Oximetry 93 %   $ Pulse Oximetry (Spot Check) Yes   Respiratory Assessment   Respiratory Pattern Within Normal Limits   Level of Consciousness Alert   Chest Exam   Work Of Breathing / Effort Mild;Tachypnea   Breath Sounds   RUL Breath Sounds Diminished;Clear   RML Breath Sounds Diminished;Clear   RLL Breath Sounds Diminished   MOSHE Breath Sounds Diminished;Fine Crackles   LLL Breath Sounds Diminished   Oxygen   O2 (LPM) 3   O2 Delivery Device Silicone Nasal Cannula

## 2022-03-01 NOTE — FLOWSHEET NOTE
02/28/22 2115   Events/Summary/Plan   Events/Summary/Plan MDI 2 puffs   Vital Signs   Pulse 80   Respiration 18   Pulse Oximetry 94 %   $ Pulse Oximetry (Spot Check) Yes   Chest Exam   Work Of Breathing / Effort Mild   Breath Sounds   RUL Breath Sounds Clear   RML Breath Sounds Diminished   RLL Breath Sounds Diminished   MOSHE Breath Sounds Clear   LLL Breath Sounds Diminished   Secretions   Cough Non Productive   Oxygen   O2 (LPM) 3   O2 Delivery Device Silicone Nasal Cannula

## 2022-03-01 NOTE — THERAPY
"Speech Language Pathology  Daily Treatment     Patient Name: Gabrielle Olmstead  Age:  76 y.o., Sex:  female  Medical Record #: [id number]  Today's Date: 3/1/2022     Precautions  Precautions: (P) Fall Risk,Swallow Precautions ( See Comments)  Comments: poor endurance    Assessment    Pt seen on this date for dysphagia therapy. Per RN, pt found laying down while eating. Pt able to independently recall the following strategies: no straws and small bites/sips. Re-educated pt on rest of strategies (sitting up at 90* during meals, cough/throat clear after sips of thins, slow rate). Intermittent cough noted irrespective of PO which appears to have increased since sessions yesterday (of note, pt titrated from 4L to 3L). She required fading cues for cough/throat clear after sips of thins but completing independently at end of session. Independently taking small bites/sips at slow rate. Pt c/o reduced appetite and fatigue but when offered to take a break she declined. At end of session, pt independently recalled all strategies except sitting up at 90* during meals.     Plan    1) continue soft and bite sized (SB6)/thin liquids with implementation of strategies (no straws, small bites/sips, up at 90* during meals, slow rate, cough/throat clear after sips of thin liquids)  2) medications float whole in puree    Continue current treatment plan.    Discharge Recommendations: (P) Recommend post-acute placement for additional speech therapy services prior to discharge home       Objective       03/01/22 0815   Precautions   Precautions Fall Risk;Swallow Precautions ( See Comments)   Vitals   O2 (LPM) 3   O2 Delivery Device Silicone Nasal Cannula   Pain 0 - 10 Group   Therapist Pain Assessment Post Activity Pain Same as Prior to Activity;Nurse Notified;0   Patient / Family Goals   Patient / Family Goal #1 \"ice cold water, please\"   Goal #1 Outcome Progressing as expected   Short Term Goals   Short Term Goal # 1 Pt will safely participate " in pre-feeding trials with SLP free from s/sx aspiration or respiratory compromise.   Goal Outcome # 1 Goal met, new goal added   Short Term Goal # 2 NEW 2/28: Pt will consume an SB6/TN0 diet with no overt s/sx of aspiration   Goal Outcome # 2  Progressing as expected   Short Term Goal # 3 NEW 3/1: Pt will complete 10 reps of swallowing exercises targeting base of tongue retraction and pharyngeal squeeze with good quality   Education Group   Education Provided Dysphagia   Dysphagia Patient Response Patient;Acceptance;Explanation;Verbal Demonstration;Reinforcement Needed   Anticipated Discharge Needs   Discharge Recommendations Recommend post-acute placement for additional speech therapy services prior to discharge home   Therapy Recommendations Upon DC Dysphagia Training;Community Re-Integration;Patient / Family / Caregiver Education

## 2022-03-01 NOTE — CARE PLAN
The patient is     Shift Goals  Clinical Goals: safety  Patient Goals: safety  Family Goals: JACKY    Progress made toward(s) clinical / shift goals:  safety    Patient is not progressing towards the following goals:

## 2022-03-01 NOTE — HOSPITAL COURSE
76F PMH COPD with chronic home oxygen 2 L nasal cannula baseline, hypertension, T2DM, dyslipidemia, hypothyroidism admitted on 2/16/2022 for worsening shortness of breath, increasing generalized weakness and diarrhea.  Patient was transferred to ICU on 02/19 requiring BiPAP, was intubated and later extubated.  Patient downgraded on 02/26.  ID has been following patient's case given patient had positive blood cultures, from infected pressure ulcer present upon admission.  Patient was on Zosyn and transition to Unasyn, end date 3/5/22.  Patient was evaluated by speech therapy and passed the fiberoptic swallow study.  Currently pending SNF acceptance.

## 2022-03-02 ENCOUNTER — TELEPHONE (OUTPATIENT)
Dept: INFECTIOUS DISEASES | Facility: MEDICAL CENTER | Age: 77
End: 2022-03-02
Payer: MEDICARE

## 2022-03-02 ENCOUNTER — APPOINTMENT (OUTPATIENT)
Dept: RADIOLOGY | Facility: MEDICAL CENTER | Age: 77
DRG: 870 | End: 2022-03-02
Attending: INTERNAL MEDICINE
Payer: MEDICARE

## 2022-03-02 VITALS
OXYGEN SATURATION: 97 % | TEMPERATURE: 98.2 F | WEIGHT: 233.69 LBS | HEIGHT: 61 IN | RESPIRATION RATE: 18 BRPM | SYSTOLIC BLOOD PRESSURE: 134 MMHG | DIASTOLIC BLOOD PRESSURE: 46 MMHG | BODY MASS INDEX: 44.12 KG/M2 | HEART RATE: 66 BPM

## 2022-03-02 PROBLEM — R78.81 BACTEREMIA: Status: RESOLVED | Noted: 2022-02-19 | Resolved: 2022-03-02

## 2022-03-02 PROBLEM — R19.7 DIARRHEA: Status: RESOLVED | Noted: 2022-02-16 | Resolved: 2022-03-02

## 2022-03-02 PROBLEM — I21.4 NSTEMI (NON-ST ELEVATED MYOCARDIAL INFARCTION) (HCC): Status: RESOLVED | Noted: 2022-02-19 | Resolved: 2022-03-02

## 2022-03-02 PROBLEM — R79.89 ELEVATED TROPONIN: Status: RESOLVED | Noted: 2022-02-16 | Resolved: 2022-03-02

## 2022-03-02 PROBLEM — L03.116 CELLULITIS OF LEFT LOWER EXTREMITY: Status: RESOLVED | Noted: 2022-02-16 | Resolved: 2022-03-02

## 2022-03-02 PROBLEM — E86.0 DEHYDRATION WITH HYPERNATREMIA: Status: RESOLVED | Noted: 2022-02-28 | Resolved: 2022-03-02

## 2022-03-02 PROBLEM — E87.0 DEHYDRATION WITH HYPERNATREMIA: Status: RESOLVED | Noted: 2022-02-28 | Resolved: 2022-03-02

## 2022-03-02 PROBLEM — E87.6 HYPOKALEMIA: Status: RESOLVED | Noted: 2022-03-01 | Resolved: 2022-03-02

## 2022-03-02 PROBLEM — N17.9 AKI (ACUTE KIDNEY INJURY) (HCC): Status: RESOLVED | Noted: 2022-02-20 | Resolved: 2022-03-02

## 2022-03-02 PROBLEM — A41.9 SEPSIS (HCC): Status: RESOLVED | Noted: 2022-02-16 | Resolved: 2022-03-02

## 2022-03-02 LAB
ALBUMIN SERPL BCP-MCNC: 3.3 G/DL (ref 3.2–4.9)
BASOPHILS # BLD AUTO: 0.3 % (ref 0–1.8)
BASOPHILS # BLD: 0.04 K/UL (ref 0–0.12)
BUN SERPL-MCNC: 43 MG/DL (ref 8–22)
CALCIUM SERPL-MCNC: 9 MG/DL (ref 8.4–10.2)
CHLORIDE SERPL-SCNC: 104 MMOL/L (ref 96–112)
CO2 SERPL-SCNC: 31 MMOL/L (ref 20–33)
CREAT SERPL-MCNC: 0.76 MG/DL (ref 0.5–1.4)
EOSINOPHIL # BLD AUTO: 0.6 K/UL (ref 0–0.51)
EOSINOPHIL NFR BLD: 4.4 % (ref 0–6.9)
ERYTHROCYTE [DISTWIDTH] IN BLOOD BY AUTOMATED COUNT: 49.4 FL (ref 35.9–50)
GLUCOSE BLD STRIP.AUTO-MCNC: 104 MG/DL (ref 65–99)
GLUCOSE BLD STRIP.AUTO-MCNC: 167 MG/DL (ref 65–99)
GLUCOSE SERPL-MCNC: 130 MG/DL (ref 65–99)
HCT VFR BLD AUTO: 35.6 % (ref 37–47)
HGB BLD-MCNC: 10.9 G/DL (ref 12–16)
IMM GRANULOCYTES # BLD AUTO: 0.08 K/UL (ref 0–0.11)
IMM GRANULOCYTES NFR BLD AUTO: 0.6 % (ref 0–0.9)
LYMPHOCYTES # BLD AUTO: 2.17 K/UL (ref 1–4.8)
LYMPHOCYTES NFR BLD: 15.8 % (ref 22–41)
MAGNESIUM SERPL-MCNC: 2.2 MG/DL (ref 1.5–2.5)
MCH RBC QN AUTO: 31.1 PG (ref 27–33)
MCHC RBC AUTO-ENTMCNC: 30.6 G/DL (ref 33.6–35)
MCV RBC AUTO: 101.7 FL (ref 81.4–97.8)
MONOCYTES # BLD AUTO: 0.53 K/UL (ref 0–0.85)
MONOCYTES NFR BLD AUTO: 3.9 % (ref 0–13.4)
NEUTROPHILS # BLD AUTO: 10.32 K/UL (ref 2–7.15)
NEUTROPHILS NFR BLD: 75 % (ref 44–72)
NRBC # BLD AUTO: 0 K/UL
NRBC BLD-RTO: 0 /100 WBC
PHOSPHATE SERPL-MCNC: 2.9 MG/DL (ref 2.5–4.5)
PLATELET # BLD AUTO: 321 K/UL (ref 164–446)
PMV BLD AUTO: 10.5 FL (ref 9–12.9)
POTASSIUM SERPL-SCNC: 3.3 MMOL/L (ref 3.6–5.5)
RBC # BLD AUTO: 3.5 M/UL (ref 4.2–5.4)
SODIUM SERPL-SCNC: 145 MMOL/L (ref 135–145)
WBC # BLD AUTO: 13.7 K/UL (ref 4.8–10.8)

## 2022-03-02 PROCEDURE — 700102 HCHG RX REV CODE 250 W/ 637 OVERRIDE(OP): Performed by: STUDENT IN AN ORGANIZED HEALTH CARE EDUCATION/TRAINING PROGRAM

## 2022-03-02 PROCEDURE — 83735 ASSAY OF MAGNESIUM: CPT

## 2022-03-02 PROCEDURE — 700111 HCHG RX REV CODE 636 W/ 250 OVERRIDE (IP): Performed by: INTERNAL MEDICINE

## 2022-03-02 PROCEDURE — 82962 GLUCOSE BLOOD TEST: CPT

## 2022-03-02 PROCEDURE — 700102 HCHG RX REV CODE 250 W/ 637 OVERRIDE(OP): Performed by: HOSPITALIST

## 2022-03-02 PROCEDURE — 700105 HCHG RX REV CODE 258: Performed by: INTERNAL MEDICINE

## 2022-03-02 PROCEDURE — 97110 THERAPEUTIC EXERCISES: CPT

## 2022-03-02 PROCEDURE — A9270 NON-COVERED ITEM OR SERVICE: HCPCS | Performed by: INTERNAL MEDICINE

## 2022-03-02 PROCEDURE — 94760 N-INVAS EAR/PLS OXIMETRY 1: CPT

## 2022-03-02 PROCEDURE — 99239 HOSP IP/OBS DSCHRG MGMT >30: CPT | Performed by: INTERNAL MEDICINE

## 2022-03-02 PROCEDURE — 700102 HCHG RX REV CODE 250 W/ 637 OVERRIDE(OP): Performed by: INTERNAL MEDICINE

## 2022-03-02 PROCEDURE — 97530 THERAPEUTIC ACTIVITIES: CPT

## 2022-03-02 PROCEDURE — 80069 RENAL FUNCTION PANEL: CPT

## 2022-03-02 PROCEDURE — 85025 COMPLETE CBC W/AUTO DIFF WBC: CPT

## 2022-03-02 PROCEDURE — A9270 NON-COVERED ITEM OR SERVICE: HCPCS | Performed by: HOSPITALIST

## 2022-03-02 PROCEDURE — 700111 HCHG RX REV CODE 636 W/ 250 OVERRIDE (IP): Performed by: STUDENT IN AN ORGANIZED HEALTH CARE EDUCATION/TRAINING PROGRAM

## 2022-03-02 PROCEDURE — A9270 NON-COVERED ITEM OR SERVICE: HCPCS | Performed by: STUDENT IN AN ORGANIZED HEALTH CARE EDUCATION/TRAINING PROGRAM

## 2022-03-02 PROCEDURE — 94640 AIRWAY INHALATION TREATMENT: CPT

## 2022-03-02 PROCEDURE — C1751 CATH, INF, PER/CENT/MIDLINE: HCPCS

## 2022-03-02 RX ORDER — M-VIT,TX,IRON,MINS/CALC/FOLIC 27MG-0.4MG
1 TABLET ORAL DAILY
Qty: 30 TABLET | Refills: 11 | Status: SHIPPED
Start: 2022-03-03

## 2022-03-02 RX ORDER — IPRATROPIUM BROMIDE AND ALBUTEROL SULFATE 2.5; .5 MG/3ML; MG/3ML
3 SOLUTION RESPIRATORY (INHALATION) EVERY 6 HOURS PRN
Status: SHIPPED
Start: 2022-03-02

## 2022-03-02 RX ORDER — ASCORBIC ACID 500 MG
500 TABLET ORAL 2 TIMES DAILY
Qty: 30 TABLET | Status: SHIPPED
Start: 2022-03-02

## 2022-03-02 RX ORDER — ACETAMINOPHEN 325 MG/1
650 TABLET ORAL EVERY 6 HOURS PRN
Qty: 30 TABLET | Refills: 0 | Status: SHIPPED
Start: 2022-03-02

## 2022-03-02 RX ORDER — FUROSEMIDE 40 MG/1
40 TABLET ORAL
Status: DISCONTINUED | OUTPATIENT
Start: 2022-03-02 | End: 2022-03-02

## 2022-03-02 RX ORDER — POTASSIUM CHLORIDE 20 MEQ/1
40 TABLET, EXTENDED RELEASE ORAL ONCE
Status: COMPLETED | OUTPATIENT
Start: 2022-03-02 | End: 2022-03-02

## 2022-03-02 RX ORDER — AMOXICILLIN 250 MG
2 CAPSULE ORAL 2 TIMES DAILY
Qty: 30 TABLET | Refills: 0 | Status: SHIPPED
Start: 2022-03-02

## 2022-03-02 RX ORDER — ASPIRIN 81 MG/1
81 TABLET, CHEWABLE ORAL DAILY
Qty: 100 TABLET | Status: SHIPPED
Start: 2022-03-03

## 2022-03-02 RX ORDER — POTASSIUM CHLORIDE 20 MEQ/1
20 TABLET, EXTENDED RELEASE ORAL DAILY
Status: SHIPPED
Start: 2022-03-02

## 2022-03-02 RX ORDER — FUROSEMIDE 40 MG/1
40 TABLET ORAL 2 TIMES DAILY
Qty: 30 TABLET | Status: SHIPPED
Start: 2022-03-02

## 2022-03-02 RX ORDER — FUROSEMIDE 40 MG/1
40 TABLET ORAL
Status: DISCONTINUED | OUTPATIENT
Start: 2022-03-02 | End: 2022-03-02 | Stop reason: HOSPADM

## 2022-03-02 RX ORDER — FUROSEMIDE 40 MG/1
40 TABLET ORAL
Status: DISCONTINUED | OUTPATIENT
Start: 2022-03-03 | End: 2022-03-02

## 2022-03-02 RX ADMIN — INSULIN GLARGINE 30 UNITS: 100 INJECTION, SOLUTION SUBCUTANEOUS at 05:41

## 2022-03-02 RX ADMIN — SENNOSIDES AND DOCUSATE SODIUM 2 TABLET: 50; 8.6 TABLET ORAL at 05:41

## 2022-03-02 RX ADMIN — SODIUM HYPOCHLORITE 1 ML: 1.25 SOLUTION TOPICAL at 06:00

## 2022-03-02 RX ADMIN — ATORVASTATIN CALCIUM 40 MG: 40 TABLET, FILM COATED ORAL at 05:40

## 2022-03-02 RX ADMIN — AMPICILLIN SODIUM AND SULBACTAM SODIUM 3 G: 2; 1 INJECTION, POWDER, FOR SOLUTION INTRAMUSCULAR; INTRAVENOUS at 14:51

## 2022-03-02 RX ADMIN — BUDESONIDE AND FORMOTEROL FUMARATE DIHYDRATE 2 PUFF: 160; 4.5 AEROSOL RESPIRATORY (INHALATION) at 07:39

## 2022-03-02 RX ADMIN — AMPICILLIN SODIUM AND SULBACTAM SODIUM 3 G: 2; 1 INJECTION, POWDER, FOR SOLUTION INTRAMUSCULAR; INTRAVENOUS at 05:36

## 2022-03-02 RX ADMIN — LEVOTHYROXINE SODIUM 25 MCG: 0.03 TABLET ORAL at 06:00

## 2022-03-02 RX ADMIN — OXYCODONE HYDROCHLORIDE AND ACETAMINOPHEN 500 MG: 500 TABLET ORAL at 05:41

## 2022-03-02 RX ADMIN — MULTIPLE VITAMINS W/ MINERALS TAB 1 TABLET: TAB at 05:40

## 2022-03-02 RX ADMIN — SERTRALINE HYDROCHLORIDE 50 MG: 50 TABLET ORAL at 05:41

## 2022-03-02 RX ADMIN — AMLODIPINE BESYLATE 10 MG: 5 TABLET ORAL at 05:40

## 2022-03-02 RX ADMIN — AMPICILLIN SODIUM AND SULBACTAM SODIUM 3 G: 2; 1 INJECTION, POWDER, FOR SOLUTION INTRAMUSCULAR; INTRAVENOUS at 00:12

## 2022-03-02 RX ADMIN — POTASSIUM CHLORIDE 40 MEQ: 1500 TABLET, EXTENDED RELEASE ORAL at 14:52

## 2022-03-02 RX ADMIN — ENOXAPARIN SODIUM 40 MG: 40 INJECTION SUBCUTANEOUS at 05:46

## 2022-03-02 RX ADMIN — ASPIRIN 81 MG CHEWABLE TABLET 81 MG: 81 TABLET CHEWABLE at 05:41

## 2022-03-02 RX ADMIN — FUROSEMIDE 40 MG: 10 INJECTION, SOLUTION INTRAVENOUS at 05:37

## 2022-03-02 RX ADMIN — BENAZEPRIL HYDROCHLORIDE 20 MG: 10 TABLET, COATED ORAL at 05:40

## 2022-03-02 RX ADMIN — INSULIN HUMAN 6 UNITS: 100 INJECTION, SOLUTION PARENTERAL at 00:11

## 2022-03-02 ASSESSMENT — GAIT ASSESSMENTS
GAIT LEVEL OF ASSIST: CONTACT GUARD ASSIST
ASSISTIVE DEVICE: 4 WHEEL WALKER
DEVIATION: STEP TO
DISTANCE (FEET): 5

## 2022-03-02 ASSESSMENT — FIBROSIS 4 INDEX: FIB4 SCORE: 0.98

## 2022-03-02 NOTE — DISCHARGE PLANNING
Agency[facility]Facility Name: [facility]  Spoke To: Mya  Outcome: DPA called and asked if the pt can go to SNF with IV antibiotics. Per mya pt can be accepted today with midline and IV anti biotics, pt would just need F/U apt with ID and SNF will take them to appointment      1155  Agency/Facility Name: [facility]  Spoke To: Mya  Outcome: Per mya they can pick pt up at 1530 via their transport

## 2022-03-02 NOTE — FLOWSHEET NOTE
03/02/22 0700   Events/Summary/Plan   Events/Summary/Plan MDI   Vital Signs   Pulse 66   Respiration 20   Pulse Oximetry 94 %   $ Pulse Oximetry (Spot Check) Yes   Chest Exam   Work Of Breathing / Effort Mild   Breath Sounds   RUL Breath Sounds Clear   RML Breath Sounds Diminished   RLL Breath Sounds Diminished   MOSHE Breath Sounds Clear   LLL Breath Sounds Diminished   Secretions   Cough Non Productive   Oxygen   O2 (LPM) 3   O2 Delivery Device Silicone Nasal Cannula

## 2022-03-02 NOTE — THERAPY
Missed Therapy     Patient Name: Gabrielle Olmstead  Age:  76 y.o., Sex:  female  Medical Record #: [id number]  Today's Date: 3/2/2022    Discussed missed therapy with RN via Voalte. OT tx attempted, however, pt refused. She states she has been very sleepy and had not gotten a decent sleep the last couple of days. Would like to try later this afternoon. Explained to pt OT only available in the AM today, will reattempt tomorrow if pt does not dc, and once more appropriate.       03/02/22 1152   Treatment Variance   Reason For Missed Therapy Non-Medical - Patient Refused   Interdisciplinary Plan of Care Collaboration   IDT Collaboration with  Nursing   Patient Position at End of Therapy In Bed   Collaboration Comments RN made aware of OT attempt via Voalte   Session Information   Date / Session Number  3/2 attempted (last seen 2/27 #1 (1/3, 3/6))   Priority 2

## 2022-03-02 NOTE — FLOWSHEET NOTE
03/01/22 1934   Events/Summary/Plan   Events/Summary/Plan MDI 2 puffs   Skin Inspection Respiratory Device Intact   Vital Signs   Pulse 80   Respiration 20   Pulse Oximetry 93 %   $ Pulse Oximetry (Spot Check) Yes   Respiratory Assessment   Level of Consciousness Alert   Chest Exam   Work Of Breathing / Effort Mild   Breath Sounds   RUL Breath Sounds Clear;Diminished   RML Breath Sounds Clear;Diminished   RLL Breath Sounds Diminished   MOSHE Breath Sounds Clear;Diminished   LLL Breath Sounds Diminished   Secretions   Cough Dry;Strong   How Sputum Obtained Spontaneous   Oxygen   O2 (LPM) 3   O2 Delivery Device Silicone Nasal Cannula

## 2022-03-02 NOTE — DISCHARGE INSTRUCTIONS
Discharge Instructions    Discharged to [facility] by medical transportation with escort. Discharged via wheelchair, hospital escort: Yes.  Special equipment needed: Not Applicable    Be sure to schedule a follow-up appointment with your primary care doctor or any specialists as instructed.     Discharge Plan:   Diet Plan: Discussed  Activity Level: Discussed  Confirmed Follow up Appointment: Appointment Scheduled  Confirmed Symptoms Management: Discussed  Medication Reconciliation Updated: Yes  Influenza Vaccine Indication: Not indicated: Previously immunized this influenza season and > 8 years of age    I understand that a diet low in cholesterol, fat, and sodium is recommended for good health. Unless I have been given specific instructions below for another diet, I accept this instruction as my diet prescription.   Other diet: diabetic    Special Instructions:     HF Patient Discharge Instructions  · Monitor your weight daily, and maintain a weight chart, to track your weight changes.   · Activity as tolerated, unless your Doctor has ordered otherwise. Other activity order: as tolerated  · .  · Follow a low fat, low cholesterol, low salt diet unless instructed otherwise by your Doctor. Read the labels on the back of food products and track your intake of fat, cholesterol and salt.   · Fluid Restriction No. If a Fluid Restriction has been ordered by your Doctor, measure fluids with a measuring cup to ensure that you are not exceeding the restriction.   · No smoking.  · Oxygen No. If your Doctor has ordered that you wear Oxygen at home, it is important to wear it as ordered.  · Did you receive an explanation from staff on the importance of taking each of your medications and why it is necessary to stay on the medications the physician/care provider has ordered? No  · Do you have any questions concerning how to manage your heart failure and what to do should you have any increased signs and symptoms after you go home?  No  · Do you feel like your heart failure care team involved you in the care treatment plan and allowed you to make decisions regarding your care while in the hospital and addressed any discharge needs you might have? Yes    See the educational handout provided at discharge for more information on monitoring your daily weight, activity and diet. This also explains more about Heart Failure, symptoms of a flare-up and some of the tests that you have undergone.     Warning Signs of a Flare-Up include:  · Swelling in the ankles or lower legs.  · Shortness of breath, while at rest, or while doing normal activities.   · Shortness of breath at night when in bed, or coughing in bed.   · Requiring more pillows to sleep at night, or needing to sit up at night to sleep.  · Feeling weak, dizzy or fatigued.     When to call your Doctor:  · Call [facility] about questions regarding the discharge instructions you were given [phone].  (Discharge Unit med tele)  · Call your Primary Care Physician or Cardiologist if:   1. You experience any pain radiating to your jaw or neck.  2. You have any difficulty breathing.  3. You experience weight gain of 2 lbs in a day or 5 lbs in a week.   4. You feel any palpitations or irregular heartbeats.  5. You become dizzy or lose consciousness.   If you have had an angiogram or had a pacemaker or AICD placed, and experience:  1. Bleeding, drainage or swelling at the surgical / puncture site.  2. Fever greater than 100.0 F  3. Shock from internal defibrillator.  4. Cool and / or numb extremities.      · Is patient discharged on Warfarin / Coumadin?   No     Depression / Suicide Risk    As you are discharged from this [facility], it is important to learn how to keep safe from harming yourself.    Recognize the warning signs:  · Abrupt changes in personality, positive or negative- including increase in energy   · Giving away possessions  · Change in eating  patterns- significant weight changes-  positive or negative  · Change in sleeping patterns- unable to sleep or sleeping all the time   · Unwillingness or inability to communicate  · Depression  · Unusual sadness, discouragement and loneliness  · Talk of wanting to die  · Neglect of personal appearance   · Rebelliousness- reckless behavior  · Withdrawal from people/activities they love  · Confusion- inability to concentrate     If you or a loved one observes any of these behaviors or has concerns about self-harm, here's what you can do:  · Talk about it- your feelings and reasons for harming yourself  · Remove any means that you might use to hurt yourself (examples: pills, rope, extension cords, firearm)  · Get professional help from the community (Mental Health, Substance Abuse, psychological counseling)  · Do not be alone:Call your Safe Contact- someone whom you trust who will be there for you.  · Call your local CRISIS HOTLINE 420-9585 or 267-346-8008  · Call your local Children's Mobile Crisis Response Team [place] (453) 635-0368 or www.AnSyn  · Call the toll free National Suicide Prevention Hotlines   · National Suicide Prevention Lifeline 884-938-YXHK (6072)  · National Hope Line Network 800-SUICIDE (733-3161)

## 2022-03-02 NOTE — THERAPY
Physical Therapy   Daily Treatment     Patient Name: Gabrielle Olmstead  Age:  76 y.o., Sex:  female  Medical Record #: [id number]  Today's Date: 3/2/2022          Assessment    Much improved since I last saw her bed mob,transfers and ambulation.Pt going to SNF today     Plan       03/02/22 1300   Total Time Spent   Total Time Spent (Mins) 25   Charge Group   PT Therapeutic Activities 1   PT Therapeutic Exercise 1   Balance   Sitting Balance (Static) Fair   Sitting Balance (Dynamic) Fair   Standing Balance (Static) Fair -   Standing Balance (Dynamic) Fair -   Weight Shift Sitting Fair   Weight Shift Standing Fair   Gait Analysis   Gait Level Of Assist Contact Guard Assist   Assistive Device 4 Wheel Walker   Distance (Feet) 5   # of Times Distance was Traveled 1   Deviation Step To   Weight Bearing Status full   Bed Mobility    Supine to Sit Standby Assist   Sit to Supine Standby Assist   Scooting Minimal Assist   Functional Mobility   Sit to Stand Minimal Assist   Transfer Method Stand Step   Short Term Goals    Short Term Goal # 1 pt will go sit<>stand w/4ww w/spv in 6tx   Goal Outcome # 1 Progressing slower than expected   Short Term Goal # 2 pt will transfer bed<>chair w/4ww w/spv in 6tx for safe d/c home   Goal Outcome # 2 Progressing slower than expected   Short Term Goal # 3 pt will ambulate for 150ft w/4ww w/spv in 6tx for safe d/c home   Goal Outcome # 3 Progressing slower than expected   Anticipated Discharge Equipment and Recommendations   Discharge Recommendations Recommend post-acute placement for additional physical therapy services prior to discharge home   Interdisciplinary Plan of Care Collaboration   IDT Collaboration with  Nursing   Session Information   Date / Session Number  3/2-4 4/4 3/3   Patient discharged from facility.    DC Equipment Recommendations: 4-Wheeled Walker (pt states her 4WW is broken.)  Discharge Recommendations: (P) Recommend post-acute placement for additional physical therapy  services prior to discharge home

## 2022-03-02 NOTE — DISCHARGE PLANNING
Anticipated Discharge Disposition: [facility]      Action: Discussed during IDT rounds. Pt accepted to [facility]. ROLLY spoke with Chaparrita from [facility]. Pt needing mideline and f/u apt with ID. Discussed with MD. CM RN called [phone] to schedule a f/u apt, no answer, voice message left. Voalte message sent to bedside RN about midline placement time.     1152: Called and spoke with Mikael from PICC team, informed Mideline placement pending discharge. Informed transport set up for 1530, confirmed with Mikael line should be placed prior to that time. Called ID at [phone] to schedule apt, apt set at for 3/29 at 1420 and update [facility] admissions about apt time.     1318: Cobra completed and placed on chart. Discussed with pt discharge plan to [facility], Pt stating she loves [facility] and the staff.      1529: Rounded with Staff, midline in place. Documentation and measurement for midline placed in discharge packet.     PASRR: [id number]    Called and spoke to POA Joellen, updated time and discharge plan.    Barriers to Discharge: Mideline, ID f/u apt outpt     Plan: Case management to follow up with PICC team on placement time.

## 2022-03-02 NOTE — PROCEDURES
Vascular Access Team    Date of Insertion: 3/2/22  Arm Circumference: 35  Internal length: 12  External Length: 0  Vein Occupancy %: 30  Reason for Midline: IV ABX  Labs: WITHIN PARAMETERS    Orders confirmed, vessel patency confirmed with ultrasound. Risks and benefits of procedure explained to patient and education regarding line associated bloodstream infections provided. Questions answered.     Power Midline placed in RUE per licensed provider order with ultrasound guidance. 4 Fr, 1 lumen Power Midline placed in BASILIC vein after 1 attempt(s). 2 mL of 1% lidocaine injected intradermally, 21 gauge microintroducer needle was visualized entering the vein and modified Seldinger technique used. 12 cm catheter inserted with good blood return. Secured at 0 cm marker. Internal positioning stylet removed and verified to be intact. Each lumen flushed without resistance with 10 mL 0.9% normal saline. Midline secured with Biopatch and Tegaderm.     Midline placement is confirmed by nurse using ultrasound and ability to flush and draw blood. Midline is appropriate for use at this time.  Patient tolerated procedure well, without complications.  No X-ray is needed for placement confirmation.  Patient condition relayed to unit RN or ordering physician via this post procedure note in the EMR.     Ultrasound images uploaded to PACS and viewable in the EMR - yes  Ultrasound imaged printed and placed in paper chart - no     BARD Power Midline ref # [id number], Lot # [id number], Expiration Date 2023-03-31

## 2022-03-02 NOTE — CARE PLAN
Problem: Nutritional:  Goal: Achieve adequate nutritional intake  Description: Patient will consume >50% of meals  Outcome: Progressing   PO intake of pt's Level 6-soft and bite sized, consistent CHO (diabetic) diet with Level 0 - thin liquids has been adequate at % x 2 recorded meals. RD will continue to follow.

## 2022-03-02 NOTE — TELEPHONE ENCOUNTER
Spoke with  at [facility]. States EOT is 03/05, Pt appointment not until 03/29/2022 . Would you like to extend abx or continue with EOT 03/05, Pt to go to SNF after d/c , Pt has anaerobic bacteremia

## 2022-03-03 PROBLEM — L02.416 CELLULITIS AND ABSCESS OF LEFT LEG: Status: ACTIVE | Noted: 2022-02-16

## 2022-03-03 LAB
BACTERIA BLD CULT: NORMAL
BACTERIA BLD CULT: NORMAL
SIGNIFICANT IND 70042: NORMAL
SIGNIFICANT IND 70042: NORMAL
SITE SITE: NORMAL
SITE SITE: NORMAL
SOURCE SOURCE: NORMAL
SOURCE SOURCE: NORMAL

## 2022-03-03 NOTE — TELEPHONE ENCOUNTER
Jessica Corey M.D.  You Yesterday (12:18 PM)         This patient does not need a follow-up appointment and I did not request one in my note.     Jessica       NOTED. Will speak with Case Management .

## 2022-03-03 NOTE — PROGRESS NOTES
Pt discharged to [facility]. Discharge instructions provided to pt. Pt verbalizes understanding. Pt states all questions have been answered. Signed copy in chart. Prescriptions sent to n/a. Pt states that all personal belongings are in possession. Pt off unit via [facility] transport, escorted by nursing staff.    RN noted that midline dressing was bloody. RN changed the dressing prior to discharging, sterile technique used.     Report called to April REAL for report at [facility] at [phone].

## 2022-03-08 PROBLEM — S42.309A HUMERUS FRACTURE: Status: RESOLVED | Noted: 2020-04-04 | Resolved: 2022-03-08

## 2022-03-08 PROBLEM — K59.1 FUNCTIONAL DIARRHEA: Status: ACTIVE | Noted: 2022-03-08

## 2022-03-11 PROBLEM — R26.2 AMBULATORY DYSFUNCTION: Status: ACTIVE | Noted: 2022-03-11

## 2022-03-18 PROBLEM — R53.81 DEBILITY: Status: ACTIVE | Noted: 2022-03-18

## 2022-03-23 PROBLEM — L02.416 CELLULITIS AND ABSCESS OF LEFT LEG: Status: RESOLVED | Noted: 2022-02-16 | Resolved: 2022-03-23

## 2022-03-23 PROBLEM — R06.02 SHORTNESS OF BREATH: Status: RESOLVED | Noted: 2022-02-16 | Resolved: 2022-03-23

## 2022-03-23 PROBLEM — J18.9 COMMUNITY ACQUIRED PNEUMONIA: Status: RESOLVED | Noted: 2021-07-05 | Resolved: 2022-03-23

## 2022-03-23 PROBLEM — L03.116 CELLULITIS AND ABSCESS OF LEFT LEG: Status: RESOLVED | Noted: 2022-02-16 | Resolved: 2022-03-23

## 2022-05-04 NOTE — DISCHARGE SUMMARY
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  Symptom onset for 1 week. Worsened SOB in last 2 days prior to arrival.  Associated BLE swelling and left leg redness.  Patient was started on Unasyn for LLE cellulitis, placed on supplemental oxygen, started treatment for acute COPD exacerbation with IV steroids and duonebs.    Pulmonary was consulted for COPD exacerbation.  Patient was transferred to ICU on 02/19 required BiPAP, but did not improve and was intubated. Patient did require Precedex and aggressive diuretics.  Patient was later extubated 02/23/2022.  Patient downgraded on 02/26. Patient to continue lasix with potassium replacements.  Please recheck BMP weekly until potassium stabilizes.    Troponins noted to have elevated to 131 and cardiology was consulted by critical care for STEMI.  Patient's troponinemia was determined to be due to demand ischemia (Dr. Esquivel).  I could not find a formal consult note.    02/17/2022 - Echo returned with LVEF 70%, RVSP 58mmHg.    Repeat 02/19/2022 - Echo returned with LVEF 40%, taken after troponin elevation.  Recommending cardiology follow up and echo in 3 months.    ID has been following patient's case given patient had positive blood cultures. One with Staphylococcus, non-MSSA or MRSA which was likely contaminant (02/24/2022).  One other blood culture showed Peptoniphilus lacrimalis (02/16/2022).  Source likely from infected pressure ulcer present upon admission.  Patient was on Zosyn and transition to Unasyn, end date 3/5/22.  Appointment with community  care services infectious disease made for 03/29/2022 at 1420.  Midline was placed on to patient prior to discharge.  Unasyn to continue with 3 g every 6 hours, last dose on 3/5/2022 at 6 PM.    ABX course:  Unasyn 02/16-02/17. Restarted 02/28-current.  Zosyn 02/26-02/28  Cefepime 02/20-02/25  Flagyl 02/19-02/24  Azithromycin 02/17-02/19  Cefazolin 02/16    Patient was evaluated by speech therapy and passed the fiberoptic swallow study.  Patient is to continue soft and bite-size with thin liquids.  Medication instructions listed below.    Therefore, she is discharged in fair and stable condition to skilled nursing facility.    The patient met 2-midnight criteria for an inpatient stay at the time of discharge.    Discharge Date  03/02/2022    FOLLOW UP ITEMS POST DISCHARGE  Listed below    DISCHARGE DIAGNOSES  Principal Problem:    COPD with acute exacerbation ([facility]) POA: Yes  Active Problems:    Essential hypertension POA: Yes    Dyslipidemia POA: Yes    DM (diabetes mellitus) ([facility]) POA: Yes    Hypothyroidism POA: Yes    Shortness of breath POA: Yes    Acute on chronic respiratory failure with hypoxia ([facility]) POA: Yes    Decubitus ulcer POA: Yes  Resolved Problems:    Diarrhea POA: Yes    Cellulitis of left lower extremity POA: Yes    Elevated troponin POA: Yes    Sepsis ([facility]) POA: Yes    NSTEMI (non-ST elevated myocardial infarction) ([facility]) POA: Clinically Undetermined    Bacteremia POA: Yes    SRIRAM (acute kidney injury) ([facility]) POA: Yes    Dehydration with hypernatremia POA: Yes    Hypokalemia POA: Clinically Undetermined      FOLLOW UP  Future Appointments   Date Time Provider Department Center   3/29/2022  2:20 PM Jessica Corey M.D. [address]     Anthony Martinez M.D.  [address]  [place] [address]  [phone]    Schedule an appointment as soon as possible for a visit in 1 week  Follow-up in 1 to 2 weeks for post hospital visit.  Repeat labs including CBC with differential, renal function panel or  BMP, magnesium level.    Jessica Corey M.D.  [address] [facility] [address]  [facility] [address] [phone]    Go on 3/29/2022  Please follow-up with infection disease specialist Dr. Corey or other associate.      MEDICATIONS ON DISCHARGE     Medication List      START taking these medications      Instructions   acetaminophen 325 MG Tabs  Commonly known as: Tylenol   2 Tablets by Enteral Tube route every 6 hours as needed for Mild Pain or Fever.  Dose: 650 mg     ascorbic acid 500 MG tablet  Commonly known as: Vitamin C   1 Tablet by Enteral Tube route 2 times a day.  Dose: 500 mg     aspirin 81 MG Chew chewable tablet  Start taking on: March 3, 2022  Commonly known as: ASA   1 Tablet by Enteral Tube route every day.  Dose: 81 mg     enoxaparin 40 MG/0.4ML Soln inj  Start taking on: March 3, 2022  Commonly known as: LOVENOX   Inject 40 mg under the skin every day.  Dose: 40 mg     furosemide 40 MG Tabs  Commonly known as: LASIX   Take 1 Tablet by mouth 2 times a day.  Dose: 40 mg     insulin GLARGINE 100 UNIT/ML Soln  Commonly known as: Lantus,Semglee   Inject 30 Units under the skin 2 times a day.  Dose: 30 Units     insulin regular 100 Unit/mL Soln  Commonly known as: HumuLIN R   Inject 3-14 Units under the skin every 6 hours.  Dose: 3-14 Units     ipratropium-albuterol 0.5-2.5 (3) MG/3ML nebulizer solution  Commonly known as: DUONEB   Take 3 mL by nebulization every 6 hours as needed for Shortness of Breath.  Dose: 3 mL     NS SOLN 100 mL with ampicillin/sulbactam 3 (2-1) g SOLR 3 g   Infuse 3 g into a venous catheter every 6 hours for 3 days. Last dose 6pm 03/05/22  Dose: 3 g     senna-docusate 8.6-50 MG Tabs  Commonly known as: PERICOLACE or SENOKOT S   2 Tablets by Enteral Tube route 2 times a day.  Dose: 2 Tablet     therapeutic multivitamin-minerals Tabs  Start taking on: March 3, 2022   1 Tablet by Enteral Tube route every day.  Dose: 1 Tablet        CHANGE how you take these medications       Instructions   potassium chloride SA 20 MEQ Tbcr  What changed:   · medication strength  · how much to take  Commonly known as: Kdur   Take 1 Tablet by mouth every day.  Dose: 20 mEq        CONTINUE taking these medications      Instructions   amlodipine-benazepril 5-20 MG per capsule  Commonly known as: LOTREL   Take 1 Cap by mouth every day.  Dose: 1 Capsule     atorvastatin 40 MG Tabs  Commonly known as: LIPITOR   Take 40 mg by mouth every day.  Dose: 40 mg     fluticasone-salmeterol 250-50 MCG/DOSE Aepb  Commonly known as: ADVAIR   Inhale 1 Puff by mouth every 12 hours.  Dose: 1 Puff     glimepiride 4 MG Tabs  Commonly known as: AMARYL   Take 4 mg by mouth every day.  Dose: 4 mg     levothyroxine 25 MCG Tabs  Commonly known as: SYNTHROID   Take 25 mcg by mouth every day.  Dose: 25 mcg     metFORMIN  MG Tb24  Commonly known as: GLUCOPHAGE XR   Take 500 mg by mouth 2 Times a Day.  Dose: 500 mg     oxybutynin 15 MG CR tablet  Commonly known as: DITROPAN XL   Take 15 mg by mouth every evening.  Dose: 15 mg     PROBIOTIC PO   Take 1 capsule by mouth every day.  Dose: 1 Capsule     sertraline 50 MG Tabs  Commonly known as: Zoloft   Take 50 mg by mouth every day.  Dose: 50 mg        STOP taking these medications    bumetanide 1 MG Tabs  Commonly known as: BUMEX            Allergies  Allergies   Allergen Reactions   • Nkda [No Known Drug Allergy]        DIET  Orders Placed This Encounter   Procedures   • Diet Order Diet: Level 6 - Soft and Bite Sized (assist with tray set up/repositioning); Liquid level: Level 0 - Thin; Second Modifier: (optional): Consistent CHO (Diabetic); Tray Modifications (optional): No Straws, SLP - Deliver to Nursing Statio...     Standing Status:   Standing     Number of Occurrences:   1     Order Specific Question:   Diet:     Answer:   Level 6 - Soft and Bite Sized [23]     Comments:   assist with tray set up/repositioning     Order Specific Question:   Liquid level     Answer:   Level 0  - Thin     Order Specific Question:   Second Modifier: (optional)     Answer:   Consistent CHO (Diabetic) [4]     Order Specific Question:   Tray Modifications (optional)     Answer:   No Straws     Order Specific Question:   Tray Modifications (optional)     Answer:   SLP - Deliver to Nursing Station       ACTIVITY  As tolerated and directed by skilled nursing.  Weight bearing as tolerated    CONSULTATIONS  Infectious disease, critical care/pulmonology, cardiology, physiatry    PROCEDURES  Intubated 02/19, extubated 02/23    LABORATORY  Lab Results   Component Value Date    SODIUM 145 03/02/2022    POTASSIUM 3.3 (L) 03/02/2022    CHLORIDE 104 03/02/2022    CO2 31 03/02/2022    GLUCOSE 130 (H) 03/02/2022    BUN 43 (H) 03/02/2022    CREATININE 0.76 03/02/2022        Lab Results   Component Value Date    WBC 13.7 (H) 03/02/2022    HEMOGLOBIN 10.9 (L) 03/02/2022    HEMATOCRIT 35.6 (L) 03/02/2022    PLATELETCT 321 03/02/2022        Total time of the discharge process exceeds 40 minutes.   Yes

## 2023-08-24 NOTE — PROGRESS NOTES
Telemetry Shift Summary     Rhythm: SR w/ a BBB   Rate: 70-83  Measurements: 0.16/0.14/0.44  Ectopy (reported by Monitor Tech): r PAC, r PVC      Normal Values  Rhythm: Sinus  HR:   Measurements: 0.12-0.20/0.06-0.10/0.30-0.52     99.1

## 2025-01-28 NOTE — DIETARY
"Nutrition Support Assessment   Day 4 of admit.  Gabrielle Olmstead is a 76 y.o. female with admitting DX of Sepsis, Acute on chronic respiratory failure with hypoxia      Current problem list:  1. Acute on chronic respiratory failure with hypoxia  2. SRIRAM (acute kidney injury)  3. Bacteremia  4. NSTEMI (non-ST elevated myocardial infarction)  5. COPD with acute exacerbation  6. Decubitus ulcer  7. Cellulitis of left lower extremity  8. DM (diabetes mellitus)     Assessment:  Estimated Nutritional Needs: based on:   Height: 154.9 cm (5' 1\")  Weight: 110 kg (243 lb 6.2 oz)  Body mass index is 45.99 kg/m²., BMI classification: class 3 (extreme) obesity  Ideal body weight (IBW): 47.7 kg (105 lb)    Calculation/Equation:   PSU (EMV=7.1 L/min, Tmax=37.2 C): DMN=7005 kcals  MSJ: REE=1532 x 1.3=1992 kcals  Per guidelines for critically ill obese:     - 0327-7687 kcals (11-14 kcals/kg)    - >/=119 gms protein (>/=2.5 gms/kg IBW)     Evaluation:   1. Pt with tube feed consult because she has been sedated and intubated  2. Pt has a Cortrak with tip of tube in the distal stomach.   3. Weight gain noted of ~ 2 kg since admit. Pt is +2.5 liters since admit. Difference in nutrition needs based on admit weight and current weight are not significant. Current wt is appropriate to use in estimated kcal needs.   4. Skin: Pt with unstageable PI on her left buttock. Per most recent wound note, this wound has opened with undermining noted. Pt also has partial thickness wounds on her left LE   5. GI: LBM on 2/16/22 per flow sheets  6. Labs: 2/21/22: Bun=73 (HH), creatinine=1.59 (H), phos=5 (H), mag=2.2. 2/20/22: triglycerides=177 (H). Glucose accu-ck (2/20 & 2/21): 107-181   7. Meds: vit C BID for wound healing, Bumex (held), Lasix (held), insulin glargine BID, SSI, MVI+min for wound healing, Levophed @ 1 mcg/min, propofol @ 16.5 mL/hour providing 436 kcals/day.   8. Peptamen Intense VHP is appropriate formula because it is a low kcal/high " protein formula. Peptamen Intense VHP at a goal rate of 60 mL/hour will provide 1440 kcals (1876 kcals w/ propofol), 132 gms of protein, and 1210 mL of free water. This should meet pt's estimated needs and encourage wound healing.      Malnutrition Risk: criteria not met     Recommendations/Plan:  1. Initiate Peptamen Intense VHP at 25 mL/hour and advance per protocol to a goal rate of 60 mL/hour  2. Additional fluids per MD  3. Monitor weights    RD will follow.                 4 = No assist / stand by assistance